# Patient Record
Sex: MALE | Race: WHITE | Employment: OTHER | URBAN - METROPOLITAN AREA
[De-identification: names, ages, dates, MRNs, and addresses within clinical notes are randomized per-mention and may not be internally consistent; named-entity substitution may affect disease eponyms.]

---

## 2017-01-20 ENCOUNTER — HOSPITAL ENCOUNTER (OUTPATIENT)
Dept: GENERAL RADIOLOGY | Facility: HOSPITAL | Age: 82
Discharge: HOME | End: 2017-01-20
Attending: NURSE PRACTITIONER
Payer: MEDICARE

## 2017-01-20 ENCOUNTER — ANESTHESIA EVENT (OUTPATIENT)
Dept: SURGERY | Facility: HOSPITAL | Age: 82
DRG: 235 | End: 2017-01-20
Payer: MEDICARE

## 2017-01-20 ENCOUNTER — HOSPITAL ENCOUNTER (OUTPATIENT)
Dept: ULTRASOUND IMAGING | Facility: HOSPITAL | Age: 82
Discharge: HOME | End: 2017-01-20
Attending: PHYSICIAN ASSISTANT
Payer: MEDICARE

## 2017-01-20 ENCOUNTER — HOSPITAL ENCOUNTER (OUTPATIENT)
Dept: CV DIAGNOSTICS | Facility: HOSPITAL | Age: 82
Discharge: HOME | End: 2017-01-20
Attending: PHYSICIAN ASSISTANT
Payer: MEDICARE

## 2017-01-20 DIAGNOSIS — R42 DIZZINESS: ICD-10-CM

## 2017-01-20 DIAGNOSIS — I25.119 ATHEROSCLEROTIC HEART DISEASE OF NATIVE CORONARY ARTERY WITH UNSPECIFIED ANGINA PECTORIS (HCC): ICD-10-CM

## 2017-01-20 LAB
ALBUMIN SERPL BCP-MCNC: 3.8 G/DL (ref 3.5–4.8)
ALBUMIN/GLOB SERPL: 1.5 {RATIO} (ref 1–2)
ALP SERPL-CCNC: 56 U/L (ref 32–100)
ALT SERPL-CCNC: 18 U/L (ref 17–63)
ANION GAP SERPL CALC-SCNC: 7 MMOL/L (ref 0–18)
ANTIBODY SCREEN: NEGATIVE
AST SERPL-CCNC: 22 U/L (ref 15–41)
BACTERIA UR QL AUTO: NEGATIVE /HPF
BASOPHILS # BLD: 0 K/UL (ref 0–0.2)
BASOPHILS NFR BLD: 1 %
BILIRUB SERPL-MCNC: 0.9 MG/DL (ref 0.3–1.2)
BILIRUB UR QL: NEGATIVE
BUN SERPL-MCNC: 20 MG/DL (ref 8–20)
BUN/CREAT SERPL: 14 (ref 10–20)
CALCIUM SERPL-MCNC: 9.7 MG/DL (ref 8.5–10.5)
CHLORIDE SERPL-SCNC: 102 MMOL/L (ref 95–110)
CLARITY UR: CLEAR
CO2 SERPL-SCNC: 31 MMOL/L (ref 22–32)
COLOR UR: YELLOW
CREAT SERPL-MCNC: 1.43 MG/DL (ref 0.5–1.5)
EOSINOPHIL # BLD: 0.2 K/UL (ref 0–0.7)
EOSINOPHIL NFR BLD: 3 %
ERYTHROCYTE [DISTWIDTH] IN BLOOD BY AUTOMATED COUNT: 13.5 % (ref 11–15)
GLOBULIN PLAS-MCNC: 2.5 G/DL (ref 2.5–3.7)
GLUCOSE SERPL-MCNC: 81 MG/DL (ref 70–99)
GLUCOSE UR-MCNC: NEGATIVE MG/DL
HBA1C MFR BLD: 5.5 % (ref 4–6)
HCT VFR BLD AUTO: 45.4 % (ref 41–52)
HGB BLD-MCNC: 15.1 G/DL (ref 13.5–17.5)
HGB UR QL STRIP.AUTO: NEGATIVE
HYALINE CASTS #/AREA URNS AUTO: 1 /LPF
INR BLD: 1 (ref 0.9–1.2)
KETONES UR-MCNC: NEGATIVE MG/DL
LEUKOCYTE ESTERASE UR QL STRIP.AUTO: NEGATIVE
LYMPHOCYTES # BLD: 1.6 K/UL (ref 1–4)
LYMPHOCYTES NFR BLD: 25 %
MAGNESIUM SERPL-MCNC: 1.9 MG/DL (ref 1.8–2.5)
MCH RBC QN AUTO: 31.1 PG (ref 27–32)
MCHC RBC AUTO-ENTMCNC: 33.2 G/DL (ref 32–37)
MCV RBC AUTO: 93.6 FL (ref 80–100)
MONOCYTES # BLD: 0.7 K/UL (ref 0–1)
MONOCYTES NFR BLD: 11 %
MRSA NASAL: NEGATIVE
NEUTROPHILS # BLD AUTO: 3.8 K/UL (ref 1.8–7.7)
NEUTROPHILS NFR BLD: 60 %
NITRITE UR QL STRIP.AUTO: NEGATIVE
OSMOLALITY UR CALC.SUM OF ELEC: 292 MOSM/KG (ref 275–295)
PH UR: 6 [PH] (ref 5–8)
PLATELET # BLD AUTO: 219 K/UL (ref 140–400)
PMV BLD AUTO: 8.5 FL (ref 7.4–10.3)
POTASSIUM SERPL-SCNC: 4.3 MMOL/L (ref 3.3–5.1)
PROT SERPL-MCNC: 6.3 G/DL (ref 5.9–8.4)
PROT UR-MCNC: NEGATIVE MG/DL
PROTHROMBIN TIME: 12.6 SECONDS (ref 11.8–14.5)
RBC # BLD AUTO: 4.85 M/UL (ref 4.5–5.9)
RBC #/AREA URNS AUTO: <1 /HPF
RH BLOOD TYPE: POSITIVE
SODIUM SERPL-SCNC: 140 MMOL/L (ref 136–144)
SP GR UR STRIP: 1.02 (ref 1–1.03)
STAPH A BY PCR: NEGATIVE
UROBILINOGEN UR STRIP-ACNC: <2
VIT C UR-MCNC: 20 MG/DL
WBC # BLD AUTO: 6.3 K/UL (ref 4–11)
WBC #/AREA URNS AUTO: <1 /HPF

## 2017-01-20 PROCEDURE — 93306 TTE W/DOPPLER COMPLETE: CPT | Performed by: INTERNAL MEDICINE

## 2017-01-20 PROCEDURE — 86901 BLOOD TYPING SEROLOGIC RH(D): CPT | Performed by: NURSE PRACTITIONER

## 2017-01-20 PROCEDURE — 71020 XR CHEST PA + LAT CHEST (CPT=71020): CPT

## 2017-01-20 PROCEDURE — 81003 URINALYSIS AUTO W/O SCOPE: CPT | Performed by: NURSE PRACTITIONER

## 2017-01-20 PROCEDURE — 85610 PROTHROMBIN TIME: CPT | Performed by: NURSE PRACTITIONER

## 2017-01-20 PROCEDURE — 86900 BLOOD TYPING SEROLOGIC ABO: CPT | Performed by: NURSE PRACTITIONER

## 2017-01-20 PROCEDURE — 83735 ASSAY OF MAGNESIUM: CPT | Performed by: NURSE PRACTITIONER

## 2017-01-20 PROCEDURE — 94667 MNPJ CHEST WALL 1ST: CPT

## 2017-01-20 PROCEDURE — 93010 ELECTROCARDIOGRAM REPORT: CPT | Performed by: NURSE PRACTITIONER

## 2017-01-20 PROCEDURE — 93306 TTE W/DOPPLER COMPLETE: CPT

## 2017-01-20 PROCEDURE — 93880 EXTRACRANIAL BILAT STUDY: CPT

## 2017-01-20 PROCEDURE — 86850 RBC ANTIBODY SCREEN: CPT | Performed by: NURSE PRACTITIONER

## 2017-01-20 PROCEDURE — 86920 COMPATIBILITY TEST SPIN: CPT

## 2017-01-20 PROCEDURE — 93005 ELECTROCARDIOGRAM TRACING: CPT

## 2017-01-20 PROCEDURE — 80053 COMPREHEN METABOLIC PANEL: CPT | Performed by: NURSE PRACTITIONER

## 2017-01-20 PROCEDURE — 83036 HEMOGLOBIN GLYCOSYLATED A1C: CPT | Performed by: NURSE PRACTITIONER

## 2017-01-20 PROCEDURE — 85025 COMPLETE CBC W/AUTO DIFF WBC: CPT | Performed by: NURSE PRACTITIONER

## 2017-01-20 NOTE — PROGRESS NOTES
Misc. Note    Met with patient and wife for pre and post-op teaching for open heart surgery 1/25/17 with Dr. Jennifer Johnston. Questions answered consents signed.  Pre-op Instructions reviewed in regards to showers; arrival time; NPO after midnight; medications to s

## 2017-01-20 NOTE — ANESTHESIA PREPROCEDURE EVALUATION
Anesthesia PreOp Note    HPI:     Fernie Howell is a 80year old male who presents for preoperative consultation requested by: Sukumar Krishnamurthy MD    Date of Surgery: 1/25/2017    Procedure(s):   HEART CORONARY ARTERY BYPASS GRAFT  ENDOSCOPIC LEG VEIN H 01/20/2017   MCHC 33.2 01/20/2017   RDW 13.5 01/20/2017    01/20/2017   MPV 8.5 01/20/2017          Lab Results  Component Value Date   INR 1.0 01/20/2017       Vital Signs:   height is 1.74 m (5' 8.5\") and weight is 77.111 kg (170 lb).     01/20/17

## 2017-01-25 ENCOUNTER — SURGERY (OUTPATIENT)
Age: 82
End: 2017-01-25

## 2017-01-25 ENCOUNTER — ANESTHESIA (OUTPATIENT)
Dept: SURGERY | Facility: HOSPITAL | Age: 82
DRG: 235 | End: 2017-01-25
Payer: MEDICARE

## 2017-01-25 ENCOUNTER — APPOINTMENT (OUTPATIENT)
Dept: GENERAL RADIOLOGY | Facility: HOSPITAL | Age: 82
DRG: 235 | End: 2017-01-25
Attending: CLINICAL NURSE SPECIALIST
Payer: MEDICARE

## 2017-01-25 ENCOUNTER — HOSPITAL ENCOUNTER (INPATIENT)
Facility: HOSPITAL | Age: 82
LOS: 6 days | Discharge: NURSING, REHAB OR LONG-TERM CARE | DRG: 235 | End: 2017-01-31
Attending: THORACIC SURGERY (CARDIOTHORACIC VASCULAR SURGERY) | Admitting: THORACIC SURGERY (CARDIOTHORACIC VASCULAR SURGERY)
Payer: MEDICARE

## 2017-01-25 DIAGNOSIS — I25.10 CORONARY ARTERY DISEASE INVOLVING NATIVE HEART, ANGINA PRESENCE UNSPECIFIED, UNSPECIFIED VESSEL OR LESION TYPE: Primary | ICD-10-CM

## 2017-01-25 LAB
ANION GAP SERPL CALC-SCNC: 15 MMOL/L (ref 0–18)
ANION GAP SERPL CALC-SCNC: 9 MMOL/L (ref 0–18)
APTT PPP: 61 SECONDS (ref 23.2–35.3)
BASE EXCESS BLD CALC-SCNC: -0.6 MMOL/L (ref ?–2)
BASE EXCESS BLD CALC-SCNC: -1.3 MMOL/L (ref ?–2)
BASE EXCESS BLD CALC-SCNC: -2.8 MMOL/L (ref ?–2)
BASE EXCESS BLD CALC-SCNC: -3.1 MMOL/L (ref ?–2)
BASE EXCESS BLD CALC-SCNC: -3.1 MMOL/L (ref ?–2)
BASE EXCESS BLD CALC-SCNC: -3.6 MMOL/L (ref ?–2)
BLOOD TYPE BARCODE: 5100
BUN SERPL-MCNC: 18 MG/DL (ref 8–20)
BUN SERPL-MCNC: 21 MG/DL (ref 8–20)
BUN/CREAT SERPL: 12.4 (ref 10–20)
BUN/CREAT SERPL: 16.2 (ref 10–20)
CA-I BLD-SCNC: 1.18 MMOL/L (ref 0.95–1.32)
CALCIUM SERPL-MCNC: 7.4 MG/DL (ref 8.5–10.5)
CALCIUM SERPL-MCNC: 7.9 MG/DL (ref 8.5–10.5)
CFT BLD TEG: 11.4 MIN (ref 5–10)
CFT BLD TEG: 4.2 MIN (ref 5–10)
CFT P HPASE BLD TEG: 11.8 MIN (ref 5–10)
CHLORIDE SERPL-SCNC: 110 MMOL/L (ref 95–110)
CHLORIDE SERPL-SCNC: 114 MMOL/L (ref 95–110)
CLOT ANGLE BLD TEG: 49.7 DEG (ref 53–72)
CLOT ANGLE BLD TEG: 74.8 DEG (ref 53–72)
CLOT ANGLE P HPASE BLD TEG: 50.3 DEG (ref 53–72)
CLOT LYSIS 30M P MA LENFR BLD TEG: 0 % (ref 0–8)
CLOT LYSIS 30M P MA LENFR BLD TEG: 0 % (ref 0–8)
CLOT LYSIS ESTIMATE PRCTL BLD TEG: 10.2 MIN
CLOT LYSIS ESTIMATE PRCTL BLD TEG: 3.9 MIN
CLOT STRENGTH BLD TEG: 1 MIN (ref 1–3)
CLOT STRENGTH BLD TEG: 3.2 MIN (ref 1–3)
CLOT STRENGTH BLD TEG: 5 KD/SC (ref 4.5–11)
CLOT STRENGTH BLD TEG: 9.6 KD/SC (ref 4.5–11)
CLOT STRENGTH P HPASE BLD TEG: 2.8 MIN (ref 1–3)
CLOT STRENGTH P HPASE BLD TEG: 6.4 KD/SC (ref 4.5–11)
CLOT STRENGTH P HPASE BLD TEG: 9.8 MIN
CO2 SERPL-SCNC: 18 MMOL/L (ref 22–32)
CO2 SERPL-SCNC: 21 MMOL/L (ref 22–32)
COHGB MFR BLD: 1.5 % (ref 0–1.5)
COHGB MFR BLD: 1.6 % (ref 0–1.5)
COHGB MFR BLD: <1.5 % (ref 0–1.5)
CREAT SERPL-MCNC: 1.11 MG/DL (ref 0.5–1.5)
CREAT SERPL-MCNC: 1.69 MG/DL (ref 0.5–1.5)
ERYTHROCYTE [DISTWIDTH] IN BLOOD BY AUTOMATED COUNT: 13.3 % (ref 11–15)
FIBRINOGEN PPP-MCNC: 164 MG/DL (ref 176–491)
GLUCOSE BLDC GLUCOMTR-MCNC: 105 MG/DL (ref 70–99)
GLUCOSE BLDC GLUCOMTR-MCNC: 116 MG/DL (ref 70–99)
GLUCOSE BLDC GLUCOMTR-MCNC: 121 MG/DL (ref 70–99)
GLUCOSE BLDC GLUCOMTR-MCNC: 127 MG/DL (ref 70–99)
GLUCOSE BLDC GLUCOMTR-MCNC: 136 MG/DL (ref 70–99)
GLUCOSE BLDC GLUCOMTR-MCNC: 141 MG/DL (ref 70–99)
GLUCOSE BLDC GLUCOMTR-MCNC: 145 MG/DL (ref 70–99)
GLUCOSE BLDC GLUCOMTR-MCNC: 148 MG/DL (ref 70–99)
GLUCOSE BLDC GLUCOMTR-MCNC: 149 MG/DL (ref 70–99)
GLUCOSE BLDC GLUCOMTR-MCNC: 152 MG/DL (ref 70–99)
GLUCOSE BLDC GLUCOMTR-MCNC: 164 MG/DL (ref 70–99)
GLUCOSE BLDC GLUCOMTR-MCNC: 171 MG/DL (ref 70–99)
GLUCOSE BLDC GLUCOMTR-MCNC: 173 MG/DL (ref 70–99)
GLUCOSE BLDC GLUCOMTR-MCNC: 175 MG/DL (ref 70–99)
GLUCOSE BLDC GLUCOMTR-MCNC: 179 MG/DL (ref 70–99)
GLUCOSE BLDC GLUCOMTR-MCNC: 183 MG/DL (ref 70–99)
GLUCOSE BLDC GLUCOMTR-MCNC: 79 MG/DL (ref 70–99)
GLUCOSE SERPL-MCNC: 133 MG/DL (ref 70–99)
GLUCOSE SERPL-MCNC: 196 MG/DL (ref 70–99)
HCO3 BLDA-SCNC: 20.1 MEQ/L (ref 21–27)
HCO3 BLDA-SCNC: 20.9 MEQ/L (ref 21–27)
HCO3 BLDA-SCNC: 21 MEQ/L (ref 21–27)
HCO3 BLDA-SCNC: 22 MEQ/L (ref 21–27)
HCO3 BLDA-SCNC: 23.1 MEQ/L (ref 21–27)
HCO3 BLDA-SCNC: 23.6 MEQ/L (ref 21–27)
HCT VFR BLD AUTO: 39.9 % (ref 41–52)
HGB BLD-MCNC: 12.4 G/DL (ref 13.5–17.5)
HGB BLD-MCNC: 12.5 G/DL (ref 13.5–17.5)
HGB BLD-MCNC: 13.1 G/DL (ref 13.5–17.5)
HGB BLD-MCNC: 7.9 G/DL (ref 13.5–17.5)
HGB BLD-MCNC: 8.5 G/DL (ref 13.5–17.5)
HGB BLD-MCNC: 8.6 G/DL (ref 13.5–17.5)
HGB BLD-MCNC: 9.5 G/DL (ref 13.5–17.5)
INR BLD: 2.3 (ref 0.9–1.2)
MAGNESIUM SERPL-MCNC: 2.2 MG/DL (ref 1.8–2.5)
MAGNESIUM SERPL-MCNC: 2.6 MG/DL (ref 1.8–2.5)
MCF BLD TEG: 40.4 MM
MCF BLD TEG: 49.8 MM (ref 50–70)
MCF BLD TEG: 55.4 MM
MCF BLD TEG: 65.8 MM (ref 50–70)
MCF P HPASE BLD TEG: 56 MM (ref 50–70)
MCH RBC QN AUTO: 30.8 PG (ref 27–32)
MCHC RBC AUTO-ENTMCNC: 32.9 G/DL (ref 32–37)
MCV RBC AUTO: 93.6 FL (ref 80–100)
METHGB MFR BLD: 0 % SAT (ref 0.4–1.5)
METHGB MFR BLD: 0 % SAT (ref 0.4–1.5)
METHGB MFR BLD: 0.8 % SAT (ref 0.4–1.5)
METHGB MFR BLD: 1 % SAT (ref 0.4–1.5)
METHGB MFR BLD: 1.5 % SAT (ref 0.4–1.5)
METHGB MFR BLD: 1.7 % SAT (ref 0.4–1.5)
O2 CT BLD-SCNC: 11.7 VOL% (ref 15–23)
O2 CT BLD-SCNC: 12.4 VOL% (ref 15–23)
O2 CT BLD-SCNC: 12.5 VOL% (ref 15–23)
O2 CT BLD-SCNC: 13.6 VOL% (ref 15–23)
O2 CT BLD-SCNC: 17.7 VOL% (ref 15–23)
O2 CT BLD-SCNC: 17.7 VOL% (ref 15–23)
O2/TOTAL GAS SETTING VFR VENT: 60 %
OSMOLALITY UR CALC.SUM OF ELEC: 302 MOSM/KG (ref 275–295)
OSMOLALITY UR CALC.SUM OF ELEC: 304 MOSM/KG (ref 275–295)
PCO2 BLDA: 34 MM HG (ref 35–45)
PCO2 BLDA: 35 MM HG (ref 35–45)
PCO2 BLDA: 36 MM HG (ref 35–45)
PCO2 BLDA: 40 MM HG (ref 35–45)
PCO2 BLDA: 40 MM HG (ref 35–45)
PCO2 BLDA: 41 MM HG (ref 35–45)
PEEP SETTING VENT: 5 CM H2O
PH BLDA: 7.35 [PH] (ref 7.35–7.45)
PH BLDA: 7.38 [PH] (ref 7.35–7.45)
PH BLDA: 7.38 [PH] (ref 7.35–7.45)
PH BLDA: 7.39 [PH] (ref 7.35–7.45)
PH BLDA: 7.39 [PH] (ref 7.35–7.45)
PH BLDA: 7.4 [PH] (ref 7.35–7.45)
PLATELET # BLD AUTO: 99 K/UL (ref 140–400)
PLATELET MAPPING % INHIBITION (AA): 48 %
PLATELET MAPPING % INHIBITION (ADP): 20 %
PMV BLD AUTO: 8.2 FL (ref 7.4–10.3)
PO2 BLDA: 183 MM HG (ref 80–100)
PO2 BLDA: 262 MM HG (ref 80–100)
PO2 BLDA: 278 MM HG (ref 80–100)
PO2 BLDA: 300 MM HG (ref 80–100)
PO2 BLDA: 306 MM HG (ref 80–100)
PO2 BLDA: 309 MM HG (ref 80–100)
PO2 SATN ADJ TO 0.5 BLD: 26 MMHG (ref 24–28)
PO2 SATN ADJ TO 0.5 BLD: 27 MMHG (ref 24–28)
PO2 SATN ADJ TO 0.5 BLD: 27 MMHG (ref 24–28)
PO2 SATN ADJ TO 0.5 BLD: 28 MMHG (ref 24–28)
POTASSIUM (BLOOD GAS): 3.2 MMOL/L (ref 3.3–5.1)
POTASSIUM (BLOOD GAS): 3.5 MMOL/L (ref 3.3–5.1)
POTASSIUM (BLOOD GAS): 3.7 MMOL/L (ref 3.3–5.1)
POTASSIUM (BLOOD GAS): 4.1 MMOL/L (ref 3.3–5.1)
POTASSIUM (BLOOD GAS): 4.1 MMOL/L (ref 3.3–5.1)
POTASSIUM (BLOOD GAS): 4.2 MMOL/L (ref 3.3–5.1)
POTASSIUM SERPL-SCNC: 3 MMOL/L (ref 3.3–5.1)
POTASSIUM SERPL-SCNC: 4 MMOL/L (ref 3.3–5.1)
PRESSURE SUPPORT SETTING VENT: 10 CM H2O
PROTHROMBIN TIME: 25.5 SECONDS (ref 11.8–14.5)
PUNCTURE CHARGE: NO
RBC # BLD AUTO: 4.26 M/UL (ref 4.5–5.9)
RESP RATE: 10 BPM
SAO2 % BLDA: >99 % (ref 94–100)
SODIUM (BLOOD GAS): 143 MMOL/L (ref 135–145)
SODIUM (BLOOD GAS): 145 MMOL/L (ref 135–145)
SODIUM (BLOOD GAS): 146 MMOL/L (ref 135–145)
SODIUM (BLOOD GAS): 150 MMOL/L (ref 135–145)
SODIUM SERPL-SCNC: 143 MMOL/L (ref 136–144)
SODIUM SERPL-SCNC: 144 MMOL/L (ref 136–144)
SPECIMEN VOL 24H UR: 700 ML
TSH SERPL-ACNC: 1.62 UIU/ML (ref 0.34–5.6)
WBC # BLD AUTO: 21 K/UL (ref 4–11)

## 2017-01-25 PROCEDURE — 02100Z9 BYPASS CORONARY ARTERY, ONE ARTERY FROM LEFT INTERNAL MAMMARY, OPEN APPROACH: ICD-10-PCS | Performed by: THORACIC SURGERY (CARDIOTHORACIC VASCULAR SURGERY)

## 2017-01-25 PROCEDURE — B24BZZ4 ULTRASONOGRAPHY OF HEART WITH AORTA, TRANSESOPHAGEAL: ICD-10-PCS | Performed by: ANESTHESIOLOGY

## 2017-01-25 PROCEDURE — 5A1221Z PERFORMANCE OF CARDIAC OUTPUT, CONTINUOUS: ICD-10-PCS | Performed by: ANESTHESIOLOGY

## 2017-01-25 PROCEDURE — 71010 XR CHEST AP PORTABLE  (CPT=71010): CPT

## 2017-01-25 PROCEDURE — 93312 ECHO TRANSESOPHAGEAL: CPT | Performed by: ANESTHESIOLOGY

## 2017-01-25 PROCEDURE — 99223 1ST HOSP IP/OBS HIGH 75: CPT | Performed by: INTERNAL MEDICINE

## 2017-01-25 PROCEDURE — 021209W BYPASS CORONARY ARTERY, THREE ARTERIES FROM AORTA WITH AUTOLOGOUS VENOUS TISSUE, OPEN APPROACH: ICD-10-PCS | Performed by: THORACIC SURGERY (CARDIOTHORACIC VASCULAR SURGERY)

## 2017-01-25 PROCEDURE — 06BQ4ZZ EXCISION OF LEFT SAPHENOUS VEIN, PERCUTANEOUS ENDOSCOPIC APPROACH: ICD-10-PCS | Performed by: THORACIC SURGERY (CARDIOTHORACIC VASCULAR SURGERY)

## 2017-01-25 RX ORDER — HEPARIN SODIUM 1000 [USP'U]/ML
INJECTION, SOLUTION INTRAVENOUS; SUBCUTANEOUS AS NEEDED
Status: DISCONTINUED | OUTPATIENT
Start: 2017-01-25 | End: 2017-01-25 | Stop reason: HOSPADM

## 2017-01-25 RX ORDER — EPHEDRINE SULFATE 50 MG/ML
INJECTION, SOLUTION INTRAVENOUS AS NEEDED
Status: DISCONTINUED | OUTPATIENT
Start: 2017-01-25 | End: 2017-01-25 | Stop reason: SURG

## 2017-01-25 RX ORDER — MIDAZOLAM HYDROCHLORIDE 1 MG/ML
INJECTION INTRAMUSCULAR; INTRAVENOUS AS NEEDED
Status: DISCONTINUED | OUTPATIENT
Start: 2017-01-25 | End: 2017-01-25 | Stop reason: SURG

## 2017-01-25 RX ORDER — DOBUTAMINE HYDROCHLORIDE 100 MG/100ML
INJECTION INTRAVENOUS CONTINUOUS PRN
Status: DISCONTINUED | OUTPATIENT
Start: 2017-01-25 | End: 2017-01-25 | Stop reason: SURG

## 2017-01-25 RX ORDER — METOCLOPRAMIDE 10 MG/1
10 TABLET ORAL ONCE
Status: COMPLETED | OUTPATIENT
Start: 2017-01-25 | End: 2017-01-25

## 2017-01-25 RX ORDER — ALBUMIN, HUMAN INJ 5% 5 %
SOLUTION INTRAVENOUS
Status: COMPLETED
Start: 2017-01-25 | End: 2017-01-25

## 2017-01-25 RX ORDER — DEXAMETHASONE SODIUM PHOSPHATE 4 MG/ML
VIAL (ML) INJECTION AS NEEDED
Status: DISCONTINUED | OUTPATIENT
Start: 2017-01-25 | End: 2017-01-25 | Stop reason: SURG

## 2017-01-25 RX ORDER — TEMAZEPAM 15 MG/1
15 CAPSULE ORAL NIGHTLY PRN
Status: DISCONTINUED | OUTPATIENT
Start: 2017-01-25 | End: 2017-01-31

## 2017-01-25 RX ORDER — VECURONIUM BROMIDE 1 MG/ML
INJECTION, POWDER, LYOPHILIZED, FOR SOLUTION INTRAVENOUS AS NEEDED
Status: DISCONTINUED | OUTPATIENT
Start: 2017-01-25 | End: 2017-01-25 | Stop reason: SURG

## 2017-01-25 RX ORDER — PAPAVERINE HYDROCHLORIDE 30 MG/ML
INJECTION INTRAMUSCULAR; INTRAVENOUS AS NEEDED
Status: DISCONTINUED | OUTPATIENT
Start: 2017-01-25 | End: 2017-01-25 | Stop reason: HOSPADM

## 2017-01-25 RX ORDER — FAMOTIDINE 20 MG/1
20 TABLET ORAL DAILY
Status: DISCONTINUED | OUTPATIENT
Start: 2017-01-25 | End: 2017-01-31

## 2017-01-25 RX ORDER — ACETAMINOPHEN 10 MG/ML
1000 INJECTION, SOLUTION INTRAVENOUS EVERY 8 HOURS
Status: COMPLETED | OUTPATIENT
Start: 2017-01-25 | End: 2017-01-26

## 2017-01-25 RX ORDER — BISACODYL 10 MG
10 SUPPOSITORY, RECTAL RECTAL
Status: DISCONTINUED | OUTPATIENT
Start: 2017-01-25 | End: 2017-01-31

## 2017-01-25 RX ORDER — MORPHINE SULFATE 2 MG/ML
2 INJECTION, SOLUTION INTRAMUSCULAR; INTRAVENOUS EVERY 2 HOUR PRN
Status: DISCONTINUED | OUTPATIENT
Start: 2017-01-25 | End: 2017-01-31

## 2017-01-25 RX ORDER — ALBUMIN, HUMAN INJ 5% 5 %
250 SOLUTION INTRAVENOUS ONCE AS NEEDED
Status: COMPLETED | OUTPATIENT
Start: 2017-01-25 | End: 2017-01-25

## 2017-01-25 RX ORDER — MORPHINE SULFATE 4 MG/ML
8 INJECTION, SOLUTION INTRAMUSCULAR; INTRAVENOUS EVERY 2 HOUR PRN
Status: DISCONTINUED | OUTPATIENT
Start: 2017-01-25 | End: 2017-01-31

## 2017-01-25 RX ORDER — METOCLOPRAMIDE HYDROCHLORIDE 5 MG/ML
10 INJECTION INTRAMUSCULAR; INTRAVENOUS EVERY 6 HOURS
Status: DISCONTINUED | OUTPATIENT
Start: 2017-01-25 | End: 2017-01-28

## 2017-01-25 RX ORDER — ASCORBIC ACID 500 MG
500 TABLET ORAL 3 TIMES DAILY
Status: DISCONTINUED | OUTPATIENT
Start: 2017-01-26 | End: 2017-01-31

## 2017-01-25 RX ORDER — ALBUMIN (HUMAN) 12.5 G/50ML
SOLUTION INTRAVENOUS
Status: COMPLETED
Start: 2017-01-25 | End: 2017-01-25

## 2017-01-25 RX ORDER — FAMOTIDINE 20 MG/1
20 TABLET ORAL ONCE
Status: COMPLETED | OUTPATIENT
Start: 2017-01-25 | End: 2017-01-25

## 2017-01-25 RX ORDER — POTASSIUM CHLORIDE 29.8 MG/ML
40 INJECTION INTRAVENOUS AS NEEDED
Status: DISCONTINUED | OUTPATIENT
Start: 2017-01-25 | End: 2017-01-31

## 2017-01-25 RX ORDER — GLYCOPYRROLATE 0.2 MG/ML
0.6 INJECTION, SOLUTION INTRAMUSCULAR; INTRAVENOUS ONCE
Status: COMPLETED | OUTPATIENT
Start: 2017-01-25 | End: 2017-01-25

## 2017-01-25 RX ORDER — NITROGLYCERIN 20 MG/100ML
INJECTION INTRAVENOUS CONTINUOUS PRN
Status: DISCONTINUED | OUTPATIENT
Start: 2017-01-25 | End: 2017-01-28

## 2017-01-25 RX ORDER — PROTAMINE SULFATE 10 MG/ML
INJECTION, SOLUTION INTRAVENOUS AS NEEDED
Status: DISCONTINUED | OUTPATIENT
Start: 2017-01-25 | End: 2017-01-25 | Stop reason: SURG

## 2017-01-25 RX ORDER — PROTAMINE SULFATE 10 MG/ML
INJECTION, SOLUTION INTRAVENOUS
Status: COMPLETED
Start: 2017-01-25 | End: 2017-01-25

## 2017-01-25 RX ORDER — LIDOCAINE HYDROCHLORIDE 10 MG/ML
INJECTION, SOLUTION EPIDURAL; INFILTRATION; INTRACAUDAL; PERINEURAL AS NEEDED
Status: DISCONTINUED | OUTPATIENT
Start: 2017-01-25 | End: 2017-01-25 | Stop reason: SURG

## 2017-01-25 RX ORDER — CLOPIDOGREL BISULFATE 75 MG/1
75 TABLET ORAL DAILY
Status: DISCONTINUED | OUTPATIENT
Start: 2017-01-26 | End: 2017-01-31

## 2017-01-25 RX ORDER — ALBUMIN, HUMAN INJ 5% 5 %
250 SOLUTION INTRAVENOUS ONCE
Status: COMPLETED | OUTPATIENT
Start: 2017-01-25 | End: 2017-01-25

## 2017-01-25 RX ORDER — DOBUTAMINE HYDROCHLORIDE 100 MG/100ML
INJECTION INTRAVENOUS CONTINUOUS PRN
Status: DISCONTINUED | OUTPATIENT
Start: 2017-01-25 | End: 2017-01-27

## 2017-01-25 RX ORDER — MAGNESIUM SULFATE HEPTAHYDRATE 40 MG/ML
2 INJECTION, SOLUTION INTRAVENOUS AS NEEDED
Status: DISCONTINUED | OUTPATIENT
Start: 2017-01-25 | End: 2017-01-31

## 2017-01-25 RX ORDER — ASPIRIN 81 MG/1
81 TABLET, CHEWABLE ORAL DAILY
Status: DISCONTINUED | OUTPATIENT
Start: 2017-01-26 | End: 2017-01-28

## 2017-01-25 RX ORDER — MORPHINE SULFATE 2 MG/ML
2 INJECTION, SOLUTION INTRAMUSCULAR; INTRAVENOUS ONCE
Status: COMPLETED | OUTPATIENT
Start: 2017-01-25 | End: 2017-01-25

## 2017-01-25 RX ORDER — FAMOTIDINE 10 MG/ML
20 INJECTION, SOLUTION INTRAVENOUS DAILY
Status: DISCONTINUED | OUTPATIENT
Start: 2017-01-25 | End: 2017-01-31

## 2017-01-25 RX ORDER — ENOXAPARIN SODIUM 100 MG/ML
40 INJECTION SUBCUTANEOUS DAILY
Status: DISCONTINUED | OUTPATIENT
Start: 2017-01-26 | End: 2017-01-26

## 2017-01-25 RX ORDER — LORAZEPAM 1 MG/1
1 TABLET ORAL ONCE
Status: COMPLETED | OUTPATIENT
Start: 2017-01-25 | End: 2017-01-25

## 2017-01-25 RX ORDER — SODIUM CHLORIDE 9 MG/ML
INJECTION, SOLUTION INTRAVENOUS CONTINUOUS PRN
Status: DISCONTINUED | OUTPATIENT
Start: 2017-01-25 | End: 2017-01-25 | Stop reason: SURG

## 2017-01-25 RX ORDER — ONDANSETRON 2 MG/ML
INJECTION INTRAMUSCULAR; INTRAVENOUS AS NEEDED
Status: DISCONTINUED | OUTPATIENT
Start: 2017-01-25 | End: 2017-01-25 | Stop reason: SURG

## 2017-01-25 RX ORDER — ACETAMINOPHEN 325 MG/1
650 TABLET ORAL EVERY 4 HOURS PRN
Status: DISCONTINUED | OUTPATIENT
Start: 2017-01-26 | End: 2017-01-31

## 2017-01-25 RX ORDER — CHLORHEXIDINE GLUCONATE 0.12 MG/ML
15 RINSE ORAL
Status: DISCONTINUED | OUTPATIENT
Start: 2017-01-25 | End: 2017-01-25

## 2017-01-25 RX ORDER — DOXEPIN HYDROCHLORIDE 50 MG/1
1 CAPSULE ORAL DAILY
Status: DISCONTINUED | OUTPATIENT
Start: 2017-01-26 | End: 2017-01-31

## 2017-01-25 RX ORDER — SODIUM CHLORIDE 9 MG/ML
83 INJECTION, SOLUTION INTRAVENOUS CONTINUOUS
Status: DISCONTINUED | OUTPATIENT
Start: 2017-01-25 | End: 2017-01-26

## 2017-01-25 RX ORDER — HEPARIN SODIUM 1000 [USP'U]/ML
INJECTION, SOLUTION INTRAVENOUS; SUBCUTANEOUS AS NEEDED
Status: DISCONTINUED | OUTPATIENT
Start: 2017-01-25 | End: 2017-01-25 | Stop reason: SURG

## 2017-01-25 RX ORDER — HYDROCODONE BITARTRATE AND ACETAMINOPHEN 5; 325 MG/1; MG/1
2 TABLET ORAL EVERY 4 HOURS PRN
Status: DISCONTINUED | OUTPATIENT
Start: 2017-01-26 | End: 2017-01-31

## 2017-01-25 RX ORDER — HYDROCODONE BITARTRATE AND ACETAMINOPHEN 5; 325 MG/1; MG/1
1 TABLET ORAL EVERY 4 HOURS PRN
Status: DISCONTINUED | OUTPATIENT
Start: 2017-01-26 | End: 2017-01-31

## 2017-01-25 RX ORDER — DEXTROSE, SODIUM CHLORIDE, SODIUM LACTATE, POTASSIUM CHLORIDE, AND CALCIUM CHLORIDE 5; .6; .31; .03; .02 G/100ML; G/100ML; G/100ML; G/100ML; G/100ML
INJECTION, SOLUTION INTRAVENOUS CONTINUOUS
Status: DISCONTINUED | OUTPATIENT
Start: 2017-01-25 | End: 2017-01-28

## 2017-01-25 RX ORDER — SODIUM CHLORIDE 0.9 % (FLUSH) 0.9 %
10 SYRINGE (ML) INJECTION AS NEEDED
Status: DISCONTINUED | OUTPATIENT
Start: 2017-01-25 | End: 2017-01-31

## 2017-01-25 RX ORDER — SODIUM CHLORIDE 9 MG/ML
INJECTION, SOLUTION INTRAVENOUS CONTINUOUS
Status: DISCONTINUED | OUTPATIENT
Start: 2017-01-25 | End: 2017-01-26

## 2017-01-25 RX ORDER — NEOSTIGMINE METHYLSULFATE 0.5 MG/ML
3 INJECTION INTRAVENOUS ONCE
Status: COMPLETED | OUTPATIENT
Start: 2017-01-25 | End: 2017-01-25

## 2017-01-25 RX ORDER — ASCORBIC ACID 500 MG
1000 TABLET ORAL ONCE
Status: COMPLETED | OUTPATIENT
Start: 2017-01-25 | End: 2017-01-25

## 2017-01-25 RX ORDER — POTASSIUM CHLORIDE 14.9 MG/ML
20 INJECTION INTRAVENOUS AS NEEDED
Status: DISCONTINUED | OUTPATIENT
Start: 2017-01-25 | End: 2017-01-31

## 2017-01-25 RX ORDER — CHLORHEXIDINE GLUCONATE 0.12 MG/ML
15 RINSE ORAL 2 TIMES DAILY
Status: DISCONTINUED | OUTPATIENT
Start: 2017-01-26 | End: 2017-01-31

## 2017-01-25 RX ORDER — MORPHINE SULFATE 4 MG/ML
4 INJECTION, SOLUTION INTRAMUSCULAR; INTRAVENOUS EVERY 2 HOUR PRN
Status: DISCONTINUED | OUTPATIENT
Start: 2017-01-25 | End: 2017-01-31

## 2017-01-25 RX ORDER — ONDANSETRON 2 MG/ML
4 INJECTION INTRAMUSCULAR; INTRAVENOUS EVERY 6 HOURS PRN
Status: DISCONTINUED | OUTPATIENT
Start: 2017-01-25 | End: 2017-01-31

## 2017-01-25 RX ADMIN — PROTAMINE SULFATE 100 MG: 10 INJECTION, SOLUTION INTRAVENOUS at 11:35:00

## 2017-01-25 RX ADMIN — DEXAMETHASONE SODIUM PHOSPHATE 4 MG: 4 MG/ML VIAL (ML) INJECTION at 07:51:00

## 2017-01-25 RX ADMIN — HEPARIN SODIUM 3000 UNITS: 1000 INJECTION, SOLUTION INTRAVENOUS; SUBCUTANEOUS at 08:15:00

## 2017-01-25 RX ADMIN — VECURONIUM BROMIDE 10 MG: 1 INJECTION, POWDER, LYOPHILIZED, FOR SOLUTION INTRAVENOUS at 07:21:00

## 2017-01-25 RX ADMIN — VECURONIUM BROMIDE 2 MG: 1 INJECTION, POWDER, LYOPHILIZED, FOR SOLUTION INTRAVENOUS at 09:20:00

## 2017-01-25 RX ADMIN — PROTAMINE SULFATE 500 MG: 10 INJECTION, SOLUTION INTRAVENOUS at 11:15:00

## 2017-01-25 RX ADMIN — MIDAZOLAM HYDROCHLORIDE 2 MG: 1 INJECTION INTRAMUSCULAR; INTRAVENOUS at 07:01:00

## 2017-01-25 RX ADMIN — LIDOCAINE HYDROCHLORIDE 50 MG: 10 INJECTION, SOLUTION EPIDURAL; INFILTRATION; INTRACAUDAL; PERINEURAL at 07:21:00

## 2017-01-25 RX ADMIN — PROTAMINE SULFATE 50 MG: 10 INJECTION, SOLUTION INTRAVENOUS at 12:05:00

## 2017-01-25 RX ADMIN — EPHEDRINE SULFATE 5 MG: 50 INJECTION, SOLUTION INTRAVENOUS at 09:25:00

## 2017-01-25 RX ADMIN — EPHEDRINE SULFATE 5 MG: 50 INJECTION, SOLUTION INTRAVENOUS at 07:26:00

## 2017-01-25 RX ADMIN — EPHEDRINE SULFATE 5 MG: 50 INJECTION, SOLUTION INTRAVENOUS at 07:35:00

## 2017-01-25 RX ADMIN — SODIUM CHLORIDE: 9 INJECTION, SOLUTION INTRAVENOUS at 07:00:00

## 2017-01-25 RX ADMIN — VECURONIUM BROMIDE 2 MG: 1 INJECTION, POWDER, LYOPHILIZED, FOR SOLUTION INTRAVENOUS at 11:10:00

## 2017-01-25 RX ADMIN — SODIUM CHLORIDE: 9 INJECTION, SOLUTION INTRAVENOUS at 09:30:00

## 2017-01-25 RX ADMIN — DOBUTAMINE HYDROCHLORIDE 4 MCG/KG/MIN: 100 INJECTION INTRAVENOUS at 10:45:00

## 2017-01-25 RX ADMIN — ONDANSETRON 4 MG: 2 INJECTION INTRAMUSCULAR; INTRAVENOUS at 07:51:00

## 2017-01-25 RX ADMIN — HEPARIN SODIUM 32000 UNITS: 1000 INJECTION, SOLUTION INTRAVENOUS; SUBCUTANEOUS at 09:20:00

## 2017-01-25 NOTE — ANESTHESIA POSTPROCEDURE EVALUATION
Patient: Zak Cuellar    Procedure Summary     Date Anesthesia Start Anesthesia Stop Room / Location    01/25/17 0700 1246 [facility] [place] 18 / [facility] [place]       Procedure Diagnosis Surgeon Responsible Provider    HEART CORONARY ARTERY BYPASS GRAFT (N/A ) (c

## 2017-01-25 NOTE — CONSULTS
[facility]    PATIENT'S NAME: Garrick Terrell   ATTENDING PHYSICIAN: Martita Lucia. Josefa Schneider MD   CONSULTING PHYSICIAN: Nathan William.  Lulú Ravi MD   PATIENT ACCOUNT#:   [id number]    LOCATION:  [address] [place] [address] #:   [id number]       DATE OF BIRTH: PAST MEDICAL HISTORY:  Coronary artery disease, please see above; hypertension; hypothyroid; hyperlipidemia. PAST SURGICAL HISTORY:  No surgical history reported.     MEDICATIONS:  His medications at the time of Dr. Gary Walker visit included aspirin, Thyroid disease, per primary care physician. 6.   Thrombocytopenia and elevated white count, per CV Surgery. Continue to follow up with CBC. PLAN:  Postop day number 0, patient's blood pressure and heart rate stable.   Wean off dobutamine as tolerated

## 2017-01-25 NOTE — DISCHARGE PLANNING
SW received order for [facility] -  currently intubated. Will follow up when extubated 1/26.     Roldan Rg, [address]

## 2017-01-25 NOTE — PLAN OF CARE
Rec'd pt from OR s/p CABG x 4, AET 1246. Pt with ETT, mechanical vent settings as charted, R IJ cordis, swan in place. A line to left radial, hemodynamics as charted. Mediastinal chest tubes in place, drainage as charted. Temp pacer wires in place.

## 2017-01-25 NOTE — ANESTHESIA PROCEDURE NOTES
Arterial Line  Performed by: Nieves Lucia  Authorized by: Nieves Lucia    Procedure Start:  1/25/2017 7:01 AM  Procedure End:  1/25/2017 7:04 AM  Site Identification: surface landmarks    Patient Location:  OR  Indication: continuous blood pressure mon patient tolerated procedure well with no complications     Wedge not attempted  Procedure Performed: CELIA      Start Time:  1/25/2017 7:40 AM       End Time:      Preanesthesia Checklist:  Procedure being performed at surgeon's request.    General Procedure Septum:  Intra-ventricular septum morphology normal.          Other Findings  Pericardium:  normal  Pleural Effusion:  none      Anesthesia Information  Performed Personally  Anesthesiologist:  Tracie Brasher      Procedure Performed: CELIA     Start Time:

## 2017-01-25 NOTE — OPERATIVE REPORT
Kelli 45  Operative Note     Aiyana Snare Location: OR   [facility] [id number] MRN [id number]   Admission Date 1/25/2017 Operation Date 1/25/2017   Attending Physician Maribeth Krause MD Operating Physician Ermias Barnes MD      P bypass and cooled to 33°. The aorta was crossclamped antegrade and retrograde cardioplegia were given initially and after each anastomosis.   The right coronary artery was dissected out it was small nondominant diffusely diseased we found a soft area above

## 2017-01-26 ENCOUNTER — APPOINTMENT (OUTPATIENT)
Dept: GENERAL RADIOLOGY | Facility: HOSPITAL | Age: 82
DRG: 235 | End: 2017-01-26
Attending: CLINICAL NURSE SPECIALIST
Payer: MEDICARE

## 2017-01-26 LAB
ANION GAP SERPL CALC-SCNC: 8 MMOL/L (ref 0–18)
BASE EXCESS BLD CALC-SCNC: -6.4 MMOL/L (ref ?–2)
BASOPHILS # BLD: 0 K/UL (ref 0–0.2)
BASOPHILS NFR BLD: 0 %
BUN SERPL-MCNC: 25 MG/DL (ref 8–20)
BUN/CREAT SERPL: 15.3 (ref 10–20)
CA-I BLD-SCNC: 1.14 MMOL/L (ref 0.95–1.32)
CALCIUM SERPL-MCNC: 7.8 MG/DL (ref 8.5–10.5)
CHLORIDE SERPL-SCNC: 110 MMOL/L (ref 95–110)
CO2 SERPL-SCNC: 26 MMOL/L (ref 22–32)
COHGB MFR BLD: <1.5 % (ref 0–1.5)
CREAT SERPL-MCNC: 1.63 MG/DL (ref 0.5–1.5)
EOSINOPHIL # BLD: 0 K/UL (ref 0–0.7)
EOSINOPHIL NFR BLD: 0 %
ERYTHROCYTE [DISTWIDTH] IN BLOOD BY AUTOMATED COUNT: 13.7 % (ref 11–15)
GLUCOSE BLDC GLUCOMTR-MCNC: 105 MG/DL (ref 70–99)
GLUCOSE BLDC GLUCOMTR-MCNC: 107 MG/DL (ref 70–99)
GLUCOSE BLDC GLUCOMTR-MCNC: 108 MG/DL (ref 70–99)
GLUCOSE BLDC GLUCOMTR-MCNC: 112 MG/DL (ref 70–99)
GLUCOSE BLDC GLUCOMTR-MCNC: 112 MG/DL (ref 70–99)
GLUCOSE BLDC GLUCOMTR-MCNC: 113 MG/DL (ref 70–99)
GLUCOSE BLDC GLUCOMTR-MCNC: 113 MG/DL (ref 70–99)
GLUCOSE BLDC GLUCOMTR-MCNC: 117 MG/DL (ref 70–99)
GLUCOSE BLDC GLUCOMTR-MCNC: 119 MG/DL (ref 70–99)
GLUCOSE BLDC GLUCOMTR-MCNC: 119 MG/DL (ref 70–99)
GLUCOSE BLDC GLUCOMTR-MCNC: 120 MG/DL (ref 70–99)
GLUCOSE BLDC GLUCOMTR-MCNC: 122 MG/DL (ref 70–99)
GLUCOSE BLDC GLUCOMTR-MCNC: 124 MG/DL (ref 70–99)
GLUCOSE BLDC GLUCOMTR-MCNC: 126 MG/DL (ref 70–99)
GLUCOSE BLDC GLUCOMTR-MCNC: 136 MG/DL (ref 70–99)
GLUCOSE BLDC GLUCOMTR-MCNC: 142 MG/DL (ref 70–99)
GLUCOSE BLDC GLUCOMTR-MCNC: 171 MG/DL (ref 70–99)
GLUCOSE BLDC GLUCOMTR-MCNC: 97 MG/DL (ref 70–99)
GLUCOSE BLDC GLUCOMTR-MCNC: 98 MG/DL (ref 70–99)
GLUCOSE SERPL-MCNC: 148 MG/DL (ref 70–99)
HCO3 BLDA-SCNC: 17.6 MEQ/L (ref 21–27)
HCT VFR BLD AUTO: 29.5 % (ref 41–52)
HEPARIN AB PPP QL PL AGG: NEGATIVE
HGB BLD-MCNC: 9.8 G/DL (ref 13.5–17.5)
HGB BLD-MCNC: 9.9 G/DL (ref 13.5–17.5)
LYMPHOCYTES # BLD: 0.5 K/UL (ref 1–4)
LYMPHOCYTES NFR BLD: 3 %
MAGNESIUM SERPL-MCNC: 2 MG/DL (ref 1.8–2.5)
MCH RBC QN AUTO: 30.8 PG (ref 27–32)
MCHC RBC AUTO-ENTMCNC: 33.1 G/DL (ref 32–37)
MCV RBC AUTO: 93.3 FL (ref 80–100)
METHGB MFR BLD: 1 % SAT (ref 0.4–1.5)
MONOCYTES # BLD: 1.1 K/UL (ref 0–1)
MONOCYTES NFR BLD: 6 %
NEUTROPHILS # BLD AUTO: 15.5 K/UL (ref 1.8–7.7)
NEUTROPHILS NFR BLD: 91 %
O2 CT BLD-SCNC: 13.8 VOL% (ref 15–23)
O2/TOTAL GAS SETTING VFR VENT: 40 %
OSMOLALITY UR CALC.SUM OF ELEC: 305 MOSM/KG (ref 275–295)
PCO2 BLDA: 32 MM HG (ref 35–45)
PEEP SETTING VENT: 5 CM H2O
PH BLDA: 7.37 [PH] (ref 7.35–7.45)
PLATELET # BLD AUTO: 77 K/UL (ref 140–400)
PMV BLD AUTO: 8.9 FL (ref 7.4–10.3)
PO2 BLDA: 136 MM HG (ref 80–100)
PO2 SATN ADJ TO 0.5 BLD: 27 MMHG (ref 24–28)
POTASSIUM (BLOOD GAS): 4 MMOL/L (ref 3.3–5.1)
POTASSIUM SERPL-SCNC: 3.8 MMOL/L (ref 3.3–5.1)
POTASSIUM SERPL-SCNC: 4 MMOL/L (ref 3.3–5.1)
PRESSURE SUPPORT SETTING VENT: 5 CM H2O
PUNCTURE CHARGE: NO
RBC # BLD AUTO: 3.17 M/UL (ref 4.5–5.9)
SAO2 % BLDA: >99 % (ref 94–100)
SODIUM (BLOOD GAS): 148 MMOL/L (ref 135–145)
SODIUM SERPL-SCNC: 144 MMOL/L (ref 136–144)
WBC # BLD AUTO: 17.2 K/UL (ref 4–11)

## 2017-01-26 PROCEDURE — 71010 XR CHEST AP PORTABLE  (CPT=71010): CPT

## 2017-01-26 PROCEDURE — 99233 SBSQ HOSP IP/OBS HIGH 50: CPT | Performed by: INTERNAL MEDICINE

## 2017-01-26 PROCEDURE — 99222 1ST HOSP IP/OBS MODERATE 55: CPT | Performed by: OTHER

## 2017-01-26 RX ORDER — LEVOTHYROXINE SODIUM 0.07 MG/1
75 TABLET ORAL
Status: DISCONTINUED | OUTPATIENT
Start: 2017-01-26 | End: 2017-01-31

## 2017-01-26 NOTE — PHYSICAL THERAPY NOTE
PHYSICAL THERAPY EVALUATION - INPATIENT     Room Number: 232/232-A  Evaluation Date: 1/26/2017  Type of Evaluation: Initial  Physician Order: PT Eval and Treat    Presenting Problem: CAD s/p CABG x4  Reason for Therapy: Mobility Dysfunction and Dischar adjusting bedclothes, sheets and blankets)?: A Little   -   Sitting down on and standing up from a chair with arms (e.g., wheelchair, bedside commode, etc.): A Lot   -   Moving from lying on back to sitting on the side of the bed?: A Little   How much help cues to facilitate hip extension during his sit to stand transfer. The pt was able to ambulate for 20' with the RW and min A but he displayed decreased stride length and step height during ambulation.  The pt was also educated on postural awareness during a

## 2017-01-26 NOTE — PROGRESS NOTES
[facility]  Cardiology Progress Note    Zak Cuellar Patient Status:  Inpatient    1931 MRN [id number]   Location [facility] 2W/SW Attending Bessie Reyes MD   Hosp Day # 1 PCP OMAR LEAVITT       Assessment and Plan: S/P CABG POD Extremities: Without  edema. Peripheral pulses intact  Neurologic: Alert and oriented, normal affect. .  Skin: Warm    Laboratory/Data:    Labs:      Lab Results  Component Value Date   TSH 1.62 01/25/2017       Recent Labs   Lab  01/25/17   1233  01/26/ temazepam (RESTORIL) cap 15 mg 15 mg Oral Nightly PRN   DOBUTamine in D5W (DOBUTREX) 250 mg/250 ml infusion 2.5-10 mcg/kg/min Intravenous Continuous PRN   nitroGLYCERIN infusion 50mg in D5W 250ml 5-300 mcg/min Intravenous Continuous PRN   norepinephrine morphINE sulfate (PF) 4 MG/ML injection 8 mg 8 mg Intravenous Q2H PRN   aspirin chewable tab 81 mg 81 mg Oral Daily   CeFAZolin Sodium (ANCEF/KEFZOL) IVPB premix 2 g 2 g Intravenous Q8H   Clopidogrel Bisulfate (PLAVIX) tab 75 mg 75 mg Oral Daily   Chlorh

## 2017-01-26 NOTE — PLAN OF CARE
CARDIOVASCULAR - ADULT    • Maintains optimal cardiac output and hemodynamic stability Progressing    • Absence of cardiac arrhythmias or at baseline Progressing        METABOLIC/FLUID AND ELECTROLYTES - ADULT    • Glucose maintained within prescribed rang

## 2017-01-26 NOTE — PROGRESS NOTES
[facility]     Progress Note        Lyle Code Patient Status:  Inpatient    1931 MRN [id number]   Location [facility] 2W/SW Attending Anshul Odonnell MD   [facility] Day # 1 PCP OMAR LEAVITT       Subjective:   Patient seen Min PRN   fentaNYL PCA bolus from bag 50 mcg Intravenous Once   And      fentaNYL citrate (SUBLIMAZE) 1,000 mcg in sodium chloride 0.9 % 100 mL infusion 25 mcg/hr Intravenous Continuous PRN   Normal Saline Flush 0.9 % injection 10 mL 10 mL Intravenous PRN MG per tab 1 tablet 1 tablet Oral Q4H PRN   Or      HYDROcodone-acetaminophen (NORCO) 5-325 MG per tab 2 tablet 2 tablet Oral Q4H PRN   morphINE sulfate (PF) 2 MG/ML injection 2 mg 2 mg Intravenous Q2H PRN   Or      morphINE sulfate (PF) 4 MG/ML injection K 3.8 01/26/2017    01/26/2017   CO2 26 01/26/2017    01/26/2017   CA 7.8 01/26/2017   PTT 61.0 01/25/2017   INR 2.3 01/25/2017   TSH 1.62 01/25/2017   MG 2.0 01/26/2017       Xr Chest Pa + Lat Chest (kmt=15844)    1/20/2017  PROCEDURE: XR C 8:28     Approved by (CST): Lay Corona MD on 1/26/2017 at 8:32          1/26/2017  CONCLUSION:   Improving left basilar and right infrahilar airspace disease. Persistent left pleural effusion. No pneumothorax.       Xr Chest Ap Portable  (cpt=71010) Hypokalemia  6.  Hyperlipidemia  7.  Hypothyroidism       Plan   -Patient presents status post scheduled CABG ×4  -Evidence of confusion earlier this morning with some delayed speech and tremors. Unclear etiology. Did receive pain medications earlier.   A

## 2017-01-26 NOTE — PROGRESS NOTES
[facility]    Progress Note    Zak Cuellar Patient Status:  Inpatient    1931 MRN [id number]   Location [facility] 2W/SW Attending Bessie Reyes MD   Hosp Day # 1 PCP OMAR LEAVITT       Subjective:   Zak Cuellar is insulin (NOVOLIN R) bolus from bag  0.1 Units/kg Intravenous Once   • sodium bicarbonate  50 mEq Intravenous Once       Continuous Infusions:   • sodium chloride 20 mL/hr at 01/26/17 0509   • sodium chloride     • norepinephrine     • dexmedetomidine (PREC RN report; discussed with Dr. Moon Bennett neurology  Discharge Planning; Lives at home with wife plan for [facility] or rehab will have PT/OT evaluate        Results:     Lab Results  Component Value Date   WBC 17.2* 01/26/2017   HGB 9.8* 01/26/2017   HCT 29.5* 01/26/

## 2017-01-26 NOTE — CONSULTS
[place] [facility]    Neurology Consultation    [address] [facility] Patient Status:  Inpatient    1931 MRN [id number]   Location [facility] 2W/SW Attending Ekaterina Aguayo MD   Hosp Day # 1 PEYMAN Pathak Duty     Date of Admission:  / Retired     Current Medications:    Current Facility-Administered Medications:  Levothyroxine Sodium (SYNTHROID, LEVOTHROID) tab 75 mcg 75 mcg Oral Before breakfast   insulin detemir (LEVEMIR) 100 UNIT/ML flextouch 10 Units 10 Units Subc [facility] STANISLAUS [facility]) injection 4 mg 4 mg Intravenous Q6H PRN   Metoclopramide HCl (REGLAN) injection 10 mg 10 mg Intravenous Q6H   famoTIDine (PEPCID) tab 20 mg 20 mg Oral Daily   Or      famoTIDine (PEPCID) injection 20 mg 20 mg Intravenous Daily   potassium chloride mouth before breakfast.   lisinopril 20 MG Oral Tab Take 20 mg by mouth daily. simvastatin 5 MG Oral Tab Take 20 mg by mouth nightly.        Allergies  No Known Allergies     ROS:   GENERAL: no weight loss  SKIN: denies any unusual skin lesions or rashes Portable  (cpt=71010)    1/26/2017  CONCLUSION:   Improving left basilar and right infrahilar airspace disease. Persistent left pleural effusion. No pneumothorax. Xr Chest Ap Portable  (cpt=71010)    1/25/2017  CONCLUSION:  1.  Postop chest with lines

## 2017-01-26 NOTE — PLAN OF CARE
Problem: CARDIOVASCULAR - ADULT  Goal: Maintains optimal cardiac output and hemodynamic stability  INTERVENTIONS:  - Monitor vital signs, rhythm, and trends  - Monitor for bleeding, hypotension and signs of decreased cardiac output  - Evaluate effectivenes Progressing  S/p CABG, ETT with mechanical vent, settings as charted. VAP precautions. wean when hemodynamically stable and awake.  CPM    Problem: GASTROINTESTINAL - ADULT  Goal: Minimal or absence of nausea and vomiting  INTERVENTIONS:  - Maintain adequa urinary retention  INTERVENTIONS:  - Assess patient’s ability to void and empty bladder  - Monitor intake/output and perform bladder scan as needed  - Follow urinary retention protocol/standard of care  - Consider collaborating with pharmacy to review rosendo protocol.      Problem: SKIN/TISSUE INTEGRITY - ADULT  Goal: Skin integrity remains intact  INTERVENTIONS  - Assess and document risk factors for pressure ulcer development  - Assess and document skin integrity  - Monitor for areas of redness and/or skin br period  INTERVENTIONS  - Monitor WBC  - Administer growth factors as ordered  - Implement neutropenic guidelines  Outcome: Progressing    Problem: SAFETY ADULT - FALL  Goal: Free from fall injury  INTERVENTIONS:  - Assess pt frequently for physical needs

## 2017-01-26 NOTE — DIETARY NOTE
Consult received for diet education per protocol. Visited pt and pt's wife and reviewed Nutrition Care and what to expect after Cardiac procedure.   Education deferred until patient transferred out of CCU or until s/p intervention and when appropriate for t

## 2017-01-26 NOTE — SLP NOTE
ADULT SWALLOWING EVALUATION    ASSESSMENT & PLAN   ASSESSMENT  Pt seen sitting upright in bed. Pt extubated with sustained phonation at audible level during spontaneous verbal output. Pt's wife sitting bedside upon SLP entrance for BSSE.     PLAN   Diet Rec Impaired  Laryngeal Elevation Coordination: Impaired  (Please note: Silent aspiration cannot be evaluated clinically.  Videofluoroscopic Swallow Study is required to rule-out silent aspiration.)    Esophageal Phase of Swallow: No complaints consistent with with VFSS as clinically indicated. SLE to follow for further speech/language investigation. Collaborated results with RN and APN.      FOLLOW UP  Treatment Plan: Dysphagia therapy;Communication evaluation  Number of Visits to Meet Established Goals: 3  Foll

## 2017-01-26 NOTE — CARDIAC REHAB
Cardiac Rehab Phase I    Activity:   Chair: Yes      Assistive Device: 6372 Maximus Delgado needed: Moderate   Patient tolerated activity: Fair. .    Education:  Rachel Guillermo provided and reviewed: Heart Surgery Binder.    Patient instructed on: LANDON / Brionna John

## 2017-01-27 LAB
ANION GAP SERPL CALC-SCNC: 7 MMOL/L (ref 0–18)
BUN SERPL-MCNC: 33 MG/DL (ref 8–20)
BUN/CREAT SERPL: 22.9 (ref 10–20)
CALCIUM SERPL-MCNC: 8 MG/DL (ref 8.5–10.5)
CHLORIDE SERPL-SCNC: 109 MMOL/L (ref 95–110)
CO2 SERPL-SCNC: 27 MMOL/L (ref 22–32)
CREAT SERPL-MCNC: 1.44 MG/DL (ref 0.5–1.5)
GLUCOSE BLDC GLUCOMTR-MCNC: 121 MG/DL (ref 70–99)
GLUCOSE BLDC GLUCOMTR-MCNC: 130 MG/DL (ref 70–99)
GLUCOSE BLDC GLUCOMTR-MCNC: 147 MG/DL (ref 70–99)
GLUCOSE BLDC GLUCOMTR-MCNC: 158 MG/DL (ref 70–99)
GLUCOSE SERPL-MCNC: 142 MG/DL (ref 70–99)
HEPARIN AB PPP QL PL AGG: NEGATIVE
OSMOLALITY UR CALC.SUM OF ELEC: 306 MOSM/KG (ref 275–295)
POTASSIUM SERPL-SCNC: 4.1 MMOL/L (ref 3.3–5.1)
SODIUM SERPL-SCNC: 143 MMOL/L (ref 136–144)

## 2017-01-27 PROCEDURE — 99233 SBSQ HOSP IP/OBS HIGH 50: CPT | Performed by: HOSPITALIST

## 2017-01-27 PROCEDURE — 99232 SBSQ HOSP IP/OBS MODERATE 35: CPT | Performed by: OTHER

## 2017-01-27 PROCEDURE — 99232 SBSQ HOSP IP/OBS MODERATE 35: CPT | Performed by: INTERNAL MEDICINE

## 2017-01-27 RX ORDER — ATORVASTATIN CALCIUM 20 MG/1
20 TABLET, FILM COATED ORAL NIGHTLY
Status: DISCONTINUED | OUTPATIENT
Start: 2017-01-27 | End: 2017-01-31

## 2017-01-27 NOTE — PHYSICAL THERAPY NOTE
PHYSICAL THERAPY TREATMENT NOTE - INPATIENT    Room Number: 812/582-T       Presenting Problem: CAD s/p CABG x4    Problem List  Active Problems:    Coronary artery disease involving native heart    Metabolic encephalopathy      ASSESSMENT   RN notified p with activity 96%  Liters of O2:  3  Shortness of breath  Heart Rate: with activity 348    AM-PAC '6-Clicks' INPATIENT SHORT FORM - BASIC MOBILITY  How much difficulty does the patient currently have. ..  -   Turning over in bed (including adjusting bedclot modified independent  Current: min A x 200' with a RW and verbal cues for postural awareness. Goal #4 The pt will be able to recite and maintain his sternal precautions during activity.   CURRENT: the pt required verbal reminders of all of his sternal pre

## 2017-01-27 NOTE — PROGRESS NOTES
[facility]    Progress Note    Claudette Ng Patient Status:  Inpatient    1931 MRN [id number]   Location [facility] 2W/SW Attending Rebecca Moon Day # 2 PCP OMAR LEAVITT     Subjective:     HENT: Alphonse Dasilva 01/20/2017   ALT 18 01/20/2017   PTT 61.0* 01/25/2017   INR 2.3* 01/25/2017   TSH 1.62 01/25/2017   MG 2.0 01/26/2017       Xr Chest Ap Portable  (cpt=71010)    1/26/2017  CONCLUSION:   Improving left basilar and right infrahilar airspace disease.  Persiste

## 2017-01-27 NOTE — PROGRESS NOTES
[facility]    Progress Note    Claudette Ng Patient Status:  Inpatient    1931 MRN [id number]   Location [facility] 2W/SW Attending Nita Coates MD   Hosp Day # 2 PCP Marilyn Henderson         Assessment and Plan:     Karen Chandra Application Nasal BID   • aspirin  81 mg Oral Daily   • Clopidogrel Bisulfate  75 mg Oral Daily   • Chlorhexidine Gluconate  15 mL Mouth/Throat BID   • insulin (NOVOLIN R) bolus from bag  0.1 Units/kg Intravenous Once   • sodium bicarbonate  50 mEq Teodoro Woodbury

## 2017-01-27 NOTE — CARDIAC REHAB
Cardiac Rehab Phase I    Activity:   Chair: Yes   Ambulation: Yes   Assistive Device: Walker   Distance: To chair   Assistance needed: Moderate   Patient tolerated activity: Fair. .    Education:  Echo Syed provided and reviewed: Heart Surgery Binder.

## 2017-01-27 NOTE — PROGRESS NOTES
[facility]    Progress Note    Millie Aguayo Patient Status:  Inpatient    1931 MRN [id number]   Location [facility] 2W/SW Attending Rebecca Moon Day # 2 PCP [address]       Subjective:   Millie Aguayo 0900)       General appearance: alert and cooperative  Neurologic: Alert and oriented X 3, denies double vision today; aphasia improved  Sternum incision dressing C/D/I; Left leg incision dressing C/D/I  Pulmonary:  clear to auscultation bilaterally;  Chest atelectasis. 4.         Ekg 12-lead    1/26/2017  ECG Report  Interpretation  -------------------------- Sinus Rhythm - occasional PAC . - Nonspecific T-abnormality.  ABNORMAL When compared with ECG of 01/25/2017 13:00:34 ST depression no longer seen Electr

## 2017-01-27 NOTE — DISCHARGE PLANNING
ROXANNE met w/ pt, pt's wife, and pt's dtr about discharge planning. SW received order stating possible d/c to SNF between Sunday-Tuesday. Pt's wife requested referral to [facility]. SW informed that [facility] is full until Tuesday.  Pt's wife requested referra

## 2017-01-27 NOTE — OCCUPATIONAL THERAPY NOTE
OCCUPATIONAL THERAPY EVALUATION - INPATIENT     Room Number: 232/232-A  Evaluation Date: 1/27/2017  Type of Evaluation: Initial  Presenting Problem:  (CABG x4)    Physician Order: IP Consult to Occupational Therapy  Reason for Therapy: ADL/IADL Dysfunction SITUATION  Type of Home: House  Home Layout: Multi-level  Lives With: Spouse     Toilet and Equipment: Standard height toilet  Shower/Tub and Equipment: Tub-shower combo    Drives: Yes  Patient Regularly Uses: None    Stairs in Home: 4 stairs to enter  Use Bianca    AM-PAC Score:  Score: 14  Approx Degree of Impairment: 59.67%  Standardized Score (AM-PAC Scale): 33.39  CMS Modifier (G-Code): CK    FUNCTIONAL TRANSFER ASSESSMENT  Supine to Sit : Not tested  Sit to Stand:  Moderate assistance    Toilet Transfer

## 2017-01-27 NOTE — SLP NOTE
SPEECH DAILY NOTE - INPATIENT    Evaluation Date: 01/26/2017    ASSESSMENT & PLAN   ASSESSMENT  Pt seen sitting upright in bed on trials of current diet per BSSE. Pt and wife in room upon SLP entrance.  Pt seen sitting upright in chair for all self feeding at [facility]. GOAL NOT MET.    Goal #3 The patient will utilize compensatory strategies as outlined by  BSSE (clinical evaluation) including Slow rate, Small bites, Small sips, Multiple swallows, Alternate liquids/solids, No straws, Supervision with meals wi

## 2017-01-27 NOTE — PROGRESS NOTES
[facility]     Progress Note        Marie Shore Patient Status:  Inpatient    1931 MRN [id number]   Location [facility] 2W/SW Attending Gerry Tan MD   [facility] Day # 2 PCP Ab Cleveland       Subjective:   Patient seen 250 mL infusion 0.1-4 mcg/kg/min Intravenous Continuous PRN   metoprolol Tartrate (LOPRESSOR) tab 25 mg 25 mg Oral 2x Daily(Beta Blocker)   Milrinone in Dextrose 20 MG/100ML premix infusion 0.375 mcg/kg/min Intravenous PRN   magnesium hydroxide (MILK OF MA chloride 0.9 % 100 mL infusion 1-32 Units/hr Intravenous Continuous   sodium bicarbonate injection 50 mEq 50 mEq Intravenous Once       Continuous Infusions:   • norepinephrine     • fentanyl (SUBLIMAZE) infusion     • DOBUTamine in D5W Stopped (01/26/17 0 Portable  (cpt=71010)    1/26/2017  PROCEDURE: XR CHEST AP PORTABLE (CPT=71010) TIME: 0629   COMPARISON: [facility], XR CHEST AP PORTABLE (CPT=71010), 1/25/2017, 13:01. INDICATIONS: Post op CABG, aortic stenosis  TECHNIQUE:   Single view. Nasogastric tube at least level of the gastric body  Dictated by (CST): Massiel Hall MD on 1/25/2017 at 14:02     Approved by (CST): Massiel Hall MD on 1/25/2017 at 14:08          1/25/2017  CONCLUSION:  1. Postop chest with lines and tubes in place.  2

## 2017-01-27 NOTE — H&P
[facility]    Neurology Progress Note    Lou Geronimo Patient Status:  Inpatient    1931 MRN [id number]   Location [facility] 2W/SW Attending Rebecca Moon Day # 2 PCP Chelsey Almanzar       Subjective:   Alec Mojica

## 2017-01-27 NOTE — PLAN OF CARE
CARDIOVASCULAR - ADULT    • Maintains optimal cardiac output and hemodynamic stability Progressing    • Absence of cardiac arrhythmias or at baseline Progressing

## 2017-01-28 LAB
ANION GAP SERPL CALC-SCNC: 7 MMOL/L (ref 0–18)
BASOPHILS # BLD: 0 K/UL (ref 0–0.2)
BASOPHILS NFR BLD: 0 %
BUN SERPL-MCNC: 31 MG/DL (ref 8–20)
BUN/CREAT SERPL: 28.7 (ref 10–20)
CALCIUM SERPL-MCNC: 8.2 MG/DL (ref 8.5–10.5)
CHLORIDE SERPL-SCNC: 108 MMOL/L (ref 95–110)
CO2 SERPL-SCNC: 27 MMOL/L (ref 22–32)
CREAT SERPL-MCNC: 1.08 MG/DL (ref 0.5–1.5)
EOSINOPHIL # BLD: 0 K/UL (ref 0–0.7)
EOSINOPHIL NFR BLD: 0 %
ERYTHROCYTE [DISTWIDTH] IN BLOOD BY AUTOMATED COUNT: 13.8 % (ref 11–15)
GLUCOSE BLDC GLUCOMTR-MCNC: 101 MG/DL (ref 70–99)
GLUCOSE BLDC GLUCOMTR-MCNC: 110 MG/DL (ref 70–99)
GLUCOSE BLDC GLUCOMTR-MCNC: 111 MG/DL (ref 70–99)
GLUCOSE BLDC GLUCOMTR-MCNC: 151 MG/DL (ref 70–99)
GLUCOSE SERPL-MCNC: 112 MG/DL (ref 70–99)
HCT VFR BLD AUTO: 25.5 % (ref 41–52)
HGB BLD-MCNC: 8.3 G/DL (ref 13.5–17.5)
LYMPHOCYTES # BLD: 1.3 K/UL (ref 1–4)
LYMPHOCYTES NFR BLD: 9 %
MAGNESIUM SERPL-MCNC: 2.1 MG/DL (ref 1.8–2.5)
MCH RBC QN AUTO: 30.6 PG (ref 27–32)
MCHC RBC AUTO-ENTMCNC: 32.8 G/DL (ref 32–37)
MCV RBC AUTO: 93.4 FL (ref 80–100)
MONOCYTES # BLD: 1.5 K/UL (ref 0–1)
MONOCYTES NFR BLD: 11 %
NEUTROPHILS # BLD AUTO: 11.3 K/UL (ref 1.8–7.7)
NEUTROPHILS NFR BLD: 80 %
OSMOLALITY UR CALC.SUM OF ELEC: 301 MOSM/KG (ref 275–295)
PLATELET # BLD AUTO: 56 K/UL (ref 140–400)
PMV BLD AUTO: 9.1 FL (ref 7.4–10.3)
POTASSIUM SERPL-SCNC: 3.6 MMOL/L (ref 3.3–5.1)
POTASSIUM SERPL-SCNC: 4.3 MMOL/L (ref 3.3–5.1)
RBC # BLD AUTO: 2.72 M/UL (ref 4.5–5.9)
SODIUM SERPL-SCNC: 142 MMOL/L (ref 136–144)
WBC # BLD AUTO: 14.1 K/UL (ref 4–11)

## 2017-01-28 PROCEDURE — 99232 SBSQ HOSP IP/OBS MODERATE 35: CPT | Performed by: INTERNAL MEDICINE

## 2017-01-28 PROCEDURE — 99233 SBSQ HOSP IP/OBS HIGH 50: CPT | Performed by: HOSPITALIST

## 2017-01-28 RX ORDER — ASPIRIN 81 MG/1
81 TABLET ORAL DAILY
Status: DISCONTINUED | OUTPATIENT
Start: 2017-01-28 | End: 2017-01-31

## 2017-01-28 RX ORDER — LISINOPRIL 2.5 MG/1
2.5 TABLET ORAL DAILY
Status: DISCONTINUED | OUTPATIENT
Start: 2017-01-28 | End: 2017-01-31

## 2017-01-28 RX ORDER — METOPROLOL SUCCINATE 25 MG/1
25 TABLET, EXTENDED RELEASE ORAL
Status: DISCONTINUED | OUTPATIENT
Start: 2017-01-28 | End: 2017-01-31

## 2017-01-28 RX ORDER — POLYETHYLENE GLYCOL 3350 17 G/17G
17 POWDER, FOR SOLUTION ORAL DAILY
Status: DISCONTINUED | OUTPATIENT
Start: 2017-01-28 | End: 2017-01-31

## 2017-01-28 RX ORDER — POTASSIUM CHLORIDE 20 MEQ/1
40 TABLET, EXTENDED RELEASE ORAL EVERY 4 HOURS
Status: COMPLETED | OUTPATIENT
Start: 2017-01-28 | End: 2017-01-28

## 2017-01-28 RX ORDER — ACETAMINOPHEN 325 MG/1
650 TABLET ORAL EVERY 6 HOURS PRN
Status: DISCONTINUED | OUTPATIENT
Start: 2017-01-28 | End: 2017-01-31

## 2017-01-28 NOTE — PHYSICAL THERAPY NOTE
PHYSICAL THERAPY TREATMENT NOTE - INPATIENT    Room Number: 611/752-B       Presenting Problem: CAD s/p CABG x4    Problem List  Active Problems:    Coronary artery disease involving native heart    Metabolic encephalopathy      Past Medical History  Past with a railing?: A Little    AM-PAC Score:  Raw Score: 18   PT Approx Degree of Impairment Score: 46.58%   Standardized Score (AM-PAC Scale): 43.63   CMS Modifier (G-Code): CK    FUNCTIONAL ABILITY STATUS  Gait Assessment   Gait Assistance:  (CGA)  Distanc supine - sit EOB @ level: independent  Radha for bed mobility using log-roll technique    Goal #2  Patient is able to demonstrate transfers Sit to/from Stand at assistance level: modified independent  Min A for sit to stand transfers with verbal cues to [place]

## 2017-01-28 NOTE — PROGRESS NOTES
[facility]    Progress Note    Grace Luther Patient Status:  Inpatient    1931 MRN [id number]   Location [facility] 2W/SW Attending Rebecca Moon Day # 3 PCP [address]       Subjective:   Grace Luther General appearance: alert and cooperative  Neurologic: Alert and oriented X 3, moves all extremities; no confusion speech clear  Sternum Incision dressing C/D/I; Left leg incision dressing C/D/I  Pulmonary:  clear to auscultation bilaterally  Cardiov

## 2017-01-28 NOTE — PROGRESS NOTES
[facility]     Progress Note        Jose G Alejandro Patient Status:  Inpatient    1931 MRN [id number]   Location [facility] 2W/SW Attending Mary Sarabia MD   Hosp Day # 3 PCP OMAR LEAVITT       Subjective:   Patient seen injection 10 mg 10 mg Intravenous Q6H   famoTIDine (PEPCID) tab 20 mg 20 mg Oral Daily   Or      famoTIDine (PEPCID) injection 20 mg 20 mg Intravenous Daily   potassium chloride IVPB premix 20 mEq 20 mEq Intravenous PRN   Or      potassium chloride IVPB pr  01/28/2017   CA 8.2 01/28/2017   MG 2.1 01/28/2017       Xr Chest Pa + Lat Chest (ife=[id number])    1/20/2017  PROCEDURE: XR CHEST PA + LAT CHEST (CPT=71020)  COMPARISON: None.   INDICATIONS: Shortness of breath, Pre op Bypass surgery 1-25-17  [facility] basilar and right infrahilar airspace disease. Persistent left pleural effusion. No pneumothorax. Xr Chest Ap Portable  (cpt=71010)    1/25/2017  PROCEDURE: XR CHEST AP PORTABLE (CPT=71010)-supine TIME: 1304 hrs. a.    COMPARISON: [facility]

## 2017-01-28 NOTE — PROGRESS NOTES
[facility]    Cardiology Progress Note    Zak Cuellar Patient Status:  Inpatient    1931 MRN [id number]   Location [facility] 2W/SW Attending Rebecca Moon Day # 3 PCP OMAR LEAVITT         Assessment and P 01/28/2017   CREATSERUM 1.08 01/28/2017   BUN 31* 01/28/2017    01/28/2017   K 3.6 01/28/2017    01/28/2017   CO2 27 01/28/2017   * 01/28/2017   CA 8.2* 01/28/2017   ALB 3.8 01/20/2017   ALKPHO 56 01/20/2017   BILT 0.9 01/20/2017   TP 6.

## 2017-01-28 NOTE — PROGRESS NOTES
[place] [facility]    Progress Note    Fabián Parkinson Patient Status:  Inpatient    1931 MRN [id number]   Location [facility] 2W/SW Attending Rebecca Moon Day # 3 PCP OMAR LEAVITT     Subjective:     HENT: Andi Mccallum 01/28/2017   CO2 27 01/28/2017   * 01/28/2017   CA 8.2* 01/28/2017   ALB 3.8 01/20/2017   ALKPHO 56 01/20/2017   BILT 0.9 01/20/2017   TP 6.3 01/20/2017   AST 22 01/20/2017   ALT 18 01/20/2017   PTT 61.0* 01/25/2017   INR 2.3* 01/25/2017   TSH 1.62

## 2017-01-29 ENCOUNTER — APPOINTMENT (OUTPATIENT)
Dept: GENERAL RADIOLOGY | Facility: HOSPITAL | Age: 82
DRG: 235 | End: 2017-01-29
Attending: THORACIC SURGERY (CARDIOTHORACIC VASCULAR SURGERY)
Payer: MEDICARE

## 2017-01-29 ENCOUNTER — APPOINTMENT (OUTPATIENT)
Dept: GENERAL RADIOLOGY | Facility: HOSPITAL | Age: 82
DRG: 235 | End: 2017-01-29
Attending: NURSE PRACTITIONER
Payer: MEDICARE

## 2017-01-29 LAB
ANION GAP SERPL CALC-SCNC: 6 MMOL/L (ref 0–18)
BASOPHILS # BLD: 0 K/UL (ref 0–0.2)
BASOPHILS NFR BLD: 0 %
BLOOD TYPE BARCODE: 5100
BUN SERPL-MCNC: 29 MG/DL (ref 8–20)
BUN/CREAT SERPL: 27.1 (ref 10–20)
CALCIUM SERPL-MCNC: 8.4 MG/DL (ref 8.5–10.5)
CHLORIDE SERPL-SCNC: 113 MMOL/L (ref 95–110)
CO2 SERPL-SCNC: 25 MMOL/L (ref 22–32)
CREAT SERPL-MCNC: 1.07 MG/DL (ref 0.5–1.5)
EOSINOPHIL # BLD: 0 K/UL (ref 0–0.7)
EOSINOPHIL NFR BLD: 0 %
ERYTHROCYTE [DISTWIDTH] IN BLOOD BY AUTOMATED COUNT: 13.5 % (ref 11–15)
GLUCOSE BLDC GLUCOMTR-MCNC: 106 MG/DL (ref 70–99)
GLUCOSE BLDC GLUCOMTR-MCNC: 122 MG/DL (ref 70–99)
GLUCOSE BLDC GLUCOMTR-MCNC: 125 MG/DL (ref 70–99)
GLUCOSE BLDC GLUCOMTR-MCNC: 232 MG/DL (ref 70–99)
GLUCOSE SERPL-MCNC: 103 MG/DL (ref 70–99)
HCT VFR BLD AUTO: 26.2 % (ref 41–52)
HGB BLD-MCNC: 8.6 G/DL (ref 13.5–17.5)
LYMPHOCYTES # BLD: 1.4 K/UL (ref 1–4)
LYMPHOCYTES NFR BLD: 13 %
MAGNESIUM SERPL-MCNC: 2.1 MG/DL (ref 1.8–2.5)
MCH RBC QN AUTO: 31 PG (ref 27–32)
MCHC RBC AUTO-ENTMCNC: 33.1 G/DL (ref 32–37)
MCV RBC AUTO: 93.7 FL (ref 80–100)
MONOCYTES # BLD: 1.1 K/UL (ref 0–1)
MONOCYTES NFR BLD: 10 %
NEUTROPHILS # BLD AUTO: 8.5 K/UL (ref 1.8–7.7)
NEUTROPHILS NFR BLD: 77 %
OSMOLALITY UR CALC.SUM OF ELEC: 304 MOSM/KG (ref 275–295)
PLATELET # BLD AUTO: 80 K/UL (ref 140–400)
PMV BLD AUTO: 8.9 FL (ref 7.4–10.3)
POTASSIUM SERPL-SCNC: 4.1 MMOL/L (ref 3.3–5.1)
RBC # BLD AUTO: 2.79 M/UL (ref 4.5–5.9)
SODIUM SERPL-SCNC: 144 MMOL/L (ref 136–144)
TSH SERPL-ACNC: 3.57 UIU/ML (ref 0.34–5.6)
TSH SERPL-ACNC: 3.57 UIU/ML (ref 0.34–5.6)
WBC # BLD AUTO: 11 K/UL (ref 4–11)

## 2017-01-29 PROCEDURE — 99233 SBSQ HOSP IP/OBS HIGH 50: CPT | Performed by: HOSPITALIST

## 2017-01-29 PROCEDURE — 02HV33Z INSERTION OF INFUSION DEVICE INTO SUPERIOR VENA CAVA, PERCUTANEOUS APPROACH: ICD-10-PCS | Performed by: THORACIC SURGERY (CARDIOTHORACIC VASCULAR SURGERY)

## 2017-01-29 PROCEDURE — 71020 XR CHEST PA + LAT CHEST (CPT=71020): CPT

## 2017-01-29 PROCEDURE — 71010 XR CHEST AP PORTABLE  (CPT=71010): CPT

## 2017-01-29 RX ORDER — AMIODARONE HYDROCHLORIDE 200 MG/1
200 TABLET ORAL
Status: DISCONTINUED | OUTPATIENT
Start: 2017-01-29 | End: 2017-01-29

## 2017-01-29 RX ORDER — MELATONIN
325
Status: DISCONTINUED | OUTPATIENT
Start: 2017-01-29 | End: 2017-01-31

## 2017-01-29 RX ORDER — DOCUSATE SODIUM 100 MG/1
100 CAPSULE, LIQUID FILLED ORAL 2 TIMES DAILY
Status: DISCONTINUED | OUTPATIENT
Start: 2017-01-29 | End: 2017-01-31

## 2017-01-29 RX ORDER — SODIUM CHLORIDE 0.9 % (FLUSH) 0.9 %
10 SYRINGE (ML) INJECTION AS NEEDED
Status: DISCONTINUED | OUTPATIENT
Start: 2017-01-29 | End: 2017-01-31

## 2017-01-29 RX ORDER — LIDOCAINE HYDROCHLORIDE 10 MG/ML
0.5 INJECTION, SOLUTION INFILTRATION; PERINEURAL ONCE AS NEEDED
Status: ACTIVE | OUTPATIENT
Start: 2017-01-29 | End: 2017-01-29

## 2017-01-29 NOTE — OCCUPATIONAL THERAPY NOTE
OCCUPATIONAL THERAPY TREATMENT NOTE - INPATIENT     Room Number: 539/582-M          Presenting Problem:  (CABG x4)    Problem List  Active Problems:    Coronary artery disease involving native heart    Metabolic encephalopathy      ASSESSMENT   Pt was seen drying)?: A Lot  -   Toileting, which includes using toilet, bedpan or urinal? : A Lot  -   Putting on and taking off regular upper body clothing?: A Lot  -   Taking care of personal grooming such as brushing teeth?: A Little  -   Eating meals?: A Little

## 2017-01-29 NOTE — PROGRESS NOTES
[facility] - [facility]    Progress Note    Aida Kaiser Patient Status:  Inpatient    1931 MRN [id number]   Location [facility] 2W/SW Attending Rebecca Moon Day # 4 PCP Monique Solis     Subjective:     HENT: Dimitri Reed K 4.1 01/29/2017   * 01/29/2017   CO2 25 01/29/2017   * 01/29/2017   CA 8.4* 01/29/2017   ALB 3.8 01/20/2017   ALKPHO 56 01/20/2017   BILT 0.9 01/20/2017   TP 6.3 01/20/2017   AST 22 01/20/2017   ALT 18 01/20/2017   PTT 61.0* 01/25/2017   IN

## 2017-01-29 NOTE — PROGRESS NOTES
[facility]    Cardiology Progress Note    Aida Kaiser Patient Status:  Inpatient    1931 MRN [id number]   Location [facility] 2W/SW Attending Rebecca Moon Day # 4 PCP OMAR LEAVITT         Assessment and P bicarbonate  50 mEq Intravenous Once         Results:     Lab Results  Component Value Date   WBC 11.0 01/29/2017   HGB 8.6* 01/29/2017   HCT 26.2* 01/29/2017   PLT 80* 01/29/2017   CREATSERUM 1.08 01/28/2017   BUN 31* 01/28/2017    01/28/2017   K 4.

## 2017-01-29 NOTE — PHYSICAL THERAPY NOTE
PHYSICAL THERAPY TREATMENT NOTE - INPATIENT    Room Number: 808/736-I       Presenting Problem: CAD s/p CABG x4    Problem List  Active Problems:    Coronary artery disease involving native heart    Metabolic encephalopathy      ASSESSMENT   ANIYAH Hernandez in Static Sitting: Normal  Dynamic Sitting: Normal           Static Standing: Fair +  Dynamic Standing: Fair +    ACTIVITY TOLERANCE  At rest  /67 mmHg  SpO2 96% room air  HR 89 bpm  RR 16  After activity  BP 94/71 mmHg   bpm (RN aware)  RR 17 level: modified independent    Current:  Min A for sit to stand transfers with verbal cues to maintain his sternal precautions.     Goal #3   Patient is able to ambulate 300 feet with assist device: walker - rolling at assistance level: modified independen

## 2017-01-29 NOTE — PROGRESS NOTES
[facility]    Progress Note    Grace Luther Patient Status:  Inpatient    1931 MRN [id number]   Location [facility] 2W/SW Attending Rebecca Moon Day # 4 PCP [address]       Subjective:   Grace Luther scds; Lovenox on hold low PLTs; Plts trended upward today  To 80;  HIPA neg X2; DARRIAN sent yesterday  Pain medication as needed; tylenol as needed per family who are afraid that the narcotic will confuse him  Increase activity up and ambulate; PT/OT  Shower t

## 2017-01-29 NOTE — PROGRESS NOTES
PICC Line Insertion Note    Procedure: Insertion of # 5 FR / Double Power Solo    Indications:  Amiodarone drip    Procedure Details   Patient and/or significant others educated regarding insertion of PICC line, rationale for procedure, and after care.  Inf

## 2017-01-30 ENCOUNTER — PRIOR ORIGINAL RECORDS (OUTPATIENT)
Dept: OTHER | Age: 82
End: 2017-01-30

## 2017-01-30 LAB
ANION GAP SERPL CALC-SCNC: 7 MMOL/L (ref 0–18)
BASOPHILS # BLD: 0 K/UL (ref 0–0.2)
BASOPHILS NFR BLD: 1 %
BUN SERPL-MCNC: 29 MG/DL (ref 8–20)
BUN/CREAT SERPL: 23.8 (ref 10–20)
CALCIUM SERPL-MCNC: 8.3 MG/DL (ref 8.5–10.5)
CHLORIDE SERPL-SCNC: 109 MMOL/L (ref 95–110)
CO2 SERPL-SCNC: 26 MMOL/L (ref 22–32)
CREAT SERPL-MCNC: 1.22 MG/DL (ref 0.5–1.5)
EOSINOPHIL # BLD: 0.2 K/UL (ref 0–0.7)
EOSINOPHIL NFR BLD: 2 %
ERYTHROCYTE [DISTWIDTH] IN BLOOD BY AUTOMATED COUNT: 13.6 % (ref 11–15)
GLUCOSE BLDC GLUCOMTR-MCNC: 106 MG/DL (ref 70–99)
GLUCOSE BLDC GLUCOMTR-MCNC: 124 MG/DL (ref 70–99)
GLUCOSE BLDC GLUCOMTR-MCNC: 94 MG/DL (ref 70–99)
GLUCOSE SERPL-MCNC: 103 MG/DL (ref 70–99)
HCT VFR BLD AUTO: 24.3 % (ref 41–52)
HGB BLD-MCNC: 8.1 G/DL (ref 13.5–17.5)
LYMPHOCYTES # BLD: 1.4 K/UL (ref 1–4)
LYMPHOCYTES NFR BLD: 14 %
MAGNESIUM SERPL-MCNC: 1.9 MG/DL (ref 1.8–2.5)
MCH RBC QN AUTO: 31.5 PG (ref 27–32)
MCHC RBC AUTO-ENTMCNC: 33.5 G/DL (ref 32–37)
MCV RBC AUTO: 94 FL (ref 80–100)
MONOCYTES # BLD: 1.2 K/UL (ref 0–1)
MONOCYTES NFR BLD: 12 %
NEUTROPHILS # BLD AUTO: 7.2 K/UL (ref 1.8–7.7)
NEUTROPHILS NFR BLD: 72 %
OSMOLALITY UR CALC.SUM OF ELEC: 300 MOSM/KG (ref 275–295)
PHOSPHATE SERPL-MCNC: 2.9 MG/DL (ref 2.4–4.7)
PLATELET # BLD AUTO: 108 K/UL (ref 140–400)
PMV BLD AUTO: 9 FL (ref 7.4–10.3)
POTASSIUM SERPL-SCNC: 4 MMOL/L (ref 3.3–5.1)
RBC # BLD AUTO: 2.59 M/UL (ref 4.5–5.9)
SODIUM SERPL-SCNC: 142 MMOL/L (ref 136–144)
WBC # BLD AUTO: 10 K/UL (ref 4–11)

## 2017-01-30 RX ORDER — ENOXAPARIN SODIUM 100 MG/ML
40 INJECTION SUBCUTANEOUS DAILY
Status: DISCONTINUED | OUTPATIENT
Start: 2017-01-30 | End: 2017-01-31

## 2017-01-30 RX ORDER — AMIODARONE HYDROCHLORIDE 200 MG/1
200 TABLET ORAL
Status: DISCONTINUED | OUTPATIENT
Start: 2017-01-30 | End: 2017-01-31

## 2017-01-30 NOTE — PHYSICAL THERAPY NOTE
PHYSICAL THERAPY TREATMENT NOTE - INPATIENT    Room Number: 992/462-N       Presenting Problem: CAD s/p CABG x4    Problem List  Active Problems:    Coronary artery disease involving native heart    Metabolic encephalopathy      ASSESSMENT   Pt is receive and standing up from a chair with arms (e.g., wheelchair, bedside commode, etc.): A Little   -   Moving from lying on back to sitting on the side of the bed?: A Little   How much help from another person does the patient currently need. ..   -   Moving to a

## 2017-01-30 NOTE — OCCUPATIONAL THERAPY NOTE
OCCUPATIONAL THERAPY TREATMENT NOTE - INPATIENT     Room Number: 739/757-I   Presenting Problem:  (CABG x4)    Problem List  Active Problems:    Coronary artery disease involving native heart    Metabolic encephalopathy      ASSESSMENT   Notified RN prior on and taking off regular upper body clothing?: A Little  -   Taking care of personal grooming such as brushing teeth?: None  -   Eating meals?: None    AM-PAC Score:  Score: 20  Approx Degree of Impairment: 38.32%  Standardized Score (AM-PAC Scale): 42.03

## 2017-01-30 NOTE — PROGRESS NOTES
[place] [facility]    Progress Note    Elder Hof Patient Status:  Inpatient    1931 MRN [id number]   Location [facility] 2W/SW Attending Rebecca Moon Day # 5 PCP OMAR LEAVITT     Subjective:  Pt.  Sitting in c prevention; will resume Lovenox now that plts increased. HIPA negative  Stop Levemir as glucose values within adeq range. No Hx: DM   Post op AFib- currently SR- on Amio- would plan to continue for one month post op   Discharge planning:  Thomas dunaway

## 2017-01-30 NOTE — PROGRESS NOTES
[facility]  Cardiology Progress Note    Aguilarannette Venita Patient Status:  Inpatient    1931 MRN [id number]   Location [facility] 2W/SW Attending Rebecca Moon Betty Day # 5 PCP OMAR LEAVITT       Assessment and Plan: S/P CABG Abdomen: Soft,Extremities: Without edema. Peripheral pulses are intact. Neurologic: Alert and oriented, normal affect.  Skin: Warm      Laboratory/Data:    Labs:         Recent Labs   Lab  01/28/17   0442  01/29/17   0517  01/30/17   0506   WBC  14.1* PRN   temazepam (RESTORIL) cap 15 mg 15 mg Oral Nightly PRN   magnesium hydroxide (MILK OF MAGNESIA) 400 MG/5ML suspension 30 mL 30 mL Oral Daily PRN   bisacodyl (DULCOLAX) rectal suppository 10 mg 10 mg Rectal Daily PRN   ondansetron HCl (ZOFRAN) injectio

## 2017-01-31 VITALS
SYSTOLIC BLOOD PRESSURE: 111 MMHG | DIASTOLIC BLOOD PRESSURE: 55 MMHG | BODY MASS INDEX: 26.42 KG/M2 | RESPIRATION RATE: 15 BRPM | WEIGHT: 174.31 LBS | TEMPERATURE: 98 F | HEART RATE: 82 BPM | HEIGHT: 68 IN | OXYGEN SATURATION: 98 %

## 2017-01-31 LAB — GLUCOSE BLDC GLUCOMTR-MCNC: 112 MG/DL (ref 70–99)

## 2017-01-31 PROCEDURE — 99239 HOSP IP/OBS DSCHRG MGMT >30: CPT | Performed by: HOSPITALIST

## 2017-01-31 RX ORDER — LISINOPRIL 2.5 MG/1
2.5 TABLET ORAL DAILY
Qty: 30 TABLET | Refills: 0 | Status: SHIPPED | OUTPATIENT
Start: 2017-01-31

## 2017-01-31 RX ORDER — MELATONIN
325
Qty: 60 TABLET | Refills: 0 | Status: SHIPPED | OUTPATIENT
Start: 2017-01-31 | End: 2021-09-03 | Stop reason: ALTCHOICE

## 2017-01-31 RX ORDER — DOXEPIN HYDROCHLORIDE 50 MG/1
1 CAPSULE ORAL DAILY
Qty: 30 TABLET | Refills: 0 | Status: SHIPPED | OUTPATIENT
Start: 2017-01-31 | End: 2017-03-02

## 2017-01-31 RX ORDER — CHLORHEXIDINE GLUCONATE 0.12 MG/ML
15 RINSE ORAL 2 TIMES DAILY
Qty: 240 ML | Refills: 0 | Status: SHIPPED | OUTPATIENT
Start: 2017-01-31 | End: 2017-01-31

## 2017-01-31 RX ORDER — ENOXAPARIN SODIUM 100 MG/ML
40 INJECTION SUBCUTANEOUS DAILY
Qty: 5.6 ML | Refills: 0 | Status: SHIPPED | OUTPATIENT
Start: 2017-01-31 | End: 2017-01-31

## 2017-01-31 RX ORDER — PSEUDOEPHEDRINE HCL 30 MG
100 TABLET ORAL 2 TIMES DAILY PRN
Qty: 60 CAPSULE | Refills: 0 | Status: SHIPPED | OUTPATIENT
Start: 2017-01-31 | End: 2021-09-03 | Stop reason: ALTCHOICE

## 2017-01-31 RX ORDER — AMIODARONE HYDROCHLORIDE 200 MG/1
200 TABLET ORAL DAILY
Status: DISCONTINUED | OUTPATIENT
Start: 2017-02-08 | End: 2017-01-31

## 2017-01-31 RX ORDER — ATORVASTATIN CALCIUM 20 MG/1
20 TABLET, FILM COATED ORAL NIGHTLY
Qty: 30 TABLET | Refills: 0 | Status: SHIPPED | OUTPATIENT
Start: 2017-01-31 | End: 2018-10-26 | Stop reason: DRUGHIGH

## 2017-01-31 RX ORDER — CLOPIDOGREL BISULFATE 75 MG/1
75 TABLET ORAL DAILY
Qty: 30 TABLET | Refills: 0 | Status: SHIPPED | OUTPATIENT
Start: 2017-01-31 | End: 2021-09-03 | Stop reason: ALTCHOICE

## 2017-01-31 RX ORDER — ASCORBIC ACID 500 MG
500 TABLET ORAL 3 TIMES DAILY
Qty: 90 TABLET | Refills: 0 | Status: SHIPPED | OUTPATIENT
Start: 2017-01-31 | End: 2021-09-03 | Stop reason: ALTCHOICE

## 2017-01-31 RX ORDER — HYDROCODONE BITARTRATE AND ACETAMINOPHEN 5; 325 MG/1; MG/1
1 TABLET ORAL EVERY 4 HOURS PRN
Qty: 30 TABLET | Refills: 0 | Status: SHIPPED | OUTPATIENT
Start: 2017-01-31 | End: 2017-01-31

## 2017-01-31 RX ORDER — AMIODARONE HYDROCHLORIDE 200 MG/1
200 TABLET ORAL 2 TIMES DAILY
Qty: 14 TABLET | Refills: 0 | Status: SHIPPED | OUTPATIENT
Start: 2017-01-31 | End: 2017-02-07

## 2017-01-31 RX ORDER — TEMAZEPAM 15 MG/1
15 CAPSULE ORAL NIGHTLY PRN
Qty: 30 CAPSULE | Refills: 0 | Status: SHIPPED | OUTPATIENT
Start: 2017-01-31 | End: 2021-09-03 | Stop reason: ALTCHOICE

## 2017-01-31 RX ORDER — ACETAMINOPHEN 325 MG/1
650 TABLET ORAL EVERY 6 HOURS PRN
Qty: 30 TABLET | Refills: 0 | Status: SHIPPED | OUTPATIENT
Start: 2017-01-31 | End: 2021-09-03 | Stop reason: ALTCHOICE

## 2017-01-31 RX ORDER — HYDROCODONE BITARTRATE AND ACETAMINOPHEN 5; 325 MG/1; MG/1
2 TABLET ORAL EVERY 4 HOURS PRN
Qty: 30 TABLET | Refills: 0 | Status: SHIPPED | OUTPATIENT
Start: 2017-01-31 | End: 2021-09-03 | Stop reason: ALTCHOICE

## 2017-01-31 RX ORDER — METOPROLOL SUCCINATE 25 MG/1
25 TABLET, EXTENDED RELEASE ORAL
Qty: 30 TABLET | Refills: 0 | Status: SHIPPED | OUTPATIENT
Start: 2017-01-31 | End: 2021-09-03 | Stop reason: ALTCHOICE

## 2017-01-31 RX ORDER — FAMOTIDINE 20 MG/1
20 TABLET ORAL DAILY
Qty: 30 TABLET | Refills: 0 | Status: SHIPPED | OUTPATIENT
Start: 2017-01-31 | End: 2021-09-03 | Stop reason: ALTCHOICE

## 2017-01-31 NOTE — PROGRESS NOTES
[place] [facility] - [facility]    Progress Note    Angelika Lemus Patient Status:  Inpatient    1931 MRN [id number]   Location [facility] 2W/SW Attending Rebecca Moon Day # 6 PCP OMAR LEAVITT     Subjective:  Pt.  Sitting in c

## 2017-01-31 NOTE — DISCHARGE PLANNING
SW received message from Dr. Pamela Mcnulty stating pt and pt's wife have questions in regards to insurance at [facility]; per MD, can d/c today. SW contacted RN who requested 3:30p d/c time.   SW contacted [facility] who stated they can accept at 3:30p.  SW contacted pt

## 2017-01-31 NOTE — CARDIAC REHAB
Cardiac Rehab Phase I    Activity:              . .    Education:  Sandra Goods provided and reviewed: Heart Surgery Binder. Patient instructed on: I.S. / Acapella, Cough and Deep Breathe, Incision Care, Infection Prevention and Pain Scale Instruction.

## 2017-01-31 NOTE — SLP NOTE
SPEECH DAILY NOTE - INPATIENT    Evaluation Date: 01/31/2017    ASSESSMENT & PLAN   ASSESSMENT  Patient seen to monitor tolerance of po diet. Patient only took thin liquid by straw.   Delayed slight throat clear x1 noted, which may or may not hav luciano relat [facility]. Futrher treatment is not warranted.   Follow Up Needed: No  SLP Follow-up Date: 01/30/17  Number of Visits to Meet Established Goals: 3  Session: 2    If you have any questions, please contact Dustin Santana MA/EDDA-SLP  Spe

## 2017-01-31 NOTE — DISCHARGE SUMMARY
More than 30 min spent on dc  Dc summary # [id number]  Called kelsey reyes 1/31/17 637p jorge Breen his nurse at [employer] to [employer] and call dr amaro's apn tomorrow to see if it should be continued as gilmar not back

## 2017-01-31 NOTE — PLAN OF CARE
CARDIOVASCULAR - ADULT    • Maintains optimal cardiac output and hemodynamic stability Adequate for Discharge    • Absence of cardiac arrhythmias or at baseline Adequate for Discharge    Vital signs are stable within parameters, cardiac monitor NSR, rate c

## 2017-01-31 NOTE — PROGRESS NOTES
[facility]    Cardiology Progress Note  [facility]    Tamara Bolden Patient Status:  Inpatient    1931 MRN [id number]   Location [facility] 2W/SW Attending Rebecca Moon Day # 6 PCP native heart  Stable  Post op af. To rehab with amio 200 bid for a week then 200/day. See our apn and then me thereafter.        Results:     Lab Results  Component Value Date   WBC 10.0 01/30/2017   HGB 8.1* 01/30/2017   HCT 24.3* 01/30/2017   *

## 2017-02-01 ENCOUNTER — TELEPHONE (OUTPATIENT)
Dept: MEDSURG UNIT | Facility: HOSPITAL | Age: 82
End: 2017-02-01

## 2017-02-01 LAB — SEROTONIN, SERUM: <10 NG/ML

## 2017-02-02 NOTE — DISCHARGE SUMMARY
[facility]    PATIENT'S NAME: Winter Machado   ATTENDING PHYSICIAN: Lily Moon MD   PATIENT ACCOUNT#:   [id number]    LOCATION:  [address] [place] [address] #:   [id number]       YOB: 1931  ADMISSION DATE:       01/25/20 is alert, oriented, friendly, cooperative, and completely appropriate. LABORATORY STUDIES:  Please see chart. ASSESSMENT AND PLAN:    1. Status post coronary artery bypass grafting, as described. The patient doing well.   We will see how he does in daily.  7.   Lisinopril 2.5 mg daily. 8.   Atenolol 25 mg daily. 9.   Ferrous sulfate 325 t.i.d. p.r.n.   10.   Temazepam 15 mg daily. 11.   Colace 100 mg p.o. b.i.d. p.r.n.   12.   Plavix 75 mg daily. 13.   Multivitamins daily.   14.   Synthroid 75 mcg

## 2017-02-03 ENCOUNTER — SNF VISIT (OUTPATIENT)
Dept: INTERNAL MEDICINE CLINIC | Facility: SKILLED NURSING FACILITY | Age: 82
End: 2017-02-03

## 2017-02-03 DIAGNOSIS — I50.9 CONGESTIVE HEART FAILURE, UNSPECIFIED CONGESTIVE HEART FAILURE CHRONICITY, UNSPECIFIED CONGESTIVE HEART FAILURE TYPE: Primary | ICD-10-CM

## 2017-02-03 DIAGNOSIS — T82.218S: ICD-10-CM

## 2017-02-03 NOTE — PROGRESS NOTES
HPI: Kavon Winter is an 80year old male admitted to [address] [place] [address] for elective CABG x 4 y Dr. Kaur Moran.  He had an uneventful postoperative course, but did have an episode of A. fib which was treated medically, this reverted to sinus rhythm,    Chief complaint: f/u Discussed with patient- willing to take diuretic if increased edema or weights. Will continue to monitor  CBC,CMP ON 2/8/17     1. CAD status post CABGx4.    Pt/ot, in house cards consult, cardiac meds, asa/plavix, lovenox per surgery  2. acute encephalopath

## 2017-02-09 ENCOUNTER — PRIOR ORIGINAL RECORDS (OUTPATIENT)
Dept: OTHER | Age: 82
End: 2017-02-09

## 2017-02-10 ENCOUNTER — SNF VISIT (OUTPATIENT)
Dept: INTERNAL MEDICINE CLINIC | Facility: SKILLED NURSING FACILITY | Age: 82
End: 2017-02-10

## 2017-02-10 DIAGNOSIS — T82.218D: Primary | ICD-10-CM

## 2017-02-10 RX ORDER — FUROSEMIDE 20 MG/1
20 TABLET ORAL DAILY
Qty: 1 TABLET | Refills: 0 | OUTPATIENT
Start: 2017-02-10 | End: 2021-09-03 | Stop reason: ALTCHOICE

## 2017-02-10 NOTE — PROGRESS NOTES
HPI: Kavon Winter is an 80year old male admitted to [facility] for elective CABG x 4 y Dr. Kaur Moran.  He had an uneventful postoperative course, but did have an episode of A. fib which was treated medically, this reverted to sinus rhythm,    Chief complaint: f/u post CABGx4. Pt/ot, in house cards consult, cardiac meds, asa/plavix, lovenox per surgery and dc on rehab discharge  f/u Dr. Radha Zhao 2/22 and Dr. Renu Rodas 2/15 and 3/6 as directed  2. acute encephalopathy: resolved. Monitor.   3. Leukocytosis: resolved monito

## 2017-02-14 ENCOUNTER — PRIOR ORIGINAL RECORDS (OUTPATIENT)
Dept: OTHER | Age: 82
End: 2017-02-14

## 2017-02-14 LAB
BUN: 29 MG/DL
CALCIUM: 8.3 MG/DL
CHLORIDE: 109 MEQ/L
CREATININE, SERUM: 1.22 MG/DL
GLUCOSE: 103 MG/DL
HEMATOCRIT: 24.3 %
HEMOGLOBIN: 8.1 G/DL
MAGNESIUM: 1.9 MG/DL
PLATELETS: 108 K/UL
POTASSIUM, SERUM: 4 MEQ/L
RED BLOOD COUNT: 2.59 X 10-6/U
SODIUM: 142 MEQ/L
WHITE BLOOD COUNT: 10 X 10-3/U

## 2017-02-20 ENCOUNTER — PRIOR ORIGINAL RECORDS (OUTPATIENT)
Dept: OTHER | Age: 82
End: 2017-02-20

## 2017-02-20 ENCOUNTER — TELEPHONE (OUTPATIENT)
Dept: INTERNAL MEDICINE CLINIC | Facility: CLINIC | Age: 82
End: 2017-02-20

## 2017-02-27 ENCOUNTER — LAB ENCOUNTER (OUTPATIENT)
Dept: LAB | Facility: HOSPITAL | Age: 82
End: 2017-02-27
Attending: INTERNAL MEDICINE
Payer: MEDICARE

## 2017-02-27 DIAGNOSIS — Z95.0 CARDIAC PACEMAKER IN SITU: Primary | ICD-10-CM

## 2017-02-27 LAB
BASOPHILS # BLD: 0.1 K/UL (ref 0–0.2)
BASOPHILS NFR BLD: 1 %
EOSINOPHIL # BLD: 0.2 K/UL (ref 0–0.7)
EOSINOPHIL NFR BLD: 3 %
ERYTHROCYTE [DISTWIDTH] IN BLOOD BY AUTOMATED COUNT: 15.6 % (ref 11–15)
HCT VFR BLD AUTO: 39.2 % (ref 41–52)
HGB BLD-MCNC: 12.8 G/DL (ref 13.5–17.5)
LYMPHOCYTES # BLD: 1.2 K/UL (ref 1–4)
LYMPHOCYTES NFR BLD: 16 %
MCH RBC QN AUTO: 31.8 PG (ref 27–32)
MCHC RBC AUTO-ENTMCNC: 32.6 G/DL (ref 32–37)
MCV RBC AUTO: 97.6 FL (ref 80–100)
MONOCYTES # BLD: 0.7 K/UL (ref 0–1)
MONOCYTES NFR BLD: 9 %
NEUTROPHILS # BLD AUTO: 5.4 K/UL (ref 1.8–7.7)
NEUTROPHILS NFR BLD: 71 %
PLATELET # BLD AUTO: 241 K/UL (ref 140–400)
PMV BLD AUTO: 7.7 FL (ref 7.4–10.3)
RBC # BLD AUTO: 4.01 M/UL (ref 4.5–5.9)
WBC # BLD AUTO: 7.6 K/UL (ref 4–11)

## 2017-02-27 PROCEDURE — 36415 COLL VENOUS BLD VENIPUNCTURE: CPT

## 2017-02-27 PROCEDURE — 85025 COMPLETE CBC W/AUTO DIFF WBC: CPT

## 2017-02-27 NOTE — PROGRESS NOTES
Srini Greene #[id number]  (80 year old M)       Celena Ludwig MD Physician Signed  H&P 1/27/2017  1:18 PM      [address]    Neurology Progress Note  [facility] Patient [facility] cover starting 1/28/2017      Alona Rodríguez MD

## 2017-03-01 ENCOUNTER — MYAURORA ACCOUNT LINK (OUTPATIENT)
Dept: OTHER | Age: 82
End: 2017-03-01

## 2017-03-01 ENCOUNTER — PRIOR ORIGINAL RECORDS (OUTPATIENT)
Dept: OTHER | Age: 82
End: 2017-03-01

## 2017-03-03 ENCOUNTER — PRIOR ORIGINAL RECORDS (OUTPATIENT)
Dept: OTHER | Age: 82
End: 2017-03-03

## 2017-03-06 ENCOUNTER — PRIOR ORIGINAL RECORDS (OUTPATIENT)
Dept: OTHER | Age: 82
End: 2017-03-06

## 2017-03-10 ENCOUNTER — CARDPULM VISIT (OUTPATIENT)
Dept: CARDIAC REHAB | Facility: HOSPITAL | Age: 82
End: 2017-03-10
Attending: INTERNAL MEDICINE
Payer: MEDICARE

## 2017-03-10 PROCEDURE — 93798 PHYS/QHP OP CAR RHAB W/ECG: CPT

## 2017-03-15 ENCOUNTER — CARDPULM VISIT (OUTPATIENT)
Dept: CARDIAC REHAB | Facility: HOSPITAL | Age: 82
End: 2017-03-15
Attending: INTERNAL MEDICINE
Payer: MEDICARE

## 2017-03-15 PROCEDURE — 93798 PHYS/QHP OP CAR RHAB W/ECG: CPT

## 2017-03-17 ENCOUNTER — CARDPULM VISIT (OUTPATIENT)
Dept: CARDIAC REHAB | Facility: HOSPITAL | Age: 82
End: 2017-03-17
Attending: INTERNAL MEDICINE
Payer: MEDICARE

## 2017-03-17 PROCEDURE — 93798 PHYS/QHP OP CAR RHAB W/ECG: CPT

## 2017-03-20 ENCOUNTER — PRIOR ORIGINAL RECORDS (OUTPATIENT)
Dept: OTHER | Age: 82
End: 2017-03-20

## 2017-03-20 ENCOUNTER — LAB ENCOUNTER (OUTPATIENT)
Dept: LAB | Facility: HOSPITAL | Age: 82
End: 2017-03-20
Attending: INTERNAL MEDICINE
Payer: MEDICARE

## 2017-03-20 ENCOUNTER — CARDPULM VISIT (OUTPATIENT)
Dept: CARDIAC REHAB | Facility: HOSPITAL | Age: 82
End: 2017-03-20
Attending: INTERNAL MEDICINE
Payer: MEDICARE

## 2017-03-20 ENCOUNTER — MYAURORA ACCOUNT LINK (OUTPATIENT)
Dept: OTHER | Age: 82
End: 2017-03-20

## 2017-03-20 DIAGNOSIS — E78.00 PURE HYPERCHOLESTEROLEMIA: Primary | ICD-10-CM

## 2017-03-20 DIAGNOSIS — D64.9 ANEMIA: ICD-10-CM

## 2017-03-20 DIAGNOSIS — N18.1 CHRONIC KIDNEY DISEASE, STAGE I: ICD-10-CM

## 2017-03-20 LAB
ALBUMIN SERPL BCP-MCNC: 3.6 G/DL (ref 3.5–4.8)
ALBUMIN/GLOB SERPL: 1.5 {RATIO} (ref 1–2)
ALP SERPL-CCNC: 61 U/L (ref 32–100)
ALT SERPL-CCNC: 18 U/L (ref 17–63)
ANION GAP SERPL CALC-SCNC: 8 MMOL/L (ref 0–18)
AST SERPL-CCNC: 23 U/L (ref 15–41)
BASOPHILS # BLD: 0.1 K/UL (ref 0–0.2)
BASOPHILS NFR BLD: 1 %
BILIRUB SERPL-MCNC: 0.6 MG/DL (ref 0.3–1.2)
BUN SERPL-MCNC: 19 MG/DL (ref 8–20)
BUN/CREAT SERPL: 13.9 (ref 10–20)
CALCIUM SERPL-MCNC: 9.2 MG/DL (ref 8.5–10.5)
CHLORIDE SERPL-SCNC: 107 MMOL/L (ref 95–110)
CHOLEST SERPL-MCNC: 166 MG/DL (ref 110–200)
CO2 SERPL-SCNC: 28 MMOL/L (ref 22–32)
CREAT SERPL-MCNC: 1.37 MG/DL (ref 0.5–1.5)
EOSINOPHIL # BLD: 0.3 K/UL (ref 0–0.7)
EOSINOPHIL NFR BLD: 5 %
ERYTHROCYTE [DISTWIDTH] IN BLOOD BY AUTOMATED COUNT: 14.6 % (ref 11–15)
GLOBULIN PLAS-MCNC: 2.4 G/DL (ref 2.5–3.7)
GLUCOSE SERPL-MCNC: 85 MG/DL (ref 70–99)
HCT VFR BLD AUTO: 40.3 % (ref 41–52)
HDLC SERPL-MCNC: 47 MG/DL
HGB BLD-MCNC: 13.3 G/DL (ref 13.5–17.5)
LDLC SERPL CALC-MCNC: 101 MG/DL (ref 0–99)
LYMPHOCYTES # BLD: 1.6 K/UL (ref 1–4)
LYMPHOCYTES NFR BLD: 28 %
MCH RBC QN AUTO: 31.7 PG (ref 27–32)
MCHC RBC AUTO-ENTMCNC: 33 G/DL (ref 32–37)
MCV RBC AUTO: 96 FL (ref 80–100)
MONOCYTES # BLD: 0.7 K/UL (ref 0–1)
MONOCYTES NFR BLD: 12 %
NEUTROPHILS # BLD AUTO: 3.1 K/UL (ref 1.8–7.7)
NEUTROPHILS NFR BLD: 54 %
NONHDLC SERPL-MCNC: 119 MG/DL
OSMOLALITY UR CALC.SUM OF ELEC: 298 MOSM/KG (ref 275–295)
PLATELET # BLD AUTO: 254 K/UL (ref 140–400)
PMV BLD AUTO: 8 FL (ref 7.4–10.3)
POTASSIUM SERPL-SCNC: 4.3 MMOL/L (ref 3.3–5.1)
PROT SERPL-MCNC: 6 G/DL (ref 5.9–8.4)
RBC # BLD AUTO: 4.2 M/UL (ref 4.5–5.9)
SODIUM SERPL-SCNC: 143 MMOL/L (ref 136–144)
TRIGL SERPL-MCNC: 90 MG/DL (ref 1–149)
WBC # BLD AUTO: 5.8 K/UL (ref 4–11)

## 2017-03-20 PROCEDURE — 36415 COLL VENOUS BLD VENIPUNCTURE: CPT

## 2017-03-20 PROCEDURE — 80053 COMPREHEN METABOLIC PANEL: CPT

## 2017-03-20 PROCEDURE — 85025 COMPLETE CBC W/AUTO DIFF WBC: CPT

## 2017-03-20 PROCEDURE — 80061 LIPID PANEL: CPT

## 2017-03-20 PROCEDURE — 93798 PHYS/QHP OP CAR RHAB W/ECG: CPT

## 2017-03-22 ENCOUNTER — PRIOR ORIGINAL RECORDS (OUTPATIENT)
Dept: OTHER | Age: 82
End: 2017-03-22

## 2017-03-22 ENCOUNTER — CARDPULM VISIT (OUTPATIENT)
Dept: CARDIAC REHAB | Facility: HOSPITAL | Age: 82
End: 2017-03-22
Attending: INTERNAL MEDICINE
Payer: MEDICARE

## 2017-03-22 LAB
ALT (SGPT): 18 U/L
AST (SGOT): 23 U/L
BUN: 19 MG/DL
CHOLESTEROL, TOTAL: 166 MG/DL
CREATININE, SERUM: 1.37 MG/DL
GLUCOSE: 85 MG/DL
GLUCOSE: 85 MG/DL
HDL CHOLESTEROL: 47 MG/DL
HEMATOCRIT: 40.3 %
HEMOGLOBIN: 13.3 G/DL
LDL CHOLESTEROL: 101 MG/DL
PLATELETS: 254 K/UL
POTASSIUM, SERUM: 4.3 MEQ/L
RED BLOOD COUNT: 4.2 X 10-6/U
SGOT (AST): 23 IU/L
SGPT (ALT): 18 IU/L
SODIUM: 143 MEQ/L
TRIGLYCERIDES: 90 MG/DL
WHITE BLOOD COUNT: 5.8 X 10-3/U

## 2017-03-22 PROCEDURE — 93798 PHYS/QHP OP CAR RHAB W/ECG: CPT

## 2017-03-24 ENCOUNTER — CARDPULM VISIT (OUTPATIENT)
Dept: CARDIAC REHAB | Facility: HOSPITAL | Age: 82
End: 2017-03-24
Attending: INTERNAL MEDICINE
Payer: MEDICARE

## 2017-03-24 PROCEDURE — 93798 PHYS/QHP OP CAR RHAB W/ECG: CPT

## 2017-03-27 ENCOUNTER — PRIOR ORIGINAL RECORDS (OUTPATIENT)
Dept: OTHER | Age: 82
End: 2017-03-27

## 2017-03-27 ENCOUNTER — CARDPULM VISIT (OUTPATIENT)
Dept: CARDIAC REHAB | Facility: HOSPITAL | Age: 82
End: 2017-03-27
Attending: INTERNAL MEDICINE
Payer: MEDICARE

## 2017-03-27 PROCEDURE — 93798 PHYS/QHP OP CAR RHAB W/ECG: CPT

## 2017-03-29 ENCOUNTER — CARDPULM VISIT (OUTPATIENT)
Dept: CARDIAC REHAB | Facility: HOSPITAL | Age: 82
End: 2017-03-29
Attending: INTERNAL MEDICINE
Payer: MEDICARE

## 2017-03-29 PROCEDURE — 93798 PHYS/QHP OP CAR RHAB W/ECG: CPT

## 2017-03-31 ENCOUNTER — CARDPULM VISIT (OUTPATIENT)
Dept: CARDIAC REHAB | Facility: HOSPITAL | Age: 82
End: 2017-03-31
Attending: INTERNAL MEDICINE
Payer: MEDICARE

## 2017-03-31 PROCEDURE — 93798 PHYS/QHP OP CAR RHAB W/ECG: CPT

## 2017-04-03 ENCOUNTER — CARDPULM VISIT (OUTPATIENT)
Dept: CARDIAC REHAB | Facility: HOSPITAL | Age: 82
End: 2017-04-03
Attending: INTERNAL MEDICINE
Payer: MEDICARE

## 2017-04-03 PROCEDURE — 93798 PHYS/QHP OP CAR RHAB W/ECG: CPT

## 2017-04-05 ENCOUNTER — CARDPULM VISIT (OUTPATIENT)
Dept: CARDIAC REHAB | Facility: HOSPITAL | Age: 82
End: 2017-04-05
Attending: INTERNAL MEDICINE
Payer: MEDICARE

## 2017-04-05 PROCEDURE — 93798 PHYS/QHP OP CAR RHAB W/ECG: CPT

## 2017-04-07 ENCOUNTER — CARDPULM VISIT (OUTPATIENT)
Dept: CARDIAC REHAB | Facility: HOSPITAL | Age: 82
End: 2017-04-07
Attending: INTERNAL MEDICINE
Payer: MEDICARE

## 2017-04-07 PROCEDURE — 93798 PHYS/QHP OP CAR RHAB W/ECG: CPT

## 2017-04-10 ENCOUNTER — CARDPULM VISIT (OUTPATIENT)
Dept: CARDIAC REHAB | Facility: HOSPITAL | Age: 82
End: 2017-04-10
Attending: INTERNAL MEDICINE
Payer: MEDICARE

## 2017-04-10 PROCEDURE — 93798 PHYS/QHP OP CAR RHAB W/ECG: CPT

## 2017-04-12 ENCOUNTER — CARDPULM VISIT (OUTPATIENT)
Dept: CARDIAC REHAB | Facility: HOSPITAL | Age: 82
End: 2017-04-12
Attending: INTERNAL MEDICINE
Payer: MEDICARE

## 2017-04-12 PROCEDURE — 93798 PHYS/QHP OP CAR RHAB W/ECG: CPT

## 2017-04-14 ENCOUNTER — CARDPULM VISIT (OUTPATIENT)
Dept: CARDIAC REHAB | Facility: HOSPITAL | Age: 82
End: 2017-04-14
Attending: INTERNAL MEDICINE
Payer: MEDICARE

## 2017-04-14 PROCEDURE — 93798 PHYS/QHP OP CAR RHAB W/ECG: CPT

## 2017-04-17 ENCOUNTER — CARDPULM VISIT (OUTPATIENT)
Dept: CARDIAC REHAB | Facility: HOSPITAL | Age: 82
End: 2017-04-17
Attending: INTERNAL MEDICINE
Payer: MEDICARE

## 2017-04-17 PROCEDURE — 93798 PHYS/QHP OP CAR RHAB W/ECG: CPT

## 2017-04-19 ENCOUNTER — CARDPULM VISIT (OUTPATIENT)
Dept: CARDIAC REHAB | Facility: HOSPITAL | Age: 82
End: 2017-04-19
Attending: INTERNAL MEDICINE
Payer: MEDICARE

## 2017-04-19 PROCEDURE — 93798 PHYS/QHP OP CAR RHAB W/ECG: CPT

## 2017-04-21 ENCOUNTER — CARDPULM VISIT (OUTPATIENT)
Dept: CARDIAC REHAB | Facility: HOSPITAL | Age: 82
End: 2017-04-21
Attending: INTERNAL MEDICINE
Payer: MEDICARE

## 2017-04-21 PROCEDURE — 93798 PHYS/QHP OP CAR RHAB W/ECG: CPT

## 2017-04-24 ENCOUNTER — CARDPULM VISIT (OUTPATIENT)
Dept: CARDIAC REHAB | Facility: HOSPITAL | Age: 82
End: 2017-04-24
Attending: INTERNAL MEDICINE
Payer: MEDICARE

## 2017-04-24 PROCEDURE — 93798 PHYS/QHP OP CAR RHAB W/ECG: CPT

## 2017-04-26 ENCOUNTER — CARDPULM VISIT (OUTPATIENT)
Dept: CARDIAC REHAB | Facility: HOSPITAL | Age: 82
End: 2017-04-26
Attending: INTERNAL MEDICINE
Payer: MEDICARE

## 2017-04-26 PROCEDURE — 93798 PHYS/QHP OP CAR RHAB W/ECG: CPT

## 2017-04-28 ENCOUNTER — CARDPULM VISIT (OUTPATIENT)
Dept: CARDIAC REHAB | Facility: HOSPITAL | Age: 82
End: 2017-04-28
Attending: INTERNAL MEDICINE
Payer: MEDICARE

## 2017-04-28 PROCEDURE — 93798 PHYS/QHP OP CAR RHAB W/ECG: CPT

## 2017-05-01 ENCOUNTER — CARDPULM VISIT (OUTPATIENT)
Dept: CARDIAC REHAB | Facility: HOSPITAL | Age: 82
End: 2017-05-01
Attending: INTERNAL MEDICINE
Payer: MEDICARE

## 2017-05-01 PROCEDURE — 93798 PHYS/QHP OP CAR RHAB W/ECG: CPT

## 2017-05-03 ENCOUNTER — CARDPULM VISIT (OUTPATIENT)
Dept: CARDIAC REHAB | Facility: HOSPITAL | Age: 82
End: 2017-05-03
Attending: INTERNAL MEDICINE
Payer: MEDICARE

## 2017-05-03 ENCOUNTER — PRIOR ORIGINAL RECORDS (OUTPATIENT)
Dept: OTHER | Age: 82
End: 2017-05-03

## 2017-05-03 PROCEDURE — 93798 PHYS/QHP OP CAR RHAB W/ECG: CPT

## 2017-05-05 ENCOUNTER — CARDPULM VISIT (OUTPATIENT)
Dept: CARDIAC REHAB | Facility: HOSPITAL | Age: 82
End: 2017-05-05
Attending: INTERNAL MEDICINE
Payer: MEDICARE

## 2017-05-05 PROCEDURE — 93798 PHYS/QHP OP CAR RHAB W/ECG: CPT

## 2017-05-08 ENCOUNTER — CARDPULM VISIT (OUTPATIENT)
Dept: CARDIAC REHAB | Facility: HOSPITAL | Age: 82
End: 2017-05-08
Attending: INTERNAL MEDICINE
Payer: MEDICARE

## 2017-05-08 PROCEDURE — 93798 PHYS/QHP OP CAR RHAB W/ECG: CPT

## 2017-05-09 ENCOUNTER — APPOINTMENT (OUTPATIENT)
Dept: OTHER | Facility: HOSPITAL | Age: 82
End: 2017-05-09
Attending: ORTHOPAEDIC SURGERY

## 2017-05-12 ENCOUNTER — APPOINTMENT (OUTPATIENT)
Dept: CARDIAC REHAB | Facility: HOSPITAL | Age: 82
End: 2017-05-12
Attending: INTERNAL MEDICINE
Payer: MEDICARE

## 2017-05-17 ENCOUNTER — CARDPULM VISIT (OUTPATIENT)
Dept: CARDIAC REHAB | Facility: HOSPITAL | Age: 82
End: 2017-05-17
Attending: INTERNAL MEDICINE
Payer: MEDICARE

## 2017-05-17 PROCEDURE — 93798 PHYS/QHP OP CAR RHAB W/ECG: CPT

## 2017-05-19 ENCOUNTER — CARDPULM VISIT (OUTPATIENT)
Dept: CARDIAC REHAB | Facility: HOSPITAL | Age: 82
End: 2017-05-19
Attending: INTERNAL MEDICINE
Payer: MEDICARE

## 2017-05-19 PROCEDURE — 93798 PHYS/QHP OP CAR RHAB W/ECG: CPT

## 2017-05-22 ENCOUNTER — CARDPULM VISIT (OUTPATIENT)
Dept: CARDIAC REHAB | Facility: HOSPITAL | Age: 82
End: 2017-05-22
Attending: INTERNAL MEDICINE
Payer: MEDICARE

## 2017-05-22 PROCEDURE — 93798 PHYS/QHP OP CAR RHAB W/ECG: CPT

## 2017-05-24 ENCOUNTER — CARDPULM VISIT (OUTPATIENT)
Dept: CARDIAC REHAB | Facility: HOSPITAL | Age: 82
End: 2017-05-24
Attending: INTERNAL MEDICINE
Payer: MEDICARE

## 2017-05-24 PROCEDURE — 93798 PHYS/QHP OP CAR RHAB W/ECG: CPT

## 2017-05-26 ENCOUNTER — CARDPULM VISIT (OUTPATIENT)
Dept: CARDIAC REHAB | Facility: HOSPITAL | Age: 82
End: 2017-05-26
Attending: INTERNAL MEDICINE
Payer: MEDICARE

## 2017-05-26 PROCEDURE — 93798 PHYS/QHP OP CAR RHAB W/ECG: CPT

## 2017-05-31 ENCOUNTER — CARDPULM VISIT (OUTPATIENT)
Dept: CARDIAC REHAB | Facility: HOSPITAL | Age: 82
End: 2017-05-31
Attending: INTERNAL MEDICINE
Payer: MEDICARE

## 2017-05-31 PROCEDURE — 93798 PHYS/QHP OP CAR RHAB W/ECG: CPT

## 2017-06-02 ENCOUNTER — CARDPULM VISIT (OUTPATIENT)
Dept: CARDIAC REHAB | Facility: HOSPITAL | Age: 82
End: 2017-06-02
Attending: INTERNAL MEDICINE
Payer: MEDICARE

## 2017-06-02 PROCEDURE — 93798 PHYS/QHP OP CAR RHAB W/ECG: CPT

## 2017-06-05 ENCOUNTER — APPOINTMENT (OUTPATIENT)
Dept: CARDIAC REHAB | Facility: HOSPITAL | Age: 82
End: 2017-06-05
Attending: INTERNAL MEDICINE
Payer: MEDICARE

## 2017-06-07 ENCOUNTER — APPOINTMENT (OUTPATIENT)
Dept: CARDIAC REHAB | Facility: HOSPITAL | Age: 82
End: 2017-06-07
Attending: INTERNAL MEDICINE
Payer: MEDICARE

## 2017-06-09 ENCOUNTER — APPOINTMENT (OUTPATIENT)
Dept: CARDIAC REHAB | Facility: HOSPITAL | Age: 82
End: 2017-06-09
Attending: INTERNAL MEDICINE
Payer: MEDICARE

## 2017-06-12 ENCOUNTER — CARDPULM VISIT (OUTPATIENT)
Dept: CARDIAC REHAB | Facility: HOSPITAL | Age: 82
End: 2017-06-12
Attending: INTERNAL MEDICINE
Payer: MEDICARE

## 2017-06-12 PROCEDURE — 93798 PHYS/QHP OP CAR RHAB W/ECG: CPT

## 2017-06-14 ENCOUNTER — CARDPULM VISIT (OUTPATIENT)
Dept: CARDIAC REHAB | Facility: HOSPITAL | Age: 82
End: 2017-06-14
Attending: INTERNAL MEDICINE
Payer: MEDICARE

## 2017-06-14 PROCEDURE — 93798 PHYS/QHP OP CAR RHAB W/ECG: CPT

## 2017-06-16 ENCOUNTER — APPOINTMENT (OUTPATIENT)
Dept: CARDIAC REHAB | Facility: HOSPITAL | Age: 82
End: 2017-06-16
Attending: INTERNAL MEDICINE
Payer: MEDICARE

## 2017-06-19 ENCOUNTER — OFFICE VISIT (OUTPATIENT)
Dept: OPTOMETRY | Facility: CLINIC | Age: 82
End: 2017-06-19

## 2017-06-19 ENCOUNTER — APPOINTMENT (OUTPATIENT)
Dept: CARDIAC REHAB | Facility: HOSPITAL | Age: 82
End: 2017-06-19
Attending: INTERNAL MEDICINE
Payer: MEDICARE

## 2017-06-19 ENCOUNTER — MYAURORA ACCOUNT LINK (OUTPATIENT)
Dept: OTHER | Age: 82
End: 2017-06-19

## 2017-06-19 ENCOUNTER — PRIOR ORIGINAL RECORDS (OUTPATIENT)
Dept: OTHER | Age: 82
End: 2017-06-19

## 2017-06-19 DIAGNOSIS — H25.13 AGE-RELATED NUCLEAR CATARACT OF BOTH EYES: Primary | ICD-10-CM

## 2017-06-19 DIAGNOSIS — H25.013 CORTICAL SENILE CATARACT, BILATERAL: ICD-10-CM

## 2017-06-19 DIAGNOSIS — H52.13 MYOPIA, BILATERAL: ICD-10-CM

## 2017-06-19 PROCEDURE — 92014 COMPRE OPH EXAM EST PT 1/>: CPT | Performed by: OPTOMETRIST

## 2017-06-19 PROCEDURE — 92015 DETERMINE REFRACTIVE STATE: CPT | Performed by: OPTOMETRIST

## 2017-06-19 RX ORDER — ATENOLOL AND CHLORTHALIDONE 50; 25 MG/1; MG/1
TABLET ORAL
COMMUNITY
End: 2021-09-03 | Stop reason: ALTCHOICE

## 2017-06-19 NOTE — PATIENT INSTRUCTIONS
Senile cataract  No treatment is required. Will continue to observe. Myopia  New glasses RX given to update as needed. I gave patient new RX for glasses for reading music and playing the trombone. Cortical senile cataract  No treatment is required.

## 2017-06-19 NOTE — ASSESSMENT & PLAN NOTE
New glasses RX given to update as needed. I gave patient new RX for glasses for reading music and playing the trombone.

## 2017-06-19 NOTE — PROGRESS NOTES
Zak Cuellar is a 80year old male. HPI:     HPI     Patient is in for an annual eye exam. He last had an EE here 3 years ago and felt it was time to come back. Has hx of cataracts with myopic shift. Has several pair of glasses he wants checked.  Sandi furosemide (LASIX) 20 MG Oral Tab Take 1 tablet (20 mg total) by mouth daily. Disp: 1 tablet Rfl: 0   ferrous sulfate 325 (65 FE) MG Oral Tab EC Take 1 tablet (325 mg total) by mouth 3 (three) times daily with meals.  Disp: 60 tablet Rfl: 0   temazepam 15 pinguecula    Cornea Clear Clear    Anterior Chamber Deep and quiet Deep and quiet    Iris Normal Normal    Lens 2-3+ NS, 1+ Cortical cataract, Trace Posterior subcapsular cataract 2-3+ NS, 1+ Cortical cataract, Trace Posterior subcapsular cataract    Vitr

## 2017-06-21 ENCOUNTER — CARDPULM VISIT (OUTPATIENT)
Dept: CARDIAC REHAB | Facility: HOSPITAL | Age: 82
End: 2017-06-21
Attending: INTERNAL MEDICINE
Payer: MEDICARE

## 2017-06-21 PROCEDURE — 93798 PHYS/QHP OP CAR RHAB W/ECG: CPT

## 2017-06-23 ENCOUNTER — CARDPULM VISIT (OUTPATIENT)
Dept: CARDIAC REHAB | Facility: HOSPITAL | Age: 82
End: 2017-06-23
Attending: INTERNAL MEDICINE
Payer: MEDICARE

## 2017-06-23 PROCEDURE — 93798 PHYS/QHP OP CAR RHAB W/ECG: CPT

## 2017-06-30 ENCOUNTER — CARDPULM VISIT (OUTPATIENT)
Dept: CARDIAC REHAB | Facility: HOSPITAL | Age: 82
End: 2017-06-30

## 2017-08-23 ENCOUNTER — LAB ENCOUNTER (OUTPATIENT)
Dept: LAB | Facility: HOSPITAL | Age: 82
End: 2017-08-23
Attending: INTERNAL MEDICINE
Payer: MEDICARE

## 2017-08-23 ENCOUNTER — PRIOR ORIGINAL RECORDS (OUTPATIENT)
Dept: OTHER | Age: 82
End: 2017-08-23

## 2017-08-23 DIAGNOSIS — E78.00 PURE HYPERCHOLESTEROLEMIA: Primary | ICD-10-CM

## 2017-08-23 LAB
ALT SERPL-CCNC: 16 U/L (ref 17–63)
AST SERPL-CCNC: 23 U/L (ref 15–41)
CHOLEST SERPL-MCNC: 116 MG/DL (ref 110–200)
HDLC SERPL-MCNC: 41 MG/DL
LDLC SERPL CALC-MCNC: 58 MG/DL (ref 0–99)
NONHDLC SERPL-MCNC: 75 MG/DL
TRIGL SERPL-MCNC: 83 MG/DL (ref 1–149)

## 2017-08-23 PROCEDURE — 84460 ALANINE AMINO (ALT) (SGPT): CPT

## 2017-08-23 PROCEDURE — 36415 COLL VENOUS BLD VENIPUNCTURE: CPT

## 2017-08-23 PROCEDURE — 80061 LIPID PANEL: CPT

## 2017-08-23 PROCEDURE — 84450 TRANSFERASE (AST) (SGOT): CPT

## 2017-09-15 ENCOUNTER — APPOINTMENT (OUTPATIENT)
Dept: CARDIAC REHAB | Facility: HOSPITAL | Age: 82
End: 2017-09-15

## 2017-12-06 ENCOUNTER — CARDPULM VISIT (OUTPATIENT)
Dept: CARDIAC REHAB | Facility: HOSPITAL | Age: 82
End: 2017-12-06

## 2017-12-20 ENCOUNTER — PRIOR ORIGINAL RECORDS (OUTPATIENT)
Dept: OTHER | Age: 82
End: 2017-12-20

## 2018-02-19 ENCOUNTER — APPOINTMENT (OUTPATIENT)
Dept: LAB | Facility: HOSPITAL | Age: 83
End: 2018-02-19
Attending: INTERNAL MEDICINE
Payer: MEDICARE

## 2018-02-19 ENCOUNTER — PRIOR ORIGINAL RECORDS (OUTPATIENT)
Dept: OTHER | Age: 83
End: 2018-02-19

## 2018-02-19 DIAGNOSIS — E78.00 PURE HYPERCHOLESTEROLEMIA: ICD-10-CM

## 2018-02-19 DIAGNOSIS — I48.91 ATRIAL FIBRILLATION, UNSPECIFIED TYPE (HCC): ICD-10-CM

## 2018-02-19 LAB
ALT SERPL-CCNC: 23 U/L (ref 17–63)
ANION GAP SERPL CALC-SCNC: 7 MMOL/L (ref 0–18)
AST SERPL-CCNC: 25 U/L (ref 15–41)
BUN SERPL-MCNC: 20 MG/DL (ref 8–20)
BUN/CREAT SERPL: 16.9 (ref 10–20)
CALCIUM SERPL-MCNC: 8.6 MG/DL (ref 8.5–10.5)
CHLORIDE SERPL-SCNC: 106 MMOL/L (ref 95–110)
CHOLEST SERPL-MCNC: 119 MG/DL (ref 110–200)
CO2 SERPL-SCNC: 26 MMOL/L (ref 22–32)
CREAT SERPL-MCNC: 1.18 MG/DL (ref 0.5–1.5)
GLUCOSE SERPL-MCNC: 92 MG/DL (ref 70–99)
HDLC SERPL-MCNC: 43 MG/DL
LDLC SERPL CALC-MCNC: 62 MG/DL (ref 0–99)
NONHDLC SERPL-MCNC: 76 MG/DL
OSMOLALITY UR CALC.SUM OF ELEC: 290 MOSM/KG (ref 275–295)
POTASSIUM SERPL-SCNC: 3.6 MMOL/L (ref 3.3–5.1)
SODIUM SERPL-SCNC: 139 MMOL/L (ref 136–144)
TRIGL SERPL-MCNC: 71 MG/DL (ref 1–149)

## 2018-02-19 PROCEDURE — 84450 TRANSFERASE (AST) (SGOT): CPT

## 2018-02-19 PROCEDURE — 36415 COLL VENOUS BLD VENIPUNCTURE: CPT

## 2018-02-19 PROCEDURE — 80048 BASIC METABOLIC PNL TOTAL CA: CPT

## 2018-02-19 PROCEDURE — 84460 ALANINE AMINO (ALT) (SGPT): CPT

## 2018-02-19 PROCEDURE — 80061 LIPID PANEL: CPT

## 2018-02-20 LAB
ALT (SGPT): 23 U/L
AST (SGOT): 25 U/L
BUN: 20 MG/DL
CALCIUM: 8.6 MG/DL
CHLORIDE: 106 MEQ/L
CHOLESTEROL, TOTAL: 119 MG/DL
CREATININE, SERUM: 1.18 MG/DL
GLUCOSE: 92 MG/DL
GLUCOSE: 92 MG/DL
HDL CHOLESTEROL: 43 MG/DL
LDL CHOLESTEROL: 62 MG/DL
NON-HDL CHOLESTEROL: 76 MG/DL
POTASSIUM, SERUM: 3.6 MEQ/L
SODIUM: 139 MEQ/L
TRIGLYCERIDES: 71 MG/DL

## 2018-02-21 ENCOUNTER — PRIOR ORIGINAL RECORDS (OUTPATIENT)
Dept: OTHER | Age: 83
End: 2018-02-21

## 2018-02-22 LAB
ALBUMIN: 3.2 G/DL
ALKALINE PHOSPHATATE(ALK PHOS): 92 IU/L
ALT (SGPT): 32 U/L
AST (SGOT): 25 U/L
BILIRUBIN TOTAL: 0.4 MG/DL
BUN: 22 MG/DL
CALCIUM: 8.7 MG/DL
CHLORIDE: 107 MEQ/L
CHOLESTEROL, TOTAL: 122 MG/DL
CREATININE, SERUM: 1.12 MG/DL
GLUCOSE: 86 MG/DL
GLUCOSE: 86 MG/DL
HDL CHOLESTEROL: 51 MG/DL
LDL CHOLESTEROL: 55 MG/DL
POTASSIUM, SERUM: 4.3 MEQ/L
SGOT (AST): 25 IU/L
SGPT (ALT): 32 IU/L
SODIUM: 141 MEQ/L
THYROID STIMULATING HORMONE: 7.95 MLU/L
TRIGLYCERIDES: 82 MG/DL

## 2018-03-30 ENCOUNTER — APPOINTMENT (OUTPATIENT)
Dept: OTHER | Facility: HOSPITAL | Age: 83
End: 2018-03-30
Attending: FAMILY MEDICINE

## 2018-04-16 ENCOUNTER — PRIOR ORIGINAL RECORDS (OUTPATIENT)
Dept: OTHER | Age: 83
End: 2018-04-16

## 2018-04-17 ENCOUNTER — PRIOR ORIGINAL RECORDS (OUTPATIENT)
Dept: OTHER | Age: 83
End: 2018-04-17

## 2018-04-17 ENCOUNTER — LAB ENCOUNTER (OUTPATIENT)
Dept: LAB | Facility: HOSPITAL | Age: 83
End: 2018-04-17
Attending: INTERNAL MEDICINE
Payer: MEDICARE

## 2018-04-17 DIAGNOSIS — E78.00 PURE HYPERCHOLESTEROLEMIA: Primary | ICD-10-CM

## 2018-04-17 PROCEDURE — 80061 LIPID PANEL: CPT

## 2018-04-17 PROCEDURE — 36415 COLL VENOUS BLD VENIPUNCTURE: CPT

## 2018-04-18 ENCOUNTER — PRIOR ORIGINAL RECORDS (OUTPATIENT)
Dept: OTHER | Age: 83
End: 2018-04-18

## 2018-04-18 LAB
CHOLESTEROL, TOTAL: 144 MG/DL
HDL CHOLESTEROL: 48 MG/DL
LDL CHOLESTEROL: 77 MG/DL
NON-HDL CHOLESTEROL: 96 MG/DL
TRIGLYCERIDES: 94 MG/DL

## 2018-06-01 ENCOUNTER — CARDPULM VISIT (OUTPATIENT)
Dept: CARDIAC REHAB | Facility: HOSPITAL | Age: 83
End: 2018-06-01

## 2018-07-17 ENCOUNTER — LAB ENCOUNTER (OUTPATIENT)
Dept: LAB | Facility: HOSPITAL | Age: 83
End: 2018-07-17
Attending: INTERNAL MEDICINE
Payer: MEDICARE

## 2018-07-17 ENCOUNTER — PRIOR ORIGINAL RECORDS (OUTPATIENT)
Dept: OTHER | Age: 83
End: 2018-07-17

## 2018-07-17 DIAGNOSIS — E78.00 PURE HYPERCHOLESTEROLEMIA: Primary | ICD-10-CM

## 2018-07-17 LAB
ALT SERPL-CCNC: 19 U/L (ref 17–63)
AST SERPL-CCNC: 22 U/L (ref 15–41)
CHOLEST SERPL-MCNC: 125 MG/DL (ref 110–200)
HDLC SERPL-MCNC: 42 MG/DL
LDLC SERPL CALC-MCNC: 68 MG/DL (ref 0–99)
NONHDLC SERPL-MCNC: 83 MG/DL
TRIGL SERPL-MCNC: 73 MG/DL (ref 1–149)

## 2018-07-17 PROCEDURE — 84460 ALANINE AMINO (ALT) (SGPT): CPT

## 2018-07-17 PROCEDURE — 80061 LIPID PANEL: CPT

## 2018-07-17 PROCEDURE — 84450 TRANSFERASE (AST) (SGOT): CPT

## 2018-07-17 PROCEDURE — 36415 COLL VENOUS BLD VENIPUNCTURE: CPT

## 2018-07-18 ENCOUNTER — PRIOR ORIGINAL RECORDS (OUTPATIENT)
Dept: OTHER | Age: 83
End: 2018-07-18

## 2018-07-18 LAB
ALT (SGPT): 19 U/L
AST (SGOT): 22 U/L
CHOLESTEROL, TOTAL: 125 MG/DL
HDL CHOLESTEROL: 42 MG/DL
LDL CHOLESTEROL: 68 MG/DL
NON-HDL CHOLESTEROL: 83 MG/DL
TRIGLYCERIDES: 73 MG/DL

## 2018-08-17 ENCOUNTER — CARDPULM VISIT (OUTPATIENT)
Dept: CARDIAC REHAB | Facility: HOSPITAL | Age: 83
End: 2018-08-17

## 2018-08-27 ENCOUNTER — PRIOR ORIGINAL RECORDS (OUTPATIENT)
Dept: OTHER | Age: 83
End: 2018-08-27

## 2018-08-27 ENCOUNTER — MYAURORA ACCOUNT LINK (OUTPATIENT)
Dept: OTHER | Age: 83
End: 2018-08-27

## 2018-10-26 ENCOUNTER — OFFICE VISIT (OUTPATIENT)
Dept: OPTOMETRY | Facility: CLINIC | Age: 83
End: 2018-10-26
Payer: MEDICARE

## 2018-10-26 DIAGNOSIS — H25.13 AGE-RELATED NUCLEAR CATARACT OF BOTH EYES: Primary | ICD-10-CM

## 2018-10-26 DIAGNOSIS — H52.13 MYOPIA, BILATERAL: ICD-10-CM

## 2018-10-26 PROCEDURE — 92014 COMPRE OPH EXAM EST PT 1/>: CPT | Performed by: OPTOMETRIST

## 2018-10-26 PROCEDURE — 92015 DETERMINE REFRACTIVE STATE: CPT | Performed by: OPTOMETRIST

## 2018-10-26 RX ORDER — ATORVASTATIN CALCIUM 40 MG/1
40 TABLET, FILM COATED ORAL
Refills: 2 | COMMUNITY
Start: 2018-07-18

## 2018-10-26 NOTE — ASSESSMENT & PLAN NOTE
I advised patient that vision is reduced enough that he should consider cataract surgery OS >>OD. His choice. I advised that  [facility] no longer does cataract surgery and we recommend [address] [place] [address] Ophthalmology. He will discuss with his wife and call me.  I will then

## 2018-10-26 NOTE — PATIENT INSTRUCTIONS
Myopia, bilateral  New glasses RX not recommended as decreased vision is due to cataracts and improvement with glasses is not significant enough.     Age-related nuclear cataract of both eyes  I advised patient that vision is reduced enough that he should c

## 2018-10-26 NOTE — PROGRESS NOTES
Jethro Santiago is a 80year old male. HPI:     HPI     Patient is in for an annual eye exam. He has hx of cataracts that we have  been following. Patient was not anxious for catarct surgery.  He had new glasses made last year but is having more difficult Hr Take 1 tablet (25 mg total) by mouth Daily Beta Blocker. Disp: 30 tablet Rfl: 0   temazepam 15 MG Oral Cap Take 1 capsule (15 mg total) by mouth nightly as needed for Sleep.  Disp: 30 capsule Rfl: 0   docusate sodium 100 MG Oral Cap Take 100 mg by mouth Conjunctiva/Sclera Nasal/temp pinguecula Nasal/temp pinguecula    Cornea Clear Clear    Anterior Chamber Deep and quiet Deep and quiet    Iris Normal Normal    Lens 2-3+ NS, 1+ Cortical cataract, Trace Posterior subcapsular cataract 2-3+ NS, 1+ Cortical ca Evaluation.     10/26/2018  Scribed by: Kaur Fang

## 2018-10-26 NOTE — ASSESSMENT & PLAN NOTE
New glasses RX not recommended as decreased vision is due to cataracts and improvement with glasses is not significant enough.

## 2018-11-08 ENCOUNTER — TELEPHONE (OUTPATIENT)
Dept: OPTOMETRY | Facility: CLINIC | Age: 83
End: 2018-11-08

## 2018-11-08 NOTE — TELEPHONE ENCOUNTER
Patient had a few questions that he wanted answered on cataract surgery. He wants to proceed with cataract surgery so I will send his chart notes and demographics to [address] [place] [address] Ophthalmology.

## 2018-11-14 ENCOUNTER — CARDPULM VISIT (OUTPATIENT)
Dept: CARDIAC REHAB | Facility: HOSPITAL | Age: 83
End: 2018-11-14

## 2019-02-28 VITALS
HEIGHT: 68 IN | WEIGHT: 172 LBS | DIASTOLIC BLOOD PRESSURE: 70 MMHG | SYSTOLIC BLOOD PRESSURE: 130 MMHG | BODY MASS INDEX: 26.07 KG/M2 | RESPIRATION RATE: 18 BRPM | HEART RATE: 61 BPM

## 2019-02-28 VITALS
BODY MASS INDEX: 26.52 KG/M2 | DIASTOLIC BLOOD PRESSURE: 70 MMHG | RESPIRATION RATE: 18 BRPM | HEIGHT: 68 IN | HEART RATE: 58 BPM | WEIGHT: 175 LBS | SYSTOLIC BLOOD PRESSURE: 119 MMHG

## 2019-02-28 VITALS
BODY MASS INDEX: 25.61 KG/M2 | RESPIRATION RATE: 18 BRPM | SYSTOLIC BLOOD PRESSURE: 130 MMHG | HEART RATE: 66 BPM | WEIGHT: 169 LBS | HEIGHT: 68 IN | DIASTOLIC BLOOD PRESSURE: 72 MMHG

## 2019-03-01 VITALS
HEIGHT: 68 IN | BODY MASS INDEX: 26.07 KG/M2 | SYSTOLIC BLOOD PRESSURE: 120 MMHG | DIASTOLIC BLOOD PRESSURE: 70 MMHG | RESPIRATION RATE: 18 BRPM | WEIGHT: 172 LBS | HEART RATE: 59 BPM

## 2019-03-01 VITALS
SYSTOLIC BLOOD PRESSURE: 102 MMHG | HEART RATE: 110 BPM | BODY MASS INDEX: 26.07 KG/M2 | DIASTOLIC BLOOD PRESSURE: 60 MMHG | OXYGEN SATURATION: 93 % | WEIGHT: 172 LBS | HEIGHT: 68 IN

## 2019-03-01 VITALS
HEART RATE: 54 BPM | DIASTOLIC BLOOD PRESSURE: 74 MMHG | BODY MASS INDEX: 26.37 KG/M2 | HEIGHT: 68 IN | OXYGEN SATURATION: 100 % | WEIGHT: 174 LBS | SYSTOLIC BLOOD PRESSURE: 130 MMHG

## 2019-03-01 VITALS
WEIGHT: 173 LBS | BODY MASS INDEX: 26.22 KG/M2 | HEIGHT: 68 IN | DIASTOLIC BLOOD PRESSURE: 66 MMHG | SYSTOLIC BLOOD PRESSURE: 126 MMHG | HEART RATE: 59 BPM | RESPIRATION RATE: 18 BRPM

## 2019-03-12 RX ORDER — LISINOPRIL 2.5 MG/1
1 TABLET ORAL DAILY
COMMUNITY
Start: 2018-09-10

## 2019-03-12 RX ORDER — ATORVASTATIN CALCIUM 40 MG/1
1 TABLET, FILM COATED ORAL AT BEDTIME
COMMUNITY
Start: 2019-01-15

## 2019-03-12 RX ORDER — ASPIRIN 325 MG
325 TABLET ORAL DAILY
COMMUNITY

## 2019-03-12 RX ORDER — LEVOTHYROXINE SODIUM 0.1 MG/1
100 TABLET ORAL DAILY
COMMUNITY

## 2019-03-21 PROBLEM — E87.6 HYPOKALEMIA: Status: ACTIVE | Noted: 2019-03-21

## 2019-03-21 PROBLEM — E03.9 HYPOTHYROIDISM: Status: ACTIVE | Noted: 2019-03-21

## 2019-03-21 PROBLEM — N18.9 CHRONIC KIDNEY DISEASE: Status: ACTIVE | Noted: 2019-03-21

## 2019-03-21 PROBLEM — E78.5 DYSLIPIDEMIA: Status: ACTIVE | Noted: 2019-03-21

## 2019-03-21 PROBLEM — I10 ESSENTIAL HYPERTENSION: Status: ACTIVE | Noted: 2019-03-21

## 2019-03-21 PROBLEM — I25.10 CAD (CORONARY ARTERY DISEASE): Status: ACTIVE | Noted: 2019-03-21

## 2019-03-22 ENCOUNTER — CARDPULM VISIT (OUTPATIENT)
Dept: CARDIAC REHAB | Facility: HOSPITAL | Age: 84
End: 2019-03-22

## 2019-03-25 ENCOUNTER — OFFICE VISIT (OUTPATIENT)
Dept: CARDIOLOGY | Age: 84
End: 2019-03-25

## 2019-03-25 ENCOUNTER — DOCUMENTATION ONLY (OUTPATIENT)
Dept: OPTOMETRY | Facility: CLINIC | Age: 84
End: 2019-03-25

## 2019-03-25 VITALS
WEIGHT: 174 LBS | BODY MASS INDEX: 26.37 KG/M2 | DIASTOLIC BLOOD PRESSURE: 72 MMHG | HEART RATE: 64 BPM | RESPIRATION RATE: 18 BRPM | HEIGHT: 68 IN | OXYGEN SATURATION: 96 % | SYSTOLIC BLOOD PRESSURE: 144 MMHG

## 2019-03-25 DIAGNOSIS — E78.5 DYSLIPIDEMIA: ICD-10-CM

## 2019-03-25 DIAGNOSIS — I25.10 CORONARY ARTERY DISEASE INVOLVING NATIVE CORONARY ARTERY OF NATIVE HEART WITHOUT ANGINA PECTORIS: Primary | ICD-10-CM

## 2019-03-25 PROCEDURE — 99214 OFFICE O/P EST MOD 30 MIN: CPT | Performed by: INTERNAL MEDICINE

## 2019-06-14 ENCOUNTER — CARDPULM VISIT (OUTPATIENT)
Dept: CARDIAC REHAB | Facility: HOSPITAL | Age: 84
End: 2019-06-14

## 2019-07-22 RX ORDER — ATORVASTATIN CALCIUM 40 MG/1
TABLET, FILM COATED ORAL
Qty: 90 TABLET | Refills: 1 | OUTPATIENT
Start: 2019-07-22

## 2019-07-22 NOTE — TELEPHONE ENCOUNTER
ATORVASTATIN 40 MG  Patient not seen at [facility] cardi[facility]. Seen at [facility]. Refill denied and message sent to pharmacy.

## 2019-08-30 ENCOUNTER — CARDPULM VISIT (OUTPATIENT)
Dept: CARDIAC REHAB | Facility: HOSPITAL | Age: 84
End: 2019-08-30

## 2019-09-25 ENCOUNTER — OFFICE VISIT (OUTPATIENT)
Dept: CARDIOLOGY | Age: 84
End: 2019-09-25

## 2019-09-25 VITALS
HEIGHT: 68 IN | WEIGHT: 173 LBS | BODY MASS INDEX: 26.22 KG/M2 | SYSTOLIC BLOOD PRESSURE: 122 MMHG | OXYGEN SATURATION: 97 % | DIASTOLIC BLOOD PRESSURE: 76 MMHG | HEART RATE: 67 BPM

## 2019-09-25 DIAGNOSIS — I25.10 CORONARY ARTERY DISEASE INVOLVING NATIVE CORONARY ARTERY OF NATIVE HEART WITHOUT ANGINA PECTORIS: Primary | ICD-10-CM

## 2019-09-25 DIAGNOSIS — E78.5 DYSLIPIDEMIA: ICD-10-CM

## 2019-09-25 PROCEDURE — 99214 OFFICE O/P EST MOD 30 MIN: CPT | Performed by: INTERNAL MEDICINE

## 2019-09-25 ASSESSMENT — PATIENT HEALTH QUESTIONNAIRE - PHQ9
SUM OF ALL RESPONSES TO PHQ9 QUESTIONS 1 AND 2: 0
1. LITTLE INTEREST OR PLEASURE IN DOING THINGS: NOT AT ALL
2. FEELING DOWN, DEPRESSED OR HOPELESS: NOT AT ALL
SUM OF ALL RESPONSES TO PHQ9 QUESTIONS 1 AND 2: 0

## 2019-11-19 LAB
ANION GAP SERPL CALC-SCNC: NORMAL MMOL/L
BUN SERPL-MCNC: 19 MG/DL
BUN/CREAT SERPL: NORMAL
CALCIUM SERPL-MCNC: NORMAL MG/DL
CHLORIDE SERPL-SCNC: NORMAL MMOL/L
CHOLEST SERPL-MCNC: 143 MG/DL
CHOLEST/HDLC SERPL: NORMAL {RATIO}
CO2 SERPL-SCNC: NORMAL MMOL/L
CREAT SERPL-MCNC: 1.15 MG/DL
FREE T4: 1.34
GLUCOSE SERPL-MCNC: 90 MG/DL
HDLC SERPL-MCNC: 54 MG/DL
LDLC SERPL CALC-MCNC: 73 MG/DL
LENGTH OF FAST TIME PATIENT: NORMAL H
LENGTH OF FAST TIME PATIENT: NORMAL H
Lab: 396
MAGNESIUM SERPL-MCNC: 2.1 MG/DL
NONHDLC SERPL-MCNC: NORMAL MG/DL
POTASSIUM SERPL-SCNC: 3.8 MMOL/L
SODIUM SERPL-SCNC: 141 MMOL/L
TRIGL SERPL-MCNC: NORMAL MG/DL
TSH SERPL-ACNC: 0.99 M[IU]/L
VIT D,1.25-DIHYDROXY: 30
VLDLC SERPL CALC-MCNC: NORMAL MG/DL

## 2019-11-22 ENCOUNTER — CARDPULM VISIT (OUTPATIENT)
Dept: CARDIAC REHAB | Facility: HOSPITAL | Age: 84
End: 2019-11-22

## 2020-03-06 ENCOUNTER — CARDPULM VISIT (OUTPATIENT)
Dept: CARDIAC REHAB | Facility: HOSPITAL | Age: 85
End: 2020-03-06

## 2020-03-25 ENCOUNTER — CLINICAL ABSTRACT (OUTPATIENT)
Dept: CARDIOLOGY | Age: 85
End: 2020-03-25

## 2020-03-25 ENCOUNTER — OFFICE VISIT (OUTPATIENT)
Dept: CARDIOLOGY | Age: 85
End: 2020-03-25

## 2020-03-25 DIAGNOSIS — E78.5 DYSLIPIDEMIA: ICD-10-CM

## 2020-03-25 DIAGNOSIS — I25.10 CORONARY ARTERY DISEASE INVOLVING NATIVE CORONARY ARTERY OF NATIVE HEART WITHOUT ANGINA PECTORIS: Primary | ICD-10-CM

## 2020-03-25 PROCEDURE — 99441 TELEPHONE E&M BY PHYSICIAN EST PT NOT ORIG PREV 7 DAYS 5-10 MIN: CPT | Performed by: INTERNAL MEDICINE

## 2020-03-25 ASSESSMENT — PATIENT HEALTH QUESTIONNAIRE - PHQ9
2. FEELING DOWN, DEPRESSED OR HOPELESS: NOT AT ALL
SUM OF ALL RESPONSES TO PHQ9 QUESTIONS 1 AND 2: 0
1. LITTLE INTEREST OR PLEASURE IN DOING THINGS: NOT AT ALL
SUM OF ALL RESPONSES TO PHQ9 QUESTIONS 1 AND 2: 0

## 2020-05-05 ENCOUNTER — APPOINTMENT (OUTPATIENT)
Dept: OTHER | Facility: HOSPITAL | Age: 85
End: 2020-05-05
Attending: FAMILY MEDICINE

## 2020-09-28 ENCOUNTER — OFFICE VISIT (OUTPATIENT)
Dept: CARDIOLOGY | Age: 85
End: 2020-09-28

## 2020-09-28 VITALS
HEART RATE: 61 BPM | DIASTOLIC BLOOD PRESSURE: 80 MMHG | HEIGHT: 68 IN | BODY MASS INDEX: 26.22 KG/M2 | WEIGHT: 173 LBS | SYSTOLIC BLOOD PRESSURE: 122 MMHG

## 2020-09-28 DIAGNOSIS — E78.5 DYSLIPIDEMIA: ICD-10-CM

## 2020-09-28 DIAGNOSIS — I25.10 CORONARY ARTERY DISEASE INVOLVING NATIVE CORONARY ARTERY OF NATIVE HEART WITHOUT ANGINA PECTORIS: Primary | ICD-10-CM

## 2020-09-28 PROCEDURE — 99214 OFFICE O/P EST MOD 30 MIN: CPT | Performed by: INTERNAL MEDICINE

## 2020-09-28 ASSESSMENT — PATIENT HEALTH QUESTIONNAIRE - PHQ9
CLINICAL INTERPRETATION OF PHQ9 SCORE: NO FURTHER SCREENING NEEDED
SUM OF ALL RESPONSES TO PHQ9 QUESTIONS 1 AND 2: 0
SUM OF ALL RESPONSES TO PHQ9 QUESTIONS 1 AND 2: 0
2. FEELING DOWN, DEPRESSED OR HOPELESS: NOT AT ALL
CLINICAL INTERPRETATION OF PHQ2 SCORE: NO FURTHER SCREENING NEEDED
1. LITTLE INTEREST OR PLEASURE IN DOING THINGS: NOT AT ALL

## 2020-10-07 LAB
ALT SERPL-CCNC: 26 UNITS/L
AST SERPL-CCNC: 22 UNITS/L
BUN SERPL-MCNC: 20 MG/DL
CHLORIDE SERPL-SCNC: 111 MMOL/L
CHOLEST SERPL-MCNC: 127 MG/DL
CREAT SERPL-MCNC: 1.2 MG/DL
FOLATE RBC: 36.1
GLUCOSE SERPL-MCNC: 86 MG/DL
HCT VFR BLD CALC: 49 %
HDLC SERPL-MCNC: 55 MG/DL
HGB BLD-MCNC: 15.4 G/DL
LDLC SERPL CALC-MCNC: 54 MG/DL
Lab: 381
POTASSIUM SERPL-SCNC: 4.2 MMOL/L
SODIUM SERPL-SCNC: 141 MMOL/L
TRIGL SERPL-MCNC: 89 MG/DL
TSH SERPL-ACNC: 3.74 MCUNITS/ML
WBC # BLD: 6.75 K/MCL

## 2020-10-22 ENCOUNTER — CLINICAL ABSTRACT (OUTPATIENT)
Dept: CARDIOLOGY | Age: 85
End: 2020-10-22

## 2021-03-24 ENCOUNTER — OFFICE VISIT (OUTPATIENT)
Dept: CARDIOLOGY | Age: 86
End: 2021-03-24

## 2021-03-24 VITALS
RESPIRATION RATE: 18 BRPM | HEART RATE: 68 BPM | DIASTOLIC BLOOD PRESSURE: 68 MMHG | WEIGHT: 166 LBS | HEIGHT: 68 IN | BODY MASS INDEX: 25.16 KG/M2 | SYSTOLIC BLOOD PRESSURE: 134 MMHG | OXYGEN SATURATION: 95 %

## 2021-03-24 DIAGNOSIS — I25.10 CORONARY ARTERY DISEASE INVOLVING NATIVE CORONARY ARTERY OF NATIVE HEART WITHOUT ANGINA PECTORIS: Primary | ICD-10-CM

## 2021-03-24 DIAGNOSIS — I10 ESSENTIAL HYPERTENSION: ICD-10-CM

## 2021-03-24 DIAGNOSIS — E78.5 DYSLIPIDEMIA: ICD-10-CM

## 2021-03-24 PROCEDURE — 99214 OFFICE O/P EST MOD 30 MIN: CPT | Performed by: INTERNAL MEDICINE

## 2021-03-24 ASSESSMENT — PATIENT HEALTH QUESTIONNAIRE - PHQ9
CLINICAL INTERPRETATION OF PHQ2 SCORE: NO FURTHER SCREENING NEEDED
CLINICAL INTERPRETATION OF PHQ9 SCORE: NO FURTHER SCREENING NEEDED
SUM OF ALL RESPONSES TO PHQ9 QUESTIONS 1 AND 2: 0
2. FEELING DOWN, DEPRESSED OR HOPELESS: NOT AT ALL
SUM OF ALL RESPONSES TO PHQ9 QUESTIONS 1 AND 2: 0
1. LITTLE INTEREST OR PLEASURE IN DOING THINGS: NOT AT ALL

## 2021-09-03 ENCOUNTER — OFFICE VISIT (OUTPATIENT)
Dept: OPTOMETRY | Facility: CLINIC | Age: 86
End: 2021-09-03
Payer: MEDICARE

## 2021-09-03 DIAGNOSIS — H52.203 MYOPIA WITH ASTIGMATISM AND PRESBYOPIA, BILATERAL: ICD-10-CM

## 2021-09-03 DIAGNOSIS — H52.13 MYOPIA WITH ASTIGMATISM AND PRESBYOPIA, BILATERAL: ICD-10-CM

## 2021-09-03 DIAGNOSIS — H52.4 MYOPIA WITH ASTIGMATISM AND PRESBYOPIA, BILATERAL: ICD-10-CM

## 2021-09-03 DIAGNOSIS — H26.493 AFTER CATARACT NOT OBSCURING VISION, BILATERAL: Primary | ICD-10-CM

## 2021-09-03 PROCEDURE — 92015 DETERMINE REFRACTIVE STATE: CPT | Performed by: OPTOMETRIST

## 2021-09-03 PROCEDURE — 92014 COMPRE OPH EXAM EST PT 1/>: CPT | Performed by: OPTOMETRIST

## 2021-09-03 RX ORDER — LEVOTHYROXINE SODIUM 0.1 MG/1
100 TABLET ORAL DAILY
COMMUNITY

## 2021-09-03 NOTE — PROGRESS NOTES
Jessa Guan is a 80year old male. HPI:     HPI     Patient is in for an annual eye exam. He had phaco in 2019 at Dr. Severa Mount and Sosa's office. He is very happy with the outcome of the surgery and has no complaints with his vision.     Last edited by [facility])       Right Left    Dist cc 20/20 - 20/25 -    Near cc 4pt 4pt+    Correction: Glasses          Tonometry ([facility], 8:52 AM)       Right Left    Pressure 14 15          Pupils       Pupils    Right PERRL    Left PERRL          Visual Fields       Lef Visit:  Requested Prescriptions      No prescriptions requested or ordered in this encounter        Follow up instructions:  Return in about 1 year (around 9/3/2022) for Eye Exam.    9/3/2021  Scribed by: Tala Martini

## 2021-09-03 NOTE — PATIENT INSTRUCTIONS
Myopia with astigmatism and presbyopia, bilateral  New glasses RX given to update as needed. After cataract not obscuring vision, bilateral  No treatment is required. Will continue to observe.

## 2024-07-16 NOTE — CONSULTS
07/15/24 2337   NIV Type   NIV Started/Stopped On   Equipment Type v60   Mode Bilevel   Mask Type Full face mask   Mask Size Medium   Assessment   Respirations 18   Settings/Measurements   IPAP 12 cmH20   CPAP/EPAP 6 cmH2O   Vt (Measured) 510 mL   FiO2  30 %   Mask Leak (lpm) 10 lpm   Patient's Home Machine No   Alarm Settings   Alarms On Y   Low Pressure (cmH2O) 6 cmH2O   High Pressure (cmH2O) 30 cmH2O        [facility]    Report of Consultation    Jessa Guan Patient Status:  Inpatient    1931 MRN [id number]   Location [facility] 2W/SW Attending Al Singh MD   Hosp Day # 0 PCP Kim Shook     Date of Admission:  / citrate (SUBLIMAZE) 0.05 MG/ML injection 50 mcg 50 mcg Intravenous Q30 Min PRN   fentaNYL PCA bolus from bag 50 mcg Intravenous Once   And      fentaNYL citrate (SUBLIMAZE) 1,000 mcg in sodium chloride 0.9 % 100 mL infusion 25 mcg/hr Intravenous Continuous infusion  Intravenous Continuous   [START ON 1/26/2017] Enoxaparin Sodium (LOVENOX) 40 MG/0.4ML injection 40 mg 40 mg Subcutaneous Daily   acetaminophen (OFIRMEV) infusion 1,000 mg 1,000 mg Intravenous Q8H   [START ON 1/26/2017] acetaminophen (TYLENOL) tab temperature source Oral, resp. rate 11, height 5' 8\" (1.727 m), weight 177 lb 5 oz (80.428 kg), SpO2 100 %.     Constitutional: no acute distress  HEENT: PERRL  Cardio: RRR, S1 S2  Respiratory: clear to auscultation bilaterally  GI: abdomen soft, non tende arteries post coronary bypass. Post open heart surgery. TECHNIQUE:   Single view. FINDINGS:  CARDIAC/VASC: Post coronary artery bypass. Heart size and pulmonary vascularity normal. Atherosclerotic calcification aorta.  MEDIAST/GRIFFIN:   No visible mass or

## 2024-09-22 ENCOUNTER — APPOINTMENT (OUTPATIENT)
Dept: CV DIAGNOSTICS | Facility: HOSPITAL | Age: 89
End: 2024-09-22
Attending: STUDENT IN AN ORGANIZED HEALTH CARE EDUCATION/TRAINING PROGRAM
Payer: MEDICARE

## 2024-09-22 ENCOUNTER — APPOINTMENT (OUTPATIENT)
Dept: GENERAL RADIOLOGY | Facility: HOSPITAL | Age: 89
End: 2024-09-22
Attending: EMERGENCY MEDICINE
Payer: MEDICARE

## 2024-09-22 ENCOUNTER — HOSPITAL ENCOUNTER (OUTPATIENT)
Facility: HOSPITAL | Age: 89
Setting detail: OBSERVATION
Discharge: HOME | End: 2024-09-23
Attending: EMERGENCY MEDICINE | Admitting: INTERNAL MEDICINE
Payer: MEDICARE

## 2024-09-22 DIAGNOSIS — R07.9 ACUTE CHEST PAIN: Primary | ICD-10-CM

## 2024-09-22 LAB
ANION GAP SERPL CALC-SCNC: 9 MMOL/L (ref 0–18)
ATRIAL RATE: 74 BPM
BASOPHILS # BLD AUTO: 0.04 X10(3) UL (ref 0–0.2)
BASOPHILS NFR BLD AUTO: 0.2 %
BUN BLD-MCNC: 13 MG/DL (ref 9–23)
BUN/CREAT SERPL: 12.9 (ref 10–20)
CALCIUM BLD-MCNC: 9.7 MG/DL (ref 8.7–10.4)
CHLORIDE SERPL-SCNC: 107 MMOL/L (ref 98–112)
CO2 SERPL-SCNC: 26 MMOL/L (ref 21–32)
CREAT BLD-MCNC: 1.01 MG/DL
DEPRECATED RDW RBC AUTO: 54.9 FL (ref 35.1–46.3)
EGFRCR SERPLBLD CKD-EPI 2021: 69 ML/MIN/1.73M2 (ref 60–?)
EOSINOPHIL # BLD AUTO: 0.01 X10(3) UL (ref 0–0.7)
EOSINOPHIL NFR BLD AUTO: 0.1 %
ERYTHROCYTE [DISTWIDTH] IN BLOOD BY AUTOMATED COUNT: 15.3 % (ref 11–15)
GLUCOSE BLD-MCNC: 107 MG/DL (ref 70–99)
HCT VFR BLD AUTO: 46.5 %
HGB BLD-MCNC: 15.1 G/DL
IMM GRANULOCYTES # BLD AUTO: 0.08 X10(3) UL (ref 0–1)
IMM GRANULOCYTES NFR BLD: 0.5 %
LYMPHOCYTES # BLD AUTO: 0.44 X10(3) UL (ref 1–4)
LYMPHOCYTES NFR BLD AUTO: 2.6 %
MCH RBC QN AUTO: 31.6 PG (ref 26–34)
MCHC RBC AUTO-ENTMCNC: 32.5 G/DL (ref 31–37)
MCV RBC AUTO: 97.3 FL
MONOCYTES # BLD AUTO: 0.38 X10(3) UL (ref 0.1–1)
MONOCYTES NFR BLD AUTO: 2.3 %
NEUTROPHILS # BLD AUTO: 15.88 X10 (3) UL (ref 1.5–7.7)
NEUTROPHILS # BLD AUTO: 15.88 X10(3) UL (ref 1.5–7.7)
NEUTROPHILS NFR BLD AUTO: 94.3 %
NT-PROBNP SERPL-MCNC: 1168 PG/ML (ref ?–450)
OSMOLALITY SERPL CALC.SUM OF ELEC: 295 MOSM/KG (ref 275–295)
P AXIS: 48 DEGREES
P-R INTERVAL: 134 MS
PLATELET # BLD AUTO: 253 10(3)UL (ref 150–450)
POTASSIUM SERPL-SCNC: 3.9 MMOL/L (ref 3.5–5.1)
Q-T INTERVAL: 352 MS
QRS DURATION: 74 MS
QTC CALCULATION (BEZET): 390 MS
R AXIS: 81 DEGREES
RBC # BLD AUTO: 4.78 X10(6)UL
SODIUM SERPL-SCNC: 142 MMOL/L (ref 136–145)
T AXIS: 68 DEGREES
TROPONIN I SERPL HS-MCNC: 25 NG/L
VENTRICULAR RATE: 74 BPM
WBC # BLD AUTO: 16.8 X10(3) UL (ref 4–11)

## 2024-09-22 PROCEDURE — 85025 COMPLETE CBC W/AUTO DIFF WBC: CPT | Performed by: EMERGENCY MEDICINE

## 2024-09-22 PROCEDURE — 80048 BASIC METABOLIC PNL TOTAL CA: CPT | Performed by: EMERGENCY MEDICINE

## 2024-09-22 PROCEDURE — 93306 TTE W/DOPPLER COMPLETE: CPT | Performed by: STUDENT IN AN ORGANIZED HEALTH CARE EDUCATION/TRAINING PROGRAM

## 2024-09-22 PROCEDURE — 99285 EMERGENCY DEPT VISIT HI MDM: CPT

## 2024-09-22 PROCEDURE — 83880 ASSAY OF NATRIURETIC PEPTIDE: CPT | Performed by: EMERGENCY MEDICINE

## 2024-09-22 PROCEDURE — 96372 THER/PROPH/DIAG INJ SC/IM: CPT

## 2024-09-22 PROCEDURE — 93010 ELECTROCARDIOGRAM REPORT: CPT

## 2024-09-22 PROCEDURE — 93005 ELECTROCARDIOGRAM TRACING: CPT

## 2024-09-22 PROCEDURE — 84484 ASSAY OF TROPONIN QUANT: CPT | Performed by: EMERGENCY MEDICINE

## 2024-09-22 PROCEDURE — 96374 THER/PROPH/DIAG INJ IV PUSH: CPT

## 2024-09-22 PROCEDURE — 71045 X-RAY EXAM CHEST 1 VIEW: CPT | Performed by: EMERGENCY MEDICINE

## 2024-09-22 RX ORDER — ASPIRIN 81 MG/1
324 TABLET, CHEWABLE ORAL ONCE
Status: COMPLETED | OUTPATIENT
Start: 2024-09-22 | End: 2024-09-22

## 2024-09-22 RX ORDER — LISINOPRIL 2.5 MG/1
2.5 TABLET ORAL DAILY
COMMUNITY

## 2024-09-22 RX ORDER — LEVOTHYROXINE SODIUM 100 UG/1
100 TABLET ORAL
Status: DISCONTINUED | OUTPATIENT
Start: 2024-09-23 | End: 2024-09-23

## 2024-09-22 RX ORDER — MORPHINE SULFATE 2 MG/ML
2 INJECTION, SOLUTION INTRAMUSCULAR; INTRAVENOUS ONCE
Status: COMPLETED | OUTPATIENT
Start: 2024-09-22 | End: 2024-09-22

## 2024-09-22 RX ORDER — ENOXAPARIN SODIUM 100 MG/ML
40 INJECTION SUBCUTANEOUS DAILY
Status: DISCONTINUED | OUTPATIENT
Start: 2024-09-22 | End: 2024-09-23

## 2024-09-22 RX ORDER — ONDANSETRON 2 MG/ML
4 INJECTION INTRAMUSCULAR; INTRAVENOUS EVERY 6 HOURS PRN
Status: DISCONTINUED | OUTPATIENT
Start: 2024-09-22 | End: 2024-09-23

## 2024-09-22 RX ORDER — ATORVASTATIN CALCIUM 40 MG/1
40 TABLET, FILM COATED ORAL NIGHTLY
COMMUNITY

## 2024-09-22 RX ORDER — ASPIRIN 325 MG
325 TABLET ORAL DAILY
COMMUNITY

## 2024-09-22 NOTE — CONSULTS
[facility]  [facility] of [facility]    Cardiology Consultation    Imer Mcguire Patient Status:  Emergency    1931 MRN [id number]   Location [facility] EMERGENCY DEPARTMENT Attending Areli García MD   Hosp Day # 0 PCP OMAR LEAVITT     Date of Admission:  2024  Date of Consult:  2024  Reason for Consultation:   Chest pain    History of Present Illness:   Patient is a 89-year-old male with a history of CAD s/p CABG 2017, HTN, HLD, CKD, who presents with chest discomfort.  Earlier this morning shortly after breakfast was getting ready to go to the [employer] game when he developed central chest discomfort that lasted several hours.  Reported very mild shortness of breath and diaphoresis, once he arrived in the hospital was nauseated and had an episode of emesis.  Now he is symptom-free, however over the last month or so has had 4-5 similar episodes although not as severe.  In between these episodes he reports feeling well without any shortness of breath and walks up to a mile when he plays golf.  He remains physically and socially active.    proBNP 1168  Troponin 25  WBC 16.8    ECG-sinus rhythm, nonspecific ST-T wave abnormality, 74 bpm    Cardiac history:  CAD s/p CABG (LIMA-LAD, SVG-RCA, SVG-OM1, SVG-OM2) on 2017  Venous insufficiency  HTN  HLD  CKD    Past Medical History  Past Medical History:    Atherosclerosis of coronary artery    Cortical senile cataract    Disorder of thyroid    Dyslipidemia    Essential hypertension    High blood pressure    Hypothyroidism    Senile cataract     Past Surgical History  Past Surgical History:   Procedure Laterality Date    Cataract extraction w/  intraocular lens implant Right 2019    Dr. Schwarz @ [facility]    Cataract extraction w/  intraocular lens implant Left 2019    Dr. Schwarz @ [facility]     Family History  Family History   Problem Relation Age of Onset    Other (Other) Father     Cancer Mother     Cancer Brother     Diabetes  Neg     Glaucoma Neg      Social History  Lives at home alone.  No tobacco use.  Occasional alcohol socially.  Now retired but previously worked as a .  Pediatric History   Patient Parents/Guardians    Carli Hameed (Daughter/Guardian)     Other Topics Concern    Caffeine Concern Not Asked    Exercise Not Asked    Seat Belt Not Asked    Special Diet Not Asked    Stress Concern Not Asked    Weight Concern Not Asked   Social History Narrative    Not on file         Current Medications:  Current Facility-Administered Medications   Medication Dose Route Frequency    aspirin chewable tab 324 mg  324 mg Oral Once     (Not in a hospital admission)    Allergies  No Known Allergies    Review of Systems:     14 point review of systems completed and negative except as noted.    Physical Exam:   Patient Vitals for the past 24 hrs:   BP Temp Temp src Pulse Resp SpO2 Height Weight   09/22/24 1545 (!) 162/63 -- -- 75 (!) 28 94 % -- --   09/22/24 1353 145/55 97.4 °F (36.3 °C) Temporal 64 18 94 % 5' 8\" (1.727 m) 150 lb (68 kg)     Scheduled Meds:    aspirin  324 mg Oral Once     General: Alert and oriented x 3. No apparent distress.   HEENT: Normocephalic, anicteric sclera, neck supple, no thyromegaly or adenopathy.  Neck: No JVD, carotids 2+, no bruits.  Cardiac: Regular rate and rhythm. S1, S2 normal. 3/6 systolic murmur.  Lungs: Clear without wheezes, rales, rhonchi or dullness.  Normal excursions and effort.  Abdomen: Soft, non-tender. No organosplenomegally, mass or rebound, BS-present.  Extremities: Without clubbing or cyanosis. No edema.    Neurologic: Alert and oriented, normal affect. No focal defects  Skin: Warm and dry.     Results:   Laboratory Data:  Lab Results   Component Value Date    WBC 16.8 (H) 09/22/2024    HGB 15.1 09/22/2024    HCT 46.5 09/22/2024    .0 09/22/2024    CREATSERUM 1.01 09/22/2024    BUN 13 09/22/2024     09/22/2024    K 3.9 09/22/2024     09/22/2024    CO2 26.0  09/22/2024     (H) 09/22/2024    CA 9.7 09/22/2024    ALB 3.6 03/20/2017    ALKPHO 61 03/20/2017    TP 6.0 03/20/2017    AST 22 07/17/2018    ALT 19 07/17/2018    PTT 61.0 (H) 01/25/2017    INR 2.3 (H) 01/25/2017    PTP 25.5 (H) 01/25/2017    TSH 3.57 01/29/2017    TSH 3.57 01/29/2017    MG 1.9 01/30/2017    PHOS 2.9 01/30/2017         Recent Labs   Lab 09/22/24  1521   *   BUN 13   CREATSERUM 1.01   CA 9.7      K 3.9      CO2 26.0     Recent Labs   Lab 09/22/24  1521   RBC 4.78   HGB 15.1   HCT 46.5   MCV 97.3   MCH 31.6   MCHC 32.5   RDW 15.3*   NEPRELIM 15.88*   WBC 16.8*   .0       No results for input(s): \"BNPML\" in the last 168 hours.    No results for input(s): \"TROP\", \"CK\" in the last 168 hours.    Imaging:  No results found.    Impression:   Assessment:  Chest pain, troponin negative  CAD s/p CABG (LIMA-LAD, SVG-RCA, SVG-OM1, SVG-OM2) on 1/25/2017  Venous insufficiency  HTN  HLD  CKD    Plan:  - Presents with intermittent episodes of chest discomfort associated with diaphoresis/nausea, not associated with exertion  - Twelve-lead ECG unremarkable, troponin negative  - Given age and multiple risk factors plan to rule out ischemia, no recent cardiac testing  -Transthoracic echocardiogram, soft systolic murmur on exam  - Lexiscan SPECT stress tomorrow to rule obstructive disease  - If above testing is unremarkable can be discharged home afterwards, patient is working a shift at the [employer] on Tuesday morning    Thank you for allowing me to participate in the care of your patient.    Rishi Hyatt MD  [facility]  9/22/2024

## 2024-09-22 NOTE — ED PROVIDER NOTES
Patient Seen in: [facility] Emergency Department      History     Chief Complaint   Patient presents with    Chest Pain     Stated Complaint: chest pain/ chills    Subjective:   HPI    89-year-old male with multiple medical problems presents for evaluation of chest pain.  Patient reports sudden onset left-sided chest pain while at home 2 hours prior to arrival.  Associated with diaphoresis.  No shortness of breath.  He has had a cough.      Objective:   Past Medical History:    Atherosclerosis of coronary artery    Cortical senile cataract    Disorder of thyroid    Dyslipidemia    Essential hypertension    High blood pressure    Hypothyroidism    Senile cataract              Past Surgical History:   Procedure Laterality Date    Cataract extraction w/  intraocular lens implant Right 01/08/2019    Dr. Schwarz @ [facility]    Cataract extraction w/  intraocular lens implant Left 03/2019    Dr. Schwarz @ [facility]                Social History     Socioeconomic History    Marital status:    Tobacco Use    Smoking status: Never   Substance and Sexual Activity    Alcohol use: Yes     Alcohol/week: 2.0 standard drinks of alcohol     Types: 2 Glasses of wine per week    Drug use: No     Social Determinants of Health     Food Insecurity: No Food Insecurity (9/22/2024)    Food Insecurity     Food Insecurity: Never true   Transportation Needs: No Transportation Needs (9/22/2024)    Transportation Needs     Lack of Transportation: No   Housing Stability: Low Risk  (9/22/2024)    Housing Stability     Housing Instability: No              Review of Systems    Positive for stated Chief Complaint: Chest Pain    Other systems are as noted in HPI.  Constitutional and vital signs reviewed.      All other systems reviewed and negative except as noted above.    Physical Exam     ED Triage Vitals [09/22/24 1353]   /55   Pulse 64   Resp 18   Temp 97.4 °F (36.3 °C)   Temp src Temporal   SpO2 94 %   O2 Device None (Room air)        Current Vitals:   Vital Signs  BP: 125/65  Pulse: 83  Resp: 18  Temp: (!) 100.5 °F (38.1 °C)  Temp src: Oral  MAP (mmHg): 81    Oxygen Therapy  SpO2: 94 %  O2 Device: None (Room air)            Physical Exam  Vitals and nursing note reviewed.   Constitutional:       General: He is not in acute distress.     Appearance: He is well-developed.   HENT:      Head: Normocephalic and atraumatic.   Eyes:      Conjunctiva/sclera: Conjunctivae normal.   Cardiovascular:      Rate and Rhythm: Normal rate and regular rhythm.      Heart sounds: Normal heart sounds.   Pulmonary:      Effort: Pulmonary effort is normal. No respiratory distress.      Breath sounds: Normal breath sounds.   Abdominal:      General: Bowel sounds are normal.      Palpations: Abdomen is soft.      Tenderness: There is no abdominal tenderness.   Musculoskeletal:         General: Normal range of motion.      Cervical back: Normal range of motion and neck supple.   Skin:     General: Skin is warm and dry.      Findings: No rash.   Neurological:      General: No focal deficit present.      Mental Status: He is alert and oriented to person, place, and time.               ED Course     Labs Reviewed   CBC WITH DIFFERENTIAL WITH PLATELET - Abnormal; Notable for the following components:       Result Value    WBC 16.8 (*)     RDW-SD 54.9 (*)     RDW 15.3 (*)     Neutrophil Absolute Prelim 15.88 (*)     Neutrophil Absolute 15.88 (*)     Lymphocyte Absolute 0.44 (*)     All other components within normal limits   BASIC METABOLIC PANEL (8) - Abnormal; Notable for the following components:    Glucose 107 (*)     All other components within normal limits   PRO BETA NATRIURETIC PEPTIDE - Abnormal; Notable for the following components:    Pro-Beta Natriuretic Peptide 1,168 (*)     All other components within normal limits   TROPONIN I HIGH SENSITIVITY - Normal   RAINBOW DRAW BLUE     EKG    Rate, intervals and axes as noted on EKG Report.  Rate: 74  Rhythm: Sinus  Rhythm  Reading: Sinus rhythm, no               Imaging Results Available and Reviewed while in ED:   XR CHEST AP PORTABLE  (CPT=71045)   Final Result   PROCEDURE: XR CHEST AP PORTABLE  (CPT=71045)   TIME: 14:57.         COMPARISON: [facility], XR CHEST AP PORTABLE (CPT=71010),    1/29/2017, 1:21 PM.  [facility], XR CHEST PA + LAT CHEST    (CPT=71020), 1/29/2017, 9:08 AM.  [facility], XR CHEST AP    PORTABLE (CPT=71010), 1/26/2017,    6:26 AM.       INDICATIONS: Pain mid epigastric region and back pain mid thoracic today.    No known injury.       TECHNIQUE:   Single view.         FINDINGS:    CARDIAC/VASC: The cardiomediastinal silhouette is unchanged in size.     Postoperative changes from prior CABG.  There is atherosclerotic    calcification of the thoracic aorta.   MEDIAST/GRIFFIN:   The mediastinum hilum are unchanged.   LUNGS/PLEURA: There is progressive prominence of the interstitial    markings.  There are linear bibasilar opacities.  No significant pleural    effusion.  No pneumothorax.   BONES: Multilevel degenerative changes of the thoracic spine.  Bilateral    shoulder degenerative change.   OTHER: Negative.                     =====   CONCLUSION:        Progressive prominence of the interstitial markings, which is concerning    for pulmonary edema.       Linear bibasilar opacities, which are favored to reflect atelectasis.       Redemonstrated postoperative changes from prior CABG.               Dictated by (CST): Josse Guerra MD on 9/22/2024 at 4:42 PM        Finalized by (CST): Josse Guerra MD on 9/22/2024 at 4:43 PM                   ED Medications Administered:   Medications   levothyroxine (Synthroid) tab 100 mcg (has no administration in time range)   enoxaparin (Lovenox) 40 MG/0.4ML SUBQ injection 40 mg (has no administration in time range)   ondansetron (Zofran) 4 MG/2ML injection 4 mg (has no administration in time range)   morphINE PF 2 MG/ML  injection 2 mg (2 mg Intravenous Given 9/22/24 1547)   aspirin chewable tab 324 mg (324 mg Oral Given 9/22/24 1621)           Vitals:    09/22/24 1545 09/22/24 1600 09/22/24 1635 09/22/24 1757   BP: (!) 162/63 132/52 125/65    BP Location:   Right arm    Pulse: 75 75 83    Resp: (!) 28 20 18    Temp:   (!) 100.5 °F (38.1 °C)    TempSrc:   Oral    SpO2: 94% 92% 94%    Weight:    64 kg   Height:         *I personally reviewed and interpreted all ED vitals.             MDM        Admission disposition: 9/22/2024  3:55 PM                                        Medical Decision Making  Differential diagnosis includes but is not limited to ACS, aortic aneurysm or dissection, pleural effusion, GERD    Well-appearing patient, initial troponin negative.  Given history plan for admission for further evaluation and monitoring.  Discussed with and seen in the emergency department by Dr. Hyatt.    Problems Addressed:  Acute chest pain: acute illness or injury    Amount and/or Complexity of Data Reviewed  External Data Reviewed: labs.     Details: New leukocytosis, otherwise CBC and BMP stable compared to 3/20/2017  Labs: ordered.  Radiology: ordered and independent interpretation performed.     Details: Chest x-ray image reviewed, no obvious pneumothorax, other incidental findings deferred to radiology  ECG/medicine tests: ordered and independent interpretation performed. Decision-making details documented in ED Course.        Disposition and Plan     Clinical Impression:  1. Acute chest pain         Disposition:  Admit  9/22/2024  3:55 pm    Follow-up:  Manny Pickett  [address] [address] [phone]          We recommend that you schedule follow up care with a primary care provider within the next three months to obtain basic health screening including reassessment of your blood pressure.      Medications Prescribed:  Current Discharge Medication List                            Hospital Problems        Present on Admission  Date Reviewed: 9/3/2021            ICD-10-CM Noted POA    * (Principal) Acute chest pain R07.9 9/22/2024 Unknown

## 2024-09-22 NOTE — H&P
[facility] Emergency Department Hospitalist H&P       CC: Chest pain     PCP: OMAR LEAVITT    History of Present Illness:   89-year-old male with a history of CAD status post CABG in 2017, hypertension, hyperlipidemia, CKD, who presents to the hospital for evaluation of chest discomfort.  He states this happened earlier this morning shortly after eating breakfast.  He was about to go watch the [employer] game.  However he started to develop central chest discomfort, it lasted a few hours.  He does not have any symptoms right now in the ER.  He is normally pretty active [place] he actually works there as well once a week.  He denies any diaphoresis.  He denies any shortness of breath.  Currently denies any nausea or vomiting.    Review of Systems  Comprehensive ROS reviewed and negative except for what's stated above.     PMH  Past Medical History:    Atherosclerosis of coronary artery    Cortical senile cataract    Disorder of thyroid    Dyslipidemia    Essential hypertension    High blood pressure    Hypothyroidism    Senile cataract        PSH  Past Surgical History:   Procedure Laterality Date    Cataract extraction w/  intraocular lens implant Right 01/08/2019    Dr. Schwarz @ [facility]    Cataract extraction w/  intraocular lens implant Left 03/2019    Dr. Schwarz @ [facility]      ALL:  No Known Allergies     Home Medications:  Synthroid 100mcg daily    Soc Hx  Social History     Tobacco Use    Smoking status: Never    Smokeless tobacco: Not on file   Substance Use Topics    Alcohol use: Yes     Alcohol/week: 2.0 standard drinks of alcohol     Types: 2 Glasses of wine per week        Fam Hx  Family History   Problem Relation Age of Onset    Other (Other) Father     Cancer Mother     Cancer Brother     Diabetes Neg     Glaucoma Neg        OBJECTIVE:  BP (!) 162/63   Pulse 75   Temp 97.4 °F (36.3 °C) (Temporal)   Resp (!) 28   Ht 5' 8\" (1.727 m)   Wt 150 lb (68 kg)   SpO2 94%   BMI 22.81 kg/m²   General: Alert, no  acute distress  Lungs: clear to ausculation bilaterally, no wheezing  Heart: Regular rate and rhythm  Abdomen: soft, non tender, non distended   Extremities: No edema    Diagnostic Data:    CBC/Chem  Recent Labs   Lab 09/22/24  1521   WBC 16.8*   HGB 15.1   MCV 97.3   .0       Recent Labs   Lab 09/22/24  1521      K 3.9      CO2 26.0   BUN 13   CREATSERUM 1.01   *   CA 9.7     ASSESSMENT / PLAN:   89-year-old male with a history of CAD status post CABG in 2017, hypertension, hyperlipidemia, CKD, who presents to the hospital for evaluation of chest discomfort.      Chest pain  History of coronary artery disease, CABG 1/25/2017  -Cardiology consulted  -Troponin negative  -EKG unremarkable  -Plan for nuclear stress test tomorrow  -Will also obtain TTE    Chronic medical problems  Hypothyroidism-Coreg  Hyperlipidemia-currently off, reasonable given his age  CKD, stable, creatinine of 1 on admission  Hypertension, not on treatment again reasonable given his age, will monitor his blood pressures while he is here    Diet: stress test diet   DVT prophylaxis: lovenox  Code status: full     Asrar skye CHACON  [facility] Hospitalist

## 2024-09-23 ENCOUNTER — APPOINTMENT (OUTPATIENT)
Dept: CV DIAGNOSTICS | Facility: HOSPITAL | Age: 89
End: 2024-09-23
Attending: INTERNAL MEDICINE
Payer: MEDICARE

## 2024-09-23 ENCOUNTER — APPOINTMENT (OUTPATIENT)
Dept: NUCLEAR MEDICINE | Facility: HOSPITAL | Age: 89
End: 2024-09-23
Attending: INTERNAL MEDICINE
Payer: MEDICARE

## 2024-09-23 VITALS
TEMPERATURE: 99 F | HEART RATE: 61 BPM | OXYGEN SATURATION: 97 % | WEIGHT: 147.38 LBS | RESPIRATION RATE: 18 BRPM | BODY MASS INDEX: 22.34 KG/M2 | SYSTOLIC BLOOD PRESSURE: 147 MMHG | HEIGHT: 68 IN | DIASTOLIC BLOOD PRESSURE: 70 MMHG

## 2024-09-23 PROCEDURE — 93017 CV STRESS TEST TRACING ONLY: CPT | Performed by: INTERNAL MEDICINE

## 2024-09-23 PROCEDURE — 93018 CV STRESS TEST I&R ONLY: CPT | Performed by: STUDENT IN AN ORGANIZED HEALTH CARE EDUCATION/TRAINING PROGRAM

## 2024-09-23 PROCEDURE — 78452 HT MUSCLE IMAGE SPECT MULT: CPT | Performed by: INTERNAL MEDICINE

## 2024-09-23 PROCEDURE — 93016 CV STRESS TEST SUPVJ ONLY: CPT | Performed by: STUDENT IN AN ORGANIZED HEALTH CARE EDUCATION/TRAINING PROGRAM

## 2024-09-23 RX ORDER — ASPIRIN 325 MG
325 TABLET, DELAYED RELEASE (ENTERIC COATED) ORAL DAILY
Status: DISCONTINUED | OUTPATIENT
Start: 2024-09-23 | End: 2024-09-23

## 2024-09-23 RX ORDER — REGADENOSON 0.08 MG/ML
INJECTION, SOLUTION INTRAVENOUS
Status: COMPLETED
Start: 2024-09-23 | End: 2024-09-23

## 2024-09-23 RX ORDER — ATORVASTATIN CALCIUM 40 MG/1
40 TABLET, FILM COATED ORAL NIGHTLY
Status: DISCONTINUED | OUTPATIENT
Start: 2024-09-23 | End: 2024-09-23

## 2024-09-23 RX ORDER — LISINOPRIL 2.5 MG/1
2.5 TABLET ORAL DAILY
Status: DISCONTINUED | OUTPATIENT
Start: 2024-09-23 | End: 2024-09-23

## 2024-09-23 NOTE — PLAN OF CARE
Patient is alert and oriented. Patient had lexiscan stress test today. Patient is medically cleared for discharge back to home. Patient will drive himself home, patient drove himself here. Discharge instructions were reviewed with the patient and all questions were answered. Safety measures are in place.     Problem: Patient Centered Care  Goal: Patient preferences are identified and integrated in the patient's plan of care  Description: Interventions:  - What would you like us to know as we care for you? I'm from home alone  - Provide timely, complete, and accurate information to patient/family  - Incorporate patient and family knowledge, values, beliefs, and cultural backgrounds into the planning and delivery of care  - Encourage patient/family to participate in care and decision-making at the level they choose  - Honor patient and family perspectives and choices  Outcome: Adequate for Discharge     Problem: CARDIOVASCULAR - ADULT  Goal: Maintains optimal cardiac output and hemodynamic stability  Description: INTERVENTIONS:  - Monitor vital signs, rhythm, and trends  - Monitor for bleeding, hypotension and signs of decreased cardiac output  - Evaluate effectiveness of vasoactive medications to optimize hemodynamic stability  - Monitor arterial and/or venous puncture sites for bleeding and/or hematoma  - Assess quality of pulses, skin color and temperature  - Assess for signs of decreased coronary artery perfusion - ex. Angina  - Evaluate fluid balance, assess for edema, trend weights  Outcome: Adequate for Discharge  Goal: Absence of cardiac arrhythmias or at baseline  Description: INTERVENTIONS:  - Continuous cardiac monitoring, monitor vital signs, obtain 12 lead EKG if indicated  - Evaluate effectiveness of antiarrhythmic and heart rate control medications as ordered  - Initiate emergency measures for life threatening arrhythmias  - Monitor electrolytes and administer replacement therapy as ordered  Outcome:  Adequate for Discharge

## 2024-09-23 NOTE — DISCHARGE SUMMARY
General Medicine Discharge Summary     Patient ID:  Imer Mcguire  89 year old  7/11/1931    Admit date: 9/22/2024    Discharge date and time: 9/23/24    Attending Physician: Bhavana Chapa DO     Primary Care Physician: MANNY PICKETT     Discharge Diagnoses:   Acute chest pain [R07.9]    Discharge Condition: stable    Disposition: home     Important Follow up:  Manny Pickett  [address] [facility] [address]  [facility] [address] [phone]    Hospital Course:    89-year-old male with a history of CAD status post CABG in 2017, hypertension, hyperlipidemia, CKD, who presents to the hospital for evaluation of chest discomfort.       Chest pain  History of coronary artery disease, CABG 1/25/2017  -Cardiology consulted  -Troponin negative  -EKG unremarkable  -Plan for nuclear stress test---negative, normal perfusion study here this admission   -echocardiogram completed   -resume aspirin and statin   -stable to discharge home.   -continue regular home meds        Consults: IP CONSULT TO CARDIOLOGY    Operative Procedures:        Patient instructions:      Discharge medications reconciled with current medication list     Current Discharge Medication List        CONTINUE these medications which have NOT CHANGED    Details   lisinopril 2.5 MG Oral Tab Take 1 tablet (2.5 mg total) by mouth daily.      atorvastatin 40 MG Oral Tab Take 1 tablet (40 mg total) by mouth nightly.      aspirin 325 MG Oral Tab Take 1 tablet (325 mg total) by mouth daily.      levothyroxine 75 MCG Oral Tab Take 1 tablet (75 mcg total) by mouth daily.             Exam on day of discharge:     Vitals:    09/23/24 0813   BP: 132/71   Pulse: 58   Resp: 18   Temp: 98.4 °F (36.9 °C)       General: no acute distress  Heart: RRR  Lungs: clear bilaterally  Abdomen: nontender, nondistended, intact BS  Extremities: no pedal edema     Total time coordinating care for discharge: Greater than 30 minutes    Bhavana Chapa  DO

## 2024-09-23 NOTE — PROGRESS NOTES
[facility] Hospitalist Progress Note     CC: Hospital Follow up    PCP: OMAR LEAVITT       Assessment/Plan:   89-year-old male with a history of CAD status post CABG in 2017, hypertension, hyperlipidemia, CKD, who presents to the hospital for evaluation of chest discomfort.       Chest pain  History of coronary artery disease, CABG 1/25/2017  -Cardiology consulted  -Troponin negative  -EKG unremarkable  -Plan for nuclear stress test today   -echocardiogram completed   -resume aspirin and statin      Chronic medical problems  Hypothyroidism-Coreg  Hyperlipidemia-on statin   CKD, stable, creatinine of 1 on admission  Hypertension: apparently is on lisinopril, ordered per cardiology      Diet: stress test diet   DVT prophylaxis: lovenox  Code status: full   Dispo: maybe home today pending stress test results     Bhavana Chapa DO  [facility] Hospitalist         Subjective:     Feels fine. No acute symptoms or complaints.     OBJECTIVE:    Blood pressure 132/71, pulse 58, temperature 98.4 °F (36.9 °C), temperature source Oral, resp. rate 18, height 5' 8\" (1.727 m), weight 147 lb 6.4 oz (66.9 kg), SpO2 97%.    Temp:  [97.4 °F (36.3 °C)-100.5 °F (38.1 °C)] 98.4 °F (36.9 °C)  Pulse:  [58-83] 58  Resp:  [18-28] 18  BP: (106-162)/(52-71) 132/71  SpO2:  [92 %-97 %] 97 %      Intake/Output:    Intake/Output Summary (Last 24 hours) at 9/23/2024 1226  Last data filed at 9/23/2024 0900  Gross per 24 hour   Intake 170 ml   Output 550 ml   Net -380 ml       Last 3 Weights   09/23/24 0441 147 lb 6.4 oz (66.9 kg)   09/22/24 1757 141 lb 1.6 oz (64 kg)   09/22/24 1353 150 lb (68 kg)   01/31/17 0700 174 lb 4.8 oz (79.1 kg)   01/30/17 0700 174 lb 1.6 oz (79 kg)   01/29/17 0600 173 lb 12.8 oz (78.8 kg)   01/28/17 0600 175 lb 6.4 oz (79.6 kg)   01/27/17 0500 177 lb 3.2 oz (80.4 kg)   01/26/17 0600 177 lb 3.2 oz (80.4 kg)   01/25/17 0547 177 lb 5 oz (80.4 kg)   01/20/17 0916 170 lb (77.1 kg)   01/18/17 1414 172 lb (78 kg)        /71 (BP Location: Right arm)   Pulse 58   Temp 98.4 °F (36.9 °C) (Oral)   Resp 18   Ht 5' 8\" (1.727 m)   Wt 147 lb 6.4 oz (66.9 kg)   SpO2 97%   BMI 22.41 kg/m²   General: Alert, no acute distress  Lungs: clear to ausculation bilaterally  Heart: Regular rate and rhythm  Abdomen: soft, non tender  Extremities: No edema    Data Review:       Labs:     Recent Labs   Lab 09/22/24  1521   RBC 4.78   HGB 15.1   HCT 46.5   MCV 97.3   MCH 31.6   MCHC 32.5   RDW 15.3*   NEPRELIM 15.88*   WBC 16.8*   .0         Recent Labs   Lab 09/22/24  1521   *   BUN 13   CREATSERUM 1.01   EGFRCR 69   CA 9.7      K 3.9      CO2 26.0       No results for input(s): \"ALT\", \"AST\", \"ALB\", \"AMYLASE\", \"LIPASE\", \"LDH\" in the last 168 hours.    Invalid input(s): \"ALPHOS\", \"TBIL\", \"DBIL\", \"TPROT\"      Imaging:  XR CHEST AP PORTABLE  (CPT=71045)    Result Date: 9/22/2024  CONCLUSION:   Progressive prominence of the interstitial markings, which is concerning for pulmonary edema.  Linear bibasilar opacities, which are favored to reflect atelectasis.  Redemonstrated postoperative changes from prior CABG.    Dictated by (CST): Josse Guerra MD on 9/22/2024 at 4:42 PM     Finalized by (CST): Josse Guerra MD on 9/22/2024 at 4:43 PM             Meds:      regadenoson        aspirin  325 mg Oral Daily    lisinopril  2.5 mg Oral Daily    atorvastatin  40 mg Oral Nightly    levothyroxine  100 mcg Oral Daily @ 0700    enoxaparin  40 mg Subcutaneous Daily         regadenoson    ondansetron

## 2024-09-23 NOTE — PROGRESS NOTES
[facility] Cardiology Progress Note    Imer Mcguire Patient Status:  Observation    1931 MRN [id number]   Location [facility] 3W/SW Attending Bhavana Chapa,    Hosp Day # 0 PCP OMAR LEAVITT     Subjective:  No CV concerns. Asymptomatic        Objective:  /71 (BP Location: Right arm)   Pulse 58   Temp 98.4 °F (36.9 °C) (Oral)   Resp 18   Ht 172.7 cm (5' 8\")   Wt 147 lb 6.4 oz (66.9 kg)   SpO2 97%   BMI 22.41 kg/m²     Telemetry: SB      Intake/Output:    Intake/Output Summary (Last 24 hours) at 2024 1001  Last data filed at 2024 0549  Gross per 24 hour   Intake 170 ml   Output 550 ml   Net -380 ml       Last 3 Weights   24 0441 147 lb 6.4 oz (66.9 kg)   24 1757 141 lb 1.6 oz (64 kg)   24 1353 150 lb (68 kg)   17 0700 174 lb 4.8 oz (79.1 kg)   17 0700 174 lb 1.6 oz (79 kg)   17 0600 173 lb 12.8 oz (78.8 kg)   17 0600 175 lb 6.4 oz (79.6 kg)   17 0500 177 lb 3.2 oz (80.4 kg)   17 0600 177 lb 3.2 oz (80.4 kg)   17 0547 177 lb 5 oz (80.4 kg)   17 0916 170 lb (77.1 kg)   17 1414 172 lb (78 kg)       Labs:  Recent Labs   Lab 24  1521   *   BUN 13   CREATSERUM 1.01   EGFRCR 69   CA 9.7      K 3.9      CO2 26.0     Recent Labs   Lab 24  1521   RBC 4.78   HGB 15.1   HCT 46.5   MCV 97.3   MCH 31.6   MCHC 32.5   RDW 15.3*   NEPRELIM 15.88*   WBC 16.8*   .0         Recent Labs   Lab 24  1521   TROPHS 25       Diagnostics:   24 Echo  1. Left ventricle: The cavity size was normal. Wall thickness was normal.      Systolic function was normal. The estimated ejection fraction was 55-65%,      by biplane method of disks. Wall motion is normal; there are no regional      wall motion abnormalities. Left ventricular diastolic function parameters      were normal.   2. Left atrium: The atrium was mildly dilated.   3. Tricuspid valve: There was moderate regurgitation.   4.  Pulmonary arteries: Systolic pressure was estimated to be 44mm Hg.       Review of Systems   Respiratory: Negative.     Cardiovascular: Negative.      Physical Exam:    General: Alert and oriented x 3. No apparent distress.   HEENT: Normocephalic, anicteric sclera, neck supple, no thyromegaly or adenopathy.  Neck: No JVD, carotids 2+, no bruits.  Cardiac: Regular rate & rhythm. S1, S2 normal. No murmur, pericardial rub, S3, or extra cardiac sounds.  Lungs: Clear without wheezes, rales, rhonchi or dullness.  Normal excursions and effort.  Abdomen: Soft, non-tender. No organosplenomegally, mass or rebound, BS-present.  Extremities: Without clubbing or cyanosis.  No left lower extremity edema, no right lower extremity edema.  Neurologic: Alert and oriented, normal affect. No focal defects  Skin: Warm and dry.       Medications:   levothyroxine  100 mcg Oral Daily @ 0700    enoxaparin  40 mg Subcutaneous Daily         Assessment:    Chest pain   - presented w/ intermittent chest pain, mild sob, diaphoresis, nausea & vomiting   - troponin negative   - no acute ischemic EKG changes  - echo w/ EF 55-65%, no wma, mild LA dilation, mod TR   - pending lexiscan today     CAD,  s/p CABG (LIMA-LAD, SVG-RCA, SVG-OM1, SVG-OM2) on 1/25/2017  - chest pain w/u as above   - on asa , lisinopril & atorvastatin     Venous insufficiency    HTN  - bp stable. On lisinopril     HLD  - LDL 68, on statin therapy     CKD  - stable     Plan:    Pt asymptomatic. Cardiac w/u thus far has been unremarkable   Pending lexiscan test today   BNP notably elevated w/ no s/s of acute HF on exam   Continue home medications - asa, lisinopril & atorvastatin     DUSTY Harrison  9/23/2024  10:01 AM  Ph [phone] ([place])  Ph [phone] ([place])      Cardiology attending    Chart and note reviewed, patient examined independently.    Agree with assessment and plan as above.  Discharge home if nuclear stress test is favorable.

## 2024-09-23 NOTE — PLAN OF CARE
Normal myocardial perfusion scan. D/w Dr. Yanes. No further cardiac w/u at this time. Ok to discharge from Cardiology standpoint. RN notified.     DUSTY Harrison  9/23/2024  3:33 PM  Ph [phone] ([place])  Ph [phone] ([place])

## 2024-09-23 NOTE — PLAN OF CARE
Patient alert and oriented on room air with no complaints of CP overnight. Call light in reach and no needs noted at this time. Plan for stress test this morning  Problem: Patient Centered Care  Goal: Patient preferences are identified and integrated in the patient's plan of care  Description: Interventions:  - What would you like us to know as we care for you?   - Provide timely, complete, and accurate information to patient/family  - Incorporate patient and family knowledge, values, beliefs, and cultural backgrounds into the planning and delivery of care  - Encourage patient/family to participate in care and decision-making at the level they choose  - Honor patient and family perspectives and choices  Outcome: Progressing     Problem: CARDIOVASCULAR - ADULT  Goal: Maintains optimal cardiac output and hemodynamic stability  Description: INTERVENTIONS:  - Monitor vital signs, rhythm, and trends  - Monitor for bleeding, hypotension and signs of decreased cardiac output  - Evaluate effectiveness of vasoactive medications to optimize hemodynamic stability  - Monitor arterial and/or venous puncture sites for bleeding and/or hematoma  - Assess quality of pulses, skin color and temperature  - Assess for signs of decreased coronary artery perfusion - ex. Angina  - Evaluate fluid balance, assess for edema, trend weights  Outcome: Progressing  Goal: Absence of cardiac arrhythmias or at baseline  Description: INTERVENTIONS:  - Continuous cardiac monitoring, monitor vital signs, obtain 12 lead EKG if indicated  - Evaluate effectiveness of antiarrhythmic and heart rate control medications as ordered  - Initiate emergency measures for life threatening arrhythmias  - Monitor electrolytes and administer replacement therapy as ordered  Outcome: Progressing

## 2024-09-24 LAB
% OF MAX PREDICTED HR: 100 %
MAX DIASTOLIC BP: 72 MMHG
MAX HEART RATE: 73 BPM
MAX PREDICTED HEART RATE: 127 BPM
MAX SYSTOLIC BP: 164 MMHG
MAX WORK LOAD: 10

## 2024-09-27 ENCOUNTER — LAB ENCOUNTER (OUTPATIENT)
Dept: LAB | Facility: HOSPITAL | Age: 89
End: 2024-09-27
Attending: FAMILY MEDICINE
Payer: MEDICARE

## 2024-09-27 DIAGNOSIS — D72.829 LEUKOCYTOSIS (LEUCOCYTOSIS): Primary | ICD-10-CM

## 2024-09-27 LAB
BASOPHILS # BLD AUTO: 0.05 X10(3) UL (ref 0–0.2)
BASOPHILS NFR BLD AUTO: 0.5 %
DEPRECATED RDW RBC AUTO: 54.1 FL (ref 35.1–46.3)
EOSINOPHIL # BLD AUTO: 0.09 X10(3) UL (ref 0–0.7)
EOSINOPHIL NFR BLD AUTO: 0.8 %
ERYTHROCYTE [DISTWIDTH] IN BLOOD BY AUTOMATED COUNT: 15.3 % (ref 11–15)
HCT VFR BLD AUTO: 41.2 %
HGB BLD-MCNC: 13.6 G/DL
IMM GRANULOCYTES # BLD AUTO: 0.11 X10(3) UL (ref 0–1)
IMM GRANULOCYTES NFR BLD: 1 %
LYMPHOCYTES # BLD AUTO: 1.32 X10(3) UL (ref 1–4)
LYMPHOCYTES NFR BLD AUTO: 12 %
MCH RBC QN AUTO: 31.6 PG (ref 26–34)
MCHC RBC AUTO-ENTMCNC: 33 G/DL (ref 31–37)
MCV RBC AUTO: 95.6 FL
MONOCYTES # BLD AUTO: 1 X10(3) UL (ref 0.1–1)
MONOCYTES NFR BLD AUTO: 9.1 %
NEUTROPHILS # BLD AUTO: 8.45 X10 (3) UL (ref 1.5–7.7)
NEUTROPHILS # BLD AUTO: 8.45 X10(3) UL (ref 1.5–7.7)
NEUTROPHILS NFR BLD AUTO: 76.6 %
PLATELET # BLD AUTO: 276 10(3)UL (ref 150–450)
RBC # BLD AUTO: 4.31 X10(6)UL
WBC # BLD AUTO: 11 X10(3) UL (ref 4–11)

## 2024-09-27 PROCEDURE — 85025 COMPLETE CBC W/AUTO DIFF WBC: CPT

## 2024-09-27 PROCEDURE — 36415 COLL VENOUS BLD VENIPUNCTURE: CPT

## 2025-06-14 ENCOUNTER — HOSPITAL ENCOUNTER (INPATIENT)
Facility: HOSPITAL | Age: OVER 89
LOS: 12 days | Discharge: NURSING, REHAB OR LONG-TERM CARE | End: 2025-06-26
Attending: EMERGENCY MEDICINE | Admitting: INTERNAL MEDICINE
Payer: MEDICARE

## 2025-06-14 ENCOUNTER — APPOINTMENT (OUTPATIENT)
Dept: GENERAL RADIOLOGY | Facility: HOSPITAL | Age: OVER 89
End: 2025-06-14
Attending: EMERGENCY MEDICINE
Payer: MEDICARE

## 2025-06-14 DIAGNOSIS — J18.9 SEPSIS DUE TO PNEUMONIA (HCC): Primary | ICD-10-CM

## 2025-06-14 DIAGNOSIS — A41.9 SEPSIS DUE TO PNEUMONIA (HCC): Primary | ICD-10-CM

## 2025-06-14 LAB
ALBUMIN SERPL-MCNC: 4.2 G/DL (ref 3.2–4.8)
ALBUMIN/GLOB SERPL: 1.6 {RATIO} (ref 1–2)
ALP LIVER SERPL-CCNC: 116 U/L (ref 45–117)
ALT SERPL-CCNC: 32 U/L (ref 10–49)
ANION GAP SERPL CALC-SCNC: 11 MMOL/L (ref 0–18)
APTT PPP: 28.5 SECONDS (ref 23–36)
AST SERPL-CCNC: 43 U/L (ref ?–34)
BASOPHILS # BLD: 0 X10(3) UL (ref 0–0.2)
BASOPHILS NFR BLD: 0 %
BILIRUB SERPL-MCNC: 1.8 MG/DL (ref 0.2–0.9)
BILIRUB UR QL: NEGATIVE
BUN BLD-MCNC: 32 MG/DL (ref 9–23)
BUN/CREAT SERPL: 20.3 (ref 10–20)
CALCIUM BLD-MCNC: 9.6 MG/DL (ref 8.7–10.4)
CHLORIDE SERPL-SCNC: 101 MMOL/L (ref 98–112)
CO2 SERPL-SCNC: 23 MMOL/L (ref 21–32)
COLOR UR: YELLOW
CREAT BLD-MCNC: 1.58 MG/DL (ref 0.7–1.3)
DEPRECATED RDW RBC AUTO: 47.1 FL (ref 35.1–46.3)
EGFRCR SERPLBLD CKD-EPI 2021: 41 ML/MIN/1.73M2 (ref 60–?)
EOSINOPHIL # BLD: 0 X10(3) UL (ref 0–0.7)
EOSINOPHIL NFR BLD: 0 %
ERYTHROCYTE [DISTWIDTH] IN BLOOD BY AUTOMATED COUNT: 13.3 % (ref 11–15)
GLOBULIN PLAS-MCNC: 2.6 G/DL (ref 2–3.5)
GLUCOSE BLD-MCNC: 99 MG/DL (ref 70–99)
GLUCOSE BLDC GLUCOMTR-MCNC: 91 MG/DL (ref 70–99)
GLUCOSE UR-MCNC: NORMAL MG/DL
HCT VFR BLD AUTO: 47.9 % (ref 39–53)
HGB BLD-MCNC: 15.8 G/DL (ref 13–17.5)
INR BLD: 1.14 (ref 0.8–1.2)
KETONES UR-MCNC: NEGATIVE MG/DL
LACTATE SERPL-SCNC: 2.2 MMOL/L (ref 0.5–2)
LACTATE SERPL-SCNC: 2.5 MMOL/L (ref 0.5–2)
LACTATE SERPL-SCNC: 3.5 MMOL/L (ref 0.5–2)
LEUKOCYTE ESTERASE UR QL STRIP.AUTO: NEGATIVE
LYMPHOCYTES NFR BLD: 0.23 X10(3) UL (ref 1–4)
LYMPHOCYTES NFR BLD: 1 %
MCH RBC QN AUTO: 31.1 PG (ref 26–34)
MCHC RBC AUTO-ENTMCNC: 33 G/DL (ref 31–37)
MCV RBC AUTO: 94.3 FL (ref 80–100)
MONOCYTES # BLD: 0.23 X10(3) UL (ref 0.1–1)
MONOCYTES NFR BLD: 1 %
MORPHOLOGY: NORMAL
NEUTROPHILS # BLD AUTO: 21.83 X10 (3) UL (ref 1.5–7.7)
NEUTROPHILS NFR BLD: 57 %
NEUTS BAND NFR BLD: 41 %
NEUTS HYPERSEG # BLD: 22.74 X10(3) UL (ref 1.5–7.7)
NEUTS VAC BLD QL SMEAR: PRESENT
NITRITE UR QL STRIP.AUTO: NEGATIVE
OSMOLALITY SERPL CALC.SUM OF ELEC: 287 MOSM/KG (ref 275–295)
PH UR: 6 [PH] (ref 5–8)
PLATELET # BLD AUTO: 185 10(3)UL (ref 150–450)
PLATELET MORPHOLOGY: NORMAL
POTASSIUM SERPL-SCNC: 4 MMOL/L (ref 3.5–5.1)
PROT SERPL-MCNC: 6.8 G/DL (ref 5.7–8.2)
PROT UR-MCNC: 200 MG/DL
PROTHROMBIN TIME: 15.3 SECONDS (ref 11.6–14.8)
RBC # BLD AUTO: 5.08 X10(6)UL (ref 3.8–5.8)
SODIUM SERPL-SCNC: 135 MMOL/L (ref 136–145)
SP GR UR STRIP: 1.03 (ref 1–1.03)
TOTAL CELLS COUNTED BLD: 100
UROBILINOGEN UR STRIP-ACNC: 3
WBC # BLD AUTO: 23.2 X10(3) UL (ref 4–11)

## 2025-06-14 PROCEDURE — 71045 X-RAY EXAM CHEST 1 VIEW: CPT | Performed by: EMERGENCY MEDICINE

## 2025-06-14 RX ORDER — AZITHROMYCIN 250 MG/1
500 TABLET, FILM COATED ORAL EVERY 24 HOURS
Status: DISCONTINUED | OUTPATIENT
Start: 2025-06-14 | End: 2025-06-16

## 2025-06-14 RX ORDER — ACETAMINOPHEN 325 MG/1
650 TABLET ORAL EVERY 6 HOURS PRN
Status: DISCONTINUED | OUTPATIENT
Start: 2025-06-14 | End: 2025-06-26

## 2025-06-14 RX ORDER — LEVOTHYROXINE SODIUM 100 UG/1
100 TABLET ORAL DAILY
Status: DISCONTINUED | OUTPATIENT
Start: 2025-06-14 | End: 2025-06-23

## 2025-06-14 RX ORDER — ATORVASTATIN CALCIUM 40 MG/1
40 TABLET, FILM COATED ORAL NIGHTLY
Status: DISCONTINUED | OUTPATIENT
Start: 2025-06-15 | End: 2025-06-26

## 2025-06-14 RX ORDER — ASPIRIN 325 MG
325 TABLET ORAL DAILY
Status: DISCONTINUED | OUTPATIENT
Start: 2025-06-14 | End: 2025-06-19

## 2025-06-14 RX ORDER — VANCOMYCIN 1.75 GRAM/500 ML IN 0.9 % SODIUM CHLORIDE INTRAVENOUS
25 ONCE
Status: COMPLETED | OUTPATIENT
Start: 2025-06-14 | End: 2025-06-14

## 2025-06-14 RX ORDER — CALCIUM CARBONATE 500 MG/1
500 TABLET, CHEWABLE ORAL 2 TIMES DAILY PRN
Status: DISCONTINUED | OUTPATIENT
Start: 2025-06-14 | End: 2025-06-26

## 2025-06-14 RX ORDER — SODIUM CHLORIDE 9 MG/ML
INJECTION, SOLUTION INTRAVENOUS CONTINUOUS
Status: DISCONTINUED | OUTPATIENT
Start: 2025-06-14 | End: 2025-06-15

## 2025-06-14 RX ORDER — HEPARIN SODIUM 5000 [USP'U]/ML
5000 INJECTION, SOLUTION INTRAVENOUS; SUBCUTANEOUS EVERY 12 HOURS SCHEDULED
Status: DISCONTINUED | OUTPATIENT
Start: 2025-06-14 | End: 2025-06-16

## 2025-06-14 RX ORDER — ACETAMINOPHEN 325 MG/1
650 TABLET ORAL ONCE
Status: COMPLETED | OUTPATIENT
Start: 2025-06-14 | End: 2025-06-14

## 2025-06-14 NOTE — ED INITIAL ASSESSMENT (HPI)
Pt arrives via ems from home for weakness x 1 week, denies shortness of breath, vomiting, and chest pain. Pt reports weakness in bilateral leg weakness x 1 week. Denies recent falls. Pt hx of incontinence.

## 2025-06-14 NOTE — PROGRESS NOTES
[facility] Pharmacy Dosing Service      Initial Pharmacokinetic Consult for Vancomycin Dosing     Imer Mcguire is a 89 year old male who is being initiated on vancomycin therapy for pneumonia.  Pharmacy has been asked to dose vancomycin by Deepthi.  The initial treatment and monitoring approach will be non-AUC strategy.        Weight and Temperature:    Wt Readings from Last 1 Encounters:   25 70.3 kg (155 lb)        Temp Readings from Last 1 Encounters:   25 100.2 °F (37.9 °C) (Oral)      Labs:   Recent Labs   Lab 25  1409   CREATSERUM 1.58*      Estimated Creatinine Clearance: 28.3 mL/min (A) (based on SCr of 1.58 mg/dL (H)).     Recent Labs   Lab 25  1409   WBC 23.2*          The Pharmacokinetic Target is:    Trough/random 10-15 mg/L    Renal Dosing Considerations:    SIERRA/ARF     Assessment/Plan:   Initial/Loading dose: Has received 1750 mg IV (25 mg/kg, capped at 2250 mg) x 1 loading dose.      Maintenance dose: Pharmacy will dose vancomycin per levels    Monitorin) Plan for vancomycin random level to be obtained in approximately 48 hours    2) Pharmacy will order SCr as clinically indicated to assess renal function.    3) Pharmacy will monitor for toxicity and efficacy, adjust vancomycin dose and/or frequency, and order vancomycin levels as appropriate per the Pharmacy and Therapeutics Committee approved protocol until discontinuation of the medication.       We appreciate the opportunity to assist in the care of this patient.     Denis Hill, PharmD  2025  6:58 PM  [place]  Pharmacy Extension: [phone]

## 2025-06-14 NOTE — H&P
[facility]  part of [facility]    History & Physical    Imer Mcguire Patient Status:  Inpatient    1931 MRN [id number]   Location [facility] 5SW/SE Attending Severo Lacey MD   Hosp Day # 0 PCP OMAR LEAVITT     Date:  2025  Date of Admission:  2025    History provided by:patient  Chief Complaint:     Chief Complaint   Patient presents with    Fever     Increased weakness for 1 week cough and fever for 2 to 3 days  HPI:   Imer Mcguire is a(n) 89 year old male.  With coronary artery disease, status post CABG, hypothyroidism, hypertension, dyslipidemia, who was in his usual state of health.  The patient started and weakness.  Which has been getting worse over the last 1 week.  Patient also developed a fever and cough.  Patient came to the emergency room.  Patient also complaining of having shortness of breath earlier.  He is feeling better now.      History   Past Medical History[1]  Past Surgical History[2]  Family History[3]  Social History:  Short Social Hx on File[4]  Allergies/Medications:   Allergies: Allergies[5]  Prescriptions Prior to Admission[6]    Review of Systems:   No headache.  No chest pain   No abdominal pain no nausea no vomiting.  No urinary symptoms.  Other systems are negative.      Physical Exam:   Vital Signs:  Blood pressure 115/55, pulse 92, temperature (!) 100.7 °F (38.2 °C), temperature source Oral, resp. rate 22, height 5' 8\" (1.727 m), weight 155 lb (70.3 kg), SpO2 92%.     HEENT: No pallor no icterus.  Extraocular movements are intact.  Neck no JVD no carotid bruit.  Heart S1 and S2 regular in rate and rhythm.  Lungs bilateral coarse breath sounds are noted.  Abdomen is soft.  Extremities no pedal edema  CNS examination patient is conscious and alert        Results:     Lab Results   Component Value Date    WBC 23.2 (H) 2025    HGB 15.8 2025    HCT 47.9 2025    .0 2025    CREATSERUM 1.58 (H) 2025     BUN 32 (H) 06/14/2025     (L) 06/14/2025    K 4.0 06/14/2025     06/14/2025    CO2 23.0 06/14/2025    GLU 99 06/14/2025    CA 9.6 06/14/2025    ALB 4.2 06/14/2025    ALKPHO 116 06/14/2025    BILT 1.8 (H) 06/14/2025    TP 6.8 06/14/2025    AST 43 (H) 06/14/2025    ALT 32 06/14/2025    PTT 28.5 06/14/2025    INR 1.14 06/14/2025    TSH 3.57 01/29/2017    TSH 3.57 01/29/2017    MG 1.9 01/30/2017    PHOS 2.9 01/30/2017       No results found.  EKG 12 Lead  Result Date: 6/14/2025  Sinus tachycardia with Premature supraventricular complexes Otherwise normal ECG When compared with ECG of 22-SEP-2024 13:48, Premature supraventricular complexes are now Present Vent. rate has increased BY  41 BPM Nonspecific T wave abnormality no longer evident in Anterior-lateral leads      Assessment/Plan:     Sepsis due to pneumonia (HCC)  Pneumonia.  Community-acquired bacterial bacterial not on.  Will continue patient on Zosyn and vancomycin.  Also on Zithromax to cover for any atypical organisms.    Acute hypoxic respiratory failure patient is requiring 5 L of oxygen.    Continue oxygen supplementation.    Acute kidney injury.  Will start the patient on IV fluids.      Coronary disease stable.    Hypothyroidism continue the patient on levothyroxine.    Patient stable.  Discussed with the ER physician.  Discussed with nursing staff.  Further management will depend on the clinical course of the patient      Active Orders   LAB    Lactic Acid Post Positive     Frequency: Once     Number of Occurrences: 1 Occurrences   OT    OT eval and treat     Frequency: Once     Number of Occurrences: 1 Occurrences   PT    PT eval and treat     Frequency: Once     Number of Occurrences: 1 Occurrences       Severo Lacey MD  6/14/2025          [1]   Past Medical History:   Atherosclerosis of coronary artery    Cortical senile cataract    Disorder of thyroid    Dyslipidemia    Essential hypertension    High blood pressure    Hypothyroidism     Senile cataract   [2]   Past Surgical History:  Procedure Laterality Date    Cataract extraction w/  intraocular lens implant Right 01/08/2019    Dr. Schwarz @ [facility]    Cataract extraction w/  intraocular lens implant Left 03/2019    Dr. Schwarz @ [facility]   [3]   Family History  Problem Relation Age of Onset    Other (Other) Father     Cancer Mother     Cancer Brother     Diabetes Neg     Glaucoma Neg    [4]   Social History  Socioeconomic History    Marital status:    Tobacco Use    Smoking status: Never   Vaping Use    Vaping status: Never Used   Substance and Sexual Activity    Alcohol use: Yes     Alcohol/week: 2.0 standard drinks of alcohol     Types: 2 Glasses of wine per week    Drug use: No     Social Drivers of Health     Food Insecurity: No Food Insecurity (6/14/2025)    NCSS - Food Insecurity     Worried About Running Out of Food in the Last Year: No     Ran Out of Food in the Last Year: No   Transportation Needs: No Transportation Needs (6/14/2025)    NCSS - Transportation     Lack of Transportation: No   Housing Stability: Not At Risk (6/14/2025)    NCSS - Housing/Utilities     Has Housing: Yes     Worried About Losing Housing: No     Unable to Get Utilities: No   [5] No Known Allergies  [6]   Medications Prior to Admission   Medication Sig    lisinopril 2.5 MG Oral Tab Take 1 tablet (2.5 mg total) by mouth in the morning.    atorvastatin 40 MG Oral Tab Take 1 tablet (40 mg total) by mouth nightly.    aspirin 325 MG Oral Tab Take 1 tablet (325 mg total) by mouth in the morning.    levothyroxine 75 MCG Oral Tab Take 100 mcg by mouth in the morning.

## 2025-06-14 NOTE — PLAN OF CARE
Problem: PAIN - ADULT  Goal: Verbalizes/displays adequate comfort level or patient's stated pain goal  Description: INTERVENTIONS:  - Encourage pt to monitor pain and request assistance  - Assess pain using appropriate pain scale  - Administer analgesics based on type and severity of pain and evaluate response  - Implement non-pharmacological measures as appropriate and evaluate response  - Consider cultural and social influences on pain and pain management  - Manage/alleviate anxiety  - Utilize distraction and/or relaxation techniques  - Monitor for opioid side effects  - Notify MD/LIP if interventions unsuccessful or patient reports new pain  - Anticipate increased pain with activity and pre-medicate as appropriate  Outcome: Progressing     Problem: SAFETY ADULT - FALL  Goal: Free from fall injury  Description: INTERVENTIONS:  - Assess pt frequently for physical needs  - Identify cognitive and physical deficits and behaviors that affect risk of falls.  - [place] fall precautions as indicated by assessment.  - Educate pt/family on patient safety including physical limitations  - Instruct pt to call for assistance with activity based on assessment  - Modify environment to reduce risk of injury  - Provide assistive devices as appropriate  - Consider OT/PT consult to assist with strengthening/mobility  - Encourage toileting schedule  Outcome: Progressing     Problem: DISCHARGE PLANNING  Goal: Discharge to home or other facility with appropriate resources  Description: INTERVENTIONS:  - Identify barriers to discharge w/pt and caregiver  - Include patient/family/discharge partner in discharge planning  - Arrange for needed discharge resources and transportation as appropriate  - Identify discharge learning needs (meds, wound care, etc)  - Arrange for interpreters to assist at discharge as needed  - Consider post-discharge preferences of patient/family/discharge partner  - Complete POLST form as appropriate  - Assess  patient's ability to be responsible for managing their own health  - Refer to Case Management Department for coordinating discharge planning if the patient needs post-hospital services based on physician/LIP order or complex needs related to functional status, cognitive ability or social support system  Outcome: Progressing     Problem: RESPIRATORY - ADULT  Goal: Achieves optimal ventilation and oxygenation  Description: INTERVENTIONS:  - Assess for changes in respiratory status  - Assess for changes in mentation and behavior  - Position to facilitate oxygenation and minimize respiratory effort  - Oxygen supplementation based on oxygen saturation or ABGs  - Provide Smoking Cessation handout, if applicable  - Encourage broncho-pulmonary hygiene including cough, deep breathe, Incentive Spirometry  - Assess the need for suctioning and perform as needed  - Assess and instruct to report SOB or any respiratory difficulty  - Respiratory Therapy support as indicated  - Manage/alleviate anxiety  - Monitor for signs/symptoms of CO2 retention  Outcome: Progressing     Problem: Impaired Functional Mobility  Goal: Achieve highest/safest level of mobility/gait  Description: Interventions:  - Assess patient's functional ability and stability  - Promote increasing activity/tolerance for mobility and gait  - Educate and engage patient/family in tolerated activity level and precautions  Outcome: Progressing

## 2025-06-14 NOTE — ED QUICK NOTES
Orders for admission, patient is aware of plan and ready to go upstairs. Any questions, please call ED RN quan at extension [phone].     Patient Covid vaccination status: Fully vaccinated     COVID Test Ordered in ED: None    COVID Suspicion at Admission: N/A    Running Infusions: Medication Infusions[1]     Mental Status/LOC at time of transport: 4    Other pertinent information:   CIWA score: N/A   NIH score:  N/A             [1]

## 2025-06-14 NOTE — ED PROVIDER NOTES
Patient Seen in: [facility] Emergency Department        History  Chief Complaint   Patient presents with    Fever     Stated Complaint: weakness    Subjective:   HPI                  Objective:     Past Medical History:    Atherosclerosis of coronary artery    Cortical senile cataract    Disorder of thyroid    Dyslipidemia    Essential hypertension    High blood pressure    Hypothyroidism    Senile cataract              Past Surgical History:   Procedure Laterality Date    Cataract extraction w/  intraocular lens implant Right 01/08/2019    Dr. Schwarz @ [facility]    Cataract extraction w/  intraocular lens implant Left 03/2019    Dr. Schwarz @ [facility]                Social History     Socioeconomic History    Marital status:    Tobacco Use    Smoking status: Never   Vaping Use    Vaping status: Never Used   Substance and Sexual Activity    Alcohol use: Yes     Alcohol/week: 2.0 standard drinks of alcohol     Types: 2 Glasses of wine per week    Drug use: No     Social Drivers of Health     Food Insecurity: No Food Insecurity (9/22/2024)    Food Insecurity     Food Insecurity: Never true   Transportation Needs: No Transportation Needs (9/22/2024)    Transportation Needs     Lack of Transportation: No   Housing Stability: Low Risk  (9/22/2024)    Housing Stability     Housing Instability: No                                Physical Exam    ED Triage Vitals   BP 06/14/25 1426 139/63   Pulse 06/14/25 1357 112   Resp 06/14/25 1425 20   Temp 06/14/25 1357 (!) 103.1 °F (39.5 °C)   Temp src 06/14/25 1554 Oral   SpO2 06/14/25 1357 (!) 88 %   O2 Device 06/14/25 1357 None (Room air)       Current Vitals:   Vital Signs  BP: 97/56  Pulse: 82  Resp: 24  Temp: 99.2 °F (37.3 °C)  Temp src: Oral  MAP (mmHg): 69    Oxygen Therapy  SpO2: 91 %  O2 Device: Nasal cannula  O2 Flow Rate (L/min): 3 L/min            Physical Exam            ED Course  Labs Reviewed   URINALYSIS WITH CULTURE REFLEX - Abnormal; Notable for the following  components:       Result Value    Clarity Urine Turbid (*)     Blood Urine 2+ (*)     Protein Urine 200 (*)     Urobilinogen Urine 3 (*)     RBC Urine 6-10 (*)     Squamous Epi. Cells Few (*)     All other components within normal limits   CBC WITH DIFFERENTIAL WITH PLATELET - Abnormal; Notable for the following components:    WBC 23.2 (*)     RDW-SD 47.1 (*)     Neutrophil Absolute Prelim 21.83 (*)     All other components within normal limits   COMP METABOLIC PANEL (14) - Abnormal; Notable for the following components:    Sodium 135 (*)     BUN 32 (*)     Creatinine 1.58 (*)     BUN/CREA Ratio 20.3 (*)     eGFR-Cr 41 (*)     AST 43 (*)     Bilirubin, Total 1.8 (*)     All other components within normal limits   PROTHROMBIN TIME (PT) - Abnormal; Notable for the following components:    PT 15.3 (*)     All other components within normal limits   LACTIC ACID, PLASMA - Abnormal; Notable for the following components:    Lactic Acid 3.5 (*)     All other components within normal limits   MANUAL DIFFERENTIAL - Abnormal; Notable for the following components:    Neutrophil Absolute Manual 22.74 (*)     Lymphocyte Absolute Manual 0.23 (*)     Vacuolated Neutrophils Present (*)     All other components within normal limits   PTT, ACTIVATED - Normal   POCT GLUCOSE - Normal   LACTIC ACID REFLEX POST POSTIVE   RAINBOW DRAW LAVENDER   RAINBOW DRAW LIGHT GREEN   RAINBOW DRAW BLUE   RAINBOW DRAW GOLD   BLOOD CULTURE   BLOOD CULTURE     EKG    Rate, intervals and axes as noted on EKG Report.  Rate: 115  Rhythm: Sinus Rhythm  Reading: Sinus tachycardia with occasional PVCs without signs of acute ischemia or other abnormal intervals.                                MDM     89-year-old male history of CABG, hypothyroidism, hypertension, hyperlipidemia presents today with weakness.  Patient states that he has had increasing weakness particularly in the lower extremities for the last 1 week.  He is also had some cough and recently some  new urinary incontinence.  He was also found to have a fever today.  Patient denies headache, trouble breathing, neck stiffness, back pain, belly pain, diarrhea, leg swelling, rash, or other symptoms.    On exam, febrile at 39.5, tachycardic in the 110s, hypoxic in the high 80s without respiratory distress.  Normal mental status.  Mild right lung crackles.  Benign abdomen.  No extremity swelling, strength and sensation intact in the lower extremities.    Differential: Sepsis with consideration for pulmonary versus urinary source.    Patient ordered for sepsis evaluation including labs, cultures, and empiric IV fluids and broad-spectrum antibiotics.    Pt stable on 2LNC    Labs concerning for SIERRA, elevated lactic acid, leukocytosis,    I reviewed and interpreted the patient's checks x-ray which shows a consolidating right middle lobe pneumonia    While in the ED, patient had some downtrending blood pressures which were responding to IV fluids and remained generally well-appearing.    Will admit for further management.    I spent a total of 35 minutes of critical care time in obtaining history, performing a physical exam, bedside monitoring of interventions, collecting and interpreting tests and discussion with consultants but not including time spent performing procedures.      Admission disposition: 6/14/2025  4:05 PM           Medical Decision Making      Disposition and Plan     Clinical Impression:  1. Sepsis due to pneumonia (HCC)         Disposition:  Admit  6/14/2025  4:05 pm    Follow-up:  No follow-up provider specified.  We recommend that you schedule follow up care with a primary care provider within the next three months to obtain basic health screening including reassessment of your blood pressure.      Medications Prescribed:  Current Discharge Medication List                Supplementary Documentation:     [facility]  part of [facility]      Sepsis Reassessment Note    /70    Pulse 102   Temp (!) 103.1 °F (39.5 °C)   Resp 19   Ht 172.7 cm (5' 8\")   Wt 70.3 kg   SpO2 90%   BMI 23.57 kg/m²      I completed the sepsis reassessment at 1645      Kriag Byrne MD  6/14/2025  2:59 PM             Hospital Problems       Present on Admission  Date Reviewed: 9/3/2021          ICD-10-CM Noted POA    Sepsis due to pneumonia (HCC) J18.9, A41.9 6/14/2025 Unknown                             Sepsis dx documented at 6/14/2025  4:05 PM

## 2025-06-15 LAB
ALBUMIN SERPL-MCNC: 3.6 G/DL (ref 3.2–4.8)
ALBUMIN/GLOB SERPL: 1.6 {RATIO} (ref 1–2)
ALP LIVER SERPL-CCNC: 103 U/L (ref 45–117)
ALT SERPL-CCNC: 34 U/L (ref 10–49)
ANION GAP SERPL CALC-SCNC: 15 MMOL/L (ref 0–18)
AST SERPL-CCNC: 53 U/L (ref ?–34)
ATRIAL RATE: 115 BPM
BASOPHILS # BLD: 0 X10(3) UL (ref 0–0.2)
BASOPHILS NFR BLD: 0 %
BILIRUB SERPL-MCNC: 1.3 MG/DL (ref 0.2–0.9)
BUN BLD-MCNC: 34 MG/DL (ref 9–23)
BUN/CREAT SERPL: 23.1 (ref 10–20)
CALCIUM BLD-MCNC: 8.7 MG/DL (ref 8.7–10.4)
CHLORIDE SERPL-SCNC: 104 MMOL/L (ref 98–112)
CO2 SERPL-SCNC: 20 MMOL/L (ref 21–32)
CREAT BLD-MCNC: 1.47 MG/DL (ref 0.7–1.3)
DEPRECATED RDW RBC AUTO: 48.2 FL (ref 35.1–46.3)
EGFRCR SERPLBLD CKD-EPI 2021: 44 ML/MIN/1.73M2 (ref 60–?)
EOSINOPHIL # BLD: 0 X10(3) UL (ref 0–0.7)
EOSINOPHIL NFR BLD: 0 %
ERYTHROCYTE [DISTWIDTH] IN BLOOD BY AUTOMATED COUNT: 13.5 % (ref 11–15)
GLOBULIN PLAS-MCNC: 2.2 G/DL (ref 2–3.5)
GLUCOSE BLD-MCNC: 70 MG/DL (ref 70–99)
HCT VFR BLD AUTO: 48.4 % (ref 39–53)
HGB BLD-MCNC: 15.8 G/DL (ref 13–17.5)
L PNEUMO AG UR QL: POSITIVE
LYMPHOCYTES NFR BLD: 0 %
LYMPHOCYTES NFR BLD: 0 X10(3) UL (ref 1–4)
MCH RBC QN AUTO: 31.2 PG (ref 26–34)
MCHC RBC AUTO-ENTMCNC: 32.6 G/DL (ref 31–37)
MCV RBC AUTO: 95.5 FL (ref 80–100)
METAMYELOCYTES # BLD: 1.06 X10(3) UL (ref ?–0.01)
METAMYELOCYTES NFR BLD: 4 %
MONOCYTES # BLD: 0 X10(3) UL (ref 0.1–1)
MONOCYTES NFR BLD: 0 %
MORPHOLOGY: NORMAL
MRSA DNA SPEC QL NAA+PROBE: NEGATIVE
NEUTROPHILS # BLD AUTO: 25.87 X10 (3) UL (ref 1.5–7.7)
NEUTROPHILS NFR BLD: 71 %
NEUTS BAND NFR BLD: 25 %
NEUTS HYPERSEG # BLD: 25.54 X10(3) UL (ref 1.5–7.7)
NEUTS VAC BLD QL SMEAR: PRESENT
OSMOLALITY SERPL CALC.SUM OF ELEC: 294 MOSM/KG (ref 275–295)
P AXIS: 58 DEGREES
P-R INTERVAL: 134 MS
PLATELET # BLD AUTO: 216 10(3)UL (ref 150–450)
PLATELET MORPHOLOGY: NORMAL
POTASSIUM SERPL-SCNC: 3.8 MMOL/L (ref 3.5–5.1)
PROT SERPL-MCNC: 5.8 G/DL (ref 5.7–8.2)
Q-T INTERVAL: 296 MS
QRS DURATION: 80 MS
QTC CALCULATION (BEZET): 409 MS
R AXIS: 63 DEGREES
RBC # BLD AUTO: 5.07 X10(6)UL (ref 3.8–5.8)
SODIUM SERPL-SCNC: 139 MMOL/L (ref 136–145)
STREP PNEUMO ANTIGEN, URINE: NEGATIVE
T AXIS: 44 DEGREES
TOTAL CELLS COUNTED BLD: 100
VENTRICULAR RATE: 115 BPM
WBC # BLD AUTO: 26.6 X10(3) UL (ref 4–11)

## 2025-06-15 PROCEDURE — 99223 1ST HOSP IP/OBS HIGH 75: CPT | Performed by: INTERNAL MEDICINE

## 2025-06-15 RX ORDER — ALBUTEROL SULFATE 0.83 MG/ML
2.5 SOLUTION RESPIRATORY (INHALATION) EVERY 4 HOURS PRN
Status: DISCONTINUED | OUTPATIENT
Start: 2025-06-15 | End: 2025-06-26

## 2025-06-15 RX ORDER — POTASSIUM CHLORIDE 1500 MG/1
40 TABLET, EXTENDED RELEASE ORAL ONCE
Status: COMPLETED | OUTPATIENT
Start: 2025-06-15 | End: 2025-06-15

## 2025-06-15 NOTE — CONSULTS
[facility]  part of [facility]    Report of Consultation    Imer Mcguire Patient Status:  Inpatient    1931 MRN [id number]   Location [facility] 5SW/SE Attending Severo Lacey MD   Hosp Day # 1 PCP OMAR LEAVITT     Date of Admission:  2025    Reason for Consultation:   Dyspnea    History of Present Illness:   Patient is a 89-year-old male with past medical history significant for previous CABG, hypertension, hypothyroidism who presents with chief complaint of generalized fatigue and malaise.  Admits to symptoms for 1 week duration.  Persistent occasional cough and mild dyspnea.  Oxygen saturation mid 80s on presentation currently on 5 L.  Denies significant history of lung disease.  Tmax 102.5 degrees.  Hemodynamically stable    Past Medical History  Past Medical History[1]    Past Surgical History  Past Surgical History[2]    Family History  Family History[3]    Social History  Social Hx on file[4]        Current Medications:  Current Hospital Medications[5]  Prescriptions Prior to Admission[6]    Allergies  Allergies[7]    Review of Systems:   Constitutional: Fatigue, malaise  HEENT: denies headache, sore throat, vision loss  Cardio: denies chest pain, chest pressure, palpitations  Respiratory: dyspnea, cough, denies wheezing, hemoptysis   GI: denies nausea, vomiting, abdominal pain  : denies dysuria, hematuria  Musculoskeletal: denies arthralgia, myalgia  Integumentary: denies rash, itching  Neurological: denies syncope, weakness, dizziness,   Psychiatric: denies depression, anxiety  Hematologic: denies bruising        Physical Exam:   Blood pressure (!) 162/79, pulse 95, temperature 98.2 °F (36.8 °C), temperature source Oral, resp. rate 20, height 5' 8\" (1.727 m), weight 155 lb (70.3 kg), SpO2 95%.    Constitutional: no acute distress  Eyes: PERRL  ENT: nares patent  Neck: neck supple, no JVD  Cardio: RRR, S1 S2  Respiratory: Scattered right-sided crackles  GI:  abdomen soft, non tender, active bowel souds, no organomegaly  Extremities: no clubbing, cyanosis, edema  Neurologic: no gross motor deficits  Skin: warm, dry    Results:   Laboratory Data  Lab Results   Component Value Date    WBC 26.6 (H) 06/15/2025    HGB 15.8 06/15/2025    HCT 48.4 06/15/2025    .0 06/15/2025    CREATSERUM 1.47 (H) 06/15/2025    BUN 34 (H) 06/15/2025     06/15/2025    K 3.8 06/15/2025     06/15/2025    CO2 20.0 (L) 06/15/2025    GLU 70 06/15/2025    CA 8.7 06/15/2025    ALB 3.6 06/15/2025    ALKPHO 103 06/15/2025    TP 5.8 06/15/2025    AST 53 (H) 06/15/2025    ALT 34 06/15/2025    PTT 28.5 06/14/2025    INR 1.14 06/14/2025    PTP 15.3 (H) 06/14/2025    TSH 3.57 01/29/2017    TSH 3.57 01/29/2017    MG 1.9 01/30/2017    PHOS 2.9 01/30/2017         Imaging  XR CHEST AP PORTABLE  (CPT=71045)  Result Date: 6/14/2025  CONCLUSION:  1. Newly developed airspace opacification in the mid and upper lung likely representing pneumonia.  Follow-up to complete radiographic resolution recommended.    Dictated by (CST): Nacho Lucio MD on 6/14/2025 at 6:31 PM     Finalized by (CST): Nacho Lucio MD on 6/14/2025 at 6:31 PM            Assessment   1.  Community-acquired pneumonia  2.  Acute hypoxemic respiratory failure  3.  Fevers  4.  Leukocytosis  5.  Coronary artery disease  6.  Acute kidney injury  7.  Hypertension    Plan   -Patient presents with evidence of fatigue malaise some associated cough and mild dyspnea.  Hypoxic on presentation to emergency department.  - Chest x-ray with right sided opacity seen concerning for underlying pneumonia.  - Empiric antibiotic therapy at this time.  Blood and sputum cultures pending  - Legionella and strep pneumonia antigen pending  - Wean oxygen as tolerated  - Reviewed vitals, labs imaging    Bernabe Davis DO  Pulmonary Critical Care Medicine  [facility]  6/15/2025  1:49 PM        [1]   Past Medical History:   Atherosclerosis of coronary  artery    Cortical senile cataract    Disorder of thyroid    Dyslipidemia    Essential hypertension    High blood pressure    Hypothyroidism    Senile cataract   [2]   Past Surgical History:  Procedure Laterality Date    Cataract extraction w/  intraocular lens implant Right 01/08/2019    Dr. Schwarz @ [facility]    Cataract extraction w/  intraocular lens implant Left 03/2019    Dr. Schwarz @ [facility]   [3]   Family History  Problem Relation Age of Onset    Other (Other) Father     Cancer Mother     Cancer Brother     Diabetes Neg     Glaucoma Neg    [4]   Social History  Socioeconomic History    Marital status:    Tobacco Use    Smoking status: Never   Vaping Use    Vaping status: Never Used   Substance and Sexual Activity    Alcohol use: Yes     Alcohol/week: 2.0 standard drinks of alcohol     Types: 2 Glasses of wine per week    Drug use: No   [5]   Current Facility-Administered Medications   Medication Dose Route Frequency    albuterol (Ventolin) (2.5 MG/3ML) 0.083% nebulizer solution 2.5 mg  2.5 mg Nebulization Q4H PRN    aspirin tab 325 mg  325 mg Oral Daily    atorvastatin (Lipitor) tab 40 mg  40 mg Oral Nightly    levothyroxine (Synthroid) tab 100 mcg  100 mcg Oral Daily    acetaminophen (Tylenol) tab 650 mg  650 mg Oral Q6H PRN    piperacillin-tazobactam (Zosyn) 3.375 g in dextrose 5% 100 mL IVPB-ADDV  3.375 g Intravenous Q8H    azithromycin (Zithromax) tab 500 mg  500 mg Oral Q24H    heparin (Porcine) 5000 UNIT/ML injection 5,000 Units  5,000 Units Subcutaneous 2 times per day    Vancomycin: PHARMACY DOSING  1 each Intravenous See Admin Instructions (RX holding)    calcium carbonate (Tums) chewable tab 500 mg  500 mg Oral BID PRN   [6]   Medications Prior to Admission   Medication Sig    lisinopril 2.5 MG Oral Tab Take 1 tablet (2.5 mg total) by mouth in the morning.    atorvastatin 40 MG Oral Tab Take 1 tablet (40 mg total) by mouth nightly.    aspirin 325 MG Oral Tab Take 1 tablet (325 mg total) by mouth in the  morning.    levothyroxine 75 MCG Oral Tab Take 100 mcg by mouth in the morning.   [7] No Known Allergies

## 2025-06-15 NOTE — PLAN OF CARE
Problem: Patient Centered Care  Goal: Patient preferences are identified and integrated in the patient's plan of care  Description: Interventions:  - What would you like us to know as we care for you? From home alone  - Provide timely, complete, and accurate information to patient/family  - Incorporate patient and family knowledge, values, beliefs, and cultural backgrounds into the planning and delivery of care  - Encourage patient/family to participate in care and decision-making at the level they choose  - Honor patient and family perspectives and choices  Outcome: Progressing     Problem: Patient/Family Goals  Goal: Patient/Family Long Term Goal  Description: Patient's Long Term Goal:     Interventions:  -   - See additional Care Plan goals for specific interventions  Outcome: Progressing  Goal: Patient/Family Short Term Goal  Description: Patient's Short Term Goal:     Interventions:   -   - See additional Care Plan goals for specific interventions  Outcome: Progressing     Problem: PAIN - ADULT  Goal: Verbalizes/displays adequate comfort level or patient's stated pain goal  Description: INTERVENTIONS:  - Encourage pt to monitor pain and request assistance  - Assess pain using appropriate pain scale  - Administer analgesics based on type and severity of pain and evaluate response  - Implement non-pharmacological measures as appropriate and evaluate response  - Consider cultural and social influences on pain and pain management  - Manage/alleviate anxiety  - Utilize distraction and/or relaxation techniques  - Monitor for opioid side effects  - Notify MD/LIP if interventions unsuccessful or patient reports new pain  - Anticipate increased pain with activity and pre-medicate as appropriate  Outcome: Progressing     Problem: SAFETY ADULT - FALL  Goal: Free from fall injury  Description: INTERVENTIONS:  - Assess pt frequently for physical needs  - Identify cognitive and physical deficits and behaviors that affect  risk of falls.  - [place] fall precautions as indicated by assessment.  - Educate pt/family on patient safety including physical limitations  - Instruct pt to call for assistance with activity based on assessment  - Modify environment to reduce risk of injury  - Provide assistive devices as appropriate  - Consider OT/PT consult to assist with strengthening/mobility  - Encourage toileting schedule  Outcome: Progressing     Problem: DISCHARGE PLANNING  Goal: Discharge to home or other facility with appropriate resources  Description: INTERVENTIONS:  - Identify barriers to discharge w/pt and caregiver  - Include patient/family/discharge partner in discharge planning  - Arrange for needed discharge resources and transportation as appropriate  - Identify discharge learning needs (meds, wound care, etc)  - Arrange for interpreters to assist at discharge as needed  - Consider post-discharge preferences of patient/family/discharge partner  - Complete POLST form as appropriate  - Assess patient's ability to be responsible for managing their own health  - Refer to Case Management Department for coordinating discharge planning if the patient needs post-hospital services based on physician/LIP order or complex needs related to functional status, cognitive ability or social support system  Outcome: Progressing     Problem: RESPIRATORY - ADULT  Goal: Achieves optimal ventilation and oxygenation  Description: INTERVENTIONS:  - Assess for changes in respiratory status  - Assess for changes in mentation and behavior  - Position to facilitate oxygenation and minimize respiratory effort  - Oxygen supplementation based on oxygen saturation or ABGs  - Provide Smoking Cessation handout, if applicable  - Encourage broncho-pulmonary hygiene including cough, deep breathe, Incentive Spirometry  - Assess the need for suctioning and perform as needed  - Assess and instruct to report SOB or any respiratory difficulty  - Respiratory Therapy  support as indicated  - Manage/alleviate anxiety  - Monitor for signs/symptoms of CO2 retention  Outcome: Progressing     Problem: Impaired Functional Mobility  Goal: Achieve highest/safest level of mobility/gait  Description: Interventions:  - Assess patient's functional ability and stability  - Promote increasing activity/tolerance for mobility and gait  - Educate and engage patient/family in tolerated activity level and precaution    Outcome: Progressing

## 2025-06-15 NOTE — PROGRESS NOTES
Seen and examined  Feeling better  Breathing  is better than yesterday  D/w son in law at bedside  D/w nursing staff

## 2025-06-15 NOTE — PLAN OF CARE
Problem: Patient Centered Care  Goal: Patient preferences are identified and integrated in the patient's plan of care  Description: Interventions:  - What would you like us to know as we care for you? From home alone  - Provide timely, complete, and accurate information to patient/family  - Incorporate patient and family knowledge, values, beliefs, and cultural backgrounds into the planning and delivery of care  - Encourage patient/family to participate in care and decision-making at the level they choose  - Honor patient and family perspectives and choices  Outcome: Progressing       Problem: PAIN - ADULT  Goal: Verbalizes/displays adequate comfort level or patient's stated pain goal  Description: INTERVENTIONS:  - Encourage pt to monitor pain and request assistance  - Assess pain using appropriate pain scale  - Administer analgesics based on type and severity of pain and evaluate response  - Implement non-pharmacological measures as appropriate and evaluate response  - Consider cultural and social influences on pain and pain management  - Manage/alleviate anxiety  - Utilize distraction and/or relaxation techniques  - Monitor for opioid side effects  - Notify MD/LIP if interventions unsuccessful or patient reports new pain  - Anticipate increased pain with activity and pre-medicate as appropriate  Outcome: Progressing     Problem: SAFETY ADULT - FALL  Goal: Free from fall injury  Description: INTERVENTIONS:  - Assess pt frequently for physical needs  - Identify cognitive and physical deficits and behaviors that affect risk of falls.  - [place] fall precautions as indicated by assessment.  - Educate pt/family on patient safety including physical limitations  - Instruct pt to call for assistance with activity based on assessment  - Modify environment to reduce risk of injury  - Provide assistive devices as appropriate  - Consider OT/PT consult to assist with strengthening/mobility  - Encourage toileting  schedule  Outcome: Progressing     Problem: DISCHARGE PLANNING  Goal: Discharge to home or other facility with appropriate resources  Description: INTERVENTIONS:  - Identify barriers to discharge w/pt and caregiver  - Include patient/family/discharge partner in discharge planning  - Arrange for needed discharge resources and transportation as appropriate  - Identify discharge learning needs (meds, wound care, etc)  - Arrange for interpreters to assist at discharge as needed  - Consider post-discharge preferences of patient/family/discharge partner  - Complete POLST form as appropriate  - Assess patient's ability to be responsible for managing their own health  - Refer to Case Management Department for coordinating discharge planning if the patient needs post-hospital services based on physician/LIP order or complex needs related to functional status, cognitive ability or social support system  Outcome: Progressing     Problem: RESPIRATORY - ADULT  Goal: Achieves optimal ventilation and oxygenation  Description: INTERVENTIONS:  - Assess for changes in respiratory status  - Assess for changes in mentation and behavior  - Position to facilitate oxygenation and minimize respiratory effort  - Oxygen supplementation based on oxygen saturation or ABGs  - Provide Smoking Cessation handout, if applicable  - Encourage broncho-pulmonary hygiene including cough, deep breathe, Incentive Spirometry  - Assess the need for suctioning and perform as needed  - Assess and instruct to report SOB or any respiratory difficulty  - Respiratory Therapy support as indicated  - Manage/alleviate anxiety  - Monitor for signs/symptoms of CO2 retention  Outcome: Progressing     Problem: Impaired Functional Mobility  Goal: Achieve highest/safest level of mobility/gait  Description: Interventions:  - Assess patient's functional ability and stability  - Promote increasing activity/tolerance for mobility and gait  - Educate and engage patient/family  in tolerated activity level and precautions    Outcome: Progressing

## 2025-06-15 NOTE — PROGRESS NOTES
[facility]  part of [facility]    Progress Note    Imer Mcguire Patient Status:  Inpatient    1931 MRN [id number]   Location [facility] 5SW/SE Attending Severo Lacey MD   Hosp Day # 1 PCP OMAR LEAVITT       SUBJECTIVE:  Feeling better.  Breathing is better.  Nursing staff reported patient was febrile earlier.  Patient's son-in-law is at the bedside.    OBJECTIVE:  Vital signs in last 24 hours:  /85 (BP Location: Right arm)   Pulse 103   Temp 99 °F (37.2 °C) (Oral)   Resp 20   Ht 5' 8\" (1.727 m)   Wt 155 lb (70.3 kg)   SpO2 91%   BMI 23.57 kg/m²     Intake/Output:    Intake/Output Summary (Last 24 hours) at 6/15/2025 0816  Last data filed at 6/15/2025 0417  Gross per 24 hour   Intake 2209 ml   Output 250 ml   Net 1959 ml       Wt Readings from Last 3 Encounters:   25 155 lb (70.3 kg)   24 147 lb 6.4 oz (66.9 kg)   17 174 lb 4.8 oz (79.1 kg)       Exam  Gen: No acute distress, alert and oriented x3,  HEENT: No pallor no icterus  Pulm: Lungs crackles are noted on the right side.  CV: Heart with regular rate and rhythm, no peripheral edema  Abd: Abdomen soft, nontender, nondistended, no organomegaly, bowel sounds present  Skin: no rashes or lesions  CNS: Alert    Data Review:     Labs:   Lab Results   Component Value Date    WBC 26.6 06/15/2025    HGB 15.8 06/15/2025    HCT 48.4 06/15/2025    .0 06/15/2025    CREATSERUM 1.47 06/15/2025    BUN 34 06/15/2025     06/15/2025    K 3.8 06/15/2025     06/15/2025    CO2 20.0 06/15/2025    GLU 70 06/15/2025    CA 8.7 06/15/2025    ALB 3.6 06/15/2025    ALKPHO 103 06/15/2025    BILT 1.3 06/15/2025    TP 5.8 06/15/2025    AST 53 06/15/2025    ALT 34 06/15/2025    PTT 28.5 2025    INR 1.14 2025       LABS  Recent Labs   Lab 25  1409 06/15/25  0617   RBC 5.08 5.07   HGB 15.8 15.8   HCT 47.9 48.4   MCV 94.3 95.5   MCH 31.1 31.2   MCHC 33.0 32.6   RDW 13.3 13.5   NEPRELIM  21.83* 25.87*   WBC 23.2* 26.6*   .0 216.0   AST 43* 53*   ALT 32 34   PTT 28.5  --    INR 1.14  --    GLU 99 70       Imaging:      Meds:   Current Hospital Medications[1]    Assessment  Problem List[2]  Sepsis due to pneumonia (HCC)  Pneumonia.  Community-acquired bacterial bacterial not on.  Will continue patient on Zosyn and vancomycin.  Also on Zithromax to cover for any atypical organisms.     Acute hypoxic respiratory failure patient is requiring 5 L of oxygen.     Continue oxygen supplementation.     Acute kidney injury.  on IV fluids.        Coronary disease stable.     Hypothyroidism continue the patient on levothyroxine.     Patient stable.    Discussed with nursing staff.  Further management will depend on the clinical course of the patient     Plan:   Continue IV antibiotics.  Continue continue other treatments.  Discussed with the son-in-law at the bedside.  I discussed with the patient and the son-in-law regarding goals of care and the advance directives.  Son-in-law reports to me that patient's daughter is coming back from [place].  That she has all the paperwork.    Active Orders   LAB    Vancomycin Trough, Serum     Frequency: Timed draw     Number of Occurrences: 1 Occurrences   OT    OT eval and treat     Frequency: Once     Number of Occurrences: 1 Occurrences   PT    PT eval and treat     Frequency: Once     Number of Occurrences: 1 Occurrences   Diet    Cardiac diet Cardiac; Is Patient on Accuchecks? No     Frequency: Effective Now     Number of Occurrences: Until Specified   Consult    IP Consult to Pharmacy - Pharmacy to dose Vancomycin     Frequency: Once     Number of Occurrences: 1 Occurrences   Medications    acetaminophen (Tylenol) tab 650 mg     Frequency: Q6H PRN     Dose: 650 mg     Route: Oral    aspirin tab 325 mg     Frequency: Daily     Dose: 325 mg     Route: Oral    atorvastatin (Lipitor) tab 40 mg     Frequency: Nightly     Dose: 40 mg     Route: Oral    azithromycin  (Zithromax) tab 500 mg     Frequency: Q24H     Dose: 500 mg     Route: Oral    calcium carbonate (Tums) chewable tab 500 mg     Frequency: BID PRN     Dose: 500 mg     Route: Oral    heparin (Porcine) 5000 UNIT/ML injection 5,000 Units     Frequency: Q12H TIFFANIE     Dose: 5,000 Units     Route: Subcutaneous    levothyroxine (Synthroid) tab 100 mcg     Frequency: Daily     Dose: 100 mcg     Route: Oral    piperacillin-tazobactam (Zosyn) 3.375 g in dextrose 5% 100 mL IVPB-ADDV     Frequency: Q8H     Dose: 3.375 g     Route: Intravenous    sodium chloride 0.9% infusion     Frequency: Continuous     Route: Intravenous    Vancomycin: PHARMACY DOSING     Frequency: See Admin Instructions (RX holding)     Dose: 1 each     Route: Intravenous       Severo Lacey MD  6/15/2025         [1]   Current Facility-Administered Medications   Medication Dose Route Frequency    aspirin tab 325 mg  325 mg Oral Daily    atorvastatin (Lipitor) tab 40 mg  40 mg Oral Nightly    levothyroxine (Synthroid) tab 100 mcg  100 mcg Oral Daily    acetaminophen (Tylenol) tab 650 mg  650 mg Oral Q6H PRN    piperacillin-tazobactam (Zosyn) 3.375 g in dextrose 5% 100 mL IVPB-ADDV  3.375 g Intravenous Q8H    sodium chloride 0.9% infusion   Intravenous Continuous    azithromycin (Zithromax) tab 500 mg  500 mg Oral Q24H    heparin (Porcine) 5000 UNIT/ML injection 5,000 Units  5,000 Units Subcutaneous 2 times per day    Vancomycin: PHARMACY DOSING  1 each Intravenous See Admin Instructions (RX holding)    calcium carbonate (Tums) chewable tab 500 mg  500 mg Oral BID PRN   [2]   Patient Active Problem List  Diagnosis    Coronary artery disease involving native heart    Metabolic encephalopathy    Myopia with astigmatism and presbyopia, bilateral    Age-related nuclear cataract of both eyes    After cataract not obscuring vision, bilateral    Acute chest pain    Sepsis due to pneumonia (HCC)

## 2025-06-16 ENCOUNTER — APPOINTMENT (OUTPATIENT)
Dept: GENERAL RADIOLOGY | Facility: HOSPITAL | Age: OVER 89
End: 2025-06-16
Attending: PHYSICIAN ASSISTANT
Payer: MEDICARE

## 2025-06-16 ENCOUNTER — APPOINTMENT (OUTPATIENT)
Dept: PICC SERVICES | Facility: HOSPITAL | Age: OVER 89
End: 2025-06-16
Attending: INTERNAL MEDICINE
Payer: MEDICARE

## 2025-06-16 LAB
ANION GAP SERPL CALC-SCNC: 10 MMOL/L (ref 0–18)
ANION GAP SERPL CALC-SCNC: 10 MMOL/L (ref 0–18)
APTT PPP: 31.4 SECONDS (ref 23–36)
APTT PPP: 53.4 SECONDS (ref 23–36)
BASOPHILS # BLD: 0 X10(3) UL (ref 0–0.2)
BASOPHILS NFR BLD: 0 %
BUN BLD-MCNC: 54 MG/DL (ref 9–23)
BUN BLD-MCNC: 67 MG/DL (ref 9–23)
BUN/CREAT SERPL: 26.5 (ref 10–20)
BUN/CREAT SERPL: 28 (ref 10–20)
CALCIUM BLD-MCNC: 8.3 MG/DL (ref 8.7–10.4)
CALCIUM BLD-MCNC: 8.6 MG/DL (ref 8.7–10.4)
CHLORIDE SERPL-SCNC: 106 MMOL/L (ref 98–112)
CHLORIDE SERPL-SCNC: 106 MMOL/L (ref 98–112)
CO2 SERPL-SCNC: 20 MMOL/L (ref 21–32)
CO2 SERPL-SCNC: 21 MMOL/L (ref 21–32)
CREAT BLD-MCNC: 2.04 MG/DL (ref 0.7–1.3)
CREAT BLD-MCNC: 2.39 MG/DL (ref 0.7–1.3)
DEPRECATED RDW RBC AUTO: 47.3 FL (ref 35.1–46.3)
DEPRECATED RDW RBC AUTO: 48.3 FL (ref 35.1–46.3)
DOHLE BOD BLD QL SMEAR: PRESENT
EGFRCR SERPLBLD CKD-EPI 2021: 25 ML/MIN/1.73M2 (ref 60–?)
EGFRCR SERPLBLD CKD-EPI 2021: 30 ML/MIN/1.73M2 (ref 60–?)
EOSINOPHIL # BLD: 0 X10(3) UL (ref 0–0.7)
EOSINOPHIL NFR BLD: 0 %
ERYTHROCYTE [DISTWIDTH] IN BLOOD BY AUTOMATED COUNT: 13.7 % (ref 11–15)
ERYTHROCYTE [DISTWIDTH] IN BLOOD BY AUTOMATED COUNT: 13.7 % (ref 11–15)
EST. AVERAGE GLUCOSE BLD GHB EST-MCNC: 114 MG/DL (ref 68–126)
GLUCOSE BLD-MCNC: 105 MG/DL (ref 70–99)
GLUCOSE BLD-MCNC: 91 MG/DL (ref 70–99)
HBA1C MFR BLD: 5.6 % (ref ?–5.7)
HCT VFR BLD AUTO: 42 % (ref 39–53)
HCT VFR BLD AUTO: 43.9 % (ref 39–53)
HGB BLD-MCNC: 14.1 G/DL (ref 13–17.5)
HGB BLD-MCNC: 14.7 G/DL (ref 13–17.5)
LYMPHOCYTES NFR BLD: 0 %
LYMPHOCYTES NFR BLD: 0 X10(3) UL (ref 1–4)
MAGNESIUM SERPL-MCNC: 2.3 MG/DL (ref 1.6–2.6)
MCH RBC QN AUTO: 31.3 PG (ref 26–34)
MCH RBC QN AUTO: 31.8 PG (ref 26–34)
MCHC RBC AUTO-ENTMCNC: 33.5 G/DL (ref 31–37)
MCHC RBC AUTO-ENTMCNC: 33.6 G/DL (ref 31–37)
MCV RBC AUTO: 93.4 FL (ref 80–100)
MCV RBC AUTO: 94.8 FL (ref 80–100)
MONOCYTES # BLD: 0.36 X10(3) UL (ref 0.1–1)
MONOCYTES NFR BLD: 1 %
MORPHOLOGY: NORMAL
NEUTROPHILS # BLD AUTO: 34.39 X10 (3) UL (ref 1.5–7.7)
NEUTROPHILS NFR BLD: 99 %
NEUTS HYPERSEG # BLD: 35.44 X10(3) UL (ref 1.5–7.7)
NEUTS VAC BLD QL SMEAR: PRESENT
OSMOLALITY SERPL CALC.SUM OF ELEC: 296 MOSM/KG (ref 275–295)
OSMOLALITY SERPL CALC.SUM OF ELEC: 304 MOSM/KG (ref 275–295)
PLATELET # BLD AUTO: 186 10(3)UL (ref 150–450)
PLATELET # BLD AUTO: 195 10(3)UL (ref 150–450)
PLATELET MORPHOLOGY: NORMAL
POTASSIUM SERPL-SCNC: 3.9 MMOL/L (ref 3.5–5.1)
POTASSIUM SERPL-SCNC: 3.9 MMOL/L (ref 3.5–5.1)
POTASSIUM SERPL-SCNC: 4.6 MMOL/L (ref 3.5–5.1)
Q-T INTERVAL: 268 MS
Q-T INTERVAL: 300 MS
QRS DURATION: 78 MS
QRS DURATION: 82 MS
QTC CALCULATION (BEZET): 431 MS
QTC CALCULATION (BEZET): 458 MS
R AXIS: 29 DEGREES
R AXIS: 55 DEGREES
RBC # BLD AUTO: 4.43 X10(6)UL (ref 3.8–5.8)
RBC # BLD AUTO: 4.7 X10(6)UL (ref 3.8–5.8)
SODIUM SERPL-SCNC: 136 MMOL/L (ref 136–145)
SODIUM SERPL-SCNC: 137 MMOL/L (ref 136–145)
T AXIS: 24 DEGREES
T AXIS: 36 DEGREES
TOTAL CELLS COUNTED BLD: 100
TSI SER-ACNC: 4.86 UIU/ML (ref 0.55–4.78)
VANCOMYCIN TROUGH SERPL-MCNC: 7.8 UG/ML (ref 10–20)
VENTRICULAR RATE: 124 BPM
VENTRICULAR RATE: 176 BPM
WBC # BLD AUTO: 33.8 X10(3) UL (ref 4–11)
WBC # BLD AUTO: 35.8 X10(3) UL (ref 4–11)

## 2025-06-16 PROCEDURE — 71045 X-RAY EXAM CHEST 1 VIEW: CPT | Performed by: PHYSICIAN ASSISTANT

## 2025-06-16 PROCEDURE — 99232 SBSQ HOSP IP/OBS MODERATE 35: CPT | Performed by: INTERNAL MEDICINE

## 2025-06-16 RX ORDER — HEPARIN SODIUM AND DEXTROSE 10000; 5 [USP'U]/100ML; G/100ML
12 INJECTION INTRAVENOUS ONCE
Status: COMPLETED | OUTPATIENT
Start: 2025-06-16 | End: 2025-06-16

## 2025-06-16 RX ORDER — HEPARIN SODIUM 1000 [USP'U]/ML
60 INJECTION, SOLUTION INTRAVENOUS; SUBCUTANEOUS ONCE
Status: COMPLETED | OUTPATIENT
Start: 2025-06-16 | End: 2025-06-16

## 2025-06-16 RX ORDER — DILTIAZEM HYDROCHLORIDE 5 MG/ML
10 INJECTION INTRAVENOUS ONCE
Status: COMPLETED | OUTPATIENT
Start: 2025-06-16 | End: 2025-06-16

## 2025-06-16 RX ORDER — DILTIAZEM HYDROCHLORIDE 30 MG/1
30 TABLET, FILM COATED ORAL EVERY 6 HOURS SCHEDULED
Status: DISCONTINUED | OUTPATIENT
Start: 2025-06-16 | End: 2025-06-17

## 2025-06-16 RX ORDER — LEVOFLOXACIN 750 MG/1
750 TABLET, FILM COATED ORAL
Status: DISCONTINUED | OUTPATIENT
Start: 2025-06-16 | End: 2025-06-17

## 2025-06-16 RX ORDER — DILTIAZEM HYDROCHLORIDE 5 MG/ML
INJECTION INTRAVENOUS
Status: COMPLETED
Start: 2025-06-16 | End: 2025-06-16

## 2025-06-16 RX ORDER — HEPARIN SODIUM AND DEXTROSE 10000; 5 [USP'U]/100ML; G/100ML
INJECTION INTRAVENOUS CONTINUOUS
Status: DISCONTINUED | OUTPATIENT
Start: 2025-06-16 | End: 2025-06-19

## 2025-06-16 NOTE — HISTORICAL OFFICE NOTE
Facility Logo [facility]  [address], [address] [place] [address]  [phone]      Imer Mcguire  Progress Note  Demographics:  Name: Imer Mcguire YOB: 1931  Age: 89, Male Medical Record No: [id number]  Visited Date/Time: 10/02/2024 09:00 AM    Chief Complaints  hospital f/u  History of Present Illness  Patient is a 89-year-old male with a history of CAD s/p CABG 2017, HTN, HLD, CKD who presents establish care.  Previously was followed with Dr. Alicia for many years.  He says he was raking leaves when he developed chest pain in 2017, [facility] noted multivessel [facility].  There is some discussion about PCI versus CABG but eventually came over here for second opinion and had a four-vessel bypass on 1/25/2017.  Since then has been doing well, denies any chest pain, palpitations, short of breath, edema, dizziness, syncope.  He is very physically active and goes to the gym 3 to 4 days a week.  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  Additionally sings in the choir.    10/2/2024  Patient came to the hospital 2 weeks ago with episode of chest pain, had an echocardiogram and stress test also was unremarkable.  TTE noted moderate tricuspid regurgitation.  No recurrent symptoms, currently feels great.    Cardiac history:  CAD s/p CABG (LIMA-LAD, SVG-RCA, SVG-OM1, SVG-OM 2) on 1/25/2017  Venous insufficiency  Moderate TR  HTN  HLD  CKD  Cardiac risk factors Never smoked  Past Medical History  1.Essential hypertension  2.Dyslipidemia  3.CAD (coronary artery disease)  4.Chronic kidney disease  5.Hypokalemia  6.Hypothyroidism  Family History  Family history unknown.  Social History  Smoking status Never smoked  Tobacco usage - No (Non-smoker for personal reasons (finding))  Patient lives at home alone.  He denies any tobacco use, does have occasional alcohol socially when they do their weekly gatherings at his daughter  [facility].  He is very physically active as well as sings in the [email or web address] choir and plays breast [employer] and weekly concerts.  He is now retired but previously was a .  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  Review of systems  Cardiovascular No history of Chest pain, Palpitations and Edema  Respiratory No history of SOB  Neurological No history of Numbness and Limb weakness  Physical Examination  Constitutional 96%o2  Vitals Left Arm Sitting  / 82 mmHg, Pulse rate 68 bpm, Height in 5' 8\", BMI: 23.3, Weight in 153 lbs (or) 69 kgs and BSA : 1.83 cc/m²  General Appearance No Acute Distress and Normal Body habitus  Cardiovascular   EKG/Other abnormalities  GENERAL: in no apparent distress  HEENT: Normal  NECK: no JVD, normal carotid pulses without bruits. No lymphadenopathy  LUNGS: normal excursion, clear to auscultation bilaterally  HEART: RRR, nl S1/S2, no gallop, no murmur, no rub  ABD: soft, nontender, nondistended, normal bowel sounds  EXTREMITIES: no cyanosis, clubbing or edema  SKIN: warm, dry, normal turgor  NEURO: alert, oriented x3, symmetrical face, no dysarthria, no focal deficits  PSYCH: cooperative, calm  Allergies  No medication allergies noted.  Medications (Info obtained by: Verbal)  1.aspirin 325 MG tablet, Take 325 mg by mouth daily. 1 daily  2.atorvastatin (LIPITOR) 40 MG tablet, Take 1 tablet by mouth at bedtime.  3.levothyroxine (SYNTHROID) 100 MCG tablet, Take 100 mcg by mouth daily. 1 daily  4.lisinopriL 5 mg tablet, Take 1 tablet orally once a day.  5.multivitamin , daily  6.omeprazole 20 mg capsule,delayed release, Take 1 capsule orally once a day.  Impression  1.Essential hypertension  2.Dyslipidemia  3.CAD (coronary artery disease)  Assessment & Plan  CAD s/p CABG (LIMA-LAD, SVG-RCA, SVG-OM1, SVG-OM 2) on 1/25/2017  - No chest pain or shortness of breath  - Recent nuclear stress test normal  - Continue aspirin 80 mg daily, atorvastatin 40  mg daily    Moderate TR  -Repeat transthoracic echocardiogram in 1 to 2 years for routine surveillance    HTN  - Normotensive today  - Continue lisinopril 5 mg daily    HLD  - LDL 70, triglycerides 74 on 10/25/2023  - Continue atorvastatin 40 mg daily   - Patient has blood work scheduled next week    Plan  Follow-up with me in 1 year or sooner as needed  Future appointments  1.Referral Visit - Manny Pickett ([email or web address]) : (Today)  Miscellaneous  1.Weight monitoring (regime/therapy)  Nurses documentation  refills: None   Assistive devices: none  Upcoming sx or procedures: none   Patient instructions  Plan  Follow-up with me in 1 year or sooner as needed  Please bring in you medication bottles or updated medicine list to your next appointment.   Call [facility] if you have any problems or concerns at [phone]   Care Providers: Rishi Hyatt MD, Danna EMMANUEL and Gabbi Schaefer  Electronically Authenticated by  Rishi Hyatt MD  10/02/2024 10:48:17 AM  Disclaimer: Components of this note were documented using voice recognition system and are subject to errors not corrected at proofreading. Contact the author of this note for any clarifications.

## 2025-06-16 NOTE — SIGNIFICANT EVENT
RRT    *See RRT Documentation Record*    Reason the RRT was called: HR elevated during Vital Signs, sustaining on higher on the 165's   Assessment of patient leading up to RRT: Vital Signs  Interventions/Testing: EKG, IV Cardizem, CBC, BMP  Patient Outcome/Disposition: Transfer to the 3rd floor  Family Notified: yes  Name of family notified: Carli Westbrookt. (Daughter)

## 2025-06-16 NOTE — PROGRESS NOTES
Pt transfer to Third floor, room 309. Belongings gathered at bedside with the patient. Daughter notified. Padmini Randolph ( covering Dr. Lacey) notified and aware.

## 2025-06-16 NOTE — OCCUPATIONAL THERAPY NOTE
OT orders received and chart reviewed. Pt's HR elevated and pt O2 drops with talking. Pt not appropriate for active participation in OT evaluation today. Will re-schedule when medically appropriate. RN, Marie blue. Thank you.    Farrah Stephens, Occupational Therapist, MS, OTR/L

## 2025-06-16 NOTE — PROGRESS NOTES
[facility]  part of [facility]    Progress Note    Imer Mcguire Patient Status:  Inpatient    1931 MRN [id number]   Location [facility] 5SW/SE Attending Severo Lacey MD   Hosp Day # 2 PCP OMAR LEAVITT       SUBJECTIVE:  Feeling better.  Breathing is better.  Daughter and granddaughter are at the bedside    OBJECTIVE:  Vital signs in last 24 hours:  BP 98/57   Pulse (!) 156   Temp 99.6 °F (37.6 °C) (Axillary)   Resp 24   Ht 5' 8\" (1.727 m)   Wt 155 lb (70.3 kg)   SpO2 93%   BMI 23.57 kg/m²     Intake/Output:    Intake/Output Summary (Last 24 hours) at 2025 0544  Last data filed at 6/15/2025 1806  Gross per 24 hour   Intake 190 ml   Output 200 ml   Net -10 ml       Wt Readings from Last 3 Encounters:   25 155 lb (70.3 kg)   24 147 lb 6.4 oz (66.9 kg)   17 174 lb 4.8 oz (79.1 kg)       Exam  Gen: No acute distress, alert and oriented x3,  HEENT: No pallor no icterus  Pulm: Lungs crackles are noted on the right side.  CV: Heart with irregular rate and rhythm, no peripheral edema  Abd: Abdomen soft, nontender, nondistended, no organomegaly, bowel sounds present  Skin: no rashes or lesions  CNS: Alert    Data Review:     Labs:   Lab Results   Component Value Date    WBC 35.8 2025    HGB 14.7 2025    HCT 43.9 2025    .0 2025    CREATSERUM 2.04 2025    BUN 54 2025     2025    K 3.9 2025    K 3.9 2025     2025    CO2 20.0 2025    GLU 91 2025    CA 8.6 2025    ALB 3.6 06/15/2025    ALKPHO 103 06/15/2025    BILT 1.3 06/15/2025    TP 5.8 06/15/2025    AST 53 06/15/2025    ALT 34 06/15/2025       LABS  Recent Labs   Lab 25  1409 06/15/25  0617 25  0455   RBC 5.08 5.07 4.70   HGB 15.8 15.8 14.7   HCT 47.9 48.4 43.9   MCV 94.3 95.5 93.4   MCH 31.1 31.2 31.3   MCHC 33.0 32.6 33.5   RDW 13.3 13.5 13.7   NEPRELIM 21.83* 25.87* 34.39*   WBC 23.2* 26.6*  35.8*   .0 216.0 195.0   AST 43* 53*  --    ALT 32 34  --    PTT 28.5  --   --    INR 1.14  --   --    GLU 99 70 91       Imaging:      Meds:   Current Hospital Medications[1]    Assessment  Problem List[2]  Sepsis due to pneumonia (HCC)  Pneumonia.,   Community-acquired bacterial bacterial   Secondary to Legionella  Patient started on Levaquin       New onset atrial fibrillation with rapid ventricular response  Cardiology consulted.  Patient started on amiodarone      Acute hypoxic respiratory failure patient is requiring 5 L of oxygen.     Continue oxygen supplementation.     Acute kidney injury.  on IV fluids.,        Coronary disease stable.     Hypothyroidism continue the patient on levothyroxine.     Patient stable.    Discussed with nursing staff.  Further management will depend on the clinical course of the patient     Plan:   Continue IV antibiotics.  Continue continue other treatments.  Discussed with daughter  at the bedside.    Active Orders   OT    OT eval and treat     Frequency: Once     Number of Occurrences: 1 Occurrences   PT    PT eval and treat     Frequency: Once     Number of Occurrences: 1 Occurrences   ECG    EKG 12 Lead     Frequency: Once     Number of Occurrences: 1 Occurrences   Diet    Cardiac diet Cardiac; Is Patient on Accuchecks? No     Frequency: Effective Now     Number of Occurrences: Until Specified   Nursing    Cardiac monitoring     Frequency: Until Discontinued     Number of Occurrences: Until Specified    Cardiac monitoring     Frequency: Continuous     Number of Occurrences: Until Specified    Initiate electrolyte protocol     Frequency: Until Discontinued     Number of Occurrences: Until Specified    Notify physician (cardiology or ordering physician):     Frequency: Until Discontinued     Number of Occurrences: Until Specified     Order Comments: If you are discontinuing a drip without any oral diltiazem medication orders      Notify physician for BP (cardiology or  ordering physician):     Frequency: Until Discontinued     Number of Occurrences: Until Specified     Order Comments: For SBP < 80 mmHg or symptomatic hypotension, discontinue drip and notify physician      Notify physician for HR (cardiology or ordering physician):     Frequency: Until Discontinued     Number of Occurrences: Until Specified     Order Comments: 1. If you are on max intravenous dose and the HR is >120  2. If HR is >140 and it has been one hour since you transitioned to oral from IV diltiazem  3. If rate < 75 for > 5 min or symptomatic bradycardia, discontinue the drip and notify physician  4. For pause greater than 3.0 seconds, hold drip and notify physician     Consult    ED Consult to Cardiology     Frequency: Once     Number of Occurrences: 1 Occurrences   Medications    acetaminophen (Tylenol) tab 650 mg     Frequency: Q6H PRN     Dose: 650 mg     Route: Oral    albuterol (Ventolin) (2.5 MG/3ML) 0.083% nebulizer solution 2.5 mg     Frequency: Q4H PRN     Dose: 2.5 mg     Route: Nebulization    aspirin tab 325 mg     Frequency: Daily     Dose: 325 mg     Route: Oral    atorvastatin (Lipitor) tab 40 mg     Frequency: Nightly     Dose: 40 mg     Route: Oral    azithromycin (Zithromax) tab 500 mg     Frequency: Q24H     Dose: 500 mg     Route: Oral    calcium carbonate (Tums) chewable tab 500 mg     Frequency: BID PRN     Dose: 500 mg     Route: Oral    dilTIAZem (cardIZEM) 100 mg in sodium chloride 0.9% 100 mL IVPB-ADDV     Frequency: Continuous     Dose: 2.5-20 mg/hr     Route: Intravenous    dilTIAZem (cardIZEM) tab 30 mg     Frequency: Q6H TIFFANIE     Dose: 30 mg     Route: Oral    dilTIAZem 10 mg BOLUS FROM BAG infusion     Frequency: Q1H PRN     Dose: 10 mg     Route: Intravenous    heparin (Porcine) 5000 UNIT/ML injection 5,000 Units     Frequency: Q12H TIFFANIE     Dose: 5,000 Units     Route: Subcutaneous    levothyroxine (Synthroid) tab 100 mcg     Frequency: Daily     Dose: 100 mcg     Route: Oral     piperacillin-tazobactam (Zosyn) 3.375 g in dextrose 5% 100 mL IVPB-ADDV     Frequency: Q8H     Dose: 3.375 g     Route: Intravenous    Vancomycin: PHARMACY DOSING     Frequency: See Admin Instructions (RX holding)     Dose: 1 each     Route: Intravenous       Severo Lacey MD           [1]   Current Facility-Administered Medications   Medication Dose Route Frequency    dilTIAZem 10 mg BOLUS FROM BAG infusion  10 mg Intravenous Q1H PRN    dilTIAZem (cardIZEM) 100 mg in sodium chloride 0.9% 100 mL IVPB-ADDV  2.5-20 mg/hr Intravenous Continuous    dilTIAZem (cardIZEM) tab 30 mg  30 mg Oral 4 times per day    albuterol (Ventolin) (2.5 MG/3ML) 0.083% nebulizer solution 2.5 mg  2.5 mg Nebulization Q4H PRN    aspirin tab 325 mg  325 mg Oral Daily    atorvastatin (Lipitor) tab 40 mg  40 mg Oral Nightly    levothyroxine (Synthroid) tab 100 mcg  100 mcg Oral Daily    acetaminophen (Tylenol) tab 650 mg  650 mg Oral Q6H PRN    piperacillin-tazobactam (Zosyn) 3.375 g in dextrose 5% 100 mL IVPB-ADDV  3.375 g Intravenous Q8H    azithromycin (Zithromax) tab 500 mg  500 mg Oral Q24H    heparin (Porcine) 5000 UNIT/ML injection 5,000 Units  5,000 Units Subcutaneous 2 times per day    Vancomycin: PHARMACY DOSING  1 each Intravenous See Admin Instructions (RX holding)    calcium carbonate (Tums) chewable tab 500 mg  500 mg Oral BID PRN   [2]   Patient Active Problem List  Diagnosis    Coronary artery disease involving native heart    Metabolic encephalopathy    Myopia with astigmatism and presbyopia, bilateral    Age-related nuclear cataract of both eyes    After cataract not obscuring vision, bilateral    Acute chest pain    Sepsis due to pneumonia (HCC)

## 2025-06-16 NOTE — PLAN OF CARE
Case/plan dw Dr. Barnett. Pt noted to be tachycardic & hypotensive. In Afib w/ HR 130s, SBP 80s.  Dilt gtt & po dilt held due to hypotension. Asymptomatic. Will start amiodarone & heparin gtts. RN notified.     Ashley Thomas, MSN, FPA-APRN, FNP-BC, CCK   6/16/2025  1:31 PM  Ph [phone] ([place])  Ph [phone] ([place])      Addendum @ 1530: D/w Dr. Barnett, RN, pt (confused), & pt's son in law, plan to tx to the ICU for low dose levophed.

## 2025-06-16 NOTE — CM/SW NOTE
06/16/25 1200   CM/SW Referral Data   Referral Source Social Work (self-referral)   Reason for Referral Discharge planning   Informant Patient   Medical Hx   Does patient have an established PCP? Yes   Patient Info   Patient's Current Mental Status at Time of Assessment Alert;Oriented   Patient's Home Environment House   Number of Levels in Home 2   Number of Stair in Home   (1 step to get inside and 14 between the levels.)   Patient lives with Alone   Patient Status Prior to Admission   Independent with ADLs and Mobility Yes   Discharge Needs   Anticipated D/C needs To be determined     SW initiated self-referral for discharge planning. SW introduced self and role to pt who was being visited by his  and niece.     Pt confirmed information above. Pt lives alone in a 2 level house. Pt reports there is one step to get in the house and 1 between the levels. Pt also reports his daughter lives close by and is available to help if needed. Pt has no caregivers.     Pt reports no current HH services in place.     Pt has a chair lift at home but does not use any devices to ambulate.   No home O2, pt is currently on 10L.    PLAN: TBD *p. Med clearance/PT/OT eval/O2 needs.    SW/CM to remain available for support and/or discharge planning.    Mignon, MSW, LSW [id number]

## 2025-06-16 NOTE — PHYSICAL THERAPY NOTE
PT orders received, chart reviewed. Noted pt had RRT this morning with HR in 160s. Spoke with ANIYAH Salazar. Pt's HR still elevated and pt's O2 drops with talking. Pt not appropriate for PT eval today, will plan to reattempt tomorrow as able.

## 2025-06-16 NOTE — SPIRITUAL CARE NOTE
Spiritual Care Visit Note    Patient Name: Imer Mcguire Date of Spiritual Care Visit: 25   : 1931 Primary Dx: Sepsis due to pneumonia (HCC)       Referred By: Referral From: Care Coordination    Spiritual Care Taxonomy:    Intended Effects: Convey a calming presence    Methods: Offer support    Interventions: Acknowledge current situation, Active listening, Ask guided questions, Silent prayer, Respond as  to a defined crisis event    Visit Type/Summary:     - Spiritual Care: Responded to a request via the on call phone Consulted with RN prior to visit. Provided support for patient with silent prayer and spiritual presence spiritual/Scientology requests.  remains available for follow up.  remains available as needed for follow up.    Spiritual Care support can be requested via an [facility] consult. For urgent/immediate needs, please contact the On Call  at: [place]: ext [phone]    Rev Giovanna Rivas MDiv

## 2025-06-16 NOTE — CONSULTS
[facility]  part of [facility]    Cardiology Consultation    Imer Mcguire Patient Status:  Inpatient    1931 MRN [id number]   Location [facility] 3W/SW Attending Severo Lacey MD   Hosp Day # 2 PCP OMAR LEAVITT     Date of Admission:  2025  Date of Consult:  2025  Reason for Consultation: AF    History of Present Illness:   Patient is a 89 year old male with sig PMH/o CAD s/p CABG, CKD who has been admitted for Legionella pneumonia.  Hospitalization course has been complicated by new onset A-fib with RVR.  Denies any chest pain.  Remains dyspneic with intermittent hypoxia despite high flow nasal cannula.  CXR this morning with patchy bilateral lower lobe and right upper lobe opacities that have increased in size and density compared to admission, consistent with worsening multifocal pneumonia.  Currently patient with heart rates between 100 and 110, was started on IV diltiazem current currently on 10 mg/h.    Assessment and Plan:   Multifocal pneumonia secondary to Legionella, worsening  Acute hypoxic respiratory failure, due to above   SIERRA on CKD    AF with RVR, new-onset  -Likely secondary to hypoxia, systemic inflammation (Legionella), and critical illness  -No HF or ischemic symptoms  -Check TTE  -Currently on IV diltiazem at 10 mg/h with partial rate control (-110)    -Continue IV diltiazem and adjust to target HR <100 bpm if tolerated    -Transition to oral rate control (e.g., diltiazem or metoprolol) when stable and able to tolerate PO    -Monitor closely for hypotension or bradycardia    -Consider amiodarone if further rate control is precluded (hypotension)    -Would avoid beta blockers if hemodynamics or respiratory status worsen  -Continue telemetry monitoring    Anticoagulation Consideration  -High SOLEDAD?DS?-VASc (age, CAD) ? increased thromboembolic risk  -Start AC with heparin gtt for now given SIERRA on CKD    CAD s/p CABG  -Stable from an  ischemic standpoint, no current CP or ECG changes  -Continue cardiac risk reduction, on statin + ASA  -Monitor for demand ischemia    Past Medical History  Past Medical History[1]    Past Surgical History  Past Surgical History[2]    Family History  Family History[3]    Social History  Pediatric History   Patient Parents/Guardians    Carli Hameed (Daughter/Guardian)     Other Topics Concern    Caffeine Concern Not Asked    Exercise Not Asked    Seat Belt Not Asked    Special Diet Not Asked    Stress Concern Not Asked    Weight Concern Not Asked   Social History Narrative    Not on file           Current Medications:  Current Hospital Medications[4]  Prescriptions Prior to Admission[5]    Allergies  Allergies[6]    Review of Systems:   ROS  10 point review of systems completed and negative except as noted.    Physical Exam:   Patient Vitals for the past 24 hrs:   BP Temp Temp src Pulse Resp SpO2   06/16/25 1051 -- -- -- 107 -- 92 %   06/16/25 0916 -- -- -- -- -- 90 %   06/16/25 0915 -- -- -- -- -- (!) 88 %   06/16/25 0910 100/53 97.8 °F (36.6 °C) Oral (!) 123 25 90 %   06/16/25 0758 -- -- -- -- -- 92 %   06/16/25 0630 (!) 133/110 -- -- (!) 145 24 90 %   06/16/25 0527 99/57 98.8 °F (37.1 °C) Oral (!) 156 24 90 %   06/16/25 0500 -- -- -- -- -- 93 %   06/16/25 0456 98/57 -- -- (!) 186 24 94 %   06/16/25 0450 125/77 99.6 °F (37.6 °C) Axillary (!) 188 26 (!) 88 %   06/16/25 0401 104/49 98.1 °F (36.7 °C) Oral 82 22 90 %   06/15/25 2129 114/74 100 °F (37.8 °C) Oral 104 22 91 %   06/15/25 2016 116/68 98.7 °F (37.1 °C) Oral 104 22 91 %   06/15/25 2005 -- -- -- -- -- 94 %   06/15/25 1501 106/56 98.5 °F (36.9 °C) Oral 89 20 96 %   06/15/25 1339 -- 98.2 °F (36.8 °C) Oral 95 20 95 %   06/15/25 1227 -- -- -- -- -- 96 %       Intake/Output:   Last 3 shifts:   Intake/Output                   06/14/25 0700 - 06/15/25 0659 06/15/25 0700 - 06/16/25 0659 06/16/25 0700 - 06/17/25 0659       Intake    P.O.  --  180  20    P.O. -- 180 20     I.V.  2109  10  --    I.V. -- 10 --    Volume (mL) (sodium chloride 0.9 % IV bolus 2,109 mL) 2109 -- --    IV PIGGYBACK  100  --  100    Volume (mL) (piperacillin-tazobactam (Zosyn) 3.375 g in dextrose 5% 100 mL IVPB-ADDV) -- -- 100    Volume (mL) (piperacillin-tazobactam (Zosyn) 4.5 g in dextrose 5% 100 mL IVPB-ADDV) 100 -- --    Total Intake 2209 190 120       Output    Urine  250  200  --    Urine 250 -- --    Output (mL) (External Urinary Catheter) -- 200 --    Total Output 250 200 --       Net I/O     1959 -10 120          Vent Settings:    Hemodynamic parameters (last 24 hours):    Scheduled Meds: Scheduled Medications[7]  Continuous Infusions: Medication Infusions[8]    Physical Exam:  General: No apparent distress.   HEENT: Normocephalic, anicteric sclera, neck supple, no thyromegaly or adenopathy.  Neck: No JVD, carotids 2+, no bruits.  Cardiac: Irreg irreg   Lungs: Diminished BS  Abdomen: Soft, non-tender. No organosplenomegally, mass or rebound, BS-present.  Extremities: No edema.    Neurologic: No focal defects  Skin: Warm and dry.     Results:   Laboratory Data:  Lab Results   Component Value Date    WBC 35.8 (H) 06/16/2025    HGB 14.7 06/16/2025    HCT 43.9 06/16/2025    .0 06/16/2025    CREATSERUM 2.04 (H) 06/16/2025    BUN 54 (H) 06/16/2025     06/16/2025    K 3.9 06/16/2025    K 3.9 06/16/2025     06/16/2025    CO2 20.0 (L) 06/16/2025    GLU 91 06/16/2025    CA 8.6 (L) 06/16/2025    ALB 3.6 06/15/2025    ALKPHO 103 06/15/2025    TP 5.8 06/15/2025    AST 53 (H) 06/15/2025    ALT 34 06/15/2025    PTT 28.5 06/14/2025    INR 1.14 06/14/2025    PTP 15.3 (H) 06/14/2025    TSH 3.57 01/29/2017    TSH 3.57 01/29/2017    MG 1.9 01/30/2017    PHOS 2.9 01/30/2017         Recent Labs   Lab 06/14/25  1409 06/15/25  0617 06/16/25  0455   GLU 99 70 91   BUN 32* 34* 54*   CREATSERUM 1.58* 1.47* 2.04*   CA 9.6 8.7 8.6*   * 139 136   K 4.0 3.8 3.9  3.9    104 106   CO2 23.0 20.0* 20.0*      Recent Labs   Lab 06/14/25  1409 06/15/25  0617 06/16/25  0455   RBC 5.08 5.07 4.70   HGB 15.8 15.8 14.7   HCT 47.9 48.4 43.9   MCV 94.3 95.5 93.4   MCH 31.1 31.2 31.3   MCHC 33.0 32.6 33.5   RDW 13.3 13.5 13.7   NEPRELIM 21.83* 25.87* 34.39*   WBC 23.2* 26.6* 35.8*   .0 216.0 195.0       No results for input(s): \"BNPML\" in the last 168 hours.    No results for input(s): \"TROP\", \"CK\" in the last 168 hours.    Imaging:  XR CHEST AP PORTABLE  (CPT=71045)  Result Date: 6/16/2025  CONCLUSION:   Patchy bilateral lower lobe and right upper lobe airspace opacities have increased in size and density since 6/14/2025 suspicious for worsening multifocal pneumonia    Dictated by (CST): Paresh Miranda MD on 6/16/2025 at 11:33 AM     Finalized by (CST): Paresh Miranda MD on 6/16/2025 at 11:34 AM            Thank you for allowing me to participate in the care of your patient.    Nilson Barnett, [facility] [place]         [1]   Past Medical History:   Atherosclerosis of coronary artery    Cortical senile cataract    Disorder of thyroid    Dyslipidemia    Essential hypertension    High blood pressure    Hypothyroidism    Senile cataract   [2]   Past Surgical History:  Procedure Laterality Date    Cataract extraction w/  intraocular lens implant Right 01/08/2019    Dr. Schwarz @ [facility]    Cataract extraction w/  intraocular lens implant Left 03/2019    Dr. Schwarz @ [facility]   [3]   Family History  Problem Relation Age of Onset    Other (Other) Father     Cancer Mother     Cancer Brother     Diabetes Neg     Glaucoma Neg    [4]   Current Facility-Administered Medications   Medication Dose Route Frequency    dilTIAZem 10 mg BOLUS FROM BAG infusion  10 mg Intravenous Q1H PRN    dilTIAZem (cardIZEM) 100 mg in sodium chloride 0.9% 100 mL IVPB-ADDV  2.5-20 mg/hr Intravenous Continuous    dilTIAZem (cardIZEM) tab 30 mg  30 mg Oral 4 times per day    azithromycin (Zithromax) 500 mg in sodium chloride 0.9% 250mL IVPB premix  500  mg Intravenous Q24H    levoFLOXacin (Levaquin) tab 750 mg  750 mg Oral Q48HR @ 0700    albuterol (Ventolin) (2.5 MG/3ML) 0.083% nebulizer solution 2.5 mg  2.5 mg Nebulization Q4H PRN    aspirin tab 325 mg  325 mg Oral Daily    atorvastatin (Lipitor) tab 40 mg  40 mg Oral Nightly    levothyroxine (Synthroid) tab 100 mcg  100 mcg Oral Daily    acetaminophen (Tylenol) tab 650 mg  650 mg Oral Q6H PRN    heparin (Porcine) 5000 UNIT/ML injection 5,000 Units  5,000 Units Subcutaneous 2 times per day    calcium carbonate (Tums) chewable tab 500 mg  500 mg Oral BID PRN   [5]   Medications Prior to Admission   Medication Sig    lisinopril 2.5 MG Oral Tab Take 1 tablet (2.5 mg total) by mouth in the morning.    atorvastatin 40 MG Oral Tab Take 1 tablet (40 mg total) by mouth nightly.    aspirin 325 MG Oral Tab Take 1 tablet (325 mg total) by mouth in the morning.    levothyroxine 75 MCG Oral Tab Take 100 mcg by mouth in the morning.   [6] No Known Allergies  [7]    dilTIAZem  30 mg Oral 4 times per day    azithromycin  500 mg Intravenous Q24H    levoFLOXacin  750 mg Oral Q48HR @ 0700    aspirin  325 mg Oral Daily    atorvastatin  40 mg Oral Nightly    levothyroxine  100 mcg Oral Daily    heparin  5,000 Units Subcutaneous 2 times per day   [8]    dilTIAZem 10 mg/hr (06/16/25 0641)

## 2025-06-16 NOTE — PLAN OF CARE
15 L high flow Nasal cannula, pt states breathing is improving. Afebrile. IV abx. IV Cardizem stopped due to hypotension, MD aware. Amio gtt ordered, per cards transfer to ICU for a levo gtt. Report given to ANIYAH Bryant. Questions answered. All belongings brought down with patient.     Problem: Patient Centered Care  Goal: Patient preferences are identified and integrated in the patient's plan of care  Description: Interventions:  - What would you like us to know as we care for you? From home alone  - Provide timely, complete, and accurate information to patient/family  - Incorporate patient and family knowledge, values, beliefs, and cultural backgrounds into the planning and delivery of care  - Encourage patient/family to participate in care and decision-making at the level they choose  - Honor patient and family perspectives and choices  Outcome: Progressing     Problem: PAIN - ADULT  Goal: Verbalizes/displays adequate comfort level or patient's stated pain goal  Description: INTERVENTIONS:  - Encourage pt to monitor pain and request assistance  - Assess pain using appropriate pain scale  - Administer analgesics based on type and severity of pain and evaluate response  - Implement non-pharmacological measures as appropriate and evaluate response  - Consider cultural and social influences on pain and pain management  - Manage/alleviate anxiety  - Utilize distraction and/or relaxation techniques  - Monitor for opioid side effects  - Notify MD/LIP if interventions unsuccessful or patient reports new pain  - Anticipate increased pain with activity and pre-medicate as appropriate  Outcome: Progressing     Problem: SAFETY ADULT - FALL  Goal: Free from fall injury  Description: INTERVENTIONS:  - Assess pt frequently for physical needs  - Identify cognitive and physical deficits and behaviors that affect risk of falls.  - Graham fall precautions as indicated by assessment.  - Educate pt/family on patient safety including  physical limitations  - Instruct pt to call for assistance with activity based on assessment  - Modify environment to reduce risk of injury  - Provide assistive devices as appropriate  - Consider OT/PT consult to assist with strengthening/mobility  - Encourage toileting schedule  Outcome: Progressing     Problem: DISCHARGE PLANNING  Goal: Discharge to home or other facility with appropriate resources  Description: INTERVENTIONS:  - Identify barriers to discharge w/pt and caregiver  - Include patient/family/discharge partner in discharge planning  - Arrange for needed discharge resources and transportation as appropriate  - Identify discharge learning needs (meds, wound care, etc)  - Arrange for interpreters to assist at discharge as needed  - Consider post-discharge preferences of patient/family/discharge partner  - Complete POLST form as appropriate  - Assess patient's ability to be responsible for managing their own health  - Refer to Case Management Department for coordinating discharge planning if the patient needs post-hospital services based on physician/LIP order or complex needs related to functional status, cognitive ability or social support system  Outcome: Progressing     Problem: RESPIRATORY - ADULT  Goal: Achieves optimal ventilation and oxygenation  Description: INTERVENTIONS:  - Assess for changes in respiratory status  - Assess for changes in mentation and behavior  - Position to facilitate oxygenation and minimize respiratory effort  - Oxygen supplementation based on oxygen saturation or ABGs  - Provide Smoking Cessation handout, if applicable  - Encourage broncho-pulmonary hygiene including cough, deep breathe, Incentive Spirometry  - Assess the need for suctioning and perform as needed  - Assess and instruct to report SOB or any respiratory difficulty  - Respiratory Therapy support as indicated  - Manage/alleviate anxiety  - Monitor for signs/symptoms of CO2 retention  Outcome: Progressing      Problem: Impaired Functional Mobility  Goal: Achieve highest/safest level of mobility/gait  Description: Interventions:  - Assess patient's functional ability and stability  - Promote increasing activity/tolerance for mobility and gait  - Educate and engage patient/family in tolerated activity level and precautions  Outcome: Progressing

## 2025-06-16 NOTE — PROGRESS NOTES
[facility]  part of [facility]    Progress Note    Imer Mcguire Patient Status:  Inpatient    1931 MRN [id number]   Location [facility] 3W/SW Attending Severo Lacey MD   Hosp Day # 2 PCP OMAR LEAVITT     Subjective:   Seen and examined while resting in bed. Daughter at bedside. RRT overnight for Afib with RVR. No cough today. No dyspnea. Poor appetite. No chills. Tmax in last 24 hours 101.6. On 10 L HF O2. Legionella urinary antigen positive. Of note, patient works at [employer] and plays golf frequently. He had been playing golf when he developed symptoms 1 weeks ago.    Objective:   Blood pressure 100/53, pulse 107, temperature 97.8 °F (36.6 °C), temperature source Oral, resp. rate 25, height 5' 8\" (1.727 m), weight 155 lb (70.3 kg), SpO2 92%.  Physical Exam  Vitals and nursing note reviewed.   Constitutional:       General: He is awake. He is not in acute distress.     Appearance: He is ill-appearing.      Interventions: Nasal cannula in place.   HENT:      Head: Normocephalic and atraumatic.   Cardiovascular:      Rate and Rhythm: Tachycardia present. Rhythm irregular.      Heart sounds: No murmur heard.  Pulmonary:      Breath sounds: Rales (Extensive R sided crackles, L basilar crackles) present.   Musculoskeletal:      Cervical back: Normal range of motion and neck supple.      Right lower leg: No edema.      Left lower leg: No edema.   Skin:     General: Skin is warm and dry.   Neurological:      General: No focal deficit present.      Mental Status: He is alert and oriented to person, place, and time.   Psychiatric:         Mood and Affect: Mood normal.         Behavior: Behavior is cooperative.         Results:   Lab Results   Component Value Date    WBC 35.8 (H) 2025    HGB 14.7 2025    HCT 43.9 2025    .0 2025    CREATSERUM 2.04 (H) 2025    BUN 54 (H) 2025     2025    K 3.9 2025    K 3.9 2025      06/16/2025    CO2 20.0 (L) 06/16/2025    GLU 91 06/16/2025    CA 8.6 (L) 06/16/2025    ALB 3.6 06/15/2025    ALKPHO 103 06/15/2025    BILT 1.3 (H) 06/15/2025    TP 5.8 06/15/2025    AST 53 (H) 06/15/2025    ALT 34 06/15/2025    PTT 28.5 06/14/2025    INR 1.14 06/14/2025    TSH 3.57 01/29/2017    TSH 3.57 01/29/2017    MG 1.9 01/30/2017    PHOS 2.9 01/30/2017    TROPHS 25 09/22/2024       XR CHEST AP PORTABLE  (CPT=71045)  Result Date: 6/14/2025  CONCLUSION:  1. Newly developed airspace opacification in the mid and upper lung likely representing pneumonia.  Follow-up to complete radiographic resolution recommended.    Dictated by (CST): Nacho Lucio MD on 6/14/2025 at 6:31 PM     Finalized by (CST): Nacho Lucio MD on 6/14/2025 at 6:31 PM          EKG 12 Lead  Result Date: 6/16/2025  Atrial fibrillation with rapid ventricular response Abnormal ECG When compared with ECG of 14-JUN-2025 14:00, Atrial fibrillation has replaced Sinus rhythm Vent. rate has increased BY  61 BPM Confirmed by ERNESTO ZAVALA JORDAN ([id number]) on 6/16/2025 7:44:39 AM    EKG 12 Lead  Result Date: 6/15/2025  Sinus tachycardia with Premature supraventricular complexes Otherwise normal ECG When compared with ECG of 22-SEP-2024 13:48, Premature supraventricular complexes are now Present Vent. 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  Severe right sided pneumonia  CXR 6/14 with right mid and upper lung opacification  CXR 6/16 with worsening right sided opacities  Significant leukocytosis with worsening  Blood cultures no growth x2 days  Started on PO azithromycin and IV Zosyn  Plan:  -F/u blood cultures  -Stop IV Zosyn  -IV azithromycin  -PO Levaquin    Acute hypoxemic respiratory  failure  Due to severe right sided pneumonia  Worsening oxygenation - requiring 15 L HF O2  Plan:  -O2 and wean as tolerated    Afib with RVR  New onset  Plan:  -Diltiazem gtt  -Cardiology consult    SIERRA  Creatinine worsening  Urine output low; unsure if accurate  Plan:  -Bladder scan  -I/Os  -Follow creatinine    DVT prophylaxis: Subcutaneous heparin    Code status: Full code. I d/w patient and his daughter today and confirmed wishes for full code at this time.    D/w pulmonary Dr. Munoz and RN.     Pulmonary attending attestation to follow.    Gunnar Hahn PA-C  Pulmonary Medicine  6/16/2025

## 2025-06-16 NOTE — SLP NOTE
ADULT SWALLOWING EVALUATION    ASSESSMENT    ASSESSMENT/OVERALL IMPRESSION:  PPE REQUIRED. THIS THERAPIST WORE GLOVES, DROPLET MASK, AND GOGGLES FOR DURATION OF EVALUATION. HANDS WASHED UPON ENTRANCE/EXIT.    SLP BSSE orders received and acknowledged. A swallow evaluation warranted as pt was coughing on 6/15/25 with an increased risk of aspiration. Pt afebrile with clear vocal quality, on 15L/Min, with oxygen saturation at 94. Pt with prior hx of dysphagia at [facility] in which last recommended diet was bite sized/thin in 1/26/17. Pt positioned upright in bed with daughter at bedside. Pt with no complaints of pain, RN aware. Pt with adequate oral acceptance and bilabial seal across all trials. Pt with intact bite, mildly prolonged mastication of solids, and increased LELE. Pt's swallow response appears timely with reduced hyolaryngeal elevation/excursion. No clinical signs of aspiration (e.g., immediate/delayed throat clear, immediate/delayed cough, wet vocal quality, increased O2 effort) observed across all trials. Oxygen status remained >90 t/o the entire evaluation.     At this time, pt presents with mild oral dysphagia and probable pharyngeal dysfunction. Recommend a regular diet (choose soft) and thin liquids with strict adherence to safe swallowing compensatory strategies. Results and recommendations reviewed with RN, pt, and family. Pt/pt's family v/u to all results/recommendations. Recommendations remain written on whiteboard. SLP collaborated with RN for MD diet orders.     PLAN: SLP to f/u x2-3 meal assessments, monitor CXR, and VFSS if clinically warranted.     RECOMMENDATIONS   Diet Recommendations - Solids: Regular  Diet Recommendations - Liquids: Thin Liquids      Compensatory Strategies Recommended: Alternate consistencies  Aspiration Precautions: Upright position, Slow rate, Small bites, Small sips, No straw  Medication Administration Recommendations: Whole in puree  Treatment Plan/Recommendations:  Aspiration precautions    HISTORY   MEDICAL HISTORY  Reason for Referral: R/O aspiration    Problem List  Principal Problem:    Sepsis due to pneumonia (HCC)      Past Medical History  Past Medical History[1]    Prior Living Situation: Home alone  Diet Prior to Admission: Regular, Thin liquids  Precautions: Aspiration    Patient/Family Goals: Was coughing on 6/15    SWALLOWING HISTORY  Current Diet Consistency: Regular, Thin liquids  Dysphagia History: BSE 1/27/17: chopped/thin    Imaging Results:     CXR 6/16/25:  CONCLUSION:      Patchy bilateral lower lobe and right upper lobe airspace opacities have increased in size and density since 6/14/2025 suspicious for worsening multifocal pneumonia            Dictated by (CST): Paresh Miranda MD on 6/16/2025 at 11:33 AM       Finalized by (CST): Paresh Miranda MD on 6/16/2025 at 11:34 AM     OBJECTIVE   ORAL MOTOR EXAMINATION  Dentition: Functional  Symmetry: Within Functional Limits  Strength: Overall reduced     Range of Motion: Within Functional Limits  Rate of Motion: Within Functional Limits    Voice Quality: Clear  Respiratory Status: Supplemental O2, Nasal cannula  Consistencies Trialed: Thin liquids, Puree, Soft solid, Hard solid  Method of Presentation: Self presentation, Spoon, Cup, Single sips  Patient Positioned: Upright, Midline    Oral Phase of Swallow: Impaired  Bolus Retrieval: Intact  Bilabial Seal: Intact  Bolus Formation: Intact  Bolus Propulsion: Impaired  Mastication: Impaired       Pharyngeal Phase of Swallow: Appears Impaired  Laryngeal Elevation Timing: Appears intact  Laryngeal Elevation Strength: Appears impaired     (Please note: Silent aspiration cannot be evaluated clinically. Videofluoroscopic Swallow Study is required to rule-out silent aspiration.)    Esophageal Phase of Swallow: No complaints consistent with possible esophageal involvement      GOALS  Goal #1 The patient will tolerate regular consistency and thin liquids without overt signs  or symptoms of aspiration with 100 % accuracy over 2 session(s).  In Progress   Goal #2 The patient/family/caregiver will demonstrate understanding and implementation of aspiration precautions and swallow strategies independently over 2 session(s).    In Progress   Goal #3 The patient will utilize compensatory strategies as outlined by  BSSE (clinical evaluation) including Slow rate, Small bites, Small sips, Alternate liquids/solids, No straws, Upright 90 degrees, Eliminate distractions with minimal assistance 100 % of the time across 2 sessions.  In Progress       FOLLOW UP  Treatment Plan/Recommendations: Aspiration precautions  Duration: 1 week  Follow Up Needed (Documentation Required): Yes  SLP Follow-up Date: 06/17/25    Thank you for your referral.   If you have any questions, please contact RYAN Swain M.S. CCC-SLP  Speech Language Pathologist  Phone Number Ext. [phone]         [1]   Past Medical History:   Atherosclerosis of coronary artery    Cortical senile cataract    Disorder of thyroid    Dyslipidemia    Essential hypertension    High blood pressure    Hypothyroidism    Senile cataract

## 2025-06-16 NOTE — H&P
On call Nocturnist 06/16/25  Rapid called for new onset afib with rvr.  Awake alert mild resp distress.  Here with pneumonia.  Warm to touch.    -fever control  -iv cardizem  -afib cardizem protocol  -tat labs cbc and bmp  -cards consulted  -update primary service

## 2025-06-17 ENCOUNTER — APPOINTMENT (OUTPATIENT)
Dept: CT IMAGING | Facility: HOSPITAL | Age: OVER 89
End: 2025-06-17
Attending: INTERNAL MEDICINE
Payer: MEDICARE

## 2025-06-17 ENCOUNTER — APPOINTMENT (OUTPATIENT)
Dept: CV DIAGNOSTICS | Facility: HOSPITAL | Age: OVER 89
End: 2025-06-17
Attending: STUDENT IN AN ORGANIZED HEALTH CARE EDUCATION/TRAINING PROGRAM
Payer: MEDICARE

## 2025-06-17 ENCOUNTER — APPOINTMENT (OUTPATIENT)
Dept: GENERAL RADIOLOGY | Facility: HOSPITAL | Age: OVER 89
End: 2025-06-17
Attending: INTERNAL MEDICINE
Payer: MEDICARE

## 2025-06-17 LAB
ANION GAP SERPL CALC-SCNC: 10 MMOL/L (ref 0–18)
APTT PPP: 38.6 SECONDS (ref 23–36)
APTT PPP: 39.8 SECONDS (ref 23–36)
APTT PPP: 47.4 SECONDS (ref 23–36)
BASOPHILS # BLD: 0 X10(3) UL (ref 0–0.2)
BASOPHILS NFR BLD: 0 %
BUN BLD-MCNC: 73 MG/DL (ref 9–23)
BUN/CREAT SERPL: 26.6 (ref 10–20)
CALCIUM BLD-MCNC: 8.5 MG/DL (ref 8.7–10.4)
CHLORIDE SERPL-SCNC: 104 MMOL/L (ref 98–112)
CO2 SERPL-SCNC: 20 MMOL/L (ref 21–32)
CREAT BLD-MCNC: 2.74 MG/DL (ref 0.7–1.3)
DEPRECATED RDW RBC AUTO: 49.3 FL (ref 35.1–46.3)
DOHLE BOD BLD QL SMEAR: PRESENT
EGFRCR SERPLBLD CKD-EPI 2021: 21 ML/MIN/1.73M2 (ref 60–?)
EOSINOPHIL # BLD: 0 X10(3) UL (ref 0–0.7)
EOSINOPHIL NFR BLD: 0 %
ERYTHROCYTE [DISTWIDTH] IN BLOOD BY AUTOMATED COUNT: 13.7 % (ref 11–15)
GLUCOSE BLD-MCNC: 98 MG/DL (ref 70–99)
HCT VFR BLD AUTO: 44.3 % (ref 39–53)
HGB BLD-MCNC: 14.4 G/DL (ref 13–17.5)
LYMPHOCYTES NFR BLD: 0.34 X10(3) UL (ref 1–4)
LYMPHOCYTES NFR BLD: 1 %
MCH RBC QN AUTO: 31.6 PG (ref 26–34)
MCHC RBC AUTO-ENTMCNC: 32.5 G/DL (ref 31–37)
MCV RBC AUTO: 97.4 FL (ref 80–100)
MONOCYTES # BLD: 0.34 X10(3) UL (ref 0.1–1)
MONOCYTES NFR BLD: 1 %
MORPHOLOGY: NORMAL
NEUTROPHILS # BLD AUTO: 37.47 X10 (3) UL (ref 1.5–7.7)
NEUTROPHILS NFR BLD: 81 %
NEUTS BAND NFR BLD: 17 %
NEUTS HYPERSEG # BLD: 33.12 X10(3) UL (ref 1.5–7.7)
OSMOLALITY SERPL CALC.SUM OF ELEC: 300 MOSM/KG (ref 275–295)
PLATELET # BLD AUTO: 145 10(3)UL (ref 150–450)
PLATELET # BLD AUTO: 145 10(3)UL (ref 150–450)
PLATELET MORPHOLOGY: NORMAL
POTASSIUM SERPL-SCNC: 5.1 MMOL/L (ref 3.5–5.1)
RBC # BLD AUTO: 4.55 X10(6)UL (ref 3.8–5.8)
SODIUM SERPL-SCNC: 134 MMOL/L (ref 136–145)
TOTAL CELLS COUNTED BLD: 100
TOXIC GRANULES BLD QL SMEAR: PRESENT
TSI SER-ACNC: 3.14 UIU/ML (ref 0.55–4.78)
WBC # BLD AUTO: 39.8 X10(3) UL (ref 4–11)

## 2025-06-17 PROCEDURE — 71250 CT THORAX DX C-: CPT | Performed by: INTERNAL MEDICINE

## 2025-06-17 PROCEDURE — 74176 CT ABD & PELVIS W/O CONTRAST: CPT | Performed by: INTERNAL MEDICINE

## 2025-06-17 PROCEDURE — 71045 X-RAY EXAM CHEST 1 VIEW: CPT | Performed by: INTERNAL MEDICINE

## 2025-06-17 PROCEDURE — 99233 SBSQ HOSP IP/OBS HIGH 50: CPT | Performed by: INTERNAL MEDICINE

## 2025-06-17 PROCEDURE — 93306 TTE W/DOPPLER COMPLETE: CPT | Performed by: STUDENT IN AN ORGANIZED HEALTH CARE EDUCATION/TRAINING PROGRAM

## 2025-06-17 RX ORDER — AMIODARONE HYDROCHLORIDE 200 MG/1
400 TABLET ORAL 2 TIMES DAILY WITH MEALS
Status: DISCONTINUED | OUTPATIENT
Start: 2025-06-17 | End: 2025-06-21

## 2025-06-17 RX ORDER — LEVOFLOXACIN 500 MG/1
500 TABLET, FILM COATED ORAL
Status: DISCONTINUED | OUTPATIENT
Start: 2025-06-18 | End: 2025-06-17

## 2025-06-17 RX ORDER — METOPROLOL SUCCINATE 25 MG/1
25 TABLET, EXTENDED RELEASE ORAL
Status: DISCONTINUED | OUTPATIENT
Start: 2025-06-17 | End: 2025-06-21

## 2025-06-17 NOTE — PROGRESS NOTES
[facility]  Cardiology Progress Note    Imer Mcguire Patient Status:  Inpatient    1931 MRN [id number]   Location [facility] 2W/SW Attending Severo Lacey MD   Hosp Day # 3 PCP OMAR LEAVITT     Subjective     Feeling much better today. Remains on oxygen however cough and breathing are both improved.  No chest pain or palpitations.  Remains in atrial fibrillation with controlled rates.    Objective:   Patient Vitals for the past 24 hrs:   BP Temp Temp src Pulse Resp SpO2   25 1000 115/73 -- -- 79 (!) 31 91 %   25 0800 128/76 -- -- 86 24 93 %   25 0600 139/66 -- -- 79 25 100 %   25 0500 -- -- -- 88 (!) 27 92 %   25 0400 123/65 98 °F (36.7 °C) Temporal 92 24 93 %   25 0300 -- -- -- 85 (!) 27 93 %   25 0200 127/62 -- -- 91 (!) 27 94 %   25 0100 116/63 -- -- 91 (!) 30 92 %   25 0000 111/67 98 °F (36.7 °C) Temporal 86 26 93 %   25 2337 -- -- -- 87 (!) 33 90 %   25 2336 -- -- -- 95 (!) 28 (!) 84 %   25 2300 138/72 -- -- 95 (!) 31 92 %   25 2200 102/64 -- -- 95 (!) 36 90 %   25 2100 124/68 -- -- 95 (!) 33 94 %   25 2000 121/66 97.8 °F (36.6 °C) Temporal 84 (!) 32 (!) 86 %   25 -- -- -- 92 (!) 33 91 %   25 194 141/80 -- -- 87 (!) 32 (!) 84 %   25 1900 118/66 -- -- 95 (!) 34 91 %   25 1800 130/70 98 °F (36.7 °C) Temporal 87 (!) 38 91 %   25 1700 111/64 -- -- 83 (!) 33 94 %   25 1600 -- -- -- 114 -- --   25 1520 90/60 97.8 °F (36.6 °C) Axillary (!) 123 25 94 %   25 1500 -- -- -- (!) 146 -- --   25 1457 (!) 86/66 -- -- 120 -- 91 %       Intake/Output:   Last 3 shifts:   Intake/Output                   06/15/25 0700 - 25 0659 25 0700 - 25 0659 25 0700 - 25 0659       Intake    P.O.  180  20  120    P.O. 180 20 120    I.V.  10  646.2  --    I.V. 10 -- --    Volume (mL) Heparin -- 121.9 --    Volume (mL) Amiodarone  -- 524.3 --    IV PIGGYBACK  --  350  --    Volume (mL) (piperacillin-tazobactam (Zosyn) 3.375 g in dextrose 5% 100 mL IVPB-ADDV) -- 100 --    Volume (mL) (azithromycin (Zithromax) 500 mg in sodium chloride 0.9% 250mL IVPB premix) -- 250 --    Total Intake 190 1016.2 120       Output    Urine  200  200  --    Output (mL) (External Urinary Catheter) 200 200 --    Emesis/NG output  --  --  0    Emesis -- -- 0    Total Output 200 200 0       Net I/O     -10 816.2 120             Scheduled Meds: Scheduled Medications[1]    Continuous Infusions: Medication Infusions[2]    Results:     Lab Results   Component Value Date    WBC 39.8 (H) 06/17/2025    HGB 14.4 06/17/2025    HCT 44.3 06/17/2025    .0 (L) 06/17/2025    .0 (L) 06/17/2025    CREATSERUM 2.74 (H) 06/17/2025    BUN 73 (H) 06/17/2025     (L) 06/17/2025    K 5.1 06/17/2025     06/17/2025    CO2 20.0 (L) 06/17/2025    GLU 98 06/17/2025    CA 8.5 (L) 06/17/2025    ALB 3.6 06/15/2025    ALKPHO 103 06/15/2025    BILT 1.3 (H) 06/15/2025    TP 5.8 06/15/2025    AST 53 (H) 06/15/2025    ALT 34 06/15/2025    PTT 47.4 (H) 06/17/2025    INR 1.14 06/14/2025    TSH 3.139 06/17/2025    MG 2.3 06/16/2025    PHOS 2.9 01/30/2017       Recent Labs   Lab 06/16/25  0455 06/16/25  1405 06/17/25  0437   GLU 91 105* 98   BUN 54* 67* 73*   CREATSERUM 2.04* 2.39* 2.74*   CA 8.6* 8.3* 8.5*    137 134*   K 3.9  3.9 4.6 5.1    106 104   CO2 20.0* 21.0 20.0*     Recent Labs   Lab 06/15/25  0617 06/16/25  0455 06/16/25  1405 06/17/25  0437   RBC 5.07 4.70 4.43 4.55   HGB 15.8 14.7 14.1 14.4   HCT 48.4 43.9 42.0 44.3   MCV 95.5 93.4 94.8 97.4   MCH 31.2 31.3 31.8 31.6   MCHC 32.6 33.5 33.6 32.5   RDW 13.5 13.7 13.7 13.7   NEPRELIM 25.87* 34.39*  --  37.47*   WBC 26.6* 35.8* 33.8* 39.8*   .0 195.0 186.0 145.0*  145.0*       No results for input(s): \"BNPML\" in the last 168 hours.    No results for input(s): \"TROP\", \"CK\" in the last 168 hours.    XR  CHEST AP PORTABLE  (CPT=71045)  Result Date: 6/17/2025  CONCLUSION:   Slightly improved aeration of the right lower lobe.  Persistent extensive opacities in the right upper lobe.  No new abnormality.      Dictated by (CST): Ronnell Cano MD on 6/17/2025 at 7:25 AM     Finalized by (CST): Ronnell Cano MD on 6/17/2025 at 7:27 AM          XR CHEST AP PORTABLE  (CPT=71045)  Result Date: 6/16/2025  CONCLUSION:   Patchy bilateral lower lobe and right upper lobe airspace opacities have increased in size and density since 6/14/2025 suspicious for worsening multifocal pneumonia    Dictated by (CST): Paresh Miranda MD on 6/16/2025 at 11:33 AM     Finalized by (CST): Paresh Miranda MD on 6/16/2025 at 11:34 AM          XR CHEST AP PORTABLE  (CPT=71045)  Result Date: 6/14/2025  CONCLUSION:  1. Newly developed airspace opacification in the mid and upper lung likely representing pneumonia.  Follow-up to complete radiographic resolution recommended.    Dictated by (CST): Nacho Lucio MD on 6/14/2025 at 6:31 PM     Finalized by (CST): Nacho Lucio MD on 6/14/2025 at 6:31 PM          EKG 12 Lead  Result Date: 6/16/2025  Atrial fibrillation with rapid ventricular response Abnormal ECG When compared with ECG of 16-JUN-2025 04:43, No significant change was found Confirmed by vincent teran ([id number]) on 6/16/2025 3:52:50 PM    EKG 12 Lead  Result Date: 6/16/2025  Atrial fibrillation with rapid ventricular response Abnormal ECG When compared with ECG of 14-JUN-2025 14:00, Atrial fibrillation has replaced Sinus rhythm Vent. rate has increased BY  61 BPM Confirmed by ERNESTO ZAVALA, PATTY ([id number]) on 6/16/2025 7:44:39 AM           Exam:     General: Alert and oriented x 3. No apparent distress.   HEENT: Normocephalic, anicteric sclera, neck supple, no thyromegaly or adenopathy.  Neck: No JVD, carotids 2+, no bruits.  Cardiac: Irregularly irregular rhythm, normal rate.  S1, S2 normal. No murmur, pericardial rub, S3, or extra cardiac  sounds.  Lungs: Clear without wheezes, rales, rhonchi or dullness.  Normal excursions and effort.  Abdomen: Soft, non-tender. No organosplenomegally, mass or rebound, BS-present.  Extremities: Without clubbing or cyanosis. No edema.    Neurologic: Alert and oriented, normal affect. No focal defects  Skin: Warm and dry.     Assessment and Plan:   Multifocal pneumonia secondary to Legionella, worsening  Acute hypoxic respiratory failure, due to above   SIERRA on CKD     AF with RVR, new-onset  -Likely secondary to hypoxia, systemic inflammation (Legionella), and critical illness  -No HF or ischemic symptoms  -Check TTE  -Currently on IV amiodarone due to persistent tachycardia/hypotension and A-fib, intolerant of diltiazem                  -Continue IV amiodarone 24-hour protocol, start oral amiodarone 400 mg twice daily                  - Now with better blood pressure start metoprolol succinate 25 mg twice daily                  -Monitor closely for hypotension or bradycardia  -Continue telemetry monitoring    Anticoagulation Consideration  -High SOLEDAD?DS?-VASc (age, CAD) ? increased thromboembolic risk  - Continue AC with heparin gtt for now given SIERRA on CKD     CAD s/p CABG  -Stable from an ischemic standpoint, no current CP or ECG changes  -Continue cardiac risk reduction, on statin + ASA  -Monitor for demand ischemia    Rishi Hyatt MD  [facility]  6/17/2025         [1]    lidocaine (cardiac)        atropine        EPINEPHrine        [Held by provider] dilTIAZem  30 mg Oral 4 times per day    azithromycin  500 mg Intravenous Q24H    aspirin  325 mg Oral Daily    atorvastatin  40 mg Oral Nightly    levothyroxine  100 mcg Oral Daily   [2]    amiodarone 0.5 mg/min (06/17/25 7853)    continuous dose heparin 1,000 Units/hr (06/17/25 1227)

## 2025-06-17 NOTE — CONSULTS
[facility]  part of [facility] ID CONSULT NOTE    Imer Mcguire Patient Status:  Inpatient    1931 MRN [id number]   Location [facility] 2W/SW Attending Severo Lacey MD   Hosp Day # 3 PCP OMAR LEAVITT       Reason for Consultation:  Legionella pneumonia    ASSESSMENT:    Antibiotics: IV azithromycin, levaquin; (IV zosyn)    # Legionella pneumonia with hypoxia with +Legionella urine Ag  # AFib on amiodarone drip  # SIERRA on CKD  # Leukocytosis with bandemia  # CAD s/p CABG, HTN, HLD      PLAN:    -  discontinue levaquin  -  continue IV azithromycin  -  monitor daily ECG for QT prolongation  -  check CT C/A/P  -  BCx  -  Follow fever curve, wbc  -  Reviewed labs, micro, imaging reports, available old records  -  d/w patient, family, RN, Dr. Davis    History of Present Illness:  Imer Mcguire is a a(n) 89 year old male. Patient is a 89-year-old male with a history of CAD status post CABG in , HTN, HLD, CKD who now presents to hospital sick/14 with fatigue, weakness, malaise for 1 week, cough, dyspnea.  Found to be febrile to 103.1 on admission with hypoxia, tachycardia.  Initial WBC of 26 now up to 40.  Given IV Zosyn, vancomycin, azithromycin.  Initially with worsening O2 requirements up to 15 L now improved to 6 L.  Also with A-fib on amiodarone.  Initial chest x-ray with upper lung airspace opacification consistent with pneumonia.  Blood cultures no growth.  Urine Legionella positive.  MRSA nares negative.  Also SIERRA with worsening kidney function.    Has been having issues with his air conditioning. Also works on [employer] and goes to restaurant 2x/week.    History:  Past Medical History[1]  Past Surgical History[2]  Family History[3]   reports that he has never smoked. He does not have any smokeless tobacco history on file. He reports current alcohol use of about 2.0 standard drinks of alcohol per week. He reports that he does not use  drugs.    Allergies:  Allergies[4]    Medications:  Current Hospital Medications[5]    Review of Systems:  Per HPI    Physical Exam:  Vital signs: Blood pressure 115/73, pulse 79, temperature 98 °F (36.7 °C), temperature source Temporal, resp. rate (!) 31, height 172.7 cm (5' 8\"), weight 155 lb (70.3 kg), SpO2 91%.    General: in bed, fatigued, on NC  HEENT: Moist mucous membranes. EOMI  Neck: No lymphadenopathy.  Supple.  Cardiovascular: No chest wall tenderness  Respiratory: Symmetric expansion  Abdomen: Soft, nontender, nondistended.   Musculoskeletal: No edema noted  Integument: No lesions. No erythema.    Laboratory Data: Reviewed in EMR    Microbiology: Reviewed in EMR    Radiology: Reviewed    Thank you for allowing us to participate in the care of this patient. Please do not hesitate to call if you have any questions.     We will continue to follow with you and will make further recommendations based on his progress.    Pranav Torrez MD   [facility]  [phone]  6/17/2025         [1]   Past Medical History:   Atherosclerosis of coronary artery    Cortical senile cataract    Disorder of thyroid    Dyslipidemia    Essential hypertension    High blood pressure    Hypothyroidism    Senile cataract   [2]   Past Surgical History:  Procedure Laterality Date    Cataract extraction w/  intraocular lens implant Right 01/08/2019    Dr. Schwarz @ [facility]    Cataract extraction w/  intraocular lens implant Left 03/2019    Dr. Schwarz @ [facility]   [3]   Family History  Problem Relation Age of Onset    Other (Other) Father     Cancer Mother     Cancer Brother     Diabetes Neg     Glaucoma Neg    [4] No Known Allergies  [5]   Current Facility-Administered Medications:     [START ON 6/18/2025] levoFLOXacin (Levaquin) tab 500 mg, 500 mg, Oral, Q48HR @ 0700    [Held by provider] dilTIAZem (cardIZEM) tab 30 mg, 30 mg, Oral, 4 times per day    azithromycin (Zithromax) 500 mg in sodium chloride 0.9% 250mL IVPB  premix, 500 mg, Intravenous, Q24H    [COMPLETED] amiodarone (Cordarone) 150 mg in dextrose 5% 100 mL IV bolus, 150 mg, Intravenous, Once **FOLLOWED BY** [] amiodarone in dextrose 4.14% (Cordarone) 360 mg/200mL infusion premix, 1 mg/min, Intravenous, Continuous **FOLLOWED BY** amiodarone in dextrose 4.14% (Cordarone) 360 mg/200mL infusion premix, 0.5 mg/min, Intravenous, Continuous    heparin (Porcine) 73973 units/250mL infusion ACS/AFIB CONTINUOUS, 200-3,000 Units/hr, Intravenous, Continuous    albuterol (Ventolin) (2.5 MG/3ML) 0.083% nebulizer solution 2.5 mg, 2.5 mg, Nebulization, Q4H PRN    aspirin tab 325 mg, 325 mg, Oral, Daily    atorvastatin (Lipitor) tab 40 mg, 40 mg, Oral, Nightly    levothyroxine (Synthroid) tab 100 mcg, 100 mcg, Oral, Daily    acetaminophen (Tylenol) tab 650 mg, 650 mg, Oral, Q6H PRN    calcium carbonate (Tums) chewable tab 500 mg, 500 mg, Oral, BID PRN

## 2025-06-17 NOTE — OCCUPATIONAL THERAPY NOTE
OCCUPATIONAL THERAPY EVALUATION - INPATIENT     Room Number: 219/219-A  Evaluation Date: 6/17/2025  Type of Evaluation: Initial  Presenting Problem: Sepsis due to PNA    Physician Order: IP Consult to Occupational Therapy  Reason for Therapy: ADL/IADL Dysfunction and Discharge Planning    OCCUPATIONAL THERAPY ASSESSMENT   RN cleared pt for participation in OT session, which was completed in collaboration with PT. Upon arrival, pt was supine in bed and agreeable to activity. Family present during session. Pt was left in bed (d/t transport being on their way to take pt down to CT) and alarm was activated. Call light and all needs left in reach. Handoff given to RN.    Patient is a 89 year old male admitted 6/14/2025 for cough, fever, sepsis due to pneumonia.  Prior to admission, pt was I with ADLs and IADLs without the use of mobility devices working part time and driving.  Patient is currently requiring up to mod A for ADLs overall along with for supine to sit, for sitting at EOB, for STS, and for functional transfer at RW level and sit to supine. Pt tolerated about 3 minutes of standing.  Pt has the following impairments: decreased endurance, impaired standing balance, decreased muscular endurance, and medical status.    ACTIVITY TOLERANCE  Pulse: 101  Heart Rate Source: Monitor  BP: 112/63  BP Location: Right arm  BP Method: Automatic  Patient Position: Sitting  Oxygen Therapy  SPO2% on Oxygen at Rest: 96  At rest oxygen flow (liters per minute): 6  SPO2% Ambulation on Oxygen: 89  Ambulation oxygen flow (liters per minute): 6    Education provided  Educated pt about role of OT and hospital therapy process as well as proper safety techniques including proper hand placement, body mechanics, safety techniques, importance of repositioning, RW management and safety, energy conservation strategies (using shower chair for bathing). Pt/family verbalized/demonstrated good carryover.    Patient will benefit from continued  skilled OT Services for duration of hospitalization, undetermined recommendation post hospitalization pending progress.    PLAN  OT Treatment Plan: Balance activities;Energy conservation/work simplification techniques;Continued evaluation;Compensatory technique education;Fine motor coordination activities;Neuromuscluar reeducation;Equipment eval/education;Patient/Family training;Patient/Family education;Cognitive reorientation;Endurance training;UE strengthening/ROM;Functional transfer training;Visual perceptual training;IADL training;ADL training      OCCUPATIONAL THERAPY MEDICAL/SOCIAL HISTORY     Problem List  Principal Problem:    Sepsis due to pneumonia (HCC)    Past Medical History  Past Medical History[1]    Past Surgical History  Past Surgical History[2]    HOME SITUATION  Type of Home: House  Home Layout: Two level  Lives With: Alone  Toilet and Equipment: Standard height toilet  Shower/Tub and Equipment: Walk-in shower (has shower chair if needed)  Occupation/Status: part time at [employer]  Drives: Yes  Patient Regularly Uses: Glasses, Hearing aides (has a chair lift and RW)    SUBJECTIVE  \"I should be at the [employer] today\"    OCCUPATIONAL THERAPY EXAMINATION      OBJECTIVE  Precautions: Bed/chair alarm, Hard of hearing  Fall Risk: Standard fall risk    PAIN ASSESSMENT  Ratin    COGNITION  Alert and oriented X4    VISION  Current Vision: wears glasses all the time    RANGE OF MOTION   Upper extremity ROM is within functional limits     STRENGTH ASSESSMENT  Upper extremity strength is within functional limits     COORDINATION  Gross Motor: WFL   Fine Motor: WFL     ACTIVITIES OF DAILY LIVING ASSESSMENT  AM-PAC ‘6-Clicks’ Inpatient Daily Activity Short Form  How much help from another person does the patient currently need…  -   Putting on and taking off regular lower body clothing?: A Lot  -   Bathing (including washing, rinsing, drying)?: A Lot  -   Toileting, which includes using toilet, bedpan  or urinal? : A Little  -   Putting on and taking off regular upper body clothing?: A Little  -   Taking care of personal grooming such as brushing teeth?: A Little  -   Eating meals?: A Little    AM-PAC Score:  Score: 16  Approx Degree of Impairment: 53.32%  Standardized Score (AM-PAC Scale): 35.96  CMS Modifier (G-Code): CK    FUNCTIONAL TRANSFER ASSESSMENT  Supine to Sit : Moderate Assist  Sit to supine: Mod A  STS: Mod A at RW level. Pt with posterior lean, benefits from cues to weight shift and use RW for support.    FUNCTIONAL ADL ASSESSMENT  Feeding: Set up A    Grooming: Set up A     Bathing: Mod A for thighs, BLE and feet    UB dressing: min A for retrieval and adjustment    LB dressing: mod A retrieval, socks and shoes    Toileting: min A for clothing management     The patient's Approx Degree of Impairment: 53.32% has been calculated based on documentation in the [facility] '6 clicks' Inpatient Daily Activity Short Form.  Research supports that patients with this level of impairment may benefit from rehab. Final disposition will be made by interdisciplinary medical team.    OT Goals  Patients self stated goal is: none stated     Patient will complete functional transfer with SBA  Comment:     Patient will complete toileting with SBA  Comment:     Patient will tolerate standing for 5 minutes in prep for adls with SBA   Comment:    Patient will complete item retrieval with SBA  Comment:          Goals  on: 25  Frequency: 3-5x/week    Patient Evaluation Complexity Level:   Occupational Profile/Medical History MODERATE - Expanded review of history including review of medical or therapy record   Specific performance deficits impacting engagement in ADL/IADL MODERATE  3 - 5 performance deficits   Client Assessment/Performance Deficits MODERATE - Comorbidities and min to mod modifications of tasks    Clinical Decision Making MODERATE - Analysis of occupational profile, detailed assessments, several  treatment options    Overall Complexity MODERATE     OT Session Time: 20 minutes  Therapeutic Activity: 10 minutes    SKYE Gleason OT  [facility]  Inpatient Rehabilitation  Occupational Therapy  [phone]         [1]   Past Medical History:   Atherosclerosis of coronary artery    Cortical senile cataract    Disorder of thyroid    Dyslipidemia    Essential hypertension    High blood pressure    Hypothyroidism    Senile cataract   [2]   Past Surgical History:  Procedure Laterality Date    Cataract extraction w/  intraocular lens implant Right 01/08/2019    Dr. Schwarz @ [facility]    Cataract extraction w/  intraocular lens implant Left 03/2019    Dr. Schwarz @ [facility]

## 2025-06-17 NOTE — PHYSICAL THERAPY NOTE
PHYSICAL THERAPY EVALUATION - INPATIENT     Room Number: 219/219-A  Evaluation Date: 6/17/2025  Type of Evaluation: Initial   Physician Order: PT Eval and Treat    Presenting Problem: sepsis due to pneumonia, legionella with new onset a-fib with RVR  Co-Morbidities : CAD s/p CABG, CKD,hypothyroidism, HTN  Reason for Therapy: Mobility Dysfunction and Discharge Planning    PHYSICAL THERAPY ASSESSMENT   Patient is a 89 year old male admitted 6/14/2025 for weakness in BLE, tachycardia.  Prior to admission, patient's baseline is independent and living alone in a two story home. He drives, manages home and still works as a starter 2 days a week. He has supportive family in the area.  Patient is currently functioning below baseline with bed mobility, transfers, gait, stair negotiation, maintaining seated position, standing prolonged periods, and performing household tasks.  Patient is requiring moderate assist and maximum assist as a result of the following impairments: decreased functional strength, decreased endurance/aerobic capacity, impaired standing balance, decreased muscular endurance, medical status, and increased O2 needs from baseline.  Physical Therapy will continue to follow for duration of hospitalization.    Patient will benefit from continued skilled PT Services but undetermined at this time. He may require inpatient rehab.    PLAN DURING HOSPITALIZATION  Nursing Mobility Recommendation : 1 Assist (2 assist)  PT Device Recommendation: Rolling walker  PT Treatment Plan: Bed mobility, Endurance, Energy conservation, Patient education, Gait training, Strengthening, Stoop training, Stair training, Transfer training  Rehab Potential : Good  Frequency (Obs): 5x/week     PHYSICAL THERAPY MEDICAL/SOCIAL HISTORY   History related to current admission: 89-year-old male history of CABG, hypothyroidism, hypertension, hyperlipidemia presents today with weakness. Patient states that he has had increasing weakness  particularly in the lower extremities for the last 1 week. He is also had some cough and recently some new urinary incontinence. He was also found to have a fever today. Patient denies headache, trouble breathing, neck stiffness, back pain, belly pain, diarrhea, leg swelling, rash, or other symptoms.      Problem List  Principal Problem:    Sepsis due to pneumonia (HCC)      HOME SITUATION  Type of Home: House  Home Layout: Two level, Bed/bath upstairs  Stairs to Enter : 1   Railing: No    Stairs to Bedroom: 14 (does have a stair lift if needed)    Railing: Yes    Lives With: Alone    Drives: Yes (still working PT at [employer])   Patient Regularly Uses: Glasses, Hearing aides     Prior Level of [place]: Patient lives at home alone. He is normally active and still drives and manages his home    SUBJECTIVE  \"I feel so weak\"    PHYSICAL THERAPY EXAMINATION   OBJECTIVE  Precautions: Bed/chair alarm, Hard of hearing, Other (Comment) (supplemental O2)  Fall Risk: Standard fall risk    PAIN ASSESSMENT  Ratin  Location: no complaints  Management Techniques: Activity promotion, Repositioning    COGNITION  Overall Cognitive Status:  A&Ox2, delayed responses    RANGE OF MOTION AND STRENGTH ASSESSMENT  Upper extremity ROM and strength are within functional limits   Lower extremity ROM is within functional limits   Lower extremity strength is within functional limits but fatigues easily    BALANCE  Static Sitting: Good  Dynamic Sitting: Fair +  Static Standing: Fair -  Dynamic Standing: Poor +    ACTIVITY TOLERANCE  Pulse: 101  Heart Rate Source: Monitor  Resp: (!) 28  BP: 112/63  BP Location: Right arm  BP Method: Automatic  Patient Position: Sitting    O2 WALK  Oxygen Therapy  SPO2% on Oxygen at Rest: 96  At rest oxygen flow (liters per minute): 6  SPO2% Ambulation on Oxygen: 89  Ambulation oxygen flow (liters per minute): 6    AM-PAC '6-Clicks' INPATIENT SHORT FORM - BASIC MOBILITY  How much difficulty does the  patient currently have...  Patient Difficulty: Turning over in bed (including adjusting bedclothes, sheets and blankets)?: A Little   Patient Difficulty: Sitting down on and standing up from a chair with arms (e.g., wheelchair, bedside commode, etc.): A Lot   Patient Difficulty: Moving from lying on back to sitting on the side of the bed?: A Lot   How much help from another person does the patient currently need...   Help from Another: Moving to and from a bed to a chair (including a wheelchair)?: A Lot   Help from Another: Need to walk in hospital room?: A Lot   Help from Another: Climbing 3-5 steps with a railing?: A Lot     AM-PAC Score:  Raw Score: 13   Approx Degree of Impairment: 64.91%   Standardized Score (AM-PAC Scale): 36.74   CMS Modifier (G-Code): CL    FUNCTIONAL ABILITY STATUS  Functional Mobility/Gait Assessment  Gait Assistance: Not tested (pt unable, feels too dizzy)  Rolling: moderate assist  Supine to Sit: moderate assist  Sit to Supine: maximum assist  Sit to Stand: moderate assist    Exercise/Education Provided:  Bed mobility  Energy conservation  Functional activity tolerated  Posture  ROM  Strengthening  Transfer training    Skilled Therapy Provided: RN approves participation in therapy session, seen in coordination with OT. Patient presents in bed with family at bedside, agreeable to participate. He is on 6L O2 and feels SOB during session, also reports his secretions are thick and that is making it difficult. He is need mod A for bed mobility and standing, unable to walk to chair due to feeling too SOB and overall fatigued. He also has to go down for a test and felt he would be too tired to get back to bed after sitting. He is returned to bed and needs max A to reposition to comfort, suction in hand. He will benefit from ongoing therapy, unclear if he will need a rehab stay. All questions answered and needs in reach. RN aware    The patient's Approx Degree of Impairment: 64.91% has been  calculated based on documentation in the [facility] '6 clicks' Inpatient Basic Mobility Short Form.  Research supports that patients with this level of impairment may benefit from inpatient rehab. Will continue to assess progress but may need acute inpatient rehab. Final disposition will be made by interdisciplinary medical team.    Patient End of Session: In bed, With  staff, Needs met, Call light within reach, RN aware of session/findings, All patient questions and concerns addressed, Hospital anti-slip socks, Family present    CURRENT GOALS  Goals to be met by: 6/30/25  Patient Goal Patient's self-stated goal is: to go home   Goal #1 Patient is able to demonstrate supine - sit EOB @ level: modified independent     Goal #1   Current Status    Goal #2 Patient is able to demonstrate transfers Sit to/from Stand at assistance level: supervision with walker - rolling     Goal #2  Current Status    Goal #3 Patient is able to ambulate 100 feet with assist device: walker - rolling at assistance level: minimum assistance   Goal #3   Current Status    Goal #4 Patient will negotiate 4 stairs/one curb w/ assistive device and minimal assistance   Goal #4   Current Status    Goal #5 Patient to demonstrate independence with home activity/exercise instructions provided to patient in preparation for discharge.   Goal #5   Current Status    Goal #6    Goal #6  Current Status      Patient Evaluation Complexity Level:  History High - 3 or more personal factors and/or co-morbidities   Examination of body systems Moderate - addressing a total of 3 or more elements   Clinical Presentation  Moderate - Evolving   Clinical Decision Making  Moderate Complexity     Therapeutic Activity:  20 minutes

## 2025-06-17 NOTE — SLP NOTE
SPEECH DAILY NOTE - INPATIENT    ASSESSMENT & PLAN   ASSESSMENT  PPE REQUIRED. THIS THERAPIST WORE GLOVES FOR DURATION OF SESSION. HANDS WASHED UPON ENTRANCE/EXIT.    SLP f/u for ongoing meal assessment per recommendations of regular/thin liquids per BSE. RN reports pt tolerates diet and medication well with no overt clinical s/s aspiration. Pt denies any swallowing challenges.     Pt positioned upright in bed, alert/cooperative/granddaughter present. Pt afebrile, tolerating 6L/HF O2NC with oxygen status 93% prior to the start of oral trials. SLP reviewed aspiration precautions and safe swallowing compensatory strategies with the patient. Safe swallow guidelines remain written on the white board in purple. Patient and family v/u. Provided 1:1 assistance, pt tolerates hard solids and thin liquids via cup with no overt clinical signs/symptoms of aspiration. Oxygen status remained stable t/o the entire session. Recommend remain on current diet with strict adherence to aspiration precautions and swallow strategies.    SLP to f/u with meal assessment x1-2, monitor  CXR, and VFSS if any overt CSA and/or decline in CXR. RN alerted with results and recommendations.     MOST RECENT CXR 6/17  CONCLUSION:   Slightly improved aeration of the right lower lobe.  Persistent extensive opacities in the right upper lobe.  No new abnormality.          Diet Recommendations - Solids: Regular  Diet Recommendations - Liquids: Thin Liquids    Compensatory Strategies Recommended: Alternate consistencies  Aspiration Precautions: Upright position, Slow rate, Small bites, Small sips, No straw  Medication Administration Recommendations: Whole in puree    Patient Experiencing Pain: No              Treatment Plan  Treatment Plan/Recommendations: Aspiration precautions    Interdisciplinary Communication: Plan posted at bedside          GOALS  Goal #1 The patient will tolerate regular consistency and thin liquids without overt signs or symptoms of  aspiration with 100 % accuracy over 2 session(s).  In Progress   Goal #2 The patient/family/caregiver will demonstrate understanding and implementation of aspiration precautions and swallow strategies independently over 2 session(s).    In Progress   Goal #3 The patient will utilize compensatory strategies as outlined by  BSSE (clinical evaluation) including Slow rate, Small bites, Small sips, Alternate liquids/solids, No straws, Upright 90 degrees, Eliminate distractions with minimal assistance 100 % of the time across 2 sessions.  In Progress     FOLLOW UP  Follow Up Needed (Documentation Required): Yes  SLP Follow-up Date: 06/18/25  Duration: 1 week    Session: 1    If you have any questions, please contact RYAN Dykes M.S. CCC-SLP  Speech Language Pathologist  Phone Number Ext. [phone]

## 2025-06-17 NOTE — PROGRESS NOTES
[facility]  part of [facility]     Progress Note    Imer Mcguire Patient Status:  Inpatient    1931 MRN [id number]   Location [facility] 2W/SW Attending Severo Lacey MD   Hosp Day # 3 PCP OMAR LEAVITT       Subjective:   Patient seen and examined.  Currently on 6 L oxygen.  Some occasional cough.  Some mild dyspnea present.    Objective:   Blood pressure 115/73, pulse 79, temperature 98 °F (36.7 °C), temperature source Temporal, resp. rate (!) 31, height 5' 8\" (1.727 m), weight 155 lb (70.3 kg), SpO2 91%.  Intake/Output:   Last 3 shifts: I/O last 3 completed shifts:  In: 1016.2 [P.O.:20; I.V.:646.2; IV PIGGYBACK:350]  Out: 200 [Urine:200]   This shift: I/O this shift:  In: 120 [P.O.:120]  Out: 0      Vent Settings:      Hemodynamic parameters (last 24 hours):      Scheduled Meds: Current Hospital Medications[1]    Continuous Infusions: Medication Infusions[2]    Physical Exam  Constitutional: no acute distress  Eyes: PERRL  ENT: nares pateint  Neck: supple, no JVD  Cardio: RRR, S1 S2  Respiratory: Right-sided crackles  GI: abdomen soft, non tender, active bowel sounds, no organomegaly  Extremities: no clubbing, cyanosis, edema  Neurologic: no gross motor deficits  Skin: warm, dry      Results:     Lab Results   Component Value Date    WBC 39.8 2025    HGB 14.4 2025    HCT 44.3 2025    .0 2025    .0 2025    CREATSERUM 2.74 2025    BUN 73 2025     2025    K 5.1 2025     2025    CO2 20.0 2025    GLU 98 2025    CA 8.5 2025    PTT 47.4 2025    TSH 3.139 2025    MG 2.3 2025       XR CHEST AP PORTABLE  (CPT=71045)  Result Date: 2025  CONCLUSION:   Slightly improved aeration of the right lower lobe.  Persistent extensive opacities in the right upper lobe.  No new abnormality.      Dictated by (CST): Ronnell Cano MD on 2025 at 7:25 AM     Finalized by  (CST): Ronnell Cano MD on 6/17/2025 at 7:27 AM          XR CHEST AP PORTABLE  (CPT=71045)  Result Date: 6/16/2025  CONCLUSION:   Patchy bilateral lower lobe and right upper lobe airspace opacities have increased in size and density since 6/14/2025 suspicious for worsening multifocal pneumonia    Dictated by (CST): Paresh Miranda MD on 6/16/2025 at 11:33 AM     Finalized by (CST): Paresh Miranda MD on 6/16/2025 at 11:34 AM            EKG 12 Lead  Result Date: 6/16/2025  Atrial fibrillation with rapid ventricular response Abnormal ECG When compared with ECG of 16-JUN-2025 04:43, No significant change was found Confirmed by vincent teran ([id number]) on 6/16/2025 3:52:50 PM    EKG 12 Lead  Result Date: 6/16/2025  Atrial fibrillation with rapid ventricular response Abnormal ECG When compared with ECG of 14-JUN-2025 14:00, Atrial fibrillation has replaced Sinus rhythm Vent. rate has increased BY  61 BPM Confirmed by ERNESTO ZAVALA JORDAN ([id number]) on 6/16/2025 7:44:39 AM      Assessment   1.  Severe Legionella pneumonia  2.  Acute hypoxemic respiratory failure  3.  Fevers  4.  Leukocytosis  5.  Coronary artery disease  6.  Acute kidney injury  7.  Hypertension  8.  Atrial fibrillation with rapid ventricular response     Plan   -Patient presents with evidence of fatigue malaise some associated cough and mild dyspnea.  Hypoxic on presentation to emergency department.  - Legionella antigen positive.  Chest x-ray with dense right-sided infiltrate seen.  - Significant leukocytosis present  - Antibiotic therapy per ID recommendations.  Levaquin discontinued currently on azithromycin.  - Wean oxygen as tolerated currently on 6 L  - Atrial fibrillation management per cardiology has been started on amiodarone gtt.  -Heparin GTT  - Discussed with family at bedside    Bernabe Davis DO  Pulmonary Critical Care Medicine  [facility]        [1]   Current Facility-Administered Medications   Medication Dose Route Frequency    lidocaine  (cardiac) (Xylocaine) 20 mg/mL injection        atropine 0.1 MG/ML injection        EPINEPHrine (Adrenalin) 1 MG/10ML (0.1 MG/ML) injection (Cardiac Arrest)        [Held by provider] dilTIAZem (cardIZEM) tab 30 mg  30 mg Oral 4 times per day    azithromycin (Zithromax) 500 mg in sodium chloride 0.9% 250mL IVPB premix  500 mg Intravenous Q24H    amiodarone in dextrose 4.14% (Cordarone) 360 mg/200mL infusion premix  0.5 mg/min Intravenous Continuous    heparin (Porcine) 31114 units/250mL infusion ACS/AFIB CONTINUOUS  200-3,000 Units/hr Intravenous Continuous    albuterol (Ventolin) (2.5 MG/3ML) 0.083% nebulizer solution 2.5 mg  2.5 mg Nebulization Q4H PRN    aspirin tab 325 mg  325 mg Oral Daily    atorvastatin (Lipitor) tab 40 mg  40 mg Oral Nightly    levothyroxine (Synthroid) tab 100 mcg  100 mcg Oral Daily    acetaminophen (Tylenol) tab 650 mg  650 mg Oral Q6H PRN    calcium carbonate (Tums) chewable tab 500 mg  500 mg Oral BID PRN   [2]    amiodarone 0.5 mg/min (06/17/25 4299)    continuous dose heparin 1,000 Units/hr (06/17/25 1227)

## 2025-06-17 NOTE — PROGRESS NOTES
[facility]  part of [facility]    Progress Note    Imer Mcguire Patient Status:  Inpatient    1931 MRN [id number]   Location [facility] 5SW/SE Attending Severo Lacey MD   Hosp Day # 3 PCP OMAR LEAVITT       SUBJECTIVE:  Feeling better.  Breathing is better.    OBJECTIVE:  Vital signs in last 24 hours:  /65 (BP Location: Right arm)   Pulse 92   Temp 98 °F (36.7 °C) (Temporal)   Resp 24   Ht 5' 8\" (1.727 m)   Wt 155 lb (70.3 kg)   SpO2 93%   BMI 23.57 kg/m²     Intake/Output:    Intake/Output Summary (Last 24 hours) at 2025 0615  Last data filed at 2025 2200  Gross per 24 hour   Intake 818.26 ml   Output --   Net 818.26 ml       Wt Readings from Last 3 Encounters:   25 155 lb (70.3 kg)   24 147 lb 6.4 oz (66.9 kg)   17 174 lb 4.8 oz (79.1 kg)       Exam  Gen: No acute distress, alert   HEENT: No pallor no icterus  Pulm: Lungs crackles are noted on the right side.  CV: Heart with irregular rate and rhythm, no peripheral edema  Abd: Abdomen soft, nontender, nondistended, no organomegaly, bowel sounds present  Skin: no rashes or lesions  CNS: Alert    Data Review:     Labs:   Lab Results   Component Value Date    WBC 33.8 2025    HGB 14.1 2025    HCT 42.0 2025    .0 2025    CREATSERUM 2.74 2025    BUN 73 2025     2025    K 5.1 2025     2025    CO2 20.0 2025    GLU 98 2025    CA 8.5 2025    PTT 38.6 2025    TSH 3.139 2025    MG 2.3 2025       LABS  Recent Labs   Lab 25  1409 06/15/25  0617 25  0455 25  1405 25  1920 25  0437   RBC 5.08 5.07 4.70 4.43  --   --    HGB 15.8 15.8 14.7 14.1  --   --    HCT 47.9 48.4 43.9 42.0  --   --    MCV 94.3 95.5 93.4 94.8  --   --    MCH 31.1 31.2 31.3 31.8  --   --    MCHC 33.0 32.6 33.5 33.6  --   --    RDW 13.3 13.5 13.7 13.7  --   --    NEPRELIM 21.83* 25.87*  34.39*  --   --   --    WBC 23.2* 26.6* 35.8* 33.8*  --   --    .0 216.0 195.0 186.0  --   --    AST 43* 53*  --   --   --   --    ALT 32 34  --   --   --   --    PTT 28.5  --   --  31.4 53.4* 38.6*   INR 1.14  --   --   --   --   --    GLU 99 70 91 105*  --  98       Imaging:      Meds:   Current Hospital Medications[1]    Assessment  Problem List[2]  Sepsis due to pneumonia (HCC)  Pneumonia.,   Community-acquired bacterial bacterial   Secondary to Legionella  Patient on Zithromax       New onset atrial fibrillation with rapid ventricular response  Cardiology consulted.  Patient started on amiodarone      Acute hypoxic respiratory failure patient is requiring 5 L of oxygen.     Continue oxygen supplementation.     Acute kidney injury.  on IV fluids.,        Coronary disease stable.     Hypothyroidism continue the patient on levothyroxine.     Patient stable.    Discussed with nursing staff.  Further management will depend on the clinical course of the patient     Plan:   Continue IV antibiotics.  Continue continue other treatments.      Active Orders   LAB    Basic Metabolic Panel (8)     Frequency: Q3 Days     Number of Occurrences: Until Specified    CBC, Platelet; No Differential     Frequency: Q3 Days     Number of Occurrences: Until Specified    Platelet Count     Frequency: Daily Lab     Number of Occurrences: 3 Days    PTT, Activated     Frequency: Timed draw     Number of Occurrences: 1 Occurrences     Order Comments: RN to modify draw (start) time if needed.       OT    OT eval and treat     Frequency: Once     Number of Occurrences: 1 Occurrences   PT    PT eval and treat     Frequency: Once     Number of Occurrences: 1 Occurrences   Diet    Cardiac diet Cardiac; Is Patient on Accuchecks? No     Frequency: Effective Now     Number of Occurrences: Until Specified   Nursing    Cardiac monitoring     Frequency: Until Discontinued     Number of Occurrences: Until Specified    Cardiac monitoring      Frequency: Continuous     Number of Occurrences: Until Specified    Confirm that patient does not have a history of allergy or sensitivity to Heparin.     Frequency: Until Discontinued     Number of Occurrences: Until Specified    Discontinue routine platelet count, CBC, and BMP when Heparin discontinued.     Frequency: Once     Number of Occurrences: 1 Occurrences    Increase infusion dose 100 units/hr     Frequency: Once     Number of Occurrences: 1 Occurrences    Initiate electrolyte protocol     Frequency: Until Discontinued     Number of Occurrences: Until Specified    Initiate heparin IV for ACS/A-fib protocol     Frequency: Until Discontinued     Number of Occurrences: Until Specified    Intake and Output     Frequency: Q Shift     Number of Occurrences: Until Specified    No Intramuscular injections while patient on Heparin.     Frequency: Until Discontinued     Number of Occurrences: Until Specified    Notify physician     Frequency: Once     Number of Occurrences: 1 Occurrences     Order Comments: If patient experiences abnormal bleeding or bruising, black tarry stools, or significant change in mental status or level of consciousness.      Notify physician (cardiology or ordering physician):     Frequency: Until Discontinued     Number of Occurrences: Until Specified     Order Comments: If you are discontinuing a drip without any oral diltiazem medication orders      Notify physician for BP (cardiology or ordering physician):     Frequency: Until Discontinued     Number of Occurrences: Until Specified     Order Comments: For SBP < 80 mmHg or symptomatic hypotension, discontinue drip and notify physician      Notify physician for HR (cardiology or ordering physician):     Frequency: Until Discontinued     Number of Occurrences: Until Specified     Order Comments: 1. If you are on max intravenous dose and the HR is >120  2. If HR is >140 and it has been one hour since you transitioned to oral from IV  diltiazem  3. If rate < 75 for > 5 min or symptomatic bradycardia, discontinue the drip and notify physician  4. For pause greater than 3.0 seconds, hold drip and notify physician      Notify physician if 3 consecutive PTT results require intervention     Frequency: Until Discontinued     Number of Occurrences: Until Specified    Stop amiodarone and call the attending cardiologist     Frequency: PRN     Number of Occurrences: Until Specified     Order Comments: If systolic blood pressure falls to less than 90 mmHg, heart rate drops to less than 60 beats per minute, if  EKG demonstrates greater than [place] 1 Heart Block     Code Status    Full code Continuous     Frequency: Continuous     Number of Occurrences: Until Specified   Medications    acetaminophen (Tylenol) tab 650 mg     Frequency: Q6H PRN     Dose: 650 mg     Route: Oral    albuterol (Ventolin) (2.5 MG/3ML) 0.083% nebulizer solution 2.5 mg     Frequency: Q4H PRN     Dose: 2.5 mg     Route: Nebulization    amiodarone in dextrose 4.14% (Cordarone) 360 mg/200mL infusion premix     Frequency: Continuous     Dose: 0.5 mg/min     Route: Intravenous    aspirin tab 325 mg     Frequency: Daily     Dose: 325 mg     Route: Oral    atorvastatin (Lipitor) tab 40 mg     Frequency: Nightly     Dose: 40 mg     Route: Oral    azithromycin (Zithromax) 500 mg in sodium chloride 0.9% 250mL IVPB premix     Frequency: Q24H     Dose: 500 mg     Route: Intravenous    calcium carbonate (Tums) chewable tab 500 mg     Frequency: BID PRN     Dose: 500 mg     Route: Oral    dilTIAZem (cardIZEM) tab 30 mg     Frequency: Q6H TIFFANIE     Dose: 30 mg     Route: Oral    dilTIAZem 10 mg BOLUS FROM BAG infusion     Frequency: Q1H PRN     Dose: 10 mg     Route: Intravenous    heparin (Porcine) 55967 units/250mL infusion ACS/AFIB CONTINUOUS     Frequency: Continuous     Dose: 200-3,000 Units/hr     Route: Intravenous    levoFLOXacin (Levaquin) tab 750 mg     Frequency: Q48HR @ 0700     Dose: 750  mg     Route: Oral    levothyroxine (Synthroid) tab 100 mcg     Frequency: Daily     Dose: 100 mcg     Route: Oral    norepinephrine (Levophed) 4 mg/250mL infusion premix     Frequency: Continuous     Dose: 0.5-50 mcg/min     Route: Intravenous       Severo Lacey MD           [1]   Current Facility-Administered Medications   Medication Dose Route Frequency    dilTIAZem 10 mg BOLUS FROM BAG infusion  10 mg Intravenous Q1H PRN    [Held by provider] dilTIAZem (cardIZEM) tab 30 mg  30 mg Oral 4 times per day    azithromycin (Zithromax) 500 mg in sodium chloride 0.9% 250mL IVPB premix  500 mg Intravenous Q24H    levoFLOXacin (Levaquin) tab 750 mg  750 mg Oral Q48HR @ 0700    amiodarone in dextrose 4.14% (Cordarone) 360 mg/200mL infusion premix  0.5 mg/min Intravenous Continuous    heparin (Porcine) 46259 units/250mL infusion ACS/AFIB CONTINUOUS  200-3,000 Units/hr Intravenous Continuous    norepinephrine (Levophed) 4 mg/250mL infusion premix  0.5-50 mcg/min Intravenous Continuous    albuterol (Ventolin) (2.5 MG/3ML) 0.083% nebulizer solution 2.5 mg  2.5 mg Nebulization Q4H PRN    aspirin tab 325 mg  325 mg Oral Daily    atorvastatin (Lipitor) tab 40 mg  40 mg Oral Nightly    levothyroxine (Synthroid) tab 100 mcg  100 mcg Oral Daily    acetaminophen (Tylenol) tab 650 mg  650 mg Oral Q6H PRN    calcium carbonate (Tums) chewable tab 500 mg  500 mg Oral BID PRN   [2]   Patient Active Problem List  Diagnosis    Coronary artery disease involving native heart    Metabolic encephalopathy    Myopia with astigmatism and presbyopia, bilateral    Age-related nuclear cataract of both eyes    After cataract not obscuring vision, bilateral    Acute chest pain    Sepsis due to pneumonia (HCC)

## 2025-06-17 NOTE — PHYSICAL THERAPY NOTE
Chart reviewed for PT eval, pt is currently sleeping per RN, did not sleep much last night. Will check back later.

## 2025-06-17 NOTE — PLAN OF CARE
Problem: PAIN - ADULT  Goal: Verbalizes/displays adequate comfort level or patient's stated pain goal  Description: INTERVENTIONS:  - Encourage pt to monitor pain and request assistance  - Assess pain using appropriate pain scale  - Administer analgesics based on type and severity of pain and evaluate response  - Implement non-pharmacological measures as appropriate and evaluate response  - Consider cultural and social influences on pain and pain management  - Manage/alleviate anxiety  - Utilize distraction and/or relaxation techniques  - Monitor for opioid side effects  - Notify MD/LIP if interventions unsuccessful or patient reports new pain  - Anticipate increased pain with activity and pre-medicate as appropriate  Outcome: Progressing     Problem: SAFETY ADULT - FALL  Goal: Free from fall injury  Description: INTERVENTIONS:  - Assess pt frequently for physical needs  - Identify cognitive and physical deficits and behaviors that affect risk of falls.  - [place] fall precautions as indicated by assessment.  - Educate pt/family on patient safety including physical limitations  - Instruct pt to call for assistance with activity based on assessment  - Modify environment to reduce risk of injury  - Provide assistive devices as appropriate  - Consider OT/PT consult to assist with strengthening/mobility  - Encourage toileting schedule  Outcome: Progressing     Problem: RESPIRATORY - ADULT  Goal: Achieves optimal ventilation and oxygenation  Description: INTERVENTIONS:  - Assess for changes in respiratory status  - Assess for changes in mentation and behavior  - Position to facilitate oxygenation and minimize respiratory effort  - Oxygen supplementation based on oxygen saturation or ABGs  - Provide Smoking Cessation handout, if applicable  - Encourage broncho-pulmonary hygiene including cough, deep breathe, Incentive Spirometry  - Assess the need for suctioning and perform as needed  - Assess and instruct to report  SOB or any respiratory difficulty  - Respiratory Therapy support as indicated  - Manage/alleviate anxiety  - Monitor for signs/symptoms of CO2 retention  Outcome: Progressing     Problem: CARDIOVASCULAR - ADULT  Goal: Maintains optimal cardiac output and hemodynamic stability  Description: INTERVENTIONS:  - Monitor vital signs, rhythm, and trends  - Monitor for bleeding, hypotension and signs of decreased cardiac output  - Evaluate effectiveness of vasoactive medications to optimize hemodynamic stability  - Monitor arterial and/or venous puncture sites for bleeding and/or hematoma  - Assess quality of pulses, skin color and temperature  - Assess for signs of decreased coronary artery perfusion - ex. Angina  - Evaluate fluid balance, assess for edema, trend weights  6/16/2025 2354 by Carolyn Landis, RN  Outcome: Progressing  6/16/2025 2354 by Carolyn Landis, RN  Outcome: Progressing     Problem: NEUROLOGICAL - ADULT  Goal: Achieves stable or improved neurological status  Description: INTERVENTIONS  - Assess for and report changes in neurological status  - Initiate measures to prevent increased intracranial pressure  - Maintain blood pressure and fluid volume within ordered parameters to optimize cerebral perfusion and minimize risk of hemorrhage  - Monitor temperature, glucose, and sodium. Initiate appropriate interventions as ordered  Outcome: Progressing

## 2025-06-18 LAB
ANION GAP SERPL CALC-SCNC: 13 MMOL/L (ref 0–18)
APTT PPP: 41.3 SECONDS (ref 23–36)
APTT PPP: 48 SECONDS (ref 23–36)
APTT PPP: 52.9 SECONDS (ref 23–36)
BASOPHILS # BLD: 0 X10(3) UL (ref 0–0.2)
BASOPHILS NFR BLD: 0 %
BUN BLD-MCNC: 72 MG/DL (ref 9–23)
BUN/CREAT SERPL: 25.9 (ref 10–20)
CALCIUM BLD-MCNC: 8.5 MG/DL (ref 8.7–10.4)
CHLORIDE SERPL-SCNC: 105 MMOL/L (ref 98–112)
CK SERPL-CCNC: 27 U/L (ref 46–171)
CO2 SERPL-SCNC: 16 MMOL/L (ref 21–32)
CREAT BLD-MCNC: 2.78 MG/DL (ref 0.7–1.3)
DEPRECATED RDW RBC AUTO: 46.9 FL (ref 35.1–46.3)
EGFRCR SERPLBLD CKD-EPI 2021: 21 ML/MIN/1.73M2 (ref 60–?)
EOSINOPHIL # BLD: 0 X10(3) UL (ref 0–0.7)
EOSINOPHIL NFR BLD: 0 %
ERYTHROCYTE [DISTWIDTH] IN BLOOD BY AUTOMATED COUNT: 13.7 % (ref 11–15)
GLUCOSE BLD-MCNC: 95 MG/DL (ref 70–99)
HCT VFR BLD AUTO: 39.9 % (ref 39–53)
HGB BLD-MCNC: 13.8 G/DL (ref 13–17.5)
LYMPHOCYTES NFR BLD: 0.8 X10(3) UL (ref 1–4)
LYMPHOCYTES NFR BLD: 2 %
MCH RBC QN AUTO: 31.7 PG (ref 26–34)
MCHC RBC AUTO-ENTMCNC: 34.6 G/DL (ref 31–37)
MCV RBC AUTO: 91.7 FL (ref 80–100)
METAMYELOCYTES # BLD: 0.4 X10(3) UL (ref ?–0.01)
METAMYELOCYTES NFR BLD: 1 %
MONOCYTES # BLD: 1.59 X10(3) UL (ref 0.1–1)
MONOCYTES NFR BLD: 4 %
MORPHOLOGY: NORMAL
NEUTROPHILS # BLD AUTO: 30.49 X10 (3) UL (ref 1.5–7.7)
NEUTROPHILS NFR BLD: 82 %
NEUTS BAND NFR BLD: 11 %
NEUTS HYPERSEG # BLD: 37.01 X10(3) UL (ref 1.5–7.7)
NRBC BLD MANUAL-RTO: 1 % (ref ?–1)
OSMOLALITY SERPL CALC.SUM OF ELEC: 299 MOSM/KG (ref 275–295)
PLATELET # BLD AUTO: 124 10(3)UL (ref 150–450)
PLATELET # BLD AUTO: 124 10(3)UL (ref 150–450)
PLATELET MORPHOLOGY: NORMAL
PLATELETS.RETICULATED NFR BLD AUTO: 4.4 % (ref 0–7)
POTASSIUM SERPL-SCNC: 4.3 MMOL/L (ref 3.5–5.1)
Q-T INTERVAL: 440 MS
QRS DURATION: 90 MS
QTC CALCULATION (BEZET): 450 MS
R AXIS: 23 DEGREES
RBC # BLD AUTO: 4.35 X10(6)UL (ref 3.8–5.8)
SODIUM SERPL-SCNC: 134 MMOL/L (ref 136–145)
T AXIS: 42 DEGREES
TOTAL CELLS COUNTED BLD: 100
VENTRICULAR RATE: 63 BPM
WBC # BLD AUTO: 33 X10(3) UL (ref 4–11)

## 2025-06-18 PROCEDURE — 99233 SBSQ HOSP IP/OBS HIGH 50: CPT | Performed by: INTERNAL MEDICINE

## 2025-06-18 NOTE — PROGRESS NOTES
[facility]  part of [facility]     Progress Note    Imer Mcguire Patient Status:  Inpatient    1931 MRN [id number]   Location [facility] 2W/SW Attending Sevreo Lacey MD   Hosp Day # 4 PCP OMAR LEAVITT       Subjective:   Patient seen and examined.  Currently on 6 L oxygen.  Some occasional cough.  Dyspnea gradually improving per patient    Objective:   Blood pressure 113/61, pulse 57, temperature 97 °F (36.1 °C), temperature source Temporal, resp. rate 19, height 5' 8\" (1.727 m), weight 154 lb 8.7 oz (70.1 kg), SpO2 94%.  Intake/Output:   Last 3 shifts: I/O last 3 completed shifts:  In: 2282 [P.O.:470; I.V.:1562; IV PIGGYBACK:250]  Out: 650 [Urine:650]   This shift: I/O this shift:  In: 100 [P.O.:100]  Out: 0      Vent Settings:      Hemodynamic parameters (last 24 hours):      Scheduled Meds: Current Hospital Medications[1]    Continuous Infusions: Medication Infusions[2]    Physical Exam  Constitutional: no acute distress  Eyes: PERRL  ENT: nares pateint  Neck: supple, no JVD  Cardio: RRR, S1 S2  Respiratory: Right-sided crackles  GI: abdomen soft, non tender, active bowel sounds, no organomegaly  Extremities: no clubbing, cyanosis, edema  Neurologic: no gross motor deficits  Skin: warm, dry      Results:     Lab Results   Component Value Date    WBC 33.0 2025    HGB 13.8 2025    HCT 39.9 2025    .0 2025    .0 2025    CREATSERUM 2.78 2025    BUN 72 2025     2025    K 4.3 2025     2025    CO2 16.0 2025    GLU 95 2025    CA 8.5 2025    PTT 48.0 2025       CT CHEST+ABDOMEN+PELVIS(CPT=71250/90532)  Result Date: 2025  CONCLUSION:  1. Extensive right lung airspace disease with intrinsic air bronchograms (most pronounced in the right upper lobe), which is compatible with suspected contents of lesion along pneumonia.  Follow-up to resolution is advised. 2. Small right  and trace left pleural effusions.  Probable associated compressive atelectasis at the lung bases.  Mild-moderate anasarca with trace intra-abdominal ascites.  These findings suggest fluid overload and/or mild congestive failure. 3. Coronary and peripheral atherosclerosis with coronary artery bypass. 4. Prominent in number and borderline enlarged mediastinal lymph nodes, which are probably reactive to pneumonia. 5. Punctate nonobstructing left lower pole renal calculus.  Indeterminate attenuation 1.2 cm right interpolar renal cortical lesion is incompletely characterized, but statistically relates to a benign proteinaceous/hemorrhagic cyst.  There is also left lower pole renal cortical scarring. 6. Prostatomegaly; enlarged prostate gland indents urinary bladder base. Circumferential urinary bladder wall thickening, which may relate to incomplete distention, chronic partial outlet obstruction prostate gland enlargement or cystitis.  If there are referable symptoms, urinalysis correlation is requested. 7. Colonic diverticulosis. 8. Small retrocardiac hiatal hernia. 9. Cholelithiasis. 10. Chronic-appearing right greater than left sacroiliitis. 11. Lesser incidental findings as above.     elm-remote  Dictated by (CST): Toan Ohara MD on 6/17/2025 at 3:48 PM     Finalized by (CST): Toan Ohara MD on 6/17/2025 at 4:01 PM          XR CHEST AP PORTABLE  (CPT=71045)  Result Date: 6/17/2025  CONCLUSION:   Slightly improved aeration of the right lower lobe.  Persistent extensive opacities in the right upper lobe.  No new abnormality.      Dictated by (CST): Ronnell Cano MD on 6/17/2025 at 7:25 AM     Finalized by (CST): Ronnell Cano MD on 6/17/2025 at 7:27 AM            EKG 12 Lead  Result Date: 6/18/2025  Atrial fibrillation Abnormal ECG When compared with ECG of 16-JUN-2025 14:59, Vent. rate has decreased BY  61 BPM    EKG 12 Lead  Result Date: 6/16/2025  Atrial fibrillation with rapid ventricular response Abnormal  ECG When compared with ECG of 16-JUN-2025 04:43, No significant change was found Confirmed by vincent teran ([id number]) on 6/16/2025 3:52:50 PM      Assessment   1.  Severe Legionella pneumonia  2.  Acute hypoxemic respiratory failure  3.  Fevers  4.  Leukocytosis  5.  Coronary artery disease  6.  Acute kidney injury  7.  Hypertension  8.  Atrial fibrillation with rapid ventricular response     Plan   -Patient presents with evidence of fatigue malaise some associated cough and mild dyspnea.  Hypoxic on presentation to emergency department.  - Legionella antigen positive.    - CT chest on 6/17/2025 with extensive right-sided opacities seen consistent with pneumonia.  Trace pleural effusion present.  - Significant leukocytosis present  - Antibiotic therapy per ID recommendations.  Levaquin discontinued currently on azithromycin.  - Wean oxygen as tolerated currently on 6 L  - Atrial fibrillation management per cardiology has been started on amiodarone gtt.  -Heparin GTT  -PT/OT  - Discussed with family at bedside.  Okay to transfer to floor    Bernabe Davis DO  Pulmonary Critical Care Medicine  [facility]          [1]   Current Facility-Administered Medications   Medication Dose Route Frequency    metoprolol succinate ER (Toprol XL) 24 hr tab 25 mg  25 mg Oral 2x Daily(Beta Blocker)    amiodarone (Pacerone) tab 400 mg  400 mg Oral BID with meals    azithromycin (Zithromax) 500 mg in sodium chloride 0.9% 250mL IVPB premix  500 mg Intravenous Q24H    heparin (Porcine) 58868 units/250mL infusion ACS/AFIB CONTINUOUS  200-3,000 Units/hr Intravenous Continuous    albuterol (Ventolin) (2.5 MG/3ML) 0.083% nebulizer solution 2.5 mg  2.5 mg Nebulization Q4H PRN    aspirin tab 325 mg  325 mg Oral Daily    atorvastatin (Lipitor) tab 40 mg  40 mg Oral Nightly    levothyroxine (Synthroid) tab 100 mcg  100 mcg Oral Daily    acetaminophen (Tylenol) tab 650 mg  650 mg Oral Q6H PRN    calcium carbonate (Tums) chewable tab 500 mg   500 mg Oral BID PRN   [2]    continuous dose heparin 1,300 Units/hr (06/18/25 6351)

## 2025-06-18 NOTE — PROGRESS NOTES
[facility]  part of [facility]    Cardiology Progress Note    Imer Mcguire Patient Status:  Inpatient    1931 MRN [id number]   Location [facility] 2W/SW Attending Severo Lacey MD   Hosp Day # 4 PCP OMAR LEAVITT     Interval History   Converted to SR  Denies any CP  Improved breathing    TTE with preserved LV function    Serum Cr is uptrending  Assessment and Plan:   Multifocal pneumonia secondary to Legionella  Acute hypoxic respiratory failure, due to above   SIERRA on CKD, concerning for legionella? - will check CK, appreciate nephrology input     AF with RVR, new-onset - now paroxysmal  -Has spontaneously converted to SR  -Likely secondary to hypoxia, systemic inflammation (Legionella), and critical illness  -No HF or ischemic symptoms  -TTE with preserved LV function  -Will maintain SR with amiodarone, currently on PO loading given his highly symptomatic AF with RVR with difficult rate control and hypotension, intolerant to CCB  -Started on BB as well   -Continue loading with amiodarone, monitor QTc daily  -Monitor hemodynamics/volume  -Continue telemetry monitoring    Anticoagulation Consideration  -High SOLEDAD?DS?-VASc (age, CAD) ? increased thromboembolic risk  -Continue AC with heparin gtt for now given SIERRA on CKD     CAD s/p CABG  -Stable from an ischemic standpoint, no current CP or ECG changes  -Continue cardiac risk reduction, on statin + ASA  -Monitor for demand ischemia    Objective:   Patient Vitals for the past 24 hrs:   BP Temp Temp src Pulse Resp SpO2 Weight   25 1200 113/61 97.2 °F (36.2 °C) Temporal 57 19 94 % --   25 1000 110/65 -- -- 63 21 95 % --   25 0800 119/61 97 °F (36.1 °C) Temporal 69 20 93 % --   25 0600 102/62 -- -- 71 26 94 % --   25 0500 102/56 -- -- 72 18 93 % 154 lb 8.7 oz (70.1 kg)   25 0400 116/58 97 °F (36.1 °C) Temporal 73 21 93 % --   25 0300 -- -- -- 72 25 92 % --   25 0200 115/58 -- -- 84  22 91 % --   06/18/25 0100 -- -- -- 87 24 94 % --   06/18/25 0000 106/66 97.8 °F (36.6 °C) Temporal 86 25 91 % --   06/17/25 2300 -- -- -- 78 25 93 % --   06/17/25 2200 104/67 -- -- 81 25 92 % --   06/17/25 2100 -- -- -- 77 24 93 % --   06/17/25 2000 137/66 97.4 °F (36.3 °C) Temporal 84 25 94 % --   06/17/25 1900 -- -- -- 89 25 95 % --   06/17/25 1800 130/83 -- -- 80 22 95 % --   06/17/25 1716 133/68 -- -- -- -- -- --   06/17/25 1600 133/68 97 °F (36.1 °C) Temporal 83 25 95 % --       Intake/Output:   Last 3 shifts:   Intake/Output                   06/16/25 0700 - 06/17/25 0659 06/17/25 0700 - 06/18/25 0659 06/18/25 0700 - 06/19/25 0659       Intake    P.O.  20  470  100    P.O. 20 470 100    I.V.  646.2  915.8  --    I.V. -- 289 --    Volume (mL) Heparin 121.9 230.6 --    Volume (mL) Amiodarone 524.3 396.2 --    IV PIGGYBACK  350  --  --    Volume (mL) (piperacillin-tazobactam (Zosyn) 3.375 g in dextrose 5% 100 mL IVPB-ADDV) 100 -- --    Volume (mL) (azithromycin (Zithromax) 500 mg in sodium chloride 0.9% 250mL IVPB premix) 250 -- --    Total Intake 1016.2 1385.8 100       Output    Urine  200  450  --    Output (mL) (External Urinary Catheter) 200 450 --    Emesis/NG output  --  0  0    Emesis -- 0 0    Total Output 200 450 0       Net I/O     816.2 935.8 100             Vent Settings:      Hemodynamic parameters (last 24 hours):      Scheduled Meds: Scheduled Medications[1]    Continuous Infusions: Medication Infusions[2]    Results:     Lab Results   Component Value Date    WBC 33.0 (H) 06/18/2025    HGB 13.8 06/18/2025    HCT 39.9 06/18/2025    .0 (L) 06/18/2025    .0 (L) 06/18/2025    CREATSERUM 2.78 (H) 06/18/2025    BUN 72 (H) 06/18/2025     (L) 06/18/2025    K 4.3 06/18/2025     06/18/2025    CO2 16.0 (L) 06/18/2025    GLU 95 06/18/2025    CA 8.5 (L) 06/18/2025    ALB 3.6 06/15/2025    ALKPHO 103 06/15/2025    BILT 1.3 (H) 06/15/2025    TP 5.8 06/15/2025    AST 53 (H) 06/15/2025     ALT 34 06/15/2025    PTT 48.0 (H) 06/18/2025    INR 1.14 06/14/2025    TSH 3.139 06/17/2025    MG 2.3 06/16/2025    PHOS 2.9 01/30/2017       Recent Labs   Lab 06/16/25  1405 06/17/25  0437 06/18/25  0158   * 98 95   BUN 67* 73* 72*   CREATSERUM 2.39* 2.74* 2.78*   CA 8.3* 8.5* 8.5*    134* 134*   K 4.6 5.1 4.3    104 105   CO2 21.0 20.0* 16.0*     Recent Labs   Lab 06/16/25  0455 06/16/25  1405 06/17/25  0437 06/18/25  0158   RBC 4.70 4.43 4.55 4.35   HGB 14.7 14.1 14.4 13.8   HCT 43.9 42.0 44.3 39.9   MCV 93.4 94.8 97.4 91.7   MCH 31.3 31.8 31.6 31.7   MCHC 33.5 33.6 32.5 34.6   RDW 13.7 13.7 13.7 13.7   NEPRELIM 34.39*  --  37.47* 30.49*   WBC 35.8* 33.8* 39.8* 33.0*   .0 186.0 145.0*  145.0* 124.0*  124.0*       No results for input(s): \"BNPML\" in the last 168 hours.    No results for input(s): \"TROP\", \"CK\" in the last 168 hours.    CT CHEST+ABDOMEN+PELVIS(CPT=71250/90969)  Result Date: 6/17/2025  CONCLUSION:  1. Extensive right lung airspace disease with intrinsic air bronchograms (most pronounced in the right upper lobe), which is compatible with suspected contents of lesion along pneumonia.  Follow-up to resolution is advised. 2. Small right and trace left pleural effusions.  Probable associated compressive atelectasis at the lung bases.  Mild-moderate anasarca with trace intra-abdominal ascites.  These findings suggest fluid overload and/or mild congestive failure. 3. Coronary and peripheral atherosclerosis with coronary artery bypass. 4. Prominent in number and borderline enlarged mediastinal lymph nodes, which are probably reactive to pneumonia. 5. Punctate nonobstructing left lower pole renal calculus.  Indeterminate attenuation 1.2 cm right interpolar renal cortical lesion is incompletely characterized, but statistically relates to a benign proteinaceous/hemorrhagic cyst.  There is also left lower pole renal cortical scarring. 6. Prostatomegaly; enlarged prostate gland  indents urinary bladder base. Circumferential urinary bladder wall thickening, which may relate to incomplete distention, chronic partial outlet obstruction prostate gland enlargement or cystitis.  If there are referable symptoms, urinalysis correlation is requested. 7. Colonic diverticulosis. 8. Small retrocardiac hiatal hernia. 9. Cholelithiasis. 10. Chronic-appearing right greater than left sacroiliitis. 11. Lesser incidental findings as above.     elm-remote  Dictated by (CST): Toan Ohara MD on 2025 at 3:48 PM     Finalized by (CST): Toan Ohara MD on 2025 at 4:01 PM          XR CHEST AP PORTABLE  (CPT=71045)  Result Date: 2025  CONCLUSION:   Slightly improved aeration of the right lower lobe.  Persistent extensive opacities in the right upper lobe.  No new abnormality.      Dictated by (CST): Ronnell Cano MD on 2025 at 7:25 AM     Finalized by (CST): Ronnell Cano MD on 2025 at 7:27 AM          XR CHEST AP PORTABLE  (CPT=71045)  Result Date: 2025  CONCLUSION:   Patchy bilateral lower lobe and right upper lobe airspace opacities have increased in size and density since 2025 suspicious for worsening multifocal pneumonia    Dictated by (CST): Paresh Miranda MD on 2025 at 11:33 AM     Finalized by (CST): Paresh Miranda MD on 2025 at 11:34 AM          EKG 12 Lead  Result Date: 2025  Atrial fibrillation Abnormal ECG When compared with ECG of 2025 14:59, Vent. rate has decreased BY  61 BPM    EKG 12 Lead  Result Date: 2025  Atrial fibrillation with rapid ventricular response Abnormal ECG When compared with ECG of 2025 04:43, No significant change was found Confirmed by vincent teran ([id number]) on 2025 3:52:50 PM    CARD ECHO 2D DOPPLER CONTRAST (CPT=93306)  Result Date: 2025  Transthoracic Echocardiogram Name:Imer Mcguire Date: 2025 :  1931 Ht:  (68in)  BP: 127 / 63 MRN:  [id number]    Age:  89years    Wt:  (155lb) HR:  93bpm Loc:  [facility]       Gndr: M          BSA: 1.83m^2 Sonographer: Vira CARBAJAL Ordering:    Rishi Hyatt Consulting:  Pranav Torrez ---------------------------------------------------------------------------- History/Indications:   Afib. ---------------------------------------------------------------------------- Procedure information:  A transthoracic complete 2D study was performed. Additional evaluation included M-mode, complete spectral Doppler, and color Doppler.  Patient status:  Inpatient.  Location:  Bedside.    Comparison was made to the study of 09/22/2024.    This was a routine study. Transthoracic echocardiography for diagnosis. Image quality was adequate. The study was technically limited due to poor acoustic window availability, body habitus, and patient supine. Intravenous contrast (Definity) was administered to evaluate left ventricular function and enhance regional wall motion assessment. ECG rhythm:   Atrial fibrillation ---------------------------------------------------------------------------- Conclusions: 1. Left ventricle: The cavity size was normal. Wall thickness was normal.    Systolic function was at the lower limits of normal. The estimated    ejection fraction was 50-55%, by biplane method of disks. Unable to    assess LV diastolic function due to heart rhythm. 2. Right ventricle: Systolic function was reduced. 3. Aortic valve: The findings were consistent with moderate stenosis. The    peak systolic velocity was 2.62m/sec. The mean systolic gradient was 17mm    Hg. The valve area (VTI) was 1.07cm^2. 4. Tricuspid valve: There was mild-moderate regurgitation. Impressions:  This study is compared with previous dated 09/22/2024: No significant change since prior study. * ---------------------------------------------------------------------------- * Findings: Left ventricle:  The cavity size was normal. Wall thickness was normal. Systolic function was at the lower limits of normal. The  estimated ejection fraction was 50-55%, by biplane method of disks. No diagnostic evidence for diffuse regional wall motion abnormalities. Unable to assess LV diastolic function due to heart rhythm. Left atrium:  Well visualized. The atrium was normal in size. Right ventricle:  The cavity size was normal. Systolic function was reduced. Right atrium:  Well visualized. The atrium was normal in size. Mitral valve:  Well visualized. The valve was structurally normal. The leaflets were normal thickness. Leaflet separation was normal. No evidence for prolapse.  Doppler:  Transvalvular velocity was within the normal range. There was no evidence for stenosis. There was no significant regurgitation. Aortic valve:  Well visualized. The valve was structurally normal. The valve was trileaflet. The leaflets were normal thickness. Cusp separation was normal.  Doppler:   The findings were consistent with moderate stenosis. There was trivial regurgitation.    The valve area (VTI) was 1.07cm^2. The valve area (VTI) index was 0.58cm^2/m^2.    The mean systolic gradient was 17mm Hg. The peak systolic gradient was 27mm Hg. Tricuspid valve:  Well visualized. The annulus is normal-sized. The valve is structurally normal. The leaflets are normal thickness. Leaflet separation was normal. No echocardiographic evidence for tricuspid prolapse.  Doppler: Transvalvular velocity was within the normal range. There was no evidence for stenosis. There was mild-moderate regurgitation. Pulmonic valve:   Well visualized. The annulus is normal-sized. The valve is structurally normal. The leaflets are normal thickness. Cusp separation was normal. No evidence for prolapse.  Doppler:  Transvalvular velocity was within the normal range. There was no evidence for stenosis. There was no significant regurgitation. Pericardium:   There was no pericardial effusion. Pleura:  No evidence of pleural fluid accumulation. Aorta: Aortic root: The aortic root was  normal-sized. The aortic root was normal. Ascending aorta: The ascending aorta was normal. The ascending aorta was normal. Pulmonary arteries: The main pulmonary artery was normal-sized. Mild pulmonary hypertension was present. Systemic veins:  Central venous respirophasic diameter changes are in the normal range (>50%). Inferior vena cava: The IVC was normally collapsible and normal-sized. The IVC was normal-sized. The IVC was normal-sized. ---------------------------------------------------------------------------- Measurements  Left ventricle       Value          Ref       09/22/2024  IVS thickness,   (H) 1.1   cm       0.6 - 1.0 0.8  ED, PLAX  LV ID, ED, PLAX  (L) 3.8   cm       4.2 - 5.8 3.9  LV ID, ES, PLAX  (L) 2.3   cm       2.5 - 4.0 2.4  LV PW thickness,     1.0   cm       0.6 - 1.0 0.9  ED, PLAX  IVS/LV PW ratio,     1.10           --------- 0.89  ED, PLAX  LV PW/LV ID          0.26           --------- 0.23  ratio, ED, PLAX  LV ejection          71    %        52 - 72   69  fraction  Stroke               29    ml/m^2   --------- ----------  volume/bsa, 2D  LV end-diastolic     127   ml       69 - 185  71  volume, 1-p A4C  LV ejection          49    %        46 - 74   68  fraction, 1-p  A4C  Stroke volume,       62    ml       --------- 48  1-p A4C  LV end-diastolic     69    ml/m^2   37 - 93   39  volume/bsa, 1-p  A4C  Stroke               34    ml/m^2   --------- 27  volume/bsa, 1-p  A4C  LV end-diastolic     116   ml       62 - 150  55  volume, 2-p  LV end-systolic      56    ml       21 - 61   22  volume, 2-p  LV ejection          52    %        52 - 72   60  fraction, 2-p  Stroke volume,       60    ml       --------- 33  2-p  LV end-diastolic     63    ml/m^2   34 - 74   31  volume/bsa, 2-p  LV end-systolic      30    ml/m^2   11 - 31   12  volume/bsa, 2-p  Stroke               32.8  ml/m^2   --------- 18.5  volume/bsa, 2-p  LV e', lateral   (L) 9.6   cm/sec   >=10.0    14.4  LV E/e', lateral     7               <=13      5  LV e', medial        7.1   cm/sec   >=7.0     9.4  LV E/e', medial      10             --------- 8  LV e', average       8.3   cm/sec   --------- 11.9  LV E/e', average     8              <=14      6  LVOT                 Value          Ref       09/22/2024  LVOT ID              2.1   cm       --------- ----------  LVOT peak            0.81  m/sec    --------- ----------  velocity, S  LVOT VTI, S          15.7  cm       --------- ----------  LVOT peak            3     mm Hg    --------- ----------  gradient, S  LVOT mean            1     mm Hg    --------- ----------  gradient, S  Stroke volume        54    ml       --------- ----------  (SV), LVOT DP  Stroke index         30    ml/m^2   --------- ----------  (SV/bsa), LVOT  DP  Aortic valve         Value          Ref       09/22/2024  Aortic valve         2.62  m/sec    --------- ----------  peak velocity, S  Aortic valve         50.7  cm       --------- ----------  VTI, S  Aortic mean          17    mm Hg    --------- ----------  gradient, S  Aortic peak          27    mm Hg    --------- ----------  gradient, S  Aortic pressure      467   ms       --------- ----------  half-time  Aortic valve         1.07  cm^2     --------- ----------  area, VTI  Aortic valve         0.58  cm^2/m^2 --------- ----------  area/bsa, VTI  Velocity ratio,      0.31           --------- ----------  peak, LVOT/AV  Aortic regurg        2.84  m/sec    --------- ----------  velocity, ED  Aortic regurg        178   cm/s^2   --------- ----------  deceleration  Aortic regurg        467   ms       --------- ----------  pressure  half-time  Aortic regurg        32    mm Hg    --------- ----------  gradient, ED  Aortic root          Value          Ref       09/22/2024  Aortic root ID,      2.5   cm       2.3 - 3.5 ----------  STJ, ED  Ascending aorta      Value          Ref       09/22/2024  Ascending aorta      2.6   cm       2.2 - 3.8 3.0  ID  Left atrium          Value           Ref       09/22/2024  LA volume, S         58    ml       18 - 58   50  LA volume/bsa, S     32    ml/m^2   16 - 34   28  LA volume, ES,   (H) 61    ml       18 - 58   64  1-p A4C  LA volume, ES,       54    ml       18 - 58   35  1-p A2C  LA volume, ES,       61    ml       --------- 53  A/L  LA volume/bsa,       33    ml/m^2   16 - 34   29  ES, A/L  Mitral valve         Value          Ref       09/22/2024  Mitral E-wave        0.7   m/sec    --------- 0.73  peak velocity  Mitral A-wave        0.57  m/sec    --------- 0.91  peak velocity  Mitral               274   ms       --------- 280  deceleration  time  Mitral E/A           1.2            --------- 0.8  ratio, peak  Pulmonary artery     Value          Ref       09/22/2024  PA pressure, S,      39    mm Hg    --------- ----------  DP  Tricuspid valve      Value          Ref       09/22/2024  Tricuspid regurg (H) 2.98  m/sec    <=2.8     3.2  peak velocity  Tricuspid peak       36    mm Hg    --------- 41  RV-RA gradient  Systemic veins       Value          Ref       09/22/2024  Estimated CVP        3     mm Hg    --------- 3  Inferior vena        Value          Ref       09/22/2024  cava  ID                   1.9   cm       <=2.1     1.1  Right ventricle      Value          Ref       09/22/2024  TAPSE, 2D        (L) 1.50  cm       >=1.70    1.91  TAPSE, MM        (L) 1.50  cm       >=1.70    1.91  RV pressure, S,      39    mm Hg    --------- 44  DP  RV s', lateral   (L) 7.5   cm/sec   >=9.5     11.7 Legend: (L)  and  (H)  rosy values outside specified reference range. ---------------------------------------------------------------------------- Prepared and electronically signed by Shiraz Javier 06/18/2025 08:45        Exam:     Physical Exam:  General:No apparent distress.   HEENT: Normocephalic, anicteric sclera, neck supple, no thyromegaly or adenopathy.  Neck: No JVD, carotids 2+, no bruits.  Cardiac: Regular rate and rhythm  Lungs: Diminished  BS  Abdomen: Soft, non-tender. No organosplenomegally, mass or rebound, BS-present.  Extremities: No edema.    Neurologic: Alert and oriented, normal affect. No focal defects  Skin: Warm and dry.     Nilson Barnett, [facility] [place]       [1]    metoprolol succinate ER  25 mg Oral 2x Daily(Beta Blocker)    amiodarone  400 mg Oral BID with meals    azithromycin  500 mg Intravenous Q24H    aspirin  325 mg Oral Daily    atorvastatin  40 mg Oral Nightly    levothyroxine  100 mcg Oral Daily   [2]    continuous dose heparin 1,300 Units/hr (06/18/25 1243)

## 2025-06-18 NOTE — PLAN OF CARE
Problem: PAIN - ADULT  Goal: Verbalizes/displays adequate comfort level or patient's stated pain goal  Description: INTERVENTIONS:  - Encourage pt to monitor pain and request assistance  - Assess pain using appropriate pain scale  - Administer analgesics based on type and severity of pain and evaluate response  - Implement non-pharmacological measures as appropriate and evaluate response  - Consider cultural and social influences on pain and pain management  - Manage/alleviate anxiety  - Utilize distraction and/or relaxation techniques  - Monitor for opioid side effects  - Notify MD/LIP if interventions unsuccessful or patient reports new pain  - Anticipate increased pain with activity and pre-medicate as appropriate  Outcome: Progressing     Problem: SAFETY ADULT - FALL  Goal: Free from fall injury  Description: INTERVENTIONS:  - Assess pt frequently for physical needs  - Identify cognitive and physical deficits and behaviors that affect risk of falls.  - [place] fall precautions as indicated by assessment.  - Educate pt/family on patient safety including physical limitations  - Instruct pt to call for assistance with activity based on assessment  - Modify environment to reduce risk of injury  - Provide assistive devices as appropriate  - Consider OT/PT consult to assist with strengthening/mobility  - Encourage toileting schedule  Outcome: Progressing     Problem: DISCHARGE PLANNING  Goal: Discharge to home or other facility with appropriate resources  Description: INTERVENTIONS:  - Identify barriers to discharge w/pt and caregiver  - Include patient/family/discharge partner in discharge planning  - Arrange for needed discharge resources and transportation as appropriate  - Identify discharge learning needs (meds, wound care, etc)  - Arrange for interpreters to assist at discharge as needed  - Consider post-discharge preferences of patient/family/discharge partner  - Complete POLST form as appropriate  - Assess  patient's ability to be responsible for managing their own health  - Refer to Case Management Department for coordinating discharge planning if the patient needs post-hospital services based on physician/LIP order or complex needs related to functional status, cognitive ability or social support system  Outcome: Progressing     Problem: RESPIRATORY - ADULT  Goal: Achieves optimal ventilation and oxygenation  Description: INTERVENTIONS:  - Assess for changes in respiratory status  - Assess for changes in mentation and behavior  - Position to facilitate oxygenation and minimize respiratory effort  - Oxygen supplementation based on oxygen saturation or ABGs  - Provide Smoking Cessation handout, if applicable  - Encourage broncho-pulmonary hygiene including cough, deep breathe, Incentive Spirometry  - Assess the need for suctioning and perform as needed  - Assess and instruct to report SOB or any respiratory difficulty  - Respiratory Therapy support as indicated  - Manage/alleviate anxiety  - Monitor for signs/symptoms of CO2 retention  Outcome: Progressing     Problem: Impaired Functional Mobility  Goal: Achieve highest/safest level of mobility/gait  Description: Interventions:  - Assess patient's functional ability and stability  - Promote increasing activity/tolerance for mobility and gait  - Educate and engage patient/family in tolerated activity level and precautions  Outcome: Progressing     Problem: CARDIOVASCULAR - ADULT  Goal: Maintains optimal cardiac output and hemodynamic stability  Description: INTERVENTIONS:  - Monitor vital signs, rhythm, and trends  - Monitor for bleeding, hypotension and signs of decreased cardiac output  - Evaluate effectiveness of vasoactive medications to optimize hemodynamic stability  - Monitor arterial and/or venous puncture sites for bleeding and/or hematoma  - Assess quality of pulses, skin color and temperature  - Assess for signs of decreased coronary artery perfusion - ex.  Angina  - Evaluate fluid balance, assess for edema, trend weights  Outcome: Progressing  Goal: Absence of cardiac arrhythmias or at baseline  Description: INTERVENTIONS:  - Continuous cardiac monitoring, monitor vital signs, obtain 12 lead EKG if indicated  - Evaluate effectiveness of antiarrhythmic and heart rate control medications as ordered  - Initiate emergency measures for life threatening arrhythmias  - Monitor electrolytes and administer replacement therapy as ordered  Outcome: Progressing     Problem: NEUROLOGICAL - ADULT  Goal: Achieves stable or improved neurological status  Description: INTERVENTIONS  - Assess for and report changes in neurological status  - Initiate measures to prevent increased intracranial pressure  - Maintain blood pressure and fluid volume within ordered parameters to optimize cerebral perfusion and minimize risk of hemorrhage  - Monitor temperature, glucose, and sodium. Initiate appropriate interventions as ordered  Outcome: Progressing  Goal: Achieves maximal functionality and self care  Description: INTERVENTIONS  - Monitor swallowing and airway patency with patient fatigue and changes in neurological status  - Encourage and assist patient to increase activity and self care with guidance from PT/OT  - Encourage visually impaired, hearing impaired and aphasic patients to use assistive/communication devices  Outcome: Progressing

## 2025-06-18 NOTE — PROGRESS NOTES
[facility]  part of [facility]    Progress Note    Imer Mcguire Patient Status:  Inpatient    1931 MRN [id number]   Location [facility] 5SW/SE Attending Severo Lacey MD   Hosp Day # 4 PCP OMAR LEAVITT       SUBJECTIVE:  Feeling better.  Breathing is better.  Son-in-law is at the bedside      OBJECTIVE:  Vital signs in last 24 hours:  /56 (BP Location: Right arm)   Pulse 72   Temp 97 °F (36.1 °C) (Temporal)   Resp 18   Ht 5' 8\" (1.727 m)   Wt 154 lb 8.7 oz (70.1 kg)   SpO2 93%   BMI 23.50 kg/m²     Intake/Output:    Intake/Output Summary (Last 24 hours) at 2025 0542  Last data filed at 2025 0514  Gross per 24 hour   Intake 1583.72 ml   Output 650 ml   Net 933.72 ml       Wt Readings from Last 3 Encounters:   25 154 lb 8.7 oz (70.1 kg)   24 147 lb 6.4 oz (66.9 kg)   17 174 lb 4.8 oz (79.1 kg)       Exam  Gen: No acute distress, alert   HEENT: No pallor no icterus  Pulm: Lungs crackles are noted on the right side.  CV: Heart with irregular rate and rhythm, no peripheral edema  Abd: Abdomen soft, nontender, nondistended, no organomegaly, bowel sounds present  Skin: no rashes or lesions  CNS: Alert    Data Review:     Labs:   Lab Results   Component Value Date    WBC 33.0 2025    HGB 13.8 2025    HCT 39.9 2025    .0 2025    .0 2025    CREATSERUM 2.78 2025    BUN 72 2025     2025    K 4.3 2025     2025    CO2 16.0 2025    GLU 95 2025    CA 8.5 2025    PTT 41.3 2025       LABS  Recent Labs   Lab 25  1409 06/15/25  0617 25  0455 25  1405 25  1920 25  0437 25  1143 25  1827 25  0158   RBC 5.08 5.07 4.70 4.43  --  4.55  --   --  4.35   HGB 15.8 15.8 14.7 14.1  --  14.4  --   --  13.8   HCT 47.9 48.4 43.9 42.0  --  44.3  --   --  39.9   MCV 94.3 95.5 93.4 94.8  --  97.4  --   --  91.7    MCH 31.1 31.2 31.3 31.8  --  31.6  --   --  31.7   MCHC 33.0 32.6 33.5 33.6  --  32.5  --   --  34.6   RDW 13.3 13.5 13.7 13.7  --  13.7  --   --  13.7   NEPRELIM 21.83* 25.87* 34.39*  --   --  37.47*  --   --  30.49*   WBC 23.2* 26.6* 35.8* 33.8*  --  39.8*  --   --  33.0*   .0 216.0 195.0 186.0  --  145.0*  145.0*  --   --  124.0*  124.0*   AST 43* 53*  --   --   --   --   --   --   --    ALT 32 34  --   --   --   --   --   --   --    PTT 28.5  --   --  31.4   < > 38.6* 47.4* 39.8* 41.3*   INR 1.14  --   --   --   --   --   --   --   --    GLU 99 70 91 105*  --  98  --   --  95    < > = values in this interval not displayed.       Imaging:      Meds:   Current Hospital Medications[1]    Assessment  Problem List[2]  Sepsis due to pneumonia (HCC)  Pneumonia.,   Community-acquired bacterial bacterial   Secondary to Legionella  Patient on Zithromax       New onset atrial fibrillation with rapid ventricular response  Cardiology consulted.  Patient started on amiodarone      Acute hypoxic respiratory failure patient is requiring 5 L of oxygen.     Continue oxygen supplementation.     Acute kidney injury.  on IV fluids.,        Coronary disease stable.     Hypothyroidism continue the patient on levothyroxine.     Patient stable.    Thrombocytopenia    Discussed with nursing staff.  Further management will depend on the clinical course of the patient     Plan:   Continue IV antibiotics.  Continue continue other treatments.    Discussed with the patient's son-in-law at the bedside      Active Orders   LAB    Basic Metabolic Panel (8)     Frequency: Q3 Days     Number of Occurrences: Until Specified    CBC, Platelet; No Differential     Frequency: Q3 Days     Number of Occurrences: Until Specified    Platelet Count     Frequency: Daily Lab     Number of Occurrences: 3 Days    PTT, Activated     Frequency: Timed draw     Number of Occurrences: 1 Occurrences     Order Comments: RN to modify draw (start) time if  needed.       Diet    Cardiac diet Cardiac; Is Patient on Accuchecks? No     Frequency: Effective Now     Number of Occurrences: Until Specified   Nursing    Cardiac monitoring     Frequency: Until Discontinued     Number of Occurrences: Until Specified    Cardiac monitoring     Frequency: Continuous     Number of Occurrences: Until Specified    Confirm that patient does not have a history of allergy or sensitivity to Heparin.     Frequency: Until Discontinued     Number of Occurrences: Until Specified    Discontinue routine platelet count, CBC, and BMP when Heparin discontinued.     Frequency: Once     Number of Occurrences: 1 Occurrences    Follow up for COPD/Pneumonia     Frequency: Once     Number of Occurrences: 1 Occurrences     Order Comments: Follow up  about one week after DC      Increase infusion dose 100 units/hr     Frequency: Once     Number of Occurrences: 1 Occurrences    Increase infusion dose 100 units/hr     Frequency: Once     Number of Occurrences: 1 Occurrences    Increase infusion dose 100 units/hr     Frequency: Once     Number of Occurrences: 1 Occurrences    Increase infusion dose 100 units/hr     Frequency: Once     Number of Occurrences: 1 Occurrences    Initiate electrolyte protocol     Frequency: Until Discontinued     Number of Occurrences: Until Specified    Initiate heparin IV for ACS/A-fib protocol     Frequency: Until Discontinued     Number of Occurrences: Until Specified    Intake and Output     Frequency: Q Shift     Number of Occurrences: Until Specified    No Intramuscular injections while patient on Heparin.     Frequency: Until Discontinued     Number of Occurrences: Until Specified    Notify physician     Frequency: Once     Number of Occurrences: 1 Occurrences     Order Comments: If patient experiences abnormal bleeding or bruising, black tarry stools, or significant change in mental status or level of consciousness.      Notify physician (cardiology or ordering  physician):     Frequency: Until Discontinued     Number of Occurrences: Until Specified     Order Comments: If you are discontinuing a drip without any oral diltiazem medication orders      Notify physician for BP (cardiology or ordering physician):     Frequency: Until Discontinued     Number of Occurrences: Until Specified     Order Comments: For SBP < 80 mmHg or symptomatic hypotension, discontinue drip and notify physician      Notify physician for HR (cardiology or ordering physician):     Frequency: Until Discontinued     Number of Occurrences: Until Specified     Order Comments: 1. If you are on max intravenous dose and the HR is >120  2. If HR is >140 and it has been one hour since you transitioned to oral from IV diltiazem  3. If rate < 75 for > 5 min or symptomatic bradycardia, discontinue the drip and notify physician  4. For pause greater than 3.0 seconds, hold drip and notify physician      Notify physician if 3 consecutive PTT results require intervention     Frequency: Until Discontinued     Number of Occurrences: Until Specified    Stop amiodarone and call the attending cardiologist     Frequency: PRN     Number of Occurrences: Until Specified     Order Comments: If systolic blood pressure falls to less than 90 mmHg, heart rate drops to less than 60 beats per minute, if  EKG demonstrates greater than Mobitz 1 Heart Block     Code Status    Full code Continuous     Frequency: Continuous     Number of Occurrences: Until Specified   Medications    acetaminophen (Tylenol) tab 650 mg     Frequency: Q6H PRN     Dose: 650 mg     Route: Oral    albuterol (Ventolin) (2.5 MG/3ML) 0.083% nebulizer solution 2.5 mg     Frequency: Q4H PRN     Dose: 2.5 mg     Route: Nebulization    amiodarone (Pacerone) tab 400 mg     Frequency: BID with meals     Dose: 400 mg     Route: Oral    aspirin tab 325 mg     Frequency: Daily     Dose: 325 mg     Route: Oral    atorvastatin (Lipitor) tab 40 mg     Frequency: Nightly      Dose: 40 mg     Route: Oral    azithromycin (Zithromax) 500 mg in sodium chloride 0.9% 250mL IVPB premix     Frequency: Q24H     Dose: 500 mg     Route: Intravenous    calcium carbonate (Tums) chewable tab 500 mg     Frequency: BID PRN     Dose: 500 mg     Route: Oral    heparin (Porcine) 25356 units/250mL infusion ACS/AFIB CONTINUOUS     Frequency: Continuous     Dose: 200-3,000 Units/hr     Route: Intravenous    levothyroxine (Synthroid) tab 100 mcg     Frequency: Daily     Dose: 100 mcg     Route: Oral    metoprolol succinate ER (Toprol XL) 24 hr tab 25 mg     Frequency: 2x Daily(Beta Blocker)     Dose: 25 mg     Route: Oral       Severo Lacey MD           [1]   Current Facility-Administered Medications   Medication Dose Route Frequency    metoprolol succinate ER (Toprol XL) 24 hr tab 25 mg  25 mg Oral 2x Daily(Beta Blocker)    amiodarone (Pacerone) tab 400 mg  400 mg Oral BID with meals    azithromycin (Zithromax) 500 mg in sodium chloride 0.9% 250mL IVPB premix  500 mg Intravenous Q24H    heparin (Porcine) 68223 units/250mL infusion ACS/AFIB CONTINUOUS  200-3,000 Units/hr Intravenous Continuous    albuterol (Ventolin) (2.5 MG/3ML) 0.083% nebulizer solution 2.5 mg  2.5 mg Nebulization Q4H PRN    aspirin tab 325 mg  325 mg Oral Daily    atorvastatin (Lipitor) tab 40 mg  40 mg Oral Nightly    levothyroxine (Synthroid) tab 100 mcg  100 mcg Oral Daily    acetaminophen (Tylenol) tab 650 mg  650 mg Oral Q6H PRN    calcium carbonate (Tums) chewable tab 500 mg  500 mg Oral BID PRN   [2]   Patient Active Problem List  Diagnosis    Coronary artery disease involving native heart    Metabolic encephalopathy    Myopia with astigmatism and presbyopia, bilateral    Age-related nuclear cataract of both eyes    After cataract not obscuring vision, bilateral    Acute chest pain    Sepsis due to pneumonia (HCC)

## 2025-06-18 NOTE — PROGRESS NOTES
INFECTIOUS DISEASE PROGRESS NOTE  [facility]  part of [facility] ID PROGRESS NOTE    Imer Mcguire Patient Status:  Inpatient    1931 MRN [id number]   Location [facility] 2W/SW Attending Severo Lacey MD   Hosp Day # 4 PCP OMAR LEAVITT     Subjective:  ROS reviewed. Family at bedside. Down to 6L HFNC. Feels better. Appetite not great. Has not had a BM since admission.    ASSESSMENT:    Antibiotics: IV azithromycin (IV zosyn, levaquin)     # Legionella pneumonia with hypoxia with +Legionella urine Ag   -Sputum cx with oral contamination    -CT C/A/P with extensive right lung airspace disease   -Air conditioner serviced earlier this month   -[place]   # AFib on amiodarone drip  # SIERRA on CKD  # Leukocytosis with bandemia  # CAD s/p CABG, HTN, HLD        PLAN:  -  Continue on azithromycin, day 5 of 10.  -  Daily EKG.  -  Follow fever curve, wbc.  -  Reviewed labs, micro, imaging reports, available old records.  -  Case d/w patient, family, cardiology, RN.     History of Present Illness:  89-year-old male with a history of CAD status post CABG in 2017, HTN, HLD, CKD who now presents to hospital sick/14 with fatigue, weakness, malaise for 1 week, cough, dyspnea.  Found to be febrile to 103.1 on admission with hypoxia, tachycardia.  Initial WBC of 26 now up to 40.  Given IV Zosyn, vancomycin, azithromycin.  Initially with worsening O2 requirements up to 15 L now improved to 6 L.  Also with A-fib on amiodarone.  Initial chest x-ray with upper lung airspace opacification consistent with pneumonia.  Blood cultures no growth.  Urine Legionella positive.  MRSA nares negative.  Also SIERRA with worsening kidney function.     Has been having issues with his air conditioning. 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    Physical Exam:  /65 (BP Location: Right arm)   Pulse 63   Temp 97 °F (36.1 °C) (Temporal)   Resp 21   Ht 5' 8\" (1.727  m)   Wt 154 lb 8.7 oz (70.1 kg)   SpO2 95%   BMI 23.50 kg/m²     Gen:   Awake, in bed  HEENT:  EOMI, neck supple  CV/lungs:  RRR, CTAB  Abdom:  Soft, NT/ND, +BS  Skin/extrem:  No rashes, no c/c/e  :   Primofit+  Lines:  PIV+    Laboratory Data: Reviewed    Microbiology: Reviewed    Radiology: Reviewed      [facility]  [phone]  6/18/2025

## 2025-06-18 NOTE — PLAN OF CARE
While on Zithromax and high dose Amiodarone, daily EKGs to monitor Qtc.     Gabriella Sloan, MSN, FNP-BC, CCK  06/18/25   10:55 AM  [phone] [place]  [phone] ward

## 2025-06-19 ENCOUNTER — APPOINTMENT (OUTPATIENT)
Dept: ULTRASOUND IMAGING | Facility: HOSPITAL | Age: OVER 89
End: 2025-06-19
Attending: INTERNAL MEDICINE
Payer: MEDICARE

## 2025-06-19 LAB
ANION GAP SERPL CALC-SCNC: 11 MMOL/L (ref 0–18)
APTT PPP: 44.3 SECONDS (ref 23–36)
ATRIAL RATE: 60 BPM
BASOPHILS # BLD: 0 X10(3) UL (ref 0–0.2)
BASOPHILS NFR BLD: 0 %
BUN BLD-MCNC: 113 MG/DL (ref 9–23)
BUN/CREAT SERPL: 36.8 (ref 10–20)
CALCIUM BLD-MCNC: 8.5 MG/DL (ref 8.7–10.4)
CHLORIDE SERPL-SCNC: 106 MMOL/L (ref 98–112)
CO2 SERPL-SCNC: 19 MMOL/L (ref 21–32)
CREAT BLD-MCNC: 3.07 MG/DL (ref 0.7–1.3)
DEPRECATED RDW RBC AUTO: 51.3 FL (ref 35.1–46.3)
EGFRCR SERPLBLD CKD-EPI 2021: 18 ML/MIN/1.73M2 (ref 60–?)
EOSINOPHIL # BLD: 0 X10(3) UL (ref 0–0.7)
EOSINOPHIL NFR BLD: 0 %
ERYTHROCYTE [DISTWIDTH] IN BLOOD BY AUTOMATED COUNT: 15.9 % (ref 11–15)
GLUCOSE BLD-MCNC: 90 MG/DL (ref 70–99)
HCT VFR BLD AUTO: 34.2 % (ref 39–53)
HCT VFR BLD AUTO: 36.8 % (ref 39–53)
HGB BLD-MCNC: 12 G/DL (ref 13–17.5)
HGB BLD-MCNC: 12.7 G/DL (ref 13–17.5)
LYMPHOCYTES NFR BLD: 0.85 X10(3) UL (ref 1–4)
LYMPHOCYTES NFR BLD: 4 %
MCH RBC QN AUTO: 32 PG (ref 26–34)
MCHC RBC AUTO-ENTMCNC: 35.1 G/DL (ref 31–37)
MCV RBC AUTO: 91.2 FL (ref 80–100)
MONOCYTES # BLD: 0.43 X10(3) UL (ref 0.1–1)
MONOCYTES NFR BLD: 2 %
MORPHOLOGY: NORMAL
NEUTROPHILS # BLD AUTO: 19 X10 (3) UL (ref 1.5–7.7)
NEUTROPHILS NFR BLD: 94 %
NEUTS HYPERSEG # BLD: 20.02 X10(3) UL (ref 1.5–7.7)
OSMOLALITY SERPL CALC.SUM OF ELEC: 317 MOSM/KG (ref 275–295)
P AXIS: -16 DEGREES
P-R INTERVAL: 168 MS
PLATELET # BLD AUTO: 167 10(3)UL (ref 150–450)
PLATELET # BLD AUTO: 168 10(3)UL (ref 150–450)
PLATELET MORPHOLOGY: NORMAL
PLATELETS.RETICULATED NFR BLD AUTO: 5.5 % (ref 0–7)
POTASSIUM SERPL-SCNC: 3.3 MMOL/L (ref 3.5–5.1)
POTASSIUM SERPL-SCNC: 3.8 MMOL/L (ref 3.5–5.1)
Q-T INTERVAL: 456 MS
QRS DURATION: 92 MS
QTC CALCULATION (BEZET): 456 MS
R AXIS: 51 DEGREES
RBC # BLD AUTO: 3.75 X10(6)UL (ref 3.8–5.8)
SODIUM SERPL-SCNC: 136 MMOL/L (ref 136–145)
T AXIS: 61 DEGREES
TOTAL CELLS COUNTED BLD: 100
VENTRICULAR RATE: 60 BPM
WBC # BLD AUTO: 21.3 X10(3) UL (ref 4–11)

## 2025-06-19 PROCEDURE — 76770 US EXAM ABDO BACK WALL COMP: CPT | Performed by: INTERNAL MEDICINE

## 2025-06-19 PROCEDURE — 99223 1ST HOSP IP/OBS HIGH 75: CPT | Performed by: INTERNAL MEDICINE

## 2025-06-19 PROCEDURE — 99232 SBSQ HOSP IP/OBS MODERATE 35: CPT | Performed by: INTERNAL MEDICINE

## 2025-06-19 RX ORDER — POTASSIUM CHLORIDE 14.9 MG/ML
20 INJECTION INTRAVENOUS ONCE
Status: COMPLETED | OUTPATIENT
Start: 2025-06-19 | End: 2025-06-20

## 2025-06-19 RX ORDER — SODIUM CHLORIDE 9 MG/ML
INJECTION, SOLUTION INTRAVENOUS CONTINUOUS
Status: DISCONTINUED | OUTPATIENT
Start: 2025-06-19 | End: 2025-06-21

## 2025-06-19 RX ORDER — POTASSIUM CHLORIDE 14.9 MG/ML
20 INJECTION INTRAVENOUS ONCE
Status: COMPLETED | OUTPATIENT
Start: 2025-06-19 | End: 2025-06-19

## 2025-06-19 RX ORDER — ASPIRIN 81 MG/1
81 TABLET ORAL DAILY
Status: DISCONTINUED | OUTPATIENT
Start: 2025-06-19 | End: 2025-06-26

## 2025-06-19 NOTE — SLP NOTE
SPEECH DAILY NOTE - INPATIENT    ASSESSMENT & PLAN   ASSESSMENT      Proper PPE worn. Hands sanitized upon entrance/exit Pt room.         Pt alert, afebrile and now on 3L/min 02 via NC (previous session on 6L). Pt seen for swallow treatment 2/2 RN report with Pt coughing on oral intake. Niece present. Pt agreed to participate. Pt's preferred method of learning verbal. Pt upright in chair and presented with overall weakness and fatigue. Pt observed with current diet of solid/thin liquids (and mildly-thick liquids) for monitoring diet tolerance. Swallowing precautions/strategies discussed as SLP fed Pt oral trials. Prolonged effortful mastication on solid consistency attributed to fatigue and overall weakness. Oral cavity essentially clear post swallows. Pharyngeal response appeared delayed per hyolaryngeal elevation to completion (weak rise/strength per palpation). No overt CSA on solid nor trials of mildly-thick liquids. Rec downgrade diet to BITE SIZE/MILDLY-THICK liquids/no straw/pills whole in pureed. Collaborated with RN and MD regarding Pt's swallowing plan of care. Sp02 ~95% during this session. Call light within Pt's reach upon SLP discharge from room.      CXR 6/17/25:  CONCLUSION:     Slightly improved aeration of the right lower lobe.  Persistent extensive opacities in the right upper lobe.  No new abnormality.             PLAN:    To f/u x1-2 sessions to ensure safe intake of NEW DOWNGRADED diet and reinforce swallowing/aspiration precautions. To monitor for new CXR results. Diet upgrade may be most appropriate at next level of care. Family education to be continued as available.        Diet Recommendations - Solids: Mechanical soft chopped/ Soft & Bite Sized  Diet Recommendations - Liquids: Nectar thick liquids/ Mildly thick    Compensatory Strategies Recommended: Alternate consistencies  Aspiration Precautions: No straw, Small sips, Small bites, Slow rate, Upright position  Medication Administration  Recommendations: Whole in puree    Patient Experiencing Pain: No  Treatment Plan  Treatment Plan/Recommendations: Aspiration precautions    Interdisciplinary Communication: Discussed with RN  Plan posted at bedside        GOALS  Goal #1 The patient will tolerate regular consistency and thin liquids without overt signs or symptoms of aspiration with 100 % accuracy over 2 session(s).    Diet downgraded to BITE SIZE/MILDLY-thick liquids no straw. F/u for safe tolerance. Upgrade next LOC?   GOAL MODIFIED 6/19/25     Goal #2 The patient/family/caregiver will demonstrate understanding and implementation of aspiration precautions and swallow strategies independently over 2 session(s).    Swallowing precautions posted in room. Discussed with niece; v/u.      In Progress   Goal #3 The patient will utilize compensatory strategies as outlined by  BSSE (clinical evaluation) including Slow rate, Small bites, Small sips, Alternate liquids/solids, No straws, Upright 90 degrees, Eliminate distractions with minimal assistance 100 % of the time across 2 sessions.    SLP fed Pt d/t fatigue; strategies executed.     In Progress   FOLLOW UP  Follow Up Needed (Documentation Required): Yes  SLP Follow-up Date: 06/20/25  Duration: 1 week    Session: 2    If you have any questions, please contact   Terri Canales [facility]/SLP  Speech-Language Pathologist  [facility]  #[phone]

## 2025-06-19 NOTE — PHYSICAL THERAPY NOTE
PHYSICAL THERAPY TREATMENT NOTE - INPATIENT     Room Number: 523/523-A       Presenting Problem: sepsis due to pneumonia, legionella with new onset a-fib with RVR  Co-Morbidities : CAD s/p CABG, CKD,    Problem List  Principal Problem:    Sepsis due to pneumonia (HCC)      PHYSICAL THERAPY ASSESSMENT   Patient demonstrates fair progress this session, goals  remain in progress.      Patient is requiring contact guard assist, minimal assist, and moderate assist as a result of the following impairments: decreased functional strength, decreased endurance/aerobic capacity, impaired standing balance, impaired coordination, impaired motor planning, decreased muscular endurance, and medical status.     Patient continues to function below baseline with bed mobility, transfers, gait, stair negotiation, standing prolonged periods, and performing household tasks.  Next session anticipate patient to progress bed mobility, transfers, gait, and standing prolonged periods.  Physical Therapy will continue to follow patient for duration of hospitalization.    Patient continues to benefit from continued skilled PT services: to facilitate return to prior level of function as patient demonstrates high motivation with excellent tolerance to an intensive therapy program .    PLAN DURING HOSPITALIZATION  Nursing Mobility Recommendation : 1 Assist (with 2nd person present for safety)  PT Device Recommendation: Rolling walker, Gait belt  PT Treatment Plan: Bed mobility, Endurance, Energy conservation, Patient education, Gait training, Strengthening, Stoop training, Stair training, Transfer training  Frequency (Obs): 5x/week     SUBJECTIVE  Agreeable     OBJECTIVE  Precautions: Bed/chair alarm, Hard of hearing, Cardiac    PAIN ASSESSMENT   Ratin  Location: no complaints  Management Techniques: Activity promotion, Repositioning    BALANCE  Static Sitting: Good  Dynamic Sitting: Fair +  Static Standing: Poor +  Dynamic Standing:  Poor    ACTIVITY TOLERANCE  Pulse: 59  Heart Rate Source: Monitor     BP: 90/54 (with activity)  BP Location: Right arm  BP Method: Automatic  Patient Position: Sitting     O2 WALK  Oxygen Therapy  SPO2% on Oxygen at Rest: 94  At rest oxygen flow (liters per minute): 3  SPO2% Ambulation on Oxygen: 90  Ambulation oxygen flow (liters per minute): 3    AM-PAC '6-Clicks' INPATIENT SHORT FORM - BASIC MOBILITY  How much difficulty does the patient currently have...  Patient Difficulty: Turning over in bed (including adjusting bedclothes, sheets and blankets)?: A Little   Patient Difficulty: Sitting down on and standing up from a chair with arms (e.g., wheelchair, bedside commode, etc.): A Little   Patient Difficulty: Moving from lying on back to sitting on the side of the bed?: A Little   How much help from another person does the patient currently need...   Help from Another: Moving to and from a bed to a chair (including a wheelchair)?: A Lot   Help from Another: Need to walk in hospital room?: A Lot   Help from Another: Climbing 3-5 steps with a railing?: A Lot     AM-PAC Score:  Raw Score: 15   Approx Degree of Impairment: 57.7%   Standardized Score (AM-PAC Scale): 39.45   CMS Modifier (G-Code): CK    FUNCTIONAL ABILITY STATUS  Functional Mobility/Gait Assessment  Gait Assistance: Moderate assistance  Distance (ft): 3 ft and 5 ft  Assistive Device: Rolling walker  Pattern: Shuffle (decreased maribel speed, decreased step length, flexed posture. Cues provided for step sequencing and rolling walker management.)  Rolling: contact guard assist  Supine to Sit: contact guard assist, increased time to complete  Sit to Stand: minimal assist from EOB and rolling commode    Skilled Therapy Provided: Patient in bed with niece present in room upon arrival. RN approved activity. Educated patient on POC and benefits of mobilization. Agreeable to participate. Patient reporting no pain. Patient benefits from increased cues, rest  breaks, and physical assistance in order to complete functional mobility tasks. Patient with delayed processing and impaired motor planning, requiring increased time and cues to initiate tasks and follow commands. Patient able to take a few steps to and from the rolling commode with the use of the rolling walker. Following activity, patient reporting slightly dizziness that improved once seated in bedside chair. BP noted to be 90/54 mmHg - RN aware. Encouraged patient to sit in bedside chair 2-3x/day in order to improve tolerance for out of bed activities.     The patient's Approx Degree of Impairment: 57.7% has been calculated based on documentation in the [facility] '6 clicks' Inpatient Daily Activity Short Form.  Research supports that patients with this level of impairment may benefit from rehab.  Final disposition will be made by interdisciplinary medical team.    Patient End of Session: Up in chair, Needs met, Call light within reach, RN aware of session/findings, All patient questions and concerns addressed, Hospital anti-slip socks, Alarm set, Family present    CURRENT GOALS   Goals to be met by: 6/30/25  Patient Goal Patient's self-stated goal is: to go home   Goal #1 Patient is able to demonstrate supine - sit EOB @ level: modified independent      Goal #1   Current Status  CGA   Goal #2 Patient is able to demonstrate transfers Sit to/from Stand at assistance level: supervision with walker - rolling      Goal #2  Current Status  Min A from EOB and rolling commode with RW   Goal #3 Patient is able to ambulate 100 feet with assist device: walker - rolling at assistance level: minimum assistance   Goal #3   Current Status  Mod A 3 ft and 5 ft with RW   Goal #4 Patient will negotiate 4 stairs/one curb w/ assistive device and minimal assistance   Goal #4   Current Status  NT   Goal #5 Patient to demonstrate independence with home activity/exercise instructions provided to patient in preparation for discharge.   Goal  #5   Current Status  Ongoing     Therapeutic Activity: 34 minutes

## 2025-06-19 NOTE — PROGRESS NOTES
Progress Note  Imer Mcguire Patient Status:  Inpatient    1931 MRN [id number]   Location [facility]5W Attending Severo Lacey MD   Hosp Day # 5 PCP OMAR LEAVITT     Subjective:  Episode of hemoptysis and melena this morning, hgb drop 1g overnight  Family states he has had multiple bloody stools since yesterday    Objective:  /59 (BP Location: Right arm)   Pulse 56   Temp 97.6 °F (36.4 °C) (Oral)   Resp 18   Ht 5' 8\" (1.727 m)   Wt 154 lb 8.7 oz (70.1 kg)   SpO2 95%   BMI 23.50 kg/m²     Telemetry: SR, PACs    Intake/Output:    Intake/Output Summary (Last 24 hours) at 2025 0906  Last data filed at 2025 0506  Gross per 24 hour   Intake 800 ml   Output 700 ml   Net 100 ml     Last 3 Weights   25 0500 154 lb 8.7 oz (70.1 kg)   25 1811 155 lb (70.3 kg)   25 1357 155 lb (70.3 kg)   24 0441 147 lb 6.4 oz (66.9 kg)   24 1757 141 lb 1.6 oz (64 kg)   24 1353 150 lb (68 kg)   17 0700 174 lb 4.8 oz (79.1 kg)   17 0700 174 lb 1.6 oz (79 kg)   17 0600 173 lb 12.8 oz (78.8 kg)   17 0600 175 lb 6.4 oz (79.6 kg)   17 0500 177 lb 3.2 oz (80.4 kg)   17 0600 177 lb 3.2 oz (80.4 kg)   17 0547 177 lb 5 oz (80.4 kg)   17 0916 170 lb (77.1 kg)     Labs:  Recent Labs   Lab 25  1405 25  0437 25  0158   * 98 95   BUN 67* 73* 72*   CREATSERUM 2.39* 2.74* 2.78*   EGFRCR 25* 21* 21*   CA 8.3* 8.5* 8.5*    134* 134*   K 4.6 5.1 4.3    104 105   CO2 21.0 20.0* 16.0*     Recent Labs   Lab 25  0455 25  1405 25  0437 25  0158 25  0622   RBC 4.70 4.43 4.55 4.35  --    HGB 14.7 14.1 14.4 13.8 12.7*   HCT 43.9 42.0 44.3 39.9 36.8*   MCV 93.4 94.8 97.4 91.7  --    MCH 31.3 31.8 31.6 31.7  --    MCHC 33.5 33.6 32.5 34.6  --    RDW 13.7 13.7 13.7 13.7  --    NEPRELIM 34.39*  --  37.47* 30.49*  --    WBC 35.8* 33.8* 39.8* 33.0*  --    .0 186.0 145.0*   145.0* 124.0*  124.0* 168.0     Recent Labs   Lab 06/18/25  2218   CK 27*     Lab Results   Component Value Date/Time    HDL 42 07/17/2018 07:27 AM    LDL 68 07/17/2018 07:27 AM    TRIG 73 07/17/2018 07:27 AM     No results found for: \"DDIMER\"  Lab Results   Component Value Date    TSH 3.139 06/17/2025     Review of Systems:   Constitutional: No fevers, chills, fatigue or night sweats.  Respiratory: Denies cough, wheezing or shortness of breath.  CV: Denies chest pain, palpitations, orthopnea, PND or dizziness.  GI: No nausea, vomiting or diarrhea. No blood in stools.    Physical Exam:   General: Alert, cooperative, no distress, appears stated age.  Neck: no JVD.  Lungs: rhonchi bilaterally.  Chest wall: No tenderness or deformity.  Heart: Regular rate and rhythm, S1, S2 normal, no murmur, click, rub or gallop.  Abdomen: Soft, non-tender. Bowel sounds normal. No masses,  No organomegaly.  Extremities: Extremities normal, atraumatic, no cyanosis or edema.  Pulses: 2+ and symmetric all extremities.  Neurologic: Grossly intact.    Medications:  Scheduled Medications[1]  Medication Infusions[2]    Assessment:    89 year old male with PMH of CAD s/p CABG, CKD, HTN, HLD who presented with legionella pneumonia. Cardiology was consulted for new onset AFRVR.     New onset atrial fibrillation with RVR  2/2 infection as above, inflammation  -IV amiodarone, now on PO 400mg BID  -will load for one week, then decrease to 200mg daily starting on 6/25  -LVEF preserved 50-55% with moderate aortic stenosis, mild-mod tricuspid regurgitation  -CHADS-VASc= 4 for age, CAD, HTN  -toprol for beta blockade  -AC with heparin, now having bloody stools and hemoptysis, will stop heparin gtt  -will need DOAC when deemed safe due to elevated CHADS-VASc  -daily EKG to monitor QT interval with QT prolonging abx    Pneumonia 2/2 legionella  Admitted with hypoxic respiratory failure 2/2 pna  -pulm following  -remains on 5L NC  -Abx with  Azithromycin, zosyn, levaquin per ID  -sputum cx contaminated  -CT A/P with extensive right lung airspace disease  -WBC 33 on 6/18, repeat pending, afebrile    CAD s/p CABG x4, 2017  CABG x4  (LIMA-LAD, SVG-RCA, SVG-OM1,SVG-OM2) 1/25/2017  -Denies anginal symptoms  -on full dose asa, high intensity statin, BB  -EKG stable without acute ischemic changes    HLD-statin, LDL 70 in 2023    Moderate aortic stenosis   Mean gradient 17mmHg    Mild to moderate tricuspid regurgitation  OP echo in 2024 noted moderate TR, stable    SIERRA on CKD3  Cr up to 3.07 on labs today  -Baseline cr 1  -admitting cr 1.5  -primary notes document IVF infusing, pt is not currently on IVF, would benefit from it as he admits to dry mouth  -would appreciate nephrology recs given continuously rising Scr    Hyponatremia  Na 134 on 6/17 and 6/18, now improved to 136    Hypothyroid-synthroid    Acute bleeding  Multiple melanotic stools noted per family over the past 24 hours, hemoptysis event this morning  -consider GI consult for melena  -discussed with family that hemoptysis could be 2/2 pna, will defer w/u decision to attending  -Hgb 15.8 on admission, was 13.8 on 6/18, today 12.0  -stop heparin gtt, decrease asa to 81mg daily    Plan:  -add on CBC, BMP   -EKG now and daily to monitor QT with azithromax  -stop heparin due to melena and hemoptysis  -decrease asa to 81mg daily  -consider GI consult  -continue amiodarone BID for one week, decrease to 200mg daily on 6/25  -continue asa, statin, toprol  -nephrology consult with rising Cr      Plan of care discussed with patient, RN.        Yesi Arboleda, MSN, APN, FNP-BC, CCK  6/19/2025  9:06 AM           [1]    metoprolol succinate ER  25 mg Oral 2x Daily(Beta Blocker)    amiodarone  400 mg Oral BID with meals    azithromycin  500 mg Intravenous Q24H    aspirin  325 mg Oral Daily    atorvastatin  40 mg Oral Nightly    levothyroxine  100 mcg Oral Daily   [2]    continuous dose heparin 1,400 Units/hr  (06/19/25 2807)

## 2025-06-19 NOTE — CONSULTS
[facility]  part of [facility]    Report of Consultation    Imer Mcguire Patient Status:  Inpatient    1931 MRN [id number]   Location [facility]5W Attending Severo Lacey MD   Hosp Day # 5 PCP OMAR LEAVITT     Date of Admission:  2025  Date of Consult:  2025   Reason for Consultation:   SIERRA    History of Present Illness:   Patient is a 89 year old male who was admitted to the hospital for Sepsis due to pneumonia (HCC):  The patient has a baseline creatinine of 1.5, while he has been here in the hospital he has been diagnosed with Legionella pneumonia.    His creatinine is up to 3 today.  I am asked to see him for this      Past Medical History  Past Medical History:   Atherosclerosis of coronary artery    Cortical senile cataract    Disorder of thyroid    Dyslipidemia    Essential hypertension    High blood pressure    Hypothyroidism    Senile cataract       Past Surgical History  Past Surgical History:  Procedure Laterality Date    Cataract extraction w/  intraocular lens implant Right 2019    Dr. Schwarz @ [facility]    Cataract extraction w/  intraocular lens implant Left 2019    Dr. Schwarz @ [facility]       Family History  Family History  Problem Relation Age of Onset    Other (Other) Father     Cancer Mother     Cancer Brother     Diabetes Neg     Glaucoma Neg        Social History  Social History  Socioeconomic History    Marital status:    Tobacco Use    Smoking status: Never   Vaping Use    Vaping status: Never Used   Substance and Sexual Activity    Alcohol use: Yes     Alcohol/week: 2.0 standard drinks of alcohol     Types: 2 Glasses of wine per week    Drug use: No     Social Drivers of Health     Food Insecurity: No Food Insecurity (2025)    NCSS - Food Insecurity     Worried About Running Out of Food in the Last Year: No     Ran Out of Food in the Last Year: No   Transportation Needs: No Transportation Needs (2025)    NCSS - Transportation      Lack of Transportation: No   Housing Stability: Not At Risk (6/14/2025)    NCSS - Housing/Utilities     Has Housing: Yes     Worried About Losing Housing: No     Unable to Get Utilities: No       Current Medications:  Current Facility-Administered Medications   Medication Dose Route Frequency    aspirin DR tab 81 mg  81 mg Oral Daily    potassium chloride 20 mEq/100mL IVPB premix 20 mEq  20 mEq Intravenous Once    sodium chloride 0.9% infusion   Intravenous Continuous    metoprolol succinate ER (Toprol XL) 24 hr tab 25 mg  25 mg Oral 2x Daily(Beta Blocker)    amiodarone (Pacerone) tab 400 mg  400 mg Oral BID with meals    azithromycin (Zithromax) 500 mg in sodium chloride 0.9% 250mL IVPB premix  500 mg Intravenous Q24H    albuterol (Ventolin) (2.5 MG/3ML) 0.083% nebulizer solution 2.5 mg  2.5 mg Nebulization Q4H PRN    atorvastatin (Lipitor) tab 40 mg  40 mg Oral Nightly    levothyroxine (Synthroid) tab 100 mcg  100 mcg Oral Daily    acetaminophen (Tylenol) tab 650 mg  650 mg Oral Q6H PRN    calcium carbonate (Tums) chewable tab 500 mg  500 mg Oral BID PRN     Medications Prior to Admission   Medication Sig    lisinopril 2.5 MG Oral Tab Take 1 tablet (2.5 mg total) by mouth in the morning.    atorvastatin 40 MG Oral Tab Take 1 tablet (40 mg total) by mouth nightly.    aspirin 325 MG Oral Tab Take 1 tablet (325 mg total) by mouth in the morning.    levothyroxine 75 MCG Oral Tab Take 100 mcg by mouth in the morning.         Review of Systems:     General: Weak        A comprehensive 12 point review of systems was completed.  Pertinent positives as above and all the rest were negative.     Physical Exam:   /58 (BP Location: Right arm)   Pulse 58   Temp 97.3 °F (36.3 °C) (Oral)   Resp 18   Ht 5' 8\" (1.727 m)   Wt 154 lb 8.7 oz (70.1 kg)   SpO2 92%   BMI 23.50 kg/m²      Intake/Output Summary (Last 24 hours) at 6/19/2025 1359  Last data filed at 6/19/2025 1340  Gross per 24 hour   Intake 1270 ml   Output 700  ml   Net 570 ml     Wt Readings from Last 1 Encounters:   06/18/25 154 lb 8.7 oz (70.1 kg)       Exam  Gen: No acute distress  Heent: NC AT, mucous memb clear, neck supple  Pulm: Lungs clear, normal respiratory effort  CV: Heart with regular rate and rhythm, no edema  Abd: Abdomen soft, nontender, nondistended, no organomegaly, bowel sounds present  Skin: no symptoms reported  Psych: alert and oriented        Results:     Laboratory Data:  Recent Labs   Lab 06/17/25  0437 06/18/25  0158 06/19/25  0622 06/19/25  1104   RBC 4.55 4.35  --  3.75*   HGB 14.4 13.8 12.7* 12.0*   HCT 44.3 39.9 36.8* 34.2*   MCV 97.4 91.7  --  91.2   MCH 31.6 31.7  --  32.0   MCHC 32.5 34.6  --  35.1   RDW 13.7 13.7  --  15.9*   NEPRELIM 37.47* 30.49*  --  19.00*   WBC 39.8* 33.0*  --  21.3*   .0*  145.0* 124.0*  124.0* 168.0 167.0         Recent Labs   Lab 06/17/25  0437 06/18/25  0158 06/19/25  0630   GLU 98 95 90   BUN 73* 72* 113*   CREATSERUM 2.74* 2.78* 3.07*   CA 8.5* 8.5* 8.5*   * 134* 136   K 5.1 4.3 3.8    105 106   CO2 20.0* 16.0* 19.0*        Imaging:  No results found.            Impression/Receommendations:   1 - SIERRA  Possibly due to volume depletion.  Will start IV fluids.  Check renal ultrasound    Lisinopril on hold    2 -respiratory failure/Legionella pneumonia  Being seen by pulmonary and infectious disease.  He is on azithromycin    Discussed with the patient's niece at the bedside  Thank you for allowing me to participate in the care of your patient.    ARIANA GONZALEZ MD  6/19/2025

## 2025-06-19 NOTE — PLAN OF CARE
Problem: Patient Centered Care  Goal: Patient preferences are identified and integrated in the patient's plan of care  Description: Interventions:  - What would you like us to know as we care for you? From home alone  - Provide timely, complete, and accurate information to patient/family  - Incorporate patient and family knowledge, values, beliefs, and cultural backgrounds into the planning and delivery of care  - Encourage patient/family to participate in care and decision-making at the level they choose  - Honor patient and family perspectives and choices  Outcome: Progressing     Problem: Patient/Family Goals  Goal: Patient/Family Long Term Goal  Description: Patient's Long Term Goal: discharge from hospital    Interventions:  -Monitor VS  -Monitor Appropriate labs  -Follow MD orders  -Diagnostics per order  -Update/informing patient and family on plan of care  -Discharge planning   - See additional Care Plan goals for specific interventions  Outcome: Progressing  Goal: Patient/Family Short Term Goal  Description: Patient's Short Term Goal: Feel better     Interventions:   -Monitor VS  -Monitor Appropriate labs  -Follow MD orders  -Diagnostics per order  -Update/informing patient and family on plan of care  -Discharge planning   - See additional Care Plan goals for specific interventions  Outcome: Progressing     Problem: PAIN - ADULT  Goal: Verbalizes/displays adequate comfort level or patient's stated pain goal  Description: INTERVENTIONS:  - Encourage pt to monitor pain and request assistance  - Assess pain using appropriate pain scale  - Administer analgesics based on type and severity of pain and evaluate response  - Implement non-pharmacological measures as appropriate and evaluate response  - Consider cultural and social influences on pain and pain management  - Manage/alleviate anxiety  - Utilize distraction and/or relaxation techniques  - Monitor for opioid side effects  - Notify MD/LIP if  interventions unsuccessful or patient reports new pain  - Anticipate increased pain with activity and pre-medicate as appropriate  Outcome: Progressing     Problem: SAFETY ADULT - FALL  Goal: Free from fall injury  Description: INTERVENTIONS:  - Assess pt frequently for physical needs  - Identify cognitive and physical deficits and behaviors that affect risk of falls.  - [place] fall precautions as indicated by assessment.  - Educate pt/family on patient safety including physical limitations  - Instruct pt to call for assistance with activity based on assessment  - Modify environment to reduce risk of injury  - Provide assistive devices as appropriate  - Consider OT/PT consult to assist with strengthening/mobility  - Encourage toileting schedule  Outcome: Progressing     Problem: DISCHARGE PLANNING  Goal: Discharge to home or other facility with appropriate resources  Description: INTERVENTIONS:  - Identify barriers to discharge w/pt and caregiver  - Include patient/family/discharge partner in discharge planning  - Arrange for needed discharge resources and transportation as appropriate  - Identify discharge learning needs (meds, wound care, etc)  - Arrange for interpreters to assist at discharge as needed  - Consider post-discharge preferences of patient/family/discharge partner  - Complete POLST form as appropriate  - Assess patient's ability to be responsible for managing their own health  - Refer to Case Management Department for coordinating discharge planning if the patient needs post-hospital services based on physician/LIP order or complex needs related to functional status, cognitive ability or social support system  Outcome: Progressing     Problem: RESPIRATORY - ADULT  Goal: Achieves optimal ventilation and oxygenation  Description: INTERVENTIONS:  - Assess for changes in respiratory status  - Assess for changes in mentation and behavior  - Position to facilitate oxygenation and minimize respiratory  effort  - Oxygen supplementation based on oxygen saturation or ABGs  - Provide Smoking Cessation handout, if applicable  - Encourage broncho-pulmonary hygiene including cough, deep breathe, Incentive Spirometry  - Assess the need for suctioning and perform as needed  - Assess and instruct to report SOB or any respiratory difficulty  - Respiratory Therapy support as indicated  - Manage/alleviate anxiety  - Monitor for signs/symptoms of CO2 retention  Outcome: Progressing     Problem: Impaired Functional Mobility  Goal: Achieve highest/safest level of mobility/gait  Description: Interventions:  - Assess patient's functional ability and stability  - Promote increasing activity/tolerance for mobility and gait  - Educate and engage patient/family in tolerated activity level and precautions  Outcome: Progressing     Problem: CARDIOVASCULAR - ADULT  Goal: Maintains optimal cardiac output and hemodynamic stability  Description: INTERVENTIONS:  - Monitor vital signs, rhythm, and trends  - Monitor for bleeding, hypotension and signs of decreased cardiac output  - Evaluate effectiveness of vasoactive medications to optimize hemodynamic stability  - Monitor arterial and/or venous puncture sites for bleeding and/or hematoma  - Assess quality of pulses, skin color and temperature  - Assess for signs of decreased coronary artery perfusion - ex. Angina  - Evaluate fluid balance, assess for edema, trend weights  Outcome: Progressing  Goal: Absence of cardiac arrhythmias or at baseline  Description: INTERVENTIONS:  - Continuous cardiac monitoring, monitor vital signs, obtain 12 lead EKG if indicated  - Evaluate effectiveness of antiarrhythmic and heart rate control medications as ordered  - Initiate emergency measures for life threatening arrhythmias  - Monitor electrolytes and administer replacement therapy as ordered  Outcome: Progressing     Problem: NEUROLOGICAL - ADULT  Goal: Achieves stable or improved neurological  status  Description: INTERVENTIONS  - Assess for and report changes in neurological status  - Initiate measures to prevent increased intracranial pressure  - Maintain blood pressure and fluid volume within ordered parameters to optimize cerebral perfusion and minimize risk of hemorrhage  - Monitor temperature, glucose, and sodium. Initiate appropriate interventions as ordered  Outcome: Progressing  Goal: Absence of seizures  Description: INTERVENTIONS  - Monitor for seizure activity  - Administer anti-seizure medications as ordered  - Monitor neurological status  Outcome: Progressing  Goal: Remains free of injury related to seizure activity  Description: INTERVENTIONS:  - Maintain airway, patient safety  and administer oxygen as ordered  - Monitor patient for seizure activity, document and report duration and description of seizure to MD/LIP  - If seizure occurs, turn patient to side and suction secretions as needed  - Reorient patient post seizure  - Seizure pads on all 4 side rails  - Instruct patient/family to notify RN of any seizure activity  - Instruct patient/family to call for assistance with activity based on assessment  Outcome: Progressing  Goal: Achieves maximal functionality and self care  Description: INTERVENTIONS  - Monitor swallowing and airway patency with patient fatigue and changes in neurological status  - Encourage and assist patient to increase activity and self care with guidance from PT/OT  - Encourage visually impaired, hearing impaired and aphasic patients to use assistive/communication devices  Outcome: Progressing

## 2025-06-19 NOTE — OCCUPATIONAL THERAPY NOTE
OCCUPATIONAL THERAPY TREATMENT NOTE - INPATIENT        Room Number: 523/523-A     Presenting Problem: Sepsis due to PNA    Problem List  Principal Problem:    Sepsis due to pneumonia (HCC)      OCCUPATIONAL THERAPY ASSESSMENT   Patient demonstrates limited progress this session, goals remain in progress.    Patient is requiring contact guard assist, moderate assist, and maximum assist as a result of the following impairments: decreased functional strength, decreased endurance, impaired standing balance, impaired coordination, impaired motor planning, decreased muscular endurance, cognitive deficits (A&O x2-3, attention, sequencing, delayed processing), medical status, and increased O2 needs from baseline.    Patient continues to function below baseline with toileting, bathing, lower body dressing, grooming, bed mobility, transfers, static standing balance, dynamic standing balance, functional standing tolerance, and aerobic capacity.  Next session anticipate patient to progress toileting, lower body dressing, transfers, and functional standing tolerance.  Occupational Therapy will continue to follow patient for duration of hospitalization.    Patient continues to benefit from continued skilled OT services: to facilitate return to prior level of function as patient demonstrates high motivation with excellent tolerance to an intensive therapy program.     PLAN DURING HOSPITALIZATION     OT Treatment Plan: Balance activities, Energy conservation/work simplification techniques, ADL training, Functional transfer training, Endurance training, Cognitive reorientation, Patient/Family education, Patient/Family training, Compensatory technique education     SUBJECTIVE  \"We have lots of musicians in our family.\"    OBJECTIVE  Precautions: Bed/chair alarm, Hard of hearing, Other (Comment) (supplemental O2)       PAIN ASSESSMENT  Ratin    ACTIVITY TOLERANCE           BP: 90/54  BP Location: Right arm  BP Method:  Automatic  Patient Position: Sitting    O2 SATURATIONS  Oxygen Therapy  SPO2% on Oxygen at Rest: 94  At rest oxygen flow (liters per minute): 3  SPO2% Ambulation on Oxygen: 90  Ambulation oxygen flow (liters per minute): 3    ACTIVITIES OF DAILY LIVING ASSESSMENT  AM-PAC ‘6-Clicks’ Inpatient Daily Activity Short Form  How much help from another person does the patient currently need…  -   Putting on and taking off regular lower body clothing?: A Lot  -   Bathing (including washing, rinsing, drying)?: A Lot  -   Toileting, which includes using toilet, bedpan or urinal? : A Lot  -   Putting on and taking off regular upper body clothing?: A Little  -   Taking care of personal grooming such as brushing teeth?: A Little  -   Eating meals?: A Little    AM-PAC Score:  Score: 15  Approx Degree of Impairment: 56.46%  Standardized Score (AM-PAC Scale): 34.69  CMS Modifier (G-Code): CK    FUNCTIONAL TRANSFER ASSESSMENT  Sit to Stand: Edge of Bed  Edge of Bed: Minimal Assist  Commode Transfer: Moderate Assist    BED MOBILITY  Supine to Sit : Contact Guard Assist    BALANCE ASSESSMENT  Static Sitting: Contact Guard Assist  Static Standing: Minimal Assist  Dynamic sitting: CGA  Dynamic Standing: Mod assist      FUNCTIONAL ADL ASSESSMENT  LB Dressing Seated: Maximum Assist (to doff soiled brief and jair fresh one from rolling chair)  LB Dressing Standing: Maximum Assist (to jair/doff brief before and after toileting)  Toileting Standing: Maximum Assist (for bowel jamil cares)      Skilled Therapy Provided:   RN cleared pt for participation. Session coordinated with PT. Pt received in bed with niece present. Pt agreeable to participation in therapy. Pt tolerated treatment fairly well but continues to require significant assist for ADLs, transfers, and mobility due to decreased strength, impaired motor planning/processing/sequencing, and decreased aerobic capacity. Pt required mod assist to complete bed to rolling chair transfer w/  RW support; benefited from verbal cues for sequencing and RW management. Pt required max assist for all ADLs; could not initiate doffing/donning of briefs despite multiple verbal/tactile cues from therapists and required max assist for bowel pericares. Pt with decreased activity tolerance; required mod assist to complete ~10 steps of functional mobility after toileting to complete rolling chair to recliner chair transfer. Vitals monitored and reported above; pt reporting slight dizziness after mobility that improved with seated rest break. Pt remained in recliner with all needs in reach and alarm on. RN aware.       EDUCATION PROVIDED  Patient Education : Role of Occupational Therapy; Plan of Care; Functional Transfer Techniques; Fall Prevention; Posture/Positioning; Energy Conservation; Proper Body Mechanics  Patient's Response to Education: Verbalized Understanding; Returned Demonstration  Family/Caregiver Education : Role of Occupational Therapy; Plan of Care  Family/Caregiver's Response to Education: Verbalized Understanding    The patient's Approx Degree of Impairment: 56.46% has been calculated based on documentation in the [facility] '6 clicks' Inpatient Daily Activity Short Form.  Research supports that patients with this level of impairment may benefit from rehab.  Final disposition will be made by interdisciplinary medical team.    Patient End of Session: Up in chair, Needs met, Call light within reach, RN aware of session/findings, Hospital anti-slip socks, All patient questions and concerns addressed, Alarm set, Family present    OT Goals:  Patients self stated goal is: none stated     Patient will complete functional transfer with SBA  Comment: ongoing- mod assist     Patient will complete toileting with SBA  Comment: ongoing- max assist      Patient will tolerate standing for 5 minutes in prep for adls with SBA   Comment: ongoing- mod assist     Patient will complete item retrieval with SBA  Comment: na           Goals  on: 25  Frequency: 3-5x/week    OT Session Time: 35 minutes  Self-Care Home Management: 35 minutes

## 2025-06-19 NOTE — PROGRESS NOTES
Double RN skin check done prior to transfer off Unit. Skin check performed by this RN and Kandy RN.     Wounds are as follows: bottom reddened no open area. Mepilex in place, bruising on both hands.    Will remain available for any further questions or concerns.

## 2025-06-19 NOTE — PROGRESS NOTES
[facility]  part of [facility]     Progress Note    Imer Mcguire Patient Status:  Inpatient    1931 MRN [id number]   Location [facility] 2W/SW Attending Severo Lacey MD   Hosp Day # 5 PCP OMAR LEAVITT       Subjective:   Patient seen and examined.  Currently on 5 L oxygen.  Some occasional cough.  Dyspnea gradually improving per patient    Objective:   Blood pressure 111/59, pulse 56, temperature 97.6 °F (36.4 °C), temperature source Oral, resp. rate 18, height 5' 8\" (1.727 m), weight 154 lb 8.7 oz (70.1 kg), SpO2 95%.  Intake/Output:   Last 3 shifts: I/O last 3 completed shifts:  In: 1705 [P.O.:740; I.V.:715; IV PIGGYBACK:250]  Out: 1000 [Urine:1000]   This shift: No intake/output data recorded.     Vent Settings:      Hemodynamic parameters (last 24 hours):      Scheduled Meds: Current Hospital Medications[1]    Continuous Infusions: Medication Infusions[2]    Physical Exam  Constitutional: no acute distress  Eyes: PERRL  ENT: nares pateint  Neck: supple, no JVD  Cardio: RRR, S1 S2  Respiratory: Right-sided crackles  GI: abdomen soft, non tender, active bowel sounds, no organomegaly  Extremities: no clubbing, cyanosis, edema  Neurologic: no gross motor deficits  Skin: warm, dry      Results:     Lab Results   Component Value Date    HGB 12.7 2025    HCT 36.8 2025    .0 2025    CREATSERUM 3.07 2025     2025     2025    K 3.8 2025     2025    CO2 19.0 2025    GLU 90 2025    CA 8.5 2025    PTT 44.3 2025    CK 27 2025       CT CHEST+ABDOMEN+PELVIS(CPT=71250/84466)  Result Date: 2025  CONCLUSION:  1. Extensive right lung airspace disease with intrinsic air bronchograms (most pronounced in the right upper lobe), which is compatible with suspected contents of lesion along pneumonia.  Follow-up to resolution is advised. 2. Small right and trace left pleural effusions.   Probable associated compressive atelectasis at the lung bases.  Mild-moderate anasarca with trace intra-abdominal ascites.  These findings suggest fluid overload and/or mild congestive failure. 3. Coronary and peripheral atherosclerosis with coronary artery bypass. 4. Prominent in number and borderline enlarged mediastinal lymph nodes, which are probably reactive to pneumonia. 5. Punctate nonobstructing left lower pole renal calculus.  Indeterminate attenuation 1.2 cm right interpolar renal cortical lesion is incompletely characterized, but statistically relates to a benign proteinaceous/hemorrhagic cyst.  There is also left lower pole renal cortical scarring. 6. Prostatomegaly; enlarged prostate gland indents urinary bladder base. Circumferential urinary bladder wall thickening, which may relate to incomplete distention, chronic partial outlet obstruction prostate gland enlargement or cystitis.  If there are referable symptoms, urinalysis correlation is requested. 7. Colonic diverticulosis. 8. Small retrocardiac hiatal hernia. 9. Cholelithiasis. 10. Chronic-appearing right greater than left sacroiliitis. 11. Lesser incidental findings as above.     elm-remote  Dictated by (CST): Toan Ohara MD on 6/17/2025 at 3:48 PM     Finalized by (CST): Toan Ohara MD on 6/17/2025 at 4:01 PM            EKG 12 Lead  Result Date: 6/18/2025  Normal sinus rhythm with Premature atrial complexes Abnormal ECG When compared with ECG of 16-JUN-2025 14:59, Vent. rate has decreased BY  61 BPM Confirmed by vincent teran ([id number]) on 6/18/2025 4:44:09 PM      Assessment   1.  Severe Legionella pneumonia  2.  Acute hypoxemic respiratory failure  3.  Fevers  4.  Leukocytosis  5.  Coronary artery disease  6.  Acute kidney injury  7.  Hypertension  8.  Atrial fibrillation with rapid ventricular response     Plan   -Patient presents with evidence of fatigue malaise some associated cough and mild dyspnea.  Hypoxic on presentation to  emergency department.  - Legionella antigen positive.    - CT chest on 6/17/2025 with extensive right-sided opacities seen consistent with pneumonia.  Trace pleural effusion present.  - Monitor leukocytosis at this time  - Antibiotic therapy per ID recommendations.  Levaquin discontinued currently on azithromycin.  - Wean oxygen as tolerated currently on 5 L  - Atrial fibrillation management per cardiology has been started on amiodarone gtt initially now on p.o. amiodarone.  -Heparin GTT  -PT/OT  - Discussed with family at bedside.      Bernabe Davis DO  Pulmonary Critical Care Medicine  [facility]          [1]   Current Facility-Administered Medications   Medication Dose Route Frequency    aspirin DR tab 81 mg  81 mg Oral Daily    metoprolol succinate ER (Toprol XL) 24 hr tab 25 mg  25 mg Oral 2x Daily(Beta Blocker)    amiodarone (Pacerone) tab 400 mg  400 mg Oral BID with meals    azithromycin (Zithromax) 500 mg in sodium chloride 0.9% 250mL IVPB premix  500 mg Intravenous Q24H    albuterol (Ventolin) (2.5 MG/3ML) 0.083% nebulizer solution 2.5 mg  2.5 mg Nebulization Q4H PRN    atorvastatin (Lipitor) tab 40 mg  40 mg Oral Nightly    levothyroxine (Synthroid) tab 100 mcg  100 mcg Oral Daily    acetaminophen (Tylenol) tab 650 mg  650 mg Oral Q6H PRN    calcium carbonate (Tums) chewable tab 500 mg  500 mg Oral BID PRN   [2]

## 2025-06-19 NOTE — PROGRESS NOTES
[facility]  part of [facility]    Progress Note    Imer Mcguire Patient Status:  Inpatient    1931 MRN [id number]   Location [facility] 5SW/SE Attending Severo Lacey MD   Hosp Day # 5 PCP OMAR LEAVITT       SUBJECTIVE:  Feeling better.  Breathing is better.  Malia Puckettece is at bedside       OBJECTIVE:  Vital signs in last 24 hours:  /55 (BP Location: Right arm)   Pulse 62   Temp 97.3 °F (36.3 °C) (Oral)   Resp 18   Ht 5' 8\" (1.727 m)   Wt 154 lb 8.7 oz (70.1 kg)   SpO2 98%   BMI 23.50 kg/m²     Intake/Output:    Intake/Output Summary (Last 24 hours) at 2025 0551  Last data filed at 2025 0506  Gross per 24 hour   Intake 900 ml   Output 700 ml   Net 200 ml       Wt Readings from Last 3 Encounters:   25 154 lb 8.7 oz (70.1 kg)   24 147 lb 6.4 oz (66.9 kg)   17 174 lb 4.8 oz (79.1 kg)       Exam  Gen: No acute distress, alert   HEENT: No pallor no icterus  Pulm: Lungs crackles are noted on the right side.  Nonmeat better than yesterday  CV: Heart with irregular rate and rhythm, no peripheral edema  Abd: Abdomen soft, nontender, nondistended, no organomegaly, bowel sounds present  Skin: no rashes or lesions  CNS: Alert    Data Review:     Labs:   Lab Results   Component Value Date    PTT 52.9 2025    CK 27 2025       LABS  Recent Labs   Lab 25  1409 06/15/25  0617 25  0455 25  1405 25  1920 25  0437 25  1143 25  0158 25  1057 25  1846 25  2218   RBC 5.08 5.07 4.70 4.43  --  4.55  --  4.35  --   --   --    HGB 15.8 15.8 14.7 14.1  --  14.4  --  13.8  --   --   --    HCT 47.9 48.4 43.9 42.0  --  44.3  --  39.9  --   --   --    MCV 94.3 95.5 93.4 94.8  --  97.4  --  91.7  --   --   --    MCH 31.1 31.2 31.3 31.8  --  31.6  --  31.7  --   --   --    MCHC 33.0 32.6 33.5 33.6  --  32.5  --  34.6  --   --   --    RDW 13.3 13.5 13.7 13.7  --  13.7  --  13.7  --   --   --     NEPRELIM 21.83* 25.87* 34.39*  --   --  37.47*  --  30.49*  --   --   --    WBC 23.2* 26.6* 35.8* 33.8*  --  39.8*  --  33.0*  --   --   --    .0 216.0 195.0 186.0  --  145.0*  145.0*  --  124.0*  124.0*  --   --   --    AST 43* 53*  --   --   --   --   --   --   --   --   --    ALT 32 34  --   --   --   --   --   --   --   --   --    PTT 28.5  --   --  31.4   < > 38.6*   < > 41.3* 48.0* 52.9*  --    INR 1.14  --   --   --   --   --   --   --   --   --   --    CK  --   --   --   --   --   --   --   --   --   --  27*   GLU 99 70 91 105*  --  98  --  95  --   --   --     < > = values in this interval not displayed.       Imaging:      Meds:   Current Hospital Medications[1]    Assessment  Problem List[2]  Sepsis due to pneumonia (HCC)    Pneumonia.,   Community-acquired bacterial bacterial   Secondary to Legionella  Patient on Zithromax       New onset atrial fibrillation with rapid ventricular response  Cardiology consulted.  Patient started on amiodarone      Acute hypoxic respiratory failure patient is requiring 5 L of oxygen.     Continue oxygen supplementation.     Acute kidney injury.  on IV fluids.,  Noted        Coronary disease stable.     Hypothyroidism continue the patient on levothyroxine.     Patient stable.    Thrombocytopenia    Discussed with nursing staff.  Further management will depend on the clinical course of the patient     Plan:   Continue IV antibiotics.  Continue continue other treatments.    Discussed with family at the bedside      Active Orders   LAB    Basic Metabolic Panel (8)     Frequency: Q3 Days     Number of Occurrences: Until Specified    CBC, Platelet; No Differential     Frequency: Q3 Days     Number of Occurrences: Until Specified    Hemoglobin & Hematocrit     Frequency: STAT     Number of Occurrences: 1 Occurrences    Platelet Count     Frequency: Daily Lab     Number of Occurrences: 3 Days    PTT, Activated     Frequency: Tomorrow AM draw     Number of Occurrences:  1 Occurrences   PT    IP PT eval and treat     Frequency: Once     Number of Occurrences: 1 Occurrences   Diet    Cardiac diet Cardiac; Is Patient on Accuchecks? No     Frequency: Effective Now     Number of Occurrences: Until Specified   Nursing    Cardiac monitoring     Frequency: Until Discontinued     Number of Occurrences: Until Specified    Cardiac monitoring     Frequency: Continuous     Number of Occurrences: Until Specified    Confirm that patient does not have a history of allergy or sensitivity to Heparin.     Frequency: Until Discontinued     Number of Occurrences: Until Specified    Discontinue routine platelet count, CBC, and BMP when Heparin discontinued.     Frequency: Once     Number of Occurrences: 1 Occurrences    Follow up for COPD/Pneumonia     Frequency: Once     Number of Occurrences: 1 Occurrences     Order Comments: Follow up  about one week after DC      Increase infusion dose 100 units/hr     Frequency: Once     Number of Occurrences: 1 Occurrences    Increase infusion dose 100 units/hr     Frequency: Once     Number of Occurrences: 1 Occurrences    Increase infusion dose 100 units/hr     Frequency: Once     Number of Occurrences: 1 Occurrences    Increase infusion dose 100 units/hr     Frequency: Once     Number of Occurrences: 1 Occurrences    Increase infusion dose 100 units/hr     Frequency: Once     Number of Occurrences: 1 Occurrences    Initiate electrolyte protocol     Frequency: Until Discontinued     Number of Occurrences: Until Specified    Initiate heparin IV for ACS/A-fib protocol     Frequency: Until Discontinued     Number of Occurrences: Until Specified    Intake and Output     Frequency: Q Shift     Number of Occurrences: Until Specified    No Intramuscular injections while patient on Heparin.     Frequency: Until Discontinued     Number of Occurrences: Until Specified    Notify physician     Frequency: Once     Number of Occurrences: 1 Occurrences     Order Comments: If  patient experiences abnormal bleeding or bruising, black tarry stools, or significant change in mental status or level of consciousness.      Notify physician (cardiology or ordering physician):     Frequency: Until Discontinued     Number of Occurrences: Until Specified     Order Comments: If you are discontinuing a drip without any oral diltiazem medication orders      Notify physician for BP (cardiology or ordering physician):     Frequency: Until Discontinued     Number of Occurrences: Until Specified     Order Comments: For SBP < 80 mmHg or symptomatic hypotension, discontinue drip and notify physician      Notify physician for HR (cardiology or ordering physician):     Frequency: Until Discontinued     Number of Occurrences: Until Specified     Order Comments: 1. If you are on max intravenous dose and the HR is >120  2. If HR is >140 and it has been one hour since you transitioned to oral from IV diltiazem  3. If rate < 75 for > 5 min or symptomatic bradycardia, discontinue the drip and notify physician  4. For pause greater than 3.0 seconds, hold drip and notify physician      Notify physician if 3 consecutive PTT results require intervention     Frequency: Until Discontinued     Number of Occurrences: Until Specified    Stop amiodarone and call the attending cardiologist     Frequency: PRN     Number of Occurrences: Until Specified     Order Comments: If systolic blood pressure falls to less than 90 mmHg, heart rate drops to less than 60 beats per minute, if  EKG demonstrates greater than Mobitz 1 Heart Block      Tele Box     Frequency: Once     Number of Occurrences: 1 Occurrences   Code Status    Full code Continuous     Frequency: Continuous     Number of Occurrences: Until Specified   Medications    acetaminophen (Tylenol) tab 650 mg     Frequency: Q6H PRN     Dose: 650 mg     Route: Oral    albuterol (Ventolin) (2.5 MG/3ML) 0.083% nebulizer solution 2.5 mg     Frequency: Q4H PRN     Dose: 2.5 mg      Route: Nebulization    amiodarone (Pacerone) tab 400 mg     Frequency: BID with meals     Dose: 400 mg     Route: Oral    aspirin tab 325 mg     Frequency: Daily     Dose: 325 mg     Route: Oral    atorvastatin (Lipitor) tab 40 mg     Frequency: Nightly     Dose: 40 mg     Route: Oral    azithromycin (Zithromax) 500 mg in sodium chloride 0.9% 250mL IVPB premix     Frequency: Q24H     Dose: 500 mg     Route: Intravenous    calcium carbonate (Tums) chewable tab 500 mg     Frequency: BID PRN     Dose: 500 mg     Route: Oral    heparin (Porcine) 52442 units/250mL infusion ACS/AFIB CONTINUOUS     Frequency: Continuous     Dose: 200-3,000 Units/hr     Route: Intravenous    levothyroxine (Synthroid) tab 100 mcg     Frequency: Daily     Dose: 100 mcg     Route: Oral    metoprolol succinate ER (Toprol XL) 24 hr tab 25 mg     Frequency: 2x Daily(Beta Blocker)     Dose: 25 mg     Route: Oral       Severo Lacey MD           [1]   Current Facility-Administered Medications   Medication Dose Route Frequency    metoprolol succinate ER (Toprol XL) 24 hr tab 25 mg  25 mg Oral 2x Daily(Beta Blocker)    amiodarone (Pacerone) tab 400 mg  400 mg Oral BID with meals    azithromycin (Zithromax) 500 mg in sodium chloride 0.9% 250mL IVPB premix  500 mg Intravenous Q24H    heparin (Porcine) 71435 units/250mL infusion ACS/AFIB CONTINUOUS  200-3,000 Units/hr Intravenous Continuous    albuterol (Ventolin) (2.5 MG/3ML) 0.083% nebulizer solution 2.5 mg  2.5 mg Nebulization Q4H PRN    aspirin tab 325 mg  325 mg Oral Daily    atorvastatin (Lipitor) tab 40 mg  40 mg Oral Nightly    levothyroxine (Synthroid) tab 100 mcg  100 mcg Oral Daily    acetaminophen (Tylenol) tab 650 mg  650 mg Oral Q6H PRN    calcium carbonate (Tums) chewable tab 500 mg  500 mg Oral BID PRN   [2]   Patient Active Problem List  Diagnosis    Coronary artery disease involving native heart    Metabolic encephalopathy    Myopia with astigmatism and presbyopia, bilateral     Age-related nuclear cataract of both eyes    After cataract not obscuring vision, bilateral    Acute chest pain    Sepsis due to pneumonia (HCC)

## 2025-06-19 NOTE — PROGRESS NOTES
Transferred to room 523 on tele box and O2 at 6 liter per high flow cannula.. Reported off to Yumiko DILL.Bilateral hearing aids, glasses and phone in the bag.

## 2025-06-19 NOTE — PROGRESS NOTES
INFECTIOUS DISEASE PROGRESS NOTE  [facility]  part of [facility] ID PROGRESS NOTE    Imer Mcguire Patient Status:  Inpatient    1931 MRN [id number]   Location [facility] 2W/SW Attending Severo Lacey MD   Hosp Day # 5 PCP OMAR LEAVITT     Subjective:  ROS reviewed. Down to 3L HFNC. Poor appetite.    ASSESSMENT:    Antibiotics: IV azithromycin (IV zosyn, levaquin)     # Legionella pneumonia with hypoxia with +Legionella urine Ag   -Sputum cx with oral contamination    -CT C/A/P with extensive right lung airspace disease   -Air conditioner serviced earlier this month   -[place]   # AFib on amiodarone drip  # SIERRA on CKD  # Leukocytosis with bandemia  # CAD s/p CABG, HTN, HLD        PLAN:  -  Continue on azithromycin, day 6.  -  Daily EKG.  -  Follow fever curve, wbc.  -  Reviewed labs, micro, imaging reports, available old records.  -  Case d/w patient, family, RN.     History of Present Illness:  89-year-old male with a history of CAD status post CABG in 2017, HTN, HLD, CKD who now presents to hospital sick/14 with fatigue, weakness, malaise for 1 week, cough, dyspnea.  Found to be febrile to 103.1 on admission with hypoxia, tachycardia.  Initial WBC of 26 now up to 40.  Given IV Zosyn, vancomycin, azithromycin.  Initially with worsening O2 requirements up to 15 L now improved to 6 L.  Also with A-fib on amiodarone.  Initial chest x-ray with upper lung airspace opacification consistent with pneumonia.  Blood cultures no growth.  Urine Legionella positive.  MRSA nares negative.  Also SIERRA with worsening kidney function.     Has been having issues with his air conditioning. 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    Physical Exam:  /59 (BP Location: Right arm)   Pulse 56   Temp 97.6 °F (36.4 °C) (Oral)   Resp 18   Ht 5' 8\" (1.727 m)   Wt 154 lb 8.7 oz (70.1 kg)   SpO2 94%   BMI 23.50 kg/m²     Gen:   Awake, in  bed  HEENT:  EOMI, neck supple  CV/lungs:  RRR, CTAB  Abdom:  Soft, no TTP  Skin/extrem:  No rashes, no c/c/e  :   Primofit+  Lines:  PIV+    Laboratory Data: Reviewed    Microbiology: Reviewed    Radiology: Reviewed      [facility]  [phone]  6/19/2025

## 2025-06-19 NOTE — PLAN OF CARE
Problem: Patient Centered Care  Goal: Patient preferences are identified and integrated in the patient's plan of care  Description: Interventions:  - What would you like us to know as we care for you? From home alone  - Provide timely, complete, and accurate information to patient/family  - Incorporate patient and family knowledge, values, beliefs, and cultural backgrounds into the planning and delivery of care  - Encourage patient/family to participate in care and decision-making at the level they choose  - Honor patient and family perspectives and choices  Outcome: Progressing      Problem: PAIN - ADULT  Goal: Verbalizes/displays adequate comfort level or patient's stated pain goal  Description: INTERVENTIONS:  - Encourage pt to monitor pain and request assistance  - Assess pain using appropriate pain scale  - Administer analgesics based on type and severity of pain and evaluate response  - Implement non-pharmacological measures as appropriate and evaluate response  - Consider cultural and social influences on pain and pain management  - Manage/alleviate anxiety  - Utilize distraction and/or relaxation techniques  - Monitor for opioid side effects  - Notify MD/LIP if interventions unsuccessful or patient reports new pain  - Anticipate increased pain with activity and pre-medicate as appropriate  Outcome: Progressing     Problem: SAFETY ADULT - FALL  Goal: Free from fall injury  Description: INTERVENTIONS:  - Assess pt frequently for physical needs  - Identify cognitive and physical deficits and behaviors that affect risk of falls.  - Ivor fall precautions as indicated by assessment.  - Educate pt/family on patient safety including physical limitations  - Instruct pt to call for assistance with activity based on assessment  - Modify environment to reduce risk of injury  - Provide assistive devices as appropriate  - Consider OT/PT consult to assist with strengthening/mobility  - Encourage toileting  schedule  Outcome: Progressing     Problem: DISCHARGE PLANNING  Goal: Discharge to home or other facility with appropriate resources  Description: INTERVENTIONS:  - Identify barriers to discharge w/pt and caregiver  - Include patient/family/discharge partner in discharge planning  - Arrange for needed discharge resources and transportation as appropriate  - Identify discharge learning needs (meds, wound care, etc)  - Arrange for interpreters to assist at discharge as needed  - Consider post-discharge preferences of patient/family/discharge partner  - Complete POLST form as appropriate  - Assess patient's ability to be responsible for managing their own health  - Refer to Case Management Department for coordinating discharge planning if the patient needs post-hospital services based on physician/LIP order or complex needs related to functional status, cognitive ability or social support system  Outcome: Progressing     Problem: RESPIRATORY - ADULT  Goal: Achieves optimal ventilation and oxygenation  Description: INTERVENTIONS:  - Assess for changes in respiratory status  - Assess for changes in mentation and behavior  - Position to facilitate oxygenation and minimize respiratory effort  - Oxygen supplementation based on oxygen saturation or ABGs  - Provide Smoking Cessation handout, if applicable  - Encourage broncho-pulmonary hygiene including cough, deep breathe, Incentive Spirometry  - Assess the need for suctioning and perform as needed  - Assess and instruct to report SOB or any respiratory difficulty  - Respiratory Therapy support as indicated  - Manage/alleviate anxiety  - Monitor for signs/symptoms of CO2 retention  Outcome: Progressing     Problem: Impaired Functional Mobility  Goal: Achieve highest/safest level of mobility/gait  Description: Interventions:  - Assess patient's functional ability and stability  - Promote increasing activity/tolerance for mobility and gait  - Educate and engage patient/family  in tolerated activity level and precautions  Outcome: Progressing     Problem: CARDIOVASCULAR - ADULT  Goal: Maintains optimal cardiac output and hemodynamic stability  Description: INTERVENTIONS:  - Monitor vital signs, rhythm, and trends  - Monitor for bleeding, hypotension and signs of decreased cardiac output  - Evaluate effectiveness of vasoactive medications to optimize hemodynamic stability  - Monitor arterial and/or venous puncture sites for bleeding and/or hematoma  - Assess quality of pulses, skin color and temperature  - Assess for signs of decreased coronary artery perfusion - ex. Angina  - Evaluate fluid balance, assess for edema, trend weights  Outcome: Progressing  Goal: Absence of cardiac arrhythmias or at baseline  Description: INTERVENTIONS:  - Continuous cardiac monitoring, monitor vital signs, obtain 12 lead EKG if indicated  - Evaluate effectiveness of antiarrhythmic and heart rate control medications as ordered  - Initiate emergency measures for life threatening arrhythmias  - Monitor electrolytes and administer replacement therapy as ordered  Outcome: Progressing     Problem: NEUROLOGICAL - ADULT  Goal: Achieves stable or improved neurological status  Description: INTERVENTIONS  - Assess for and report changes in neurological status  - Initiate measures to prevent increased intracranial pressure  - Maintain blood pressure and fluid volume within ordered parameters to optimize cerebral perfusion and minimize risk of hemorrhage  - Monitor temperature, glucose, and sodium. Initiate appropriate interventions as ordered  Outcome: Progressing  Goal: Absence of seizures  Description: INTERVENTIONS  - Monitor for seizure activity  - Administer anti-seizure medications as ordered  - Monitor neurological status  Outcome: Progressing  Goal: Remains free of injury related to seizure activity  Description: INTERVENTIONS:  - Maintain airway, patient safety  and administer oxygen as ordered  - Monitor  patient for seizure activity, document and report duration and description of seizure to MD/LIP  - If seizure occurs, turn patient to side and suction secretions as needed  - Reorient patient post seizure  - Seizure pads on all 4 side rails  - Instruct patient/family to notify RN of any seizure activity  - Instruct patient/family to call for assistance with activity based on assessment  Outcome: Progressing  Goal: Achieves maximal functionality and self care  Description: INTERVENTIONS  - Monitor swallowing and airway patency with patient fatigue and changes in neurological status  - Encourage and assist patient to increase activity and self care with guidance from PT/OT  - Encourage visually impaired, hearing impaired and aphasic patients to use assistive/communication devices  Outcome: Progressing     Problem: METABOLIC/FLUID AND ELECTROLYTES - ADULT  Goal: Electrolytes maintained within normal limits  Description: INTERVENTIONS:  - Monitor labs and rhythm and assess patient for signs and symptoms of electrolyte imbalances  - Administer electrolyte replacement as ordered  - Monitor response to electrolyte replacements, including rhythm and repeat lab results as appropriate  - Fluid restriction as ordered  - Instruct patient on fluid and nutrition restrictions as appropriate  Outcome: Progressing     Problem: METABOLIC/FLUID AND ELECTROLYTES - ADULT  Goal: Hemodynamic stability and optimal renal function maintained  Description: INTERVENTIONS:  - Monitor labs and assess for signs and symptoms of volume excess or deficit  - Monitor intake, output and patient weight  - Monitor urine specific gravity, serum osmolarity and serum sodium as indicated or ordered  - Monitor response to interventions for patient's volume status, including labs, urine output, blood pressure (other measures as available)  - Encourage oral intake as appropriate  - Instruct patient on fluid and nutrition restrictions as appropriate  Outcome:  Progressing     Vital signs are stable. Denies pain or discomfort. Weaned off oxygen. Bloody sputum and black stool noted. MD notified. Heparin drip discontinued. Potassium replaced per protocol.  Cough with foods, speech consulted.  Recommended bite sized and mildly thickened liquid.  IVF infusing. US pending.  Safety precautions in place.

## 2025-06-19 NOTE — PROGRESS NOTES
Provider Clarification     Additional information on the status of sepsis  severe sepsis with acute renal failure and acute respiratory failure    This note is part of the patient's medical record.

## 2025-06-20 LAB
ALBUMIN SERPL-MCNC: 2.7 G/DL (ref 3.2–4.8)
ANION GAP SERPL CALC-SCNC: 12 MMOL/L (ref 0–18)
ANION GAP SERPL CALC-SCNC: 12 MMOL/L (ref 0–18)
BASOPHILS # BLD: 0 X10(3) UL (ref 0–0.2)
BASOPHILS NFR BLD: 0 %
BUN BLD-MCNC: 120 MG/DL (ref 9–23)
BUN BLD-MCNC: 120 MG/DL (ref 9–23)
BUN/CREAT SERPL: 40.3 (ref 10–20)
BUN/CREAT SERPL: 40.3 (ref 10–20)
CALCIUM BLD-MCNC: 7.9 MG/DL (ref 8.7–10.4)
CALCIUM BLD-MCNC: 7.9 MG/DL (ref 8.7–10.4)
CHLORIDE SERPL-SCNC: 113 MMOL/L (ref 98–112)
CHLORIDE SERPL-SCNC: 113 MMOL/L (ref 98–112)
CO2 SERPL-SCNC: 18 MMOL/L (ref 21–32)
CO2 SERPL-SCNC: 18 MMOL/L (ref 21–32)
CREAT BLD-MCNC: 2.98 MG/DL (ref 0.7–1.3)
CREAT BLD-MCNC: 2.98 MG/DL (ref 0.7–1.3)
DEPRECATED RDW RBC AUTO: 49.1 FL (ref 35.1–46.3)
EGFRCR SERPLBLD CKD-EPI 2021: 19 ML/MIN/1.73M2 (ref 60–?)
EGFRCR SERPLBLD CKD-EPI 2021: 19 ML/MIN/1.73M2 (ref 60–?)
EOSINOPHIL # BLD: 0.22 X10(3) UL (ref 0–0.7)
EOSINOPHIL NFR BLD: 1 %
ERYTHROCYTE [DISTWIDTH] IN BLOOD BY AUTOMATED COUNT: 14.2 % (ref 11–15)
GLUCOSE BLD-MCNC: 95 MG/DL (ref 70–99)
GLUCOSE BLD-MCNC: 95 MG/DL (ref 70–99)
HCT VFR BLD AUTO: 34.3 % (ref 39–53)
HEMOCCULT STL QL: POSITIVE
HGB BLD-MCNC: 11.7 G/DL (ref 13–17.5)
HGB BLD-MCNC: 12.2 G/DL (ref 13–17.5)
LYMPHOCYTES NFR BLD: 0.65 X10(3) UL (ref 1–4)
LYMPHOCYTES NFR BLD: 3 %
MCH RBC QN AUTO: 31.8 PG (ref 26–34)
MCHC RBC AUTO-ENTMCNC: 34.1 G/DL (ref 31–37)
MCV RBC AUTO: 93.2 FL (ref 80–100)
METAMYELOCYTES # BLD: 0.22 X10(3) UL (ref ?–0.01)
METAMYELOCYTES NFR BLD: 1 %
MONOCYTES # BLD: 0.43 X10(3) UL (ref 0.1–1)
MONOCYTES NFR BLD: 2 %
NEUTROPHILS # BLD AUTO: 18.44 X10 (3) UL (ref 1.5–7.7)
NEUTROPHILS NFR BLD: 93 %
NEUTS HYPERSEG # BLD: 20 X10(3) UL (ref 1.5–7.7)
OSMOLALITY SERPL CALC.SUM OF ELEC: 334 MOSM/KG (ref 275–295)
OSMOLALITY SERPL CALC.SUM OF ELEC: 334 MOSM/KG (ref 275–295)
PHOSPHATE SERPL-MCNC: 5.1 MG/DL (ref 2.4–5.1)
PLATELET # BLD AUTO: 164 10(3)UL (ref 150–450)
PLATELET MORPHOLOGY: NORMAL
POTASSIUM SERPL-SCNC: 3.5 MMOL/L (ref 3.5–5.1)
POTASSIUM SERPL-SCNC: 3.6 MMOL/L (ref 3.5–5.1)
POTASSIUM SERPL-SCNC: 3.6 MMOL/L (ref 3.5–5.1)
RBC # BLD AUTO: 3.68 X10(6)UL (ref 3.8–5.8)
SODIUM SERPL-SCNC: 143 MMOL/L (ref 136–145)
SODIUM SERPL-SCNC: 143 MMOL/L (ref 136–145)
TOTAL CELLS COUNTED BLD: 100
WBC # BLD AUTO: 21.5 X10(3) UL (ref 4–11)

## 2025-06-20 PROCEDURE — 99232 SBSQ HOSP IP/OBS MODERATE 35: CPT | Performed by: INTERNAL MEDICINE

## 2025-06-20 PROCEDURE — 99233 SBSQ HOSP IP/OBS HIGH 50: CPT | Performed by: INTERNAL MEDICINE

## 2025-06-20 PROCEDURE — 99223 1ST HOSP IP/OBS HIGH 75: CPT | Performed by: STUDENT IN AN ORGANIZED HEALTH CARE EDUCATION/TRAINING PROGRAM

## 2025-06-20 RX ORDER — GUAIFENESIN 600 MG/1
600 TABLET, EXTENDED RELEASE ORAL 2 TIMES DAILY
Status: DISCONTINUED | OUTPATIENT
Start: 2025-06-20 | End: 2025-06-26

## 2025-06-20 NOTE — PROGRESS NOTES
[facility]  part of [facility]    Progress Note    Imer Mcguire Patient Status:  Inpatient    1931 MRN [id number]   Location [facility] 5SW/SE Attending Severo aLcey MD   Hosp Day # 6 PCP OMAR LEAVITT       SUBJECTIVE:  Feeling fine.  No shortness of breath     Nurse reports black stools   OBJECTIVE:  Vital signs in last 24 hours:  /53 (BP Location: Right arm)   Pulse 58   Temp 97.3 °F (36.3 °C) (Oral)   Resp 16   Ht 5' 8\" (1.727 m)   Wt 160 lb 9.6 oz (72.8 kg)   SpO2 97%   BMI 24.42 kg/m²     Intake/Output:    Intake/Output Summary (Last 24 hours) at 2025 0710  Last data filed at 2025 0503  Gross per 24 hour   Intake 1898.25 ml   Output 700 ml   Net 1198.25 ml       Wt Readings from Last 3 Encounters:   25 160 lb 9.6 oz (72.8 kg)   24 147 lb 6.4 oz (66.9 kg)   17 174 lb 4.8 oz (79.1 kg)       Exam  Gen: No acute distress, alert   HEENT: No pallor no icterus  Pulm: Lungs crackles are noted on the right side.  Nonmeat better than yesterday  CV: Heart with irregular rate and rhythm, no peripheral edema  Abd: Abdomen soft, nontender, nondistended, no organomegaly, bowel sounds present  Skin: no rashes or lesions  CNS: Alert    Data Review:     Labs:   Lab Results   Component Value Date    WBC 21.5 2025    HGB 11.7 2025    HCT 34.3 2025    .0 2025    CREATSERUM 2.98 2025    CREATSERUM 2.98 2025     2025     2025     2025     2025    K 3.6 2025    K 3.6 2025    K 3.5 2025     2025     2025    CO2 18.0 2025    CO2 18.0 2025    GLU 95 2025    GLU 95 2025    CA 7.9 2025    CA 7.9 2025    ALB 2.7 2025    PHOS 5.1 2025       LABS  Recent Labs   Lab 25  1409 06/15/25  0617 25  0455 25  0158 25  1057 25  1846 25  2218  06/19/25  0622 06/19/25  0630 06/19/25  1104 06/20/25  0501   RBC 5.08 5.07   < > 4.35  --   --   --   --   --  3.75* 3.68*   HGB 15.8 15.8   < > 13.8  --   --   --  12.7*  --  12.0* 11.7*   HCT 47.9 48.4   < > 39.9  --   --   --  36.8*  --  34.2* 34.3*   MCV 94.3 95.5   < > 91.7  --   --   --   --   --  91.2 93.2   MCH 31.1 31.2   < > 31.7  --   --   --   --   --  32.0 31.8   MCHC 33.0 32.6   < > 34.6  --   --   --   --   --  35.1 34.1   RDW 13.3 13.5   < > 13.7  --   --   --   --   --  15.9* 14.2   NEPRELIM 21.83* 25.87*   < > 30.49*  --   --   --   --   --  19.00* 18.44*   WBC 23.2* 26.6*   < > 33.0*  --   --   --   --   --  21.3* 21.5*   .0 216.0   < > 124.0*  124.0*  --   --   --  168.0  --  167.0 164.0   AST 43* 53*  --   --   --   --   --   --   --   --   --    ALT 32 34  --   --   --   --   --   --   --   --   --    PTT 28.5  --    < > 41.3* 48.0* 52.9*  --  44.3*  --   --   --    INR 1.14  --   --   --   --   --   --   --   --   --   --    CK  --   --   --   --   --   --  27*  --   --   --   --    GLU 99 70   < > 95  --   --   --   --  90  --  95  95    < > = values in this interval not displayed.       Imaging:      Meds:   Current Hospital Medications[1]    Assessment  Problem List[2]  Sepsis due to pneumonia (HCC)    Pneumonia.,   Community-acquired bacterial bacterial   Secondary to Legionella  Patient on Zithromax       New onset atrial fibrillation with rapid ventricular response  Cardiology consulted.  Patient started on amiodarone      Acute hypoxic respiratory failure patient is requiring 5 L of oxygen.     Continue oxygen supplementation.     Acute kidney injury.  on IV fluids.,  Noted        Coronary disease stable.     Hypothyroidism continue the patient on levothyroxine.     Patient stable.    Thrombocytopenia    Melena  Will check stool for occult blood   GI consult    Discussed with nursing staff.  Further management will depend on the clinical course of the patient     Plan:    Continue IV antibiotics.  Continue continue other treatments.    Discussed with family at the bedside      Active Orders   LAB    Basic Metabolic Panel (8)     Frequency: Daily Lab     Number of Occurrences: 3 Days    CBC With Differential With Platelet     Frequency: Daily Lab     Number of Occurrences: 3 Days   ECG    EKG 12 Lead     Frequency: Daily     Number of Occurrences: Until Specified   Diet    Cardiac diet Cardiac, No Straw; Fluid Consistency: Nectar Thick / Mildly Thick Liquids; Texture Consistency: Chopped / Soft & Bite Sized; Is Patient on Accuchecks? No     Frequency: Effective Now     Number of Occurrences: Until Specified     Order Comments: Pills whole in pureed     Nursing    Cardiac monitoring     Frequency: Until Discontinued     Number of Occurrences: Until Specified    Cardiac monitoring     Frequency: Continuous     Number of Occurrences: Until Specified    Daily weights     Frequency: Until Discontinued     Number of Occurrences: Until Specified    Daily weights     Frequency: Until Discontinued     Number of Occurrences: Until Specified    Follow up for COPD/Pneumonia     Frequency: Once     Number of Occurrences: 1 Occurrences     Order Comments: Follow up  about one week after DC      Increase infusion dose 100 units/hr     Frequency: Once     Number of Occurrences: 1 Occurrences    Increase infusion dose 100 units/hr     Frequency: Once     Number of Occurrences: 1 Occurrences    Increase infusion dose 100 units/hr     Frequency: Once     Number of Occurrences: 1 Occurrences    Increase infusion dose 100 units/hr     Frequency: Once     Number of Occurrences: 1 Occurrences    Increase infusion dose 100 units/hr     Frequency: Once     Number of Occurrences: 1 Occurrences    Increase infusion dose 100 units/hr     Frequency: Once     Number of Occurrences: 1 Occurrences    Initiate electrolyte protocol     Frequency: Until Discontinued     Number of Occurrences: Until Specified     Intake and Output     Frequency: Q Shift     Number of Occurrences: Until Specified    Notify physician (cardiology or ordering physician):     Frequency: Until Discontinued     Number of Occurrences: Until Specified     Order Comments: If you are discontinuing a drip without any oral diltiazem medication orders      Notify physician for BP (cardiology or ordering physician):     Frequency: Until Discontinued     Number of Occurrences: Until Specified     Order Comments: For SBP < 80 mmHg or symptomatic hypotension, discontinue drip and notify physician      Notify physician for HR (cardiology or ordering physician):     Frequency: Until Discontinued     Number of Occurrences: Until Specified     Order Comments: 1. If you are on max intravenous dose and the HR is >120  2. If HR is >140 and it has been one hour since you transitioned to oral from IV diltiazem  3. If rate < 75 for > 5 min or symptomatic bradycardia, discontinue the drip and notify physician  4. For pause greater than 3.0 seconds, hold drip and notify physician      Stop amiodarone and call the attending cardiologist     Frequency: PRN     Number of Occurrences: Until Specified     Order Comments: If systolic blood pressure falls to less than 90 mmHg, heart rate drops to less than 60 beats per minute, if  EKG demonstrates greater than Mobitz 1 Heart Block      Strict intake and output     Frequency: Until Discontinued     Number of Occurrences: Until Specified    Strict intake and output     Frequency: Until Discontinued     Number of Occurrences: Until Specified    Tele Box     Frequency: Once     Number of Occurrences: 1 Occurrences    Tele Box     Frequency: Once     Number of Occurrences: 1 Occurrences   Code Status    Full code Continuous     Frequency: Continuous     Number of Occurrences: Until Specified   Medications    acetaminophen (Tylenol) tab 650 mg     Frequency: Q6H PRN     Dose: 650 mg     Route: Oral    albuterol (Ventolin) (2.5  MG/3ML) 0.083% nebulizer solution 2.5 mg     Frequency: Q4H PRN     Dose: 2.5 mg     Route: Nebulization    amiodarone (Pacerone) tab 400 mg     Frequency: BID with meals     Dose: 400 mg     Route: Oral    aspirin DR tab 81 mg     Frequency: Daily     Dose: 81 mg     Route: Oral    atorvastatin (Lipitor) tab 40 mg     Frequency: Nightly     Dose: 40 mg     Route: Oral    azithromycin (Zithromax) 500 mg in sodium chloride 0.9% 250mL IVPB premix     Frequency: Q24H     Dose: 500 mg     Route: Intravenous    calcium carbonate (Tums) chewable tab 500 mg     Frequency: BID PRN     Dose: 500 mg     Route: Oral    levothyroxine (Synthroid) tab 100 mcg     Frequency: Daily     Dose: 100 mcg     Route: Oral    metoprolol succinate ER (Toprol XL) 24 hr tab 25 mg     Frequency: 2x Daily(Beta Blocker)     Dose: 25 mg     Route: Oral    sodium chloride 0.9% infusion     Frequency: Continuous     Route: Intravenous       Severo Lacey MD           [1]   Current Facility-Administered Medications   Medication Dose Route Frequency    aspirin DR tab 81 mg  81 mg Oral Daily    sodium chloride 0.9% infusion   Intravenous Continuous    metoprolol succinate ER (Toprol XL) 24 hr tab 25 mg  25 mg Oral 2x Daily(Beta Blocker)    amiodarone (Pacerone) tab 400 mg  400 mg Oral BID with meals    azithromycin (Zithromax) 500 mg in sodium chloride 0.9% 250mL IVPB premix  500 mg Intravenous Q24H    albuterol (Ventolin) (2.5 MG/3ML) 0.083% nebulizer solution 2.5 mg  2.5 mg Nebulization Q4H PRN    atorvastatin (Lipitor) tab 40 mg  40 mg Oral Nightly    levothyroxine (Synthroid) tab 100 mcg  100 mcg Oral Daily    acetaminophen (Tylenol) tab 650 mg  650 mg Oral Q6H PRN    calcium carbonate (Tums) chewable tab 500 mg  500 mg Oral BID PRN   [2]   Patient Active Problem List  Diagnosis    Coronary artery disease involving native heart    Metabolic encephalopathy    Myopia with astigmatism and presbyopia, bilateral    Age-related nuclear cataract of  both eyes    After cataract not obscuring vision, bilateral    Acute chest pain    Sepsis due to pneumonia (HCC)

## 2025-06-20 NOTE — PROGRESS NOTES
[facility]  part of [facility]    Progress Note    Imer Mcguire Patient Status:  Inpatient    1931 MRN [id number]   Location [facility]5W Attending Severo Lacey MD   Hosp Day # 6 PCP OMAR LEAVITT       Subjective:   Imer Mcguire is a(n) 89 year old male who I am seeing for SIERRA      Resting    Objective:   /64 (BP Location: Right arm)   Pulse 61   Temp 97.5 °F (36.4 °C) (Oral)   Resp 20   Ht 5' 8\" (1.727 m)   Wt 160 lb 9.6 oz (72.8 kg)   SpO2 91%   BMI 24.42 kg/m²      Intake/Output Summary (Last 24 hours) at 2025 1211  Last data filed at 2025 0503  Gross per 24 hour   Intake 1521.25 ml   Output 700 ml   Net 821.25 ml     Wt Readings from Last 1 Encounters:   25 160 lb 9.6 oz (72.8 kg)       Exam  Gen: No acute distress, Heent: NC AT, mucous memb clear, neck supple  Pulm: Lungs clear, normal respiratory effort  CV: Heart with regular rate and rhythm, no edema  Abd: Abdomen soft, nontender, nondistended, no organomegaly, bowel sounds present  Skin: no symptoms reported  Psych: alert and oriented    Assessment and Plan:     1 - SIERRA  Improved on IV fluids.  Renal ultrasound was negative.  Will drop the rate of IV fluids  Lisinopril on hold     Patient with suspected GI bleeding.  I feel this is the cause of his BUN being elevated at a proportion to the creatinine    2 -respiratory failure/Legionella pneumonia  Being seen by pulmonary and infectious disease.  He is on azithromycin    3 -anemia  Patient reports dark stools.  Stool guaiac is being ordered     Discussed with the patient's son Yesi at bedside            Results:     Recent Labs   Lab 25  0158 25  0622 25  1104 25  0501   RBC 4.35  --  3.75* 3.68*   HGB 13.8 12.7* 12.0* 11.7*   HCT 39.9 36.8* 34.2* 34.3*   MCV 91.7  --  91.2 93.2   MCH 31.7  --  32.0 31.8   MCHC 34.6  --  35.1 34.1   RDW 13.7  --  15.9* 14.2   NEPRELIM 30.49*  --  19.00* 18.44*   WBC 33.0*  --   21.3* 21.5*   .0*  124.0* 168.0 167.0 164.0         Recent Labs   Lab 06/18/25  0158 06/19/25  0630 06/19/25 2042 06/20/25  0501   GLU 95 90  --  95  95   BUN 72* 113*  --  120*  120*   CREATSERUM 2.78* 3.07*  --  2.98*  2.98*   CA 8.5* 8.5*  --  7.9*  7.9*   * 136  --  143  143   K 4.3 3.8 3.3* 3.6  3.6  3.5    106  --  113*  113*   CO2 16.0* 19.0*  --  18.0*  18.0*          No results found.        ARIANA GONZALEZ MD  6/20/2025

## 2025-06-20 NOTE — PROGRESS NOTES
[facility]  part of [facility]     Progress Note    Imer Mcguire Patient Status:  Inpatient    1931 MRN [id number]   Location [facility] 2W/SW Attending Severo Lacey MD   Hosp Day # 6 PCP OMAR LEAVITT       Subjective:   Patient seen and examined.  Currently on 5 L oxygen.  Some occasional cough.  Dyspnea gradually improving per patient admits to some black stools    Objective:   Blood pressure 126/60, pulse 61, temperature 97.1 °F (36.2 °C), temperature source Temporal, resp. rate 18, height 5' 8\" (1.727 m), weight 160 lb 9.6 oz (72.8 kg), SpO2 92%.  Intake/Output:   Last 3 shifts: I/O last 3 completed shifts:  In: 2498.3 [P.O.:860; I.V.:1038.3; IV PIGGYBACK:600]  Out: 1100 [Urine:1100]   This shift: No intake/output data recorded.     Vent Settings:      Hemodynamic parameters (last 24 hours):      Scheduled Meds: Current Hospital Medications[1]    Continuous Infusions: Medication Infusions[2]    Physical Exam  Constitutional: no acute distress  Eyes: PERRL  ENT: nares pateint  Neck: supple, no JVD  Cardio: RRR, S1 S2  Respiratory: Right-sided crackles  GI: abdomen soft, non tender, active bowel sounds, no organomegaly  Extremities: no clubbing, cyanosis, edema  Neurologic: no gross motor deficits  Skin: warm, dry      Results:     Lab Results   Component Value Date    WBC 21.5 2025    HGB 11.7 2025    HCT 34.3 2025    .0 2025    CREATSERUM 2.98 2025    CREATSERUM 2.98 2025     2025     2025     2025     2025    K 3.6 2025    K 3.6 2025    K 3.5 2025     2025     2025    CO2 18.0 2025    CO2 18.0 2025    GLU 95 2025    GLU 95 2025    CA 7.9 2025    CA 7.9 2025    ALB 2.7 2025    PHOS 5.1 2025       No results found.      EKG 12 Lead  Result Date: 2025  Sinus rhythm with Premature atrial  complexes Otherwise normal ECG When compared with ECG of 18-JUN-2025 11:08, No significant change was found Confirmed by vincent teran ([id number]) on 6/19/2025 4:11:26 PM    EKG 12 Lead  Result Date: 6/18/2025  Normal sinus rhythm with Premature atrial complexes Abnormal ECG When compared with ECG of 16-JUN-2025 14:59, Vent. rate has decreased BY  61 BPM Confirmed by vincent teran ([id number]) on 6/18/2025 4:44:09 PM      Assessment   1.  Severe Legionella pneumonia  2.  Acute hypoxemic respiratory failure  3.  Fevers  4.  Leukocytosis  5.  Coronary artery disease  6.  Acute kidney injury  7.  Hypertension  8.  Atrial fibrillation with rapid ventricular response     Plan   -Patient presents with evidence of fatigue malaise some associated cough and mild dyspnea.  Hypoxic on presentation to emergency department.  - Legionella antigen positive.    - CT chest on 6/17/2025 with extensive right-sided opacities seen consistent with pneumonia.  Trace pleural effusion present.  - Monitor leukocytosis at this time  - Antibiotic therapy per ID recommendations.  Levaquin discontinued currently on azithromycin.  - Wean oxygen as tolerated currently on 2 L  - Atrial fibrillation management per cardiology has been started on amiodarone gtt initially now on p.o. amiodarone.  - Daughter concerned about black stools with some gradual decline in hemoglobin.  Nurse to discuss possible GI consultation with primary care physician.  Will hold aspirin for now  - PT/OT  - Discussed with family at bedside.      Bernabe Davis DO  Pulmonary Critical Care Medicine  [facility]          [1]   Current Facility-Administered Medications   Medication Dose Route Frequency    potassium chloride 40 mEq in 250mL sodium chloride 0.9% IVPB premix  40 mEq Intravenous Once    aspirin DR tab 81 mg  81 mg Oral Daily    sodium chloride 0.9% infusion   Intravenous Continuous    metoprolol succinate ER (Toprol XL) 24 hr tab 25 mg  25 mg Oral 2x Daily(Beta Blocker)     amiodarone (Pacerone) tab 400 mg  400 mg Oral BID with meals    azithromycin (Zithromax) 500 mg in sodium chloride 0.9% 250mL IVPB premix  500 mg Intravenous Q24H    albuterol (Ventolin) (2.5 MG/3ML) 0.083% nebulizer solution 2.5 mg  2.5 mg Nebulization Q4H PRN    atorvastatin (Lipitor) tab 40 mg  40 mg Oral Nightly    levothyroxine (Synthroid) tab 100 mcg  100 mcg Oral Daily    acetaminophen (Tylenol) tab 650 mg  650 mg Oral Q6H PRN    calcium carbonate (Tums) chewable tab 500 mg  500 mg Oral BID PRN   [2]    sodium chloride 75 mL/hr at 06/20/25 [id number]

## 2025-06-20 NOTE — PROGRESS NOTES
INFECTIOUS DISEASE PROGRESS NOTE  [facility]  part of [facility] ID PROGRESS NOTE    Imer Mcguire Patient Status:  Inpatient    1931 MRN [id number]   Location [facility] 2W/SW Attending Severo Lacey MD   Hosp Day # 6 PCP OMAR LEAVITT     Subjective:  ROS reviewed. Down to 1L NC. Having dark stools. On thickened liquids but RN notes new food regurgitation.    ASSESSMENT:    Antibiotics: IV azithromycin (IV zosyn, levaquin)     # Legionella pneumonia with hypoxia with +Legionella urine Ag   -Sputum cx with oral contamination    -CT C/A/P with extensive right lung airspace disease   -Air conditioner serviced earlier this month   -[place]requ[place]   # AFib on amiodarone drip  # SIERRA on CKD  # Leukocytosis with bandemia  # CAD s/p CABG, HTN, HLD        PLAN:  -  Continue on azithromycin, day #7/10.  -  Daily EKG.  -  Follow fever curve, wbc.  -  Reviewed labs, micro, imaging reports, available old records.  -  Case d/w patient, family, RN.     History of Present Illness:  89-year-old male with a history of CAD status post CABG in 2017, HTN, HLD, CKD who now presents to hospital sick/14 with fatigue, weakness, malaise for 1 week, cough, dyspnea.  Found to be febrile to 103.1 on admission with hypoxia, tachycardia.  Initial WBC of 26 now up to 40.  Given IV Zosyn, vancomycin, azithromycin.  Initially with worsening O2 requirements up to 15 L now improved to 6 L.  Also with A-fib on amiodarone.  Initial chest x-ray with upper lung airspace opacification consistent with pneumonia.  Blood cultures no growth.  Urine Legionella positive.  MRSA nares negative.  Also SIERRA with worsening kidney function.     Has been having issues with his air conditioning. 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    Physical Exam:  /64 (BP Location: Right arm)   Pulse 61   Temp 97.5 °F (36.4 °C) (Oral)   Resp 20   Ht 5' 8\" (1.727 m)   Wt 160 lb 9.6 oz  (72.8 kg)   SpO2 92%   BMI 24.42 kg/m²     Gen:   Awake, in bed  HEENT:  EOMI, neck supple  CV/lungs:  RRR, CTAB  Abdom:  Soft, no TTP  Skin/extrem:  No rashes, no c/c/e  :   Primofit+  Lines:  PIV+    Laboratory Data: Reviewed    Microbiology: Reviewed    Radiology: Reviewed      [facility]  [phone]  6/20/2025

## 2025-06-20 NOTE — CONSULTS
[facility]   Gastroenterology Consultation Note    Imer Mcguire  Patient Status:    Inpatient  Date of Admission:         6/14/2025, Hospital day #6  Attending:   Severo Lacey MD  PCP:     OMAR LEAVITT    Chief Complaint:  Melena    History of Present Illness:  Imer Mcguire is a 89 year old male w/ a history of CAD s/p CABG, HTN, HLD, hypothyroidism who presented to the hospital with pneumonia and subsequently developed atrial fibrillation.     He was receiving aspirin 325mg in the hospital, when he went into afib with RVR he was started on a heparin drip. After 72 hours of heparin/full dose aspirin he had an episode of melena on 6/19 and another on 6/20, given this heparin drip was stopped, aspirin changed to 81mg and GI was consulted.    The patient's breathing and clinical status have significant improved over the last several days. His vital signs are stable.    Family is at bedside.    History:  Past Medical History[1]  Past Surgical History[2]  Family History[3]   reports that he has never smoked. He does not have any smokeless tobacco history on file. He reports current alcohol use of about 2.0 standard drinks of alcohol per week. He reports that he does not use drugs.    Allergies:  Allergies[4]    Medications:  Current Hospital Medications[5]    Review of Systems:  CONSTITUTIONAL:  negative for fevers, rigors  EYES:  negative for diplopia   RESPIRATORY:  negative for severe shortness of breath  CARDIOVASCULAR:  negative for crushing sub-sternal chest pain  GASTROINTESTINAL:  see HPI  GENITOURINARY:  negative for dysuria or gross hematuria  INTEGUMENT/BREAST:  SKIN:  negative for jaundice   ALLERGIC/IMMUNOLOGIC:  negative for hay fever  ENDOCRINE:  negative for cold intolerance and heat intolerance  MUSCULOSKELETAL:  negative for joint effusion/severe erythema  BEHAVIOR/PSYCH:  negative for psychotic behavior    Physical Exam:    Blood pressure 126/61, pulse 60, temperature 97.6  °F (36.4 °C), temperature source Oral, resp. rate 18, height 5' 8\" (1.727 m), weight 160 lb 9.6 oz (72.8 kg), SpO2 94%. Body mass index is 24.42 kg/m².    Gen- Patient appears comfortable and in no acute discomfort  HEENT: the sclera appears anicteric, oropharynx clear, mucus membranes appear moist  CV- regular rate and rhythm, the extremities are warm and well perfused   Lung- Moves air well; No labored breathing  Abdomen- soft, non-tender exam in all quadrants without rigidity or guarding, non-distended  Skin- No jaundice  Ext: no edema is evident.   Neuro- Alert and interactive  Psych- appropriate, non-agitated    Laboratory Data:  Lab Results   Component Value Date    WBC 21.5 06/20/2025    HGB 11.7 06/20/2025    HCT 34.3 06/20/2025    .0 06/20/2025    CREATSERUM 2.98 06/20/2025    CREATSERUM 2.98 06/20/2025     06/20/2025     06/20/2025     06/20/2025     06/20/2025    K 3.6 06/20/2025    K 3.6 06/20/2025    K 3.5 06/20/2025     06/20/2025     06/20/2025    CO2 18.0 06/20/2025    CO2 18.0 06/20/2025    GLU 95 06/20/2025    GLU 95 06/20/2025    CA 7.9 06/20/2025    CA 7.9 06/20/2025    ALB 2.7 06/20/2025    PHOS 5.1 06/20/2025       Imaging:  No results found.    Assessment & Plan   Imer Mcguire is a 89 year old male w/ a history of CAD s/p CABG, HTN, HLD, hypothyroidism who presented to the hospital with pneumonia and subsequently developed atrial fibrillation.     He was receiving aspirin 325mg in the hospital, when he went into afib with RVR he was started on a heparin drip. After 72 hours of heparin/full dose aspirin he had an episode of melena on 6/19 and another on 6/20, given this heparin drip was stopped, aspirin changed to 81mg and GI was consulted.    #Melena  -Likely related to esophagitis vs PUD that was exacerbated by use of full dose aspirin/heparin drip and hospitalization.  -Hemoglobin is rather stable as are his vital signs.  -Had imaging of his  chest/abdomen/pelvis that showed a hiatal hernia.  -I discussed with the family today that we could perform and EGD to evaluate for source of bleeding and possible intervention -- given his age, comorbid conditions and recent pneumonia the family would like to hold off on endoscopic evaluation for now, which is very reasonable.    Recommend:  -Will give BID IV Pantoprazole.  -CLD.  -Trend hemoglobin.  -Given family wants to hold off on EGD will manage conservatively for now, will consider Egd if there is a major change in clinical status.    Miguel Suarez MD  [facility] Gastroenterology        This note was partially prepared using Dragon Medical voice recognition dictation software. As a result, errors may occur. When identified, these errors have been corrected. While every attempt is made to correct errors during dictation, discrepancies may still exist.          [1]   Past Medical History:   Atherosclerosis of coronary artery    Cortical senile cataract    Disorder of thyroid    Dyslipidemia    Essential hypertension    High blood pressure    Hypothyroidism    Senile cataract   [2]   Past Surgical History:  Procedure Laterality Date    Cataract extraction w/  intraocular lens implant Right 01/08/2019    Dr. Schwarz @ [facility]    Cataract extraction w/  intraocular lens implant Left 03/2019    Dr. Schwarz @ [facility]   [3]   Family History  Problem Relation Age of Onset    Other (Other) Father     Cancer Mother     Cancer Brother     Diabetes Neg     Glaucoma Neg    [4] No Known Allergies  [5]   Current Facility-Administered Medications:     guaiFENesin ER (Mucinex) 12 hr tab 600 mg, 600 mg, Oral, BID    pantoprazole (Protonix) 40 mg in sodium chloride 0.9% PF 10 mL IV push, 40 mg, Intravenous, Q12H    [START ON 6/21/2025] sodium ferric gluconate (Ferrlecit) 125 mg in sodium chloride 0.9% 100mL IVPB premix, 125 mg, Intravenous, Daily    [Held by provider] aspirin DR tab 81 mg, 81 mg, Oral, Daily    sodium chloride  0.9% infusion, , Intravenous, Continuous    metoprolol succinate ER (Toprol XL) 24 hr tab 25 mg, 25 mg, Oral, 2x Daily(Beta Blocker)    amiodarone (Pacerone) tab 400 mg, 400 mg, Oral, BID with meals    azithromycin (Zithromax) 500 mg in sodium chloride 0.9% 250mL IVPB premix, 500 mg, Intravenous, Q24H    albuterol (Ventolin) (2.5 MG/3ML) 0.083% nebulizer solution 2.5 mg, 2.5 mg, Nebulization, Q4H PRN    atorvastatin (Lipitor) tab 40 mg, 40 mg, Oral, Nightly    levothyroxine (Synthroid) tab 100 mcg, 100 mcg, Oral, Daily    acetaminophen (Tylenol) tab 650 mg, 650 mg, Oral, Q6H PRN    calcium carbonate (Tums) chewable tab 500 mg, 500 mg, Oral, BID PRN

## 2025-06-20 NOTE — CM/SW NOTE
CM contacted patient's daughter Carli. Discussed current recommendation for AIR. CM sent referrals for AIR, only accepting is [facility]. CM reserved [facility] in [facility].    PLAN: TBD-likely AIR-pending course of stay and medical clearance    / to remain available for support and/or discharge planning     Tawnya MCDANIELN RN   Nurse    [phone]

## 2025-06-20 NOTE — PROGRESS NOTES
Progress Note  Imer Mcguire Patient Status:  Inpatient    1931 MRN [id number]   Location [facility]5W Attending Severo Lacey MD   Hosp Day # 6 PCP OMAR LEAVITT     Subjective   Feeling better each day. Denies hemoptysis this morning so far. No chest pain, dyspnea, or sob. Family at bedside.       Objective:   /64 (BP Location: Right arm)   Pulse 61   Temp 97.5 °F (36.4 °C) (Oral)   Resp 20   Ht 5' 8\" (1.727 m)   Wt 160 lb 9.6 oz (72.8 kg)   SpO2 91%   BMI 24.42 kg/m²     Telemetry: sinus rhythm     Intake/Output:    Intake/Output Summary (Last 24 hours) at 2025 1255  Last data filed at 2025 0503  Gross per 24 hour   Intake 1521.25 ml   Output 700 ml   Net 821.25 ml       Wt Readings from Last 3 Encounters:   25 160 lb 9.6 oz (72.8 kg)   24 147 lb 6.4 oz (66.9 kg)   17 174 lb 4.8 oz (79.1 kg)         Labs:  Recent Labs   Lab 25  0158 25  0630 25  2042 25  0501   GLU 95 90  --  95  95   BUN 72* 113*  --  120*  120*   CREATSERUM 2.78* 3.07*  --  2.98*  2.98*   EGFRCR 21* 18*  --  19*  19*   CA 8.5* 8.5*  --  7.9*  7.9*   * 136  --  143  143   K 4.3 3.8 3.3* 3.6  3.6  3.5    106  --  113*  113*   CO2 16.0* 19.0*  --  18.0*  18.0*     Recent Labs   Lab 25  0158 25  0622 25  1104 25  0501   RBC 4.35  --  3.75* 3.68*   HGB 13.8 12.7* 12.0* 11.7*   HCT 39.9 36.8* 34.2* 34.3*   MCV 91.7  --  91.2 93.2   MCH 31.7  --  32.0 31.8   MCHC 34.6  --  35.1 34.1   RDW 13.7  --  15.9* 14.2   NEPRELIM 30.49*  --  19.00* 18.44*   WBC 33.0*  --  21.3* 21.5*   .0*  124.0* 168.0 167.0 164.0         Recent Labs   Lab 25  2218   CK 27*         Review of Systems:     10 point review of systems completed and negative except as noted in HPI      Exam:     Physical Exam:  General: Alert and oriented x 3. No apparent distress.   HEENT: Normocephalic, neck supple, no thyromegaly or adenopathy.  Neck:  No JVD, carotids 2+, no bruits.  Cardiac: Regular rate and rhythm. S1, S2 normal. No murmur, pericardial rub, S3, or extra cardiac sounds.  Lungs: mildly diminished.  Normal excursions and effort.  Abdomen: Soft, non-tender. BS-present.  Extremities: Without clubbing or cyanosis. No edema.    Neurologic: Alert and oriented, normal affect. No focal defects  Skin: Warm and dry.       Diagnostic Studies:     Echo 6/17/25   Conclusions:     1. Left ventricle: The cavity size was normal. Wall thickness was normal.      Systolic function was at the lower limits of normal. The estimated      ejection fraction was 50-55%, by biplane method of disks. Unable to      assess LV diastolic function due to heart rhythm.   2. Right ventricle: Systolic function was reduced.   3. Aortic valve: The findings were consistent with moderate stenosis. The      peak systolic velocity was 2.62m/sec. The mean systolic gradient was 17mm      Hg. The valve area (VTI) was 1.07cm^2.   4. Tricuspid valve: There was mild-moderate regurgitation.   Impressions:  This study is compared with previous dated 09/22/2024: No   significant change since prior study.     Assessment and Plan:     Assessment:  # new onset of atrial fibrillation in the setting of pneumonia/ hypoxic respiratory failure   In sinus rhythm on PO amiodarone   AC initially with heparin gtt but developed hemoptysis and melena, + occult blood heparin currently held    [id number] ~ 3 (age, vascular disease)   # pneumonia legionella   Admitted with hypoxic respiratory failure   Oxygen now down to 1L NC > wean as able   Abx per ID   # CAD with h/o CABG (2017)   Stable, on full dose of statin, high intensity statin, and BB   # Moderate aortic stenosis   peak velocity was 2.62m/sec, mean gradient 17 mmHg, valve area 1.07cm^2  Continue close surveillance   # acute bleeding secondary to heparin IV   Occult blood +, Asa and heparin held   Monitor Hgb, if down trending consider GI consult   #  SIERRA on CKD - Cr peaked at 3.07 > improving with fluids   Lisinopril held   Nephrology following     Plan:  Continue to hold heparin and asa, monitor Hgb closely   Remains in sinus rhythm on amiodarone and BB   Replace potassium to maintain >=4, check magnesium   Wean oxygen as able   Encourage ambulation   Monitor tele     Plan of care discussed with patient, RN.      Eileen Seema, APRN  6/20/2025  Ph [phone] ([place])  Ph [phone] ([place])      =======================================================  Note reviewed and labs reviewed.  Agree with above assessment and plan.  In regards to CAD s/p CABG, stable on high-intensity statin and beta-blocker. Moderate aortic stenosis with close surveillance ongoing. Acute bleeding likely secondary to IV heparin, aspirin also held; hemoglobin monitoring in place with plan for GI consult if downward trend occurs. SIERRA on CKD  now improving; lisinopril held and nephrology following.  In regards to AF, patient remains in SR on amiodarone and beta-blocker. Given bleeding risk, anticoagulation is currently held; close hemoglobin monitoring. Oxygen support is being weaned as tolerated with encouragement of ambulation and ongoing telemetry monitoring.  I have personally performed the medical decision making in its entirety.   Nilson Barnett DO

## 2025-06-20 NOTE — PLAN OF CARE
Problem: Patient Centered Care  Goal: Patient preferences are identified and integrated in the patient's plan of care  Description: Interventions:  - What would you like us to know as we care for you? From home alone  - Provide timely, complete, and accurate information to patient/family  - Incorporate patient and family knowledge, values, beliefs, and cultural backgrounds into the planning and delivery of care  - Encourage patient/family to participate in care and decision-making at the level they choose  - Honor patient and family perspectives and choices  Outcome: Progressing     Problem: Patient/Family Goals  Goal: Patient/Family Long Term Goal  Description: Patient's Long Term Goal:     Interventions:    - See additional Care Plan goals for specific interventions  Outcome: Progressing  Goal: Patient/Family Short Term Goal  Description: Patient's Short Term Goal:     Interventions:     - See additional Care Plan goals for specific interventions  Outcome: Progressing     Problem: PAIN - ADULT  Goal: Verbalizes/displays adequate comfort level or patient's stated pain goal  Description: INTERVENTIONS:  - Encourage pt to monitor pain and request assistance  - Assess pain using appropriate pain scale  - Administer analgesics based on type and severity of pain and evaluate response  - Implement non-pharmacological measures as appropriate and evaluate response  - Consider cultural and social influences on pain and pain management  - Manage/alleviate anxiety  - Utilize distraction and/or relaxation techniques  - Monitor for opioid side effects  - Notify MD/LIP if interventions unsuccessful or patient reports new pain  - Anticipate increased pain with activity and pre-medicate as appropriate  Outcome: Progressing     Problem: SAFETY ADULT - FALL  Goal: Free from fall injury  Description: INTERVENTIONS:  - Assess pt frequently for physical needs  - Identify cognitive and physical deficits and behaviors that affect risk  of falls.  - [place] fall precautions as indicated by assessment.  - Educate pt/family on patient safety including physical limitations  - Instruct pt to call for assistance with activity based on assessment  - Modify environment to reduce risk of injury  - Provide assistive devices as appropriate  - Consider OT/PT consult to assist with strengthening/mobility  - Encourage toileting schedule  Outcome: Progressing     Problem: DISCHARGE PLANNING  Goal: Discharge to home or other facility with appropriate resources  Description: INTERVENTIONS:  - Identify barriers to discharge w/pt and caregiver  - Include patient/family/discharge partner in discharge planning  - Arrange for needed discharge resources and transportation as appropriate  - Identify discharge learning needs (meds, wound care, etc)  - Arrange for interpreters to assist at discharge as needed  - Consider post-discharge preferences of patient/family/discharge partner  - Complete POLST form as appropriate  - Assess patient's ability to be responsible for managing their own health  - Refer to Case Management Department for coordinating discharge planning if the patient needs post-hospital services based on physician/LIP order or complex needs related to functional status, cognitive ability or social support system  Outcome: Progressing     Problem: RESPIRATORY - ADULT  Goal: Achieves optimal ventilation and oxygenation  Description: INTERVENTIONS:  - Assess for changes in respiratory status  - Assess for changes in mentation and behavior  - Position to facilitate oxygenation and minimize respiratory effort  - Oxygen supplementation based on oxygen saturation or ABGs  - Provide Smoking Cessation handout, if applicable  - Encourage broncho-pulmonary hygiene including cough, deep breathe, Incentive Spirometry  - Assess the need for suctioning and perform as needed  - Assess and instruct to report SOB or any respiratory difficulty  - Respiratory Therapy support  as indicated  - Manage/alleviate anxiety  - Monitor for signs/symptoms of CO2 retention  Outcome: Progressing     Problem: Impaired Functional Mobility  Goal: Achieve highest/safest level of mobility/gait  Description: Interventions:  - Assess patient's functional ability and stability  - Promote increasing activity/tolerance for mobility and gait  - Educate and engage patient/family in tolerated activity level and precautions    Outcome: Progressing     Problem: CARDIOVASCULAR - ADULT  Goal: Maintains optimal cardiac output and hemodynamic stability  Description: INTERVENTIONS:  - Monitor vital signs, rhythm, and trends  - Monitor for bleeding, hypotension and signs of decreased cardiac output  - Evaluate effectiveness of vasoactive medications to optimize hemodynamic stability  - Monitor arterial and/or venous puncture sites for bleeding and/or hematoma  - Assess quality of pulses, skin color and temperature  - Assess for signs of decreased coronary artery perfusion - ex. Angina  - Evaluate fluid balance, assess for edema, trend weights  Outcome: Progressing  Goal: Absence of cardiac arrhythmias or at baseline  Description: INTERVENTIONS:  - Continuous cardiac monitoring, monitor vital signs, obtain 12 lead EKG if indicated  - Evaluate effectiveness of antiarrhythmic and heart rate control medications as ordered  - Initiate emergency measures for life threatening arrhythmias  - Monitor electrolytes and administer replacement therapy as ordered  Outcome: Progressing     Problem: NEUROLOGICAL - ADULT  Goal: Achieves stable or improved neurological status  Description: INTERVENTIONS  - Assess for and report changes in neurological status  - Initiate measures to prevent increased intracranial pressure  - Maintain blood pressure and fluid volume within ordered parameters to optimize cerebral perfusion and minimize risk of hemorrhage  - Monitor temperature, glucose, and sodium. Initiate appropriate interventions as  ordered  Outcome: Progressing  Goal: Absence of seizures  Description: INTERVENTIONS  - Monitor for seizure activity  - Administer anti-seizure medications as ordered  - Monitor neurological status  Outcome: Progressing  Goal: Remains free of injury related to seizure activity  Description: INTERVENTIONS:  - Maintain airway, patient safety  and administer oxygen as ordered  - Monitor patient for seizure activity, document and report duration and description of seizure to MD/LIP  - If seizure occurs, turn patient to side and suction secretions as needed  - Reorient patient post seizure  - Seizure pads on all 4 side rails  - Instruct patient/family to notify RN of any seizure activity  - Instruct patient/family to call for assistance with activity based on assessment  Outcome: Progressing  Goal: Achieves maximal functionality and self care  Description: INTERVENTIONS  - Monitor swallowing and airway patency with patient fatigue and changes in neurological status  - Encourage and assist patient to increase activity and self care with guidance from PT/OT  - Encourage visually impaired, hearing impaired and aphasic patients to use assistive/communication devices  Outcome: Progressing     Problem: METABOLIC/FLUID AND ELECTROLYTES - ADULT  Goal: Electrolytes maintained within normal limits  Description: INTERVENTIONS:  - Monitor labs and rhythm and assess patient for signs and symptoms of electrolyte imbalances  - Administer electrolyte replacement as ordered  - Monitor response to electrolyte replacements, including rhythm and repeat lab results as appropriate  - Fluid restriction as ordered  - Instruct patient on fluid and nutrition restrictions as appropriate  Outcome: Progressing  Goal: Hemodynamic stability and optimal renal function maintained  Description: INTERVENTIONS:  - Monitor labs and assess for signs and symptoms of volume excess or deficit  - Monitor intake, output and patient weight  - Monitor urine  specific gravity, serum osmolarity and serum sodium as indicated or ordered  - Monitor response to interventions for patient's volume status, including labs, urine output, blood pressure (other measures as available)  - Encourage oral intake as appropriate  - Instruct patient on fluid and nutrition restrictions as appropriate  Outcome: Progressing     Problem: RISK FOR INFECTION - ADULT  Goal: Absence of fever/infection during anticipated neutropenic period  Description: INTERVENTIONS  - Monitor WBC  - Administer growth factors as ordered  - Implement neutropenic guidelines  Outcome: Progressing

## 2025-06-20 NOTE — CONSULTS
PHYSICAL MEDICINE AND REHABILITATION CONSULTATION     CC: Impaired mobility and ADL dysfunction secondary to debility associated with acute hypoxic respiratory failure due to community-acquired pneumonia (Legionella)    HPI: This is a this is a 89-year-old male with a past medical history of hypertension hyperlipidemia, coronary artery disease, coronary artery bypass graft (2017), aortic stenosis,, hypothyroidism cataracts.  Patient presented to [facility] emergency department with dyspnea and was admitted on 6/14/2025 for evaluation and management of acute hypoxic respiratory failure and pneumonia.    Course:  - Acute hypoxic respiratory failure  - Legionella Ag (6/15/2025): Positive  - Sepsis/pneumonia  -Infectious disease consultation (6/17/2025): Legionella pneumonia/intravenous azithromycin,  - New onset atrial fibrillation with rapid ventricular response: Intravenous diltiazem, amiodarone with conversion back to sinus rhythm.  Initial anticoagulation therapy with heparin drip now on hold due to concerns of hemoptysis, melena and occult positive stools, cardiology on consultation  - Chest x-ray (6/16/2025): Worsening multifocal pneumonia with patchy bilateral lower and right upper lobe opacities since initial chest x-ray of 6/14/2025  - Acute kidney treated with  current BUN/creatinine 120/2.98 (6/20), admission BUN/creatinine 32/1.58 (6/14) nephrology on consultation  -Dark occult blood positive stools, with downward trending hemoglobin/hematocrit with concerns for GI bleed, considering GI consult, initial hemoglobin/hematocrit on admission 15.8/47.9 (6/14) current hemoglobin/hematocrit 11.7/34.3 (6/20)        PM&R has now been consulted in order to assess patient's functional status and make recommendations for rehabilitation.    ROS:  Per history of present illness and including impairments in basic self-care and mobility.    Current medications:  Current  Medications[1]    Allergies:  Allergies[2]    Past Medical History:  Past Medical History[3]    Past Surgical History:  Past Surgical History[4]    Family History:   Family History[5]    HOME SITUATION  Type of Home: House  Home Layout: Two level  Lives With: Alone  Toilet and Equipment: Standard height toilet  Shower/Tub and Equipment: Walk-in shower (has shower chair if needed)  Occupation/Status: part time at [employer]  Drives: Yes  Patient Regularly Uses: Glasses, Hearing aides (has a chair lift and RW)    Prior Level of [place]: Patient lives at home alone. He is normally active and still drives and manages his home     Current functional status    Diet recommendations as per speech therapist on 6/20/2025:  Diet Recommendations - Solids: Mechanical soft chopped/ Soft & Bite Sized  Diet Recommendations - Liquids: Nectar thick liquids/ Mildly thick     Compensatory Strategies Recommended: Alternate consistencies  Aspiration Precautions: No straw, Small sips, Small bites, Slow rate, Upright position  Medication Administration Recommendations: Whole in puree    Self-care as per occupational therapist on 6/19/2025:  FUNCTIONAL TRANSFER ASSESSMENT  Sit to Stand: Edge of Bed  Edge of Bed: Minimal Assist  Commode Transfer: Moderate Assist     BED MOBILITY  Supine to Sit : Contact Guard Assist     BALANCE ASSESSMENT  Static Sitting: Contact Guard Assist  Static Standing: Minimal Assist  Dynamic sitting: CGA  Dynamic Standing: Mod assist       FUNCTIONAL ADL ASSESSMENT  LB Dressing Seated: Maximum Assist (to doff soiled brief and jair fresh one from rolling chair)  LB Dressing Standing: Maximum Assist (to jair/doff brief before and after toileting)  Toileting Standing: Maximum Assist (for bowel jamil cares)    Mobility as per physical therapist on 6/19/2025:  FUNCTIONAL ABILITY STATUS  Functional Mobility/Gait Assessment  Gait Assistance: Moderate assistance  Distance (ft): 3 ft and 5 ft  Assistive Device: Rolling  walker  Pattern: Shuffle (decreased maribel speed, decreased step length, flexed posture. Cues provided for step sequencing and rolling walker management.)  Rolling: contact guard assist  Supine to Sit: contact guard assist, increased time to complete  Sit to Stand: minimal assist from EOB and rolling commode      Primary insurance: Medicare    FULL CODE    OBJECTIVE:    /64 (BP Location: Right arm)   Pulse 61   Temp 97.5 °F (36.4 °C) (Oral)   Resp 20   Ht 5' 8\" (1.727 m)   Wt 160 lb 9.6 oz (72.8 kg)   SpO2 92%   BMI 24.42 kg/m²   General: Patient lying in bed sleeping easily arousable in no acute distress, family member (nephew at bedside)  Head: Normocephalic, atraumatic, without obvious abnormality.  Eyes: conjunctiva/lids without erythema/icterus, EOMI.  E/N/T: No scars noted on bilateral ears, Lips normal.  Neck: supple, symmetrical,   Lungs: Non labored on RA,  No accessory muscle noted  Heart: Reg Rate, patient appears well-perfused  Abdomen: soft, non-tender, non-distended. Extrems: no clubbing/cyanosis noted in digits or nails  Skin: skin color/turgor appear normal,   Psych: awake,alert, oriented; normal mood and affect  MSK: Manual muscle testing reveals active movement against gravity tolerating resistance throughout bilateral upper lower extremities.  Neuro: CN 2-12 grossly intact, sensation intact to LT in BUE/BLE; speech clear and fluent    Data Review:    Lab Results   Component Value Date    WBC 21.5 06/20/2025    HGB 11.7 06/20/2025    HCT 34.3 06/20/2025    .0 06/20/2025    CREATSERUM 2.98 06/20/2025    CREATSERUM 2.98 06/20/2025     06/20/2025     06/20/2025     06/20/2025     06/20/2025    K 3.6 06/20/2025    K 3.6 06/20/2025    K 3.5 06/20/2025     06/20/2025     06/20/2025    CO2 18.0 06/20/2025    CO2 18.0 06/20/2025    GLU 95 06/20/2025    GLU 95 06/20/2025    CA 7.9 06/20/2025    CA 7.9 06/20/2025    ALB 2.7 06/20/2025    PHOS 5.1  06/20/2025       Imaging:    PROCEDURE: CT CHEST ABDOMEN PELVIS (CPT=71250/65277)     COMPARISON: [facility], XR CHEST AP PORTABLE (CPT=71045), 9/22/2024, 2:57 PM.  [facility], XR CHEST AP PORTABLE (CPT=71045), 6/17/2025, 6:30 AM.  [facility], XR CHEST AP PORTABLE (CPT=71045), 6/16/2025,  10:01 AM.  [facility], XR CHEST AP PORTABLE (CPT=71045), 6/14/2025, 3:16 PM.     INDICATIONS: Legionella pneumonia, hypoxia, leukocytosis, bandemia     TECHNIQUE: Multidetector CT images of the chest, abdomen and pelvis were obtained without intravenous contrast material. Automated exposure control for dose reduction was used. Adjustment of the mA and/or kV was done based on the patient's size.  Iterative reconstruction technique for dose reduction was employed.       FINDINGS:  COMMENT: Evaluation of the vasculature, griffin, and of the abdominal viscera for the presence of intraparenchymal pathology is suboptimal in the absence of contrast infusion. Overall examination quality is degraded by beam hardening artifact throughout the   imaged volume secondary to the patient's bilateral arm-down positioning.  CARDIAC: The heart is not enlarged.  Multi-vessel coronary artery calcification with coronary artery bypass.  VASCULATURE: The thoracic aorta has unremarkable configuration without aneurysmal dilatation.    LUNGS/PLEURA: Extensive right lung airspace disease with intrinsic air bronchograms, which is most pronounced in the right upper lobe, but also extends to the right middle lobe and superior segment right lower lobe.  Small right and trace left dependent  pleural effusions.  Probable associated compressive atelectasis adjacent to the effusions in both lower lobes.  No pneumothorax.  There are multiple calcified granulomas.  AIRWAYS: The tracheobronchial tree is without central mass or obstructing lesion.  MEDIASTINUM/GRIFFIN: Scattered prominent in number but  nonenlarged/subcentimeter short axis mediastinal lymph nodes.  CHEST WALL: No axillary mass or lymphadenopathy.       LIVER: No enlargement, atrophy, abnormal density, or significant focal lesion is identified within the parameters of this noncontrast study.    BILIARY: Cholelithiasis.  No significant gallbladder luminal distention or pericholecystic inflammation.  PANCREAS: Negative unenhanced appearance for fluid collection, ductal dilatation, or atrophy.    SPLEEN: No enlargement.    ADRENALS:   No defined mass or abnormal enlargement.    KIDNEYS:   Punctate 1-2 mm nonobstructing left lower pole renal calculus.  No hydronephrosis.  Left lower pole renal cortical scarring.  Variant left extrarenal pelvis and/or parapelvic cysts.  Indeterminate attenuation 1.2 cm right interpolar renal  cortical lesion.  GI/MESENTERY: There is no evidence of bowel obstruction.  Tiny retrocardiac hiatal hernia; inferior transverse segment duodenal diverticulum.  Colonic diverticulosis with mild-to-moderate colonic fecal burden, please note that some segments of the colon   are incompletely distended and suboptimally evaluated.  Candidate small normal appendix in the right lower quadrant.  URINARY BLADDER: Incompletely distended with circumferential bladder wall thickening.  PELVIC NODES: No lymphadenopathy.    PELVIC ORGANS: Enlarged 5.1 cm diameter prostate gland, which indents urinary bladder base.  VASCULATURE:   Approximate 2 cm infrarenal abdominal aortic ectasia with left lateral ulcerated plaque but no waylon abdominal aortic aneurysm.  Moderate atherosclerosis.  RETROPERITONEUM: No mass or lymphadenopathy is apparent.    BONES:   Demineralization.  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  Moderate dextroscoliosis of the thoracolumbar spine.  Chronic-appearing right greater than left sacroiliitis.  Partially imaged left glenohumeral joint  osteoarthritis.  Multilevel thoracolumbar spine degeneration.   Degenerative anterolisthesis of L4 relative to L5 and L5 relative to S1.  Sternotomy wires.  Chronic/healed left posterior 10th rib fracture.  ABDOMINAL WALL: Small fat containing bilateral inguinal hernias.  Mild-moderate anasarca.  OTHER: Trace intra-abdominal ascites.  No free intraperitoneal air.              Impression  CONCLUSION:  1. Extensive right lung airspace disease with intrinsic air bronchograms (most pronounced in the right upper lobe), which is compatible with suspected contents of lesion along pneumonia.  Follow-up to resolution is advised.  2. Small right and trace left pleural effusions.  Probable associated compressive atelectasis at the lung bases.  Mild-moderate anasarca with trace intra-abdominal ascites.  These findings suggest fluid overload and/or mild congestive failure.  3. Coronary and peripheral atherosclerosis with coronary artery bypass.  4. Prominent in number and borderline enlarged mediastinal lymph nodes, which are probably reactive to pneumonia.  5. Punctate nonobstructing left lower pole renal calculus.  Indeterminate attenuation 1.2 cm right interpolar renal cortical lesion is incompletely characterized, but statistically relates to a benign proteinaceous/hemorrhagic cyst.  There is also left  lower pole renal cortical scarring.  6. Prostatomegaly; enlarged prostate gland indents urinary bladder base. Circumferential urinary bladder wall thickening, which may relate to incomplete distention, chronic partial outlet obstruction prostate gland enlargement or cystitis.  If there are  referable symptoms, urinalysis correlation is requested.  7. Colonic diverticulosis.  8. Small retrocardiac hiatal hernia.  9. Cholelithiasis.  10. Chronic-appearing right greater than left sacroiliitis.  11. Lesser incidental findings as above.          elm-remote     Dictated by (CST): Toan Ohara MD on 6/17/2025 at 3:48 PM      Finalized by (CST): Toan Ohara MD on 6/17/2025 at   4:01 PM       PROCEDURE: US KIDNEY/BLADDER (CPT=76770)      COMPARISON: None.      INDICATIONS: SIERRA      TECHNIQUE: Ultrasound examination was performed to visualize the kidneys and bladder.       FINDINGS:   RIGHT KIDNEY: Right kidney is 10.6 cm in length.  It is diffusely echogenic. There is a 9 x 8 x 6-mm anechoic structure with imperceptible walls and increased through-transmission within the upper pole, compatible with simple cyst.  In the lower pole   there is a 17 x 13 x 16 mm simple cyst.  There is also a 3 x 2 x 3 mm nonobstructing stone in a lower pole calyx.  No suspicious solid lesion.  No hydronephrosis.   LEFT KIDNEY: Left kidney measures 13.1 cm in length.  It is diffusely echogenic.  14 x 14 x 13 mm simple cyst in the midpole cortex.  2 x 1 x 2 mm nonobstructing stone in a mid pole calyx.  No suspicious solid lesion or hydronephrosis.   BLADDER: Bladder is distended with urine measuring 62 x 67 x 85 mm (183.2 mL).  Base of the urinary bladder is deformed by the prostate.  Prominent trabeculations.  Prostate measures 48 x 38 x 40 mm (38.5 mL).      CONCLUSION:   1.  Both kidneys are echogenic suggesting medical renal disease.   2.  Bilateral simple renal cysts.  Bilateral nonobstructing intrarenal calculi.   3.  Urinary bladder is distended with urine, containing approximately 183 mL.  The base of the urinary bladder is deformed by the prostate.  Prominent bladder trabeculations which could be due to incomplete distention or neurogenic change.             DICTATED BY (CST): KENNEDY LOPEZ MD ON 6/19/2025 AT 6:11 PM       FINALIZED BY (CST): KENNEDY LOPEZ MD ON 6/19/2025 AT 6:14 PM           ASSESSMENT:    REHAB DIAGNOSIS:  1. Impaired mobility/ADL dysfunction secondary to debility associated with acute hypoxic respiratory failure due to community-acquired pneumonia (Legionella)  2.  New onset atrial fibrillation rapid ventricular response  3.  Acute kidney injury  4.  Acute blood loss anemia with concerns of GI  bleeding  5.  Oropharyngeal dysphagia    IMPAIRMENTS: ADLs, functional mobility, balance, dysphagia, strength, endurance, self care, transfers, hygiene    RECOMMENDATIONS:    Continue bedside rehabilitation services while undergoing ongoing medical management.  Based on current levels of functional impairment and associated medical comorbidities the patient is appropriate for acute inpatient rehabilitation when medically stable utilizing comprehensive rehabilitation services consisting of occupational therapy, physical therapy, speech-language pathology, neuropsychology.  Medical stability concerns: Acute kidney injury, worsening anemia, new onset atrial fibrillation holding of anticoagulation therapy and concerns of GI bleed in process of determining plan of care.     Additionally 24 hr rehab nursing also beneficial for medication management, pressure sore prevention, bowel and bladder management, skin management. Physiatric medical oversight to monitor kidney function, cardiac function, pulmonary status, DVT prevention. ELOS 2 weeks. Rehab potential is good     DC goal is home at a modified independent level.    Continue to follow for rehabilitation needs during this hospitalization make further recommendations as indicated based on any changes.    Thank you for this consultation,  Salomón Mccray III, MD   [facility]            [1]    [COMPLETED] potassium chloride 40 mEq in 250mL sodium chloride 0.9% IVPB premix  40 mEq Intravenous Once    guaiFENesin ER (Mucinex) 12 hr tab 600 mg  600 mg Oral BID    pantoprazole (Protonix) 40 mg in sodium chloride 0.9% PF 10 mL IV push  40 mg Intravenous Q12H    [Held by provider] aspirin DR tab 81 mg  81 mg Oral Daily    [COMPLETED] potassium chloride 20 mEq/100mL IVPB premix 20 mEq  20 mEq Intravenous Once    sodium chloride 0.9% infusion   Intravenous Continuous    [COMPLETED] potassium chloride 20 mEq/100mL IVPB premix 20 mEq  20 mEq Intravenous Once     [] lidocaine (cardiac) (Xylocaine) 20 mg/mL injection        [] atropine 0.1 MG/ML injection        [] EPINEPHrine (Adrenalin) 1 MG/10ML (0.1 MG/ML) injection (Cardiac Arrest)        metoprolol succinate ER (Toprol XL) 24 hr tab 25 mg  25 mg Oral 2x Daily(Beta Blocker)    amiodarone (Pacerone) tab 400 mg  400 mg Oral BID with meals    [COMPLETED] Perflutren Lipid Microsphere (DEFINITY) 6.52 MG/ML injection 1.5 mL  1.5 mL Intravenous ONCE PRN    [COMPLETED] dilTIAZem (cardIZEM) 25 mg/5mL injection        [COMPLETED] dilTIAZem (cardIZEM) 25 mg/5mL injection 10 mg  10 mg Intravenous Once    azithromycin (Zithromax) 500 mg in sodium chloride 0.9% 250mL IVPB premix  500 mg Intravenous Q24H    [COMPLETED] amiodarone (Cordarone) 150 mg in dextrose 5% 100 mL IV bolus  150 mg Intravenous Once    Followed by    [] amiodarone in dextrose 4.14% (Cordarone) 360 mg/200mL infusion premix  1 mg/min Intravenous Continuous    Followed by    [] amiodarone in dextrose 4.14% (Cordarone) 360 mg/200mL infusion premix  0.5 mg/min Intravenous Continuous    [COMPLETED] heparin (Porcine) 1000 UNIT/ML injection - BOLUS IV 4,200 Units  60 Units/kg Intravenous Once    [COMPLETED] heparin (Porcine) 84812 units/250 mL infusion (ACS/AFIB) INITIAL DOSE  12 Units/kg/hr Intravenous Once    albuterol (Ventolin) (2.5 MG/3ML) 0.083% nebulizer solution 2.5 mg  2.5 mg Nebulization Q4H PRN    [COMPLETED] potassium chloride (Klor-Con M20) tab 40 mEq  40 mEq Oral Once    [COMPLETED] sodium chloride 0.9 % IV bolus 2,109 mL  30 mL/kg Intravenous Once    [COMPLETED] vancomycin (Vancocin) 1.75 g in sodium chloride 0.9% 500mL IVPB premix  25 mg/kg Intravenous Once    [COMPLETED] piperacillin-tazobactam (Zosyn) 4.5 g in dextrose 5% 100 mL IVPB-ADDV  4.5 g Intravenous Once    [COMPLETED] acetaminophen (Tylenol) tab 650 mg  650 mg Oral Once    atorvastatin (Lipitor) tab 40 mg  40 mg Oral Nightly    levothyroxine (Synthroid) tab 100  mcg  100 mcg Oral Daily    acetaminophen (Tylenol) tab 650 mg  650 mg Oral Q6H PRN    calcium carbonate (Tums) chewable tab 500 mg  500 mg Oral BID PRN   [2] No Known Allergies  [3]   Past Medical History:   Atherosclerosis of coronary artery    Cortical senile cataract    Disorder of thyroid    Dyslipidemia    Essential hypertension    High blood pressure    Hypothyroidism    Senile cataract   [4]   Past Surgical History:  Procedure Laterality Date    Cataract extraction w/  intraocular lens implant Right 01/08/2019    Dr. Schwarz @ [facility]    Cataract extraction w/  intraocular lens implant Left 03/2019    Dr. Schwarz @ [facility]   [5]   Family History  Problem Relation Age of Onset    Other (Other) Father     Cancer Mother     Cancer Brother     Diabetes Neg     Glaucoma Neg

## 2025-06-20 NOTE — SLP NOTE
SPEECH DAILY NOTE - INPATIENT    ASSESSMENT & PLAN   ASSESSMENT  PPE REQUIRED. THIS THERAPIST WORE GLOVES FOR DURATION OF SESSION. HANDS WASHED UPON ENTRANCE/EXIT.    SLP f/u for ongoing dysphagia tx/meal assessment per recommendations of soft bite sized/mildly thick liquids per RE BSE. RN reports pt coughing up food? Pt denies any swallowing challenges.     Pt positioned upright in bed, alert/cooperative/son in law present. Pt afebrile, tolerating 1L/Min O2NC with oxygen status 93% prior to the start of oral trials. SLP reviewed aspiration precautions and safe swallowing compensatory strategies with the patient. Safe swallow guidelines remain written on the white board in purple. Patient and family v/u. Provided no assistance, pt tolerates soft solids and mildly thick liquids via cup with no overt clinical signs/symptoms of aspiration. Pt presented with trials of hard solids. Pt with adequate oral acceptance and bilabial seal. Pt with intact bite, reduced/prolonged mastication, and delayed A/P transfer. Pharyngeal swallow response appears delayed with reduced laryngeal elevation/excursion. 1x delayed/productive (thick yellow mucous) cough observed. No other clinical signs of aspiration (e.g., immediate/delayed throat clear, immediate cough, wet vocal quality, increased O2 effort) observed across all trials. Oxygen status remained stable t/o the entire session. Recommend remain on current diet with strict adherence to aspiration precautions and swallow strategies.    SLP to f/u with meal assessment x2-3, monitor  CXR, and VFSS if any overt CSA and/or decline in CXR. RN alerted with results and recommendations.     MOST RECENT CXR 6/17   CONCLUSION:   Slightly improved aeration of the right lower lobe.  Persistent extensive opacities in the right upper lobe.  No new abnormality.         Diet Recommendations - Solids: Mechanical soft chopped/ Soft & Bite Sized  Diet Recommendations - Liquids: Nectar thick liquids/  Mildly thick    Compensatory Strategies Recommended: Alternate consistencies  Aspiration Precautions: No straw, Small sips, Small bites, Slow rate, Upright position  Medication Administration Recommendations: Whole in puree    Patient Experiencing Pain: No              Treatment Plan  Treatment Plan/Recommendations: Aspiration precautions    Interdisciplinary Communication: Discussed with RN          GOALS  Goal #1 The patient will tolerate soft bite sized consistency and mildly thick liquids without overt signs or symptoms of aspiration with 100 % accuracy over 2 session(s).  Revised 6/19   Goal #2 The patient/family/caregiver will demonstrate understanding and implementation of aspiration precautions and swallow strategies independently over 2 session(s).    In Progress   Goal #3 The patient will utilize compensatory strategies as outlined by  BSSE (clinical evaluation) including Slow rate, Small bites, Small sips, Alternate liquids/solids, No straws, Upright 90 degrees, Eliminate distractions with minimal assistance 100 % of the time across 2 sessions.  In Progress     FOLLOW UP  Follow Up Needed (Documentation Required): Yes  SLP Follow-up Date: 06/21/25  Duration: 1 week    Session: 1 post RE EVAL    If you have any questions, please contact RYAN Dykes M.S. CCC-SLP  Speech Language Pathologist  Phone Number [phone]

## 2025-06-20 NOTE — DIETARY NOTE
ADULT NUTRITION INITIAL ASSESSMENT    Pt is at moderate nutrition risk.  Pt does not meet malnutrition criteria.      RECOMMENDATIONS TO MD: See nutrition intervention for ONS (oral nutrition supplements)    ADMITTING DIAGNOSIS:  Sepsis due to pneumonia (HCC) [J18.9, A41.9]  PERTINENT PAST MEDICAL HISTORY: Past Medical History[1]    PATIENT STATUS: Initial 06/20/25: Pt assessed due to re-screening for length of stay (LOS) with decreased oral intake. Chart reviewed, pt admitted from home with c/o weakness to lower extremities x 1 week. Chest x-ray with right sided opacity concerning for underlying pneumonia. Legionella urinary antigen positive per note review. Discussion with RN, regurgitating food - SLP re-consulted. Currently on chopped/soft & bite sized with mildly (nectar) thick liquids as of  6/19/25. Intakes reviewed, 0-100% x 10 meals since last visit (averaging 37% overall). Pt visited, son-in-law at bedside assisting in diet/weight history. Pt/son-in-law reports good intake/appetite prior to admission (PTA). Decline in intake since admission r/t some swallowing difficulties with increased mucous - SLP following. No weight loss reported. Current weight 160# 9.6 oz. EMR weight review utilizing care everywhere, last known weight # 12.8 oz on 2/3/25 - 8.2# or 4.9% weight loss x 4 months (non-significant). Per EMR weight review, pt noted weighing 153# on 10/2/24 - weight gain and 156# on 9/27/24 - weight gain and 147# 6 oz on 9/23/24 - weight gain. Encouraged increased energy and protein intake. Discussed adding oral nutritional supplement (ONS) to help maximize nutrition. +ONS per discussion. Adjusted diet to promote increased po intake.    FOOD/NUTRITION RELATED HISTORY:  Appetite: decreased appetite/intake since admission. Good intake PTA  Intake: ~0-100% x10 meals documented since admit - averaging 37% overall  Intake Meeting Needs: No, but oral nutrition supplements (ONS) to maximize  Percent Meals  Eaten (last 6 days)       Date/Time Percent Meals Eaten (%)    06/14/25 2005 10 %    06/15/25 1105 100 %    06/15/25 1227 0 %     Percent Meals Eaten (%): pt refused lunch at 06/15/25 1227    06/16/25 0910 5 %     Percent Meals Eaten (%): at a muffin at bedside at 06/16/25 0910    06/17/25 2200 10 %    06/18/25 0800 25 %    06/18/25 1400 75 %    06/19/25 1100 70 %    06/19/25 1340 50 %    06/19/25 2100 25 %           Food Allergies: No Known Food Allergies (NKFA)  Cultural/Ethnic/Holiness Preferences: Not Obtained    GASTROINTESTINAL: +BM 6/20/25 - large;black;soft and Swallow evaluation noted  U/O 700 ml (0.4 ml/kg/hr)    MEDICATIONS: reviewed Electrolyte replacement per protocol (cardiac)  IVF noted   potassium chloride  40 mEq Intravenous Once    pantoprazole  40 mg Intravenous Daily    guaiFENesin ER  600 mg Oral BID    [Held by provider] aspirin  81 mg Oral Daily    metoprolol succinate ER  25 mg Oral 2x Daily(Beta Blocker)    amiodarone  400 mg Oral BID with meals    azithromycin  500 mg Intravenous Q24H    atorvastatin  40 mg Oral Nightly    levothyroxine  100 mcg Oral Daily      sodium chloride 42 mL/hr at 06/20/25 1214     LABS: reviewed 5.6% on 6/16/25  Recent Labs     06/18/25  0158 06/19/25  0630 06/19/25  2042 06/20/25  0501   GLU 95 90  --  95  95   BUN 72* 113*  --  120*  120*   CREATSERUM 2.78* 3.07*  --  2.98*  2.98*   CA 8.5* 8.5*  --  7.9*  7.9*   * 136  --  143  143   K 4.3 3.8 3.3* 3.6  3.6  3.5    106  --  113*  113*   CO2 16.0* 19.0*  --  18.0*  18.0*   PHOS  --   --   --  5.1   OSMOCALC 299* 317*  --  334*  334*     WEIGHT HISTORY:  Patient Weight(s) for the past 336 hrs:   Weight   06/20/25 0449 72.8 kg (160 lb 9.6 oz)   06/18/25 0500 70.1 kg (154 lb 8.7 oz)   06/14/25 1811 70.3 kg (155 lb)   06/14/25 1357 70.3 kg (155 lb)     Wt Readings from Last 10 Encounters:   06/20/25 72.8 kg (160 lb 9.6 oz)   09/23/24 66.9 kg (147 lb 6.4 oz)   01/31/17 79.1 kg (174 lb 4.8 oz)    01/18/17 78 kg (172 lb)     ANTHROPOMETRICS:  HT: 172.7 cm (5' 8\")  Wt Readings from Last 1 Encounters:   06/20/25 72.8 kg (160 lb 9.6 oz)     Last weight: Likely accurate  BMI: Body mass index is 24.42 kg/m².  Dosing Weight: 72.8 kg - taken on 6/20/25, utilized for anthropometric calculations  BMI CLASSIFICATION: 18.5-24.9 kg/m2 - WNL  IBW/lbs (Calculated) Male: 154 lbs             104% IBW  Usual Body Wt: 150-160 lbs per EMR review       100-107% UBW    NUTRITION RELATED PHYSICAL FINDINGS:  - Nutrition Focused Physical Exam (NFPE): mild deficits appropriate for advanced age  - Fluid Accumulation: none  per flowsheet review --> See RN documentation for details  - Skin Integrity: at risk per flowsheet review --> See RN documentation for details    NUTRITION DIAGNOSIS/PROBLEM:   Inadequate oral intake related to Decreased ability to consume sufficient energy as evidenced by decreased appetite/intake since admission    NUTRITION INTERVENTION:   NUTRITION PRESCRIPTION:   Estimated Nutrition needs: --dosing wt of 72.8 kg - wt taken on 6/20/25  Calories: 6944-4590 calories/day (25-28 calories per kg Dosing wt)  Protein: 87+ g protein/day (1.2+ g protein/kg Dosing wt)    - Diet:       Procedures    Sodium restricted diet Sodium Restriction: 3-4 GM NA; Fluid Consistency: Nectar Thick / Mildly Thick Liquids; Texture Consistency: Chopped / Soft & Bite Sized; Is Patient on Accuchecks? No; Misc Restriction: No Straw    **adjusted Cardiac diet to 3-4 grams Sodium restriction to help promote increased po intake    - Nutrition Care Plan: Adjusted diet to least restrictive to maximize intake, Encouraged increased PO intake, Encouraged small frequent meals with emphasis on high calorie/high protein, and Initiated ONS (oral nutritional supplements)  - ONS (Oral Nutrition Supplements)/Meals/Snacks: Magic Cup (290 calories/ 9 g protein each) Daily Chocolate   - Vitamin and mineral supplements: none  - Feeding assistance: meal set  up  - Nutrition education: Discussed importance of adequate energy and protein intake    - Coordination of nutrition care: collaboration with other providers  - Discharge and transfer of nutrition care to new setting or provider: monitor plans from home alone    MONITOR AND EVALUATE/NUTRITION GOALS:  - Food and Nutrient Intake:      Monitor: adequacy of PO intake, tolerance of PO intake, and adequacy of supplement intake  - Food and Nutrient Administration:      Monitor: N/A  - Anthropometric Measurement:    Monitor weight  - Nutrition Goals:      maintain wt within 5%, PO and supplement greater than 75% of needs, good supplement intake, labs within acceptable limits, euglycemia, minimize lean body mass loss, prevent skin breakdown, support body systems, and improved GI status    DIETITIAN FOLLOW UP: RD to follow and monitor nutrition status    Noelle Charles MS, SHANI, RDN, LDN  Clinical Dietitian  P: [phone]       [1]   Past Medical History:   Atherosclerosis of coronary artery    Cortical senile cataract    Disorder of thyroid    Dyslipidemia    Essential hypertension    High blood pressure    Hypothyroidism    Senile cataract

## 2025-06-21 PROBLEM — K92.2 GASTROINTESTINAL HEMORRHAGE: Status: ACTIVE | Noted: 2025-06-21

## 2025-06-21 PROBLEM — N17.9 AKI (ACUTE KIDNEY INJURY): Status: ACTIVE | Noted: 2025-06-21

## 2025-06-21 LAB
ALBUMIN SERPL-MCNC: 2.8 G/DL (ref 3.2–4.8)
ANION GAP SERPL CALC-SCNC: 8 MMOL/L (ref 0–18)
ANION GAP SERPL CALC-SCNC: 8 MMOL/L (ref 0–18)
BASOPHILS # BLD: 0 X10(3) UL (ref 0–0.2)
BASOPHILS NFR BLD: 0 %
BUN BLD-MCNC: 113 MG/DL (ref 9–23)
BUN BLD-MCNC: 82 MG/DL (ref 9–23)
BUN/CREAT SERPL: 44.8 (ref 10–20)
BUN/CREAT SERPL: 47.4 (ref 10–20)
CALCIUM BLD-MCNC: 5.6 MG/DL (ref 8.7–10.4)
CALCIUM BLD-MCNC: 8.3 MG/DL (ref 8.7–10.4)
CHLORIDE SERPL-SCNC: 119 MMOL/L (ref 98–112)
CHLORIDE SERPL-SCNC: 126 MMOL/L (ref 98–112)
CO2 SERPL-SCNC: 14 MMOL/L (ref 21–32)
CO2 SERPL-SCNC: 19 MMOL/L (ref 21–32)
CREAT BLD-MCNC: 1.73 MG/DL (ref 0.7–1.3)
CREAT BLD-MCNC: 2.52 MG/DL (ref 0.7–1.3)
DEPRECATED RDW RBC AUTO: 51.2 FL (ref 35.1–46.3)
EGFRCR SERPLBLD CKD-EPI 2021: 23 ML/MIN/1.73M2 (ref 60–?)
EGFRCR SERPLBLD CKD-EPI 2021: 36 ML/MIN/1.73M2 (ref 60–?)
EOSINOPHIL # BLD: 0 X10(3) UL (ref 0–0.7)
EOSINOPHIL NFR BLD: 0 %
ERYTHROCYTE [DISTWIDTH] IN BLOOD BY AUTOMATED COUNT: 14.6 % (ref 11–15)
GLUCOSE BLD-MCNC: 66 MG/DL (ref 70–99)
GLUCOSE BLD-MCNC: 96 MG/DL (ref 70–99)
HCT VFR BLD AUTO: 31.5 % (ref 39–53)
HGB BLD-MCNC: 10.2 G/DL (ref 13–17.5)
LYMPHOCYTES NFR BLD: 0.97 X10(3) UL (ref 1–4)
LYMPHOCYTES NFR BLD: 5 %
MCH RBC QN AUTO: 31 PG (ref 26–34)
MCHC RBC AUTO-ENTMCNC: 32.4 G/DL (ref 31–37)
MCV RBC AUTO: 95.7 FL (ref 80–100)
METAMYELOCYTES # BLD: 0.19 X10(3) UL (ref ?–0.01)
METAMYELOCYTES NFR BLD: 1 %
MONOCYTES # BLD: 0.39 X10(3) UL (ref 0.1–1)
MONOCYTES NFR BLD: 2 %
MYELOCYTES # BLD: 0.19 X10(3) UL (ref ?–0.01)
MYELOCYTES NFR BLD: 1 %
NEUTROPHILS # BLD AUTO: 16.04 X10 (3) UL (ref 1.5–7.7)
NEUTROPHILS NFR BLD: 90 %
NEUTS BAND NFR BLD: 1 %
NEUTS HYPERSEG # BLD: 17.65 X10(3) UL (ref 1.5–7.7)
OSMOLALITY SERPL CALC.SUM OF ELEC: 329 MOSM/KG (ref 275–295)
OSMOLALITY SERPL CALC.SUM OF ELEC: 338 MOSM/KG (ref 275–295)
PHOSPHATE SERPL-MCNC: 4.3 MG/DL (ref 2.4–5.1)
PLATELET # BLD AUTO: 177 10(3)UL (ref 150–450)
PLATELET MORPHOLOGY: NORMAL
POTASSIUM SERPL-SCNC: 2.7 MMOL/L (ref 3.5–5.1)
POTASSIUM SERPL-SCNC: 2.7 MMOL/L (ref 3.5–5.1)
POTASSIUM SERPL-SCNC: 3.3 MMOL/L (ref 3.5–5.1)
POTASSIUM SERPL-SCNC: 3.8 MMOL/L (ref 3.5–5.1)
RBC # BLD AUTO: 3.29 X10(6)UL (ref 3.8–5.8)
SODIUM SERPL-SCNC: 146 MMOL/L (ref 136–145)
SODIUM SERPL-SCNC: 148 MMOL/L (ref 136–145)
TOTAL CELLS COUNTED BLD: 100
WBC # BLD AUTO: 19.4 X10(3) UL (ref 4–11)

## 2025-06-21 PROCEDURE — 99233 SBSQ HOSP IP/OBS HIGH 50: CPT | Performed by: STUDENT IN AN ORGANIZED HEALTH CARE EDUCATION/TRAINING PROGRAM

## 2025-06-21 PROCEDURE — 99233 SBSQ HOSP IP/OBS HIGH 50: CPT | Performed by: INTERNAL MEDICINE

## 2025-06-21 PROCEDURE — 99232 SBSQ HOSP IP/OBS MODERATE 35: CPT | Performed by: INTERNAL MEDICINE

## 2025-06-21 RX ORDER — POTASSIUM CHLORIDE 14.9 MG/ML
20 INJECTION INTRAVENOUS ONCE
Status: DISCONTINUED | OUTPATIENT
Start: 2025-06-21 | End: 2025-06-26

## 2025-06-21 RX ORDER — METOPROLOL SUCCINATE 25 MG/1
25 TABLET, EXTENDED RELEASE ORAL
Status: DISCONTINUED | OUTPATIENT
Start: 2025-06-22 | End: 2025-06-23

## 2025-06-21 RX ORDER — POTASSIUM CHLORIDE 1500 MG/1
40 TABLET, EXTENDED RELEASE ORAL ONCE
Status: COMPLETED | OUTPATIENT
Start: 2025-06-21 | End: 2025-06-21

## 2025-06-21 RX ORDER — AMIODARONE HYDROCHLORIDE 200 MG/1
200 TABLET ORAL 2 TIMES DAILY WITH MEALS
Status: DISCONTINUED | OUTPATIENT
Start: 2025-06-21 | End: 2025-06-22

## 2025-06-21 NOTE — PLAN OF CARE
Room air, stand by assist to the chair, family at bedside, call light in place     Problem: Patient Centered Care  Goal: Patient preferences are identified and integrated in the patient's plan of care  Description: Interventions:  - What would you like us to know as we care for you? From home alone  - Provide timely, complete, and accurate information to patient/family  - Incorporate patient and family knowledge, values, beliefs, and cultural backgrounds into the planning and delivery of care  - Encourage patient/family to participate in care and decision-making at the level they choose  - Honor patient and family perspectives and choices  Outcome: Progressing       Problem: PAIN - ADULT  Goal: Verbalizes/displays adequate comfort level or patient's stated pain goal  Description: INTERVENTIONS:  - Encourage pt to monitor pain and request assistance  - Assess pain using appropriate pain scale  - Administer analgesics based on type and severity of pain and evaluate response  - Implement non-pharmacological measures as appropriate and evaluate response  - Consider cultural and social influences on pain and pain management  - Manage/alleviate anxiety  - Utilize distraction and/or relaxation techniques  - Monitor for opioid side effects  - Notify MD/LIP if interventions unsuccessful or patient reports new pain  - Anticipate increased pain with activity and pre-medicate as appropriate  Outcome: Progressing     Problem: SAFETY ADULT - FALL  Goal: Free from fall injury  Description: INTERVENTIONS:  - Assess pt frequently for physical needs  - Identify cognitive and physical deficits and behaviors that affect risk of falls.  - [place] fall precautions as indicated by assessment.  - Educate pt/family on patient safety including physical limitations  - Instruct pt to call for assistance with activity based on assessment  - Modify environment to reduce risk of injury  - Provide assistive devices as appropriate  - Consider  OT/PT consult to assist with strengthening/mobility  - Encourage toileting schedule  Outcome: Progressing     Problem: DISCHARGE PLANNING  Goal: Discharge to home or other facility with appropriate resources  Description: INTERVENTIONS:  - Identify barriers to discharge w/pt and caregiver  - Include patient/family/discharge partner in discharge planning  - Arrange for needed discharge resources and transportation as appropriate  - Identify discharge learning needs (meds, wound care, etc)  - Arrange for interpreters to assist at discharge as needed  - Consider post-discharge preferences of patient/family/discharge partner  - Complete POLST form as appropriate  - Assess patient's ability to be responsible for managing their own health  - Refer to Case Management Department for coordinating discharge planning if the patient needs post-hospital services based on physician/LIP order or complex needs related to functional status, cognitive ability or social support system  Outcome: Progressing     Problem: RISK FOR INFECTION - ADULT  Goal: Absence of fever/infection during anticipated neutropenic period  Description: INTERVENTIONS  - Monitor WBC  - Administer growth factors as ordered  - Implement neutropenic guidelines  Outcome: Progressing     Problem: RESPIRATORY - ADULT  Goal: Achieves optimal ventilation and oxygenation  Description: INTERVENTIONS:  - Assess for changes in respiratory status  - Assess for changes in mentation and behavior  - Position to facilitate oxygenation and minimize respiratory effort  - Oxygen supplementation based on oxygen saturation or ABGs  - Provide Smoking Cessation handout, if applicable  - Encourage broncho-pulmonary hygiene including cough, deep breathe, Incentive Spirometry  - Assess the need for suctioning and perform as needed  - Assess and instruct to report SOB or any respiratory difficulty  - Respiratory Therapy support as indicated  - Manage/alleviate anxiety  - Monitor for  signs/symptoms of CO2 retention  Outcome: Progressing     Problem: Impaired Functional Mobility  Goal: Achieve highest/safest level of mobility/gait  Description: Interventions:  - Assess patient's functional ability and stability  - Promote increasing activity/tolerance for mobility and gait  - Educate and engage patient/family in tolerated activity level and precautions    Outcome: Progressing     Problem: METABOLIC/FLUID AND ELECTROLYTES - ADULT  Goal: Electrolytes maintained within normal limits  Description: INTERVENTIONS:  - Monitor labs and rhythm and assess patient for signs and symptoms of electrolyte imbalances  - Administer electrolyte replacement as ordered  - Monitor response to electrolyte replacements, including rhythm and repeat lab results as appropriate  - Fluid restriction as ordered  - Instruct patient on fluid and nutrition restrictions as appropriate  Outcome: Progressing  Goal: Hemodynamic stability and optimal renal function maintained  Description: INTERVENTIONS:  - Monitor labs and assess for signs and symptoms of volume excess or deficit  - Monitor intake, output and patient weight  - Monitor urine specific gravity, serum osmolarity and serum sodium as indicated or ordered  - Monitor response to interventions for patient's volume status, including labs, urine output, blood pressure (other measures as available)  - Encourage oral intake as appropriate  - Instruct patient on fluid and nutrition restrictions as appropriate  Outcome: Progressing     Problem: CARDIOVASCULAR - ADULT  Goal: Maintains optimal cardiac output and hemodynamic stability  Description: INTERVENTIONS:  - Monitor vital signs, rhythm, and trends  - Monitor for bleeding, hypotension and signs of decreased cardiac output  - Evaluate effectiveness of vasoactive medications to optimize hemodynamic stability  - Monitor arterial and/or venous puncture sites for bleeding and/or hematoma  - Assess quality of pulses, skin color and  temperature  - Assess for signs of decreased coronary artery perfusion - ex. Angina  - Evaluate fluid balance, assess for edema, trend weights  Outcome: Progressing  Goal: Absence of cardiac arrhythmias or at baseline  Description: INTERVENTIONS:  - Continuous cardiac monitoring, monitor vital signs, obtain 12 lead EKG if indicated  - Evaluate effectiveness of antiarrhythmic and heart rate control medications as ordered  - Initiate emergency measures for life threatening arrhythmias  - Monitor electrolytes and administer replacement therapy as ordered  Outcome: Progressing     Problem: NEUROLOGICAL - ADULT  Goal: Achieves stable or improved neurological status  Description: INTERVENTIONS  - Assess for and report changes in neurological status  - Initiate measures to prevent increased intracranial pressure  - Maintain blood pressure and fluid volume within ordered parameters to optimize cerebral perfusion and minimize risk of hemorrhage  - Monitor temperature, glucose, and sodium. Initiate appropriate interventions as ordered  Outcome: Progressing  Goal: Absence of seizures  Description: INTERVENTIONS  - Monitor for seizure activity  - Administer anti-seizure medications as ordered  - Monitor neurological status  Outcome: Progressing  Goal: Remains free of injury related to seizure activity  Description: INTERVENTIONS:  - Maintain airway, patient safety  and administer oxygen as ordered  - Monitor patient for seizure activity, document and report duration and description of seizure to MD/LIP  - If seizure occurs, turn patient to side and suction secretions as needed  - Reorient patient post seizure  - Seizure pads on all 4 side rails  - Instruct patient/family to notify RN of any seizure activity  - Instruct patient/family to call for assistance with activity based on assessment  Outcome: Progressing  Goal: Achieves maximal functionality and self care  Description: INTERVENTIONS  - Monitor swallowing and airway  patency with patient fatigue and changes in neurological status  - Encourage and assist patient to increase activity and self care with guidance from PT/OT  - Encourage visually impaired, hearing impaired and aphasic patients to use assistive/communication devices  Outcome: Progressing      Bcc Histology Text: There were numerous aggregates of basaloid cells.

## 2025-06-21 NOTE — PROGRESS NOTES
[facility]  part of [facility]     Progress Note    Imer Mcguire Patient Status:  Inpatient    1931 MRN [id number]   Location [facility] 2W/SW Attending Severo Lacey MD   Hosp Day # 7 PCP OMAR LEAVITT       Subjective:   Patient seen and examined.  Weaned off oxygen.  Denies significant dyspnea at rest.  Mild dyspnea with exertion.  Some occasional cough present.    Objective:   Blood pressure 153/64, pulse 55, temperature 97.6 °F (36.4 °C), temperature source Oral, resp. rate 17, height 5' 8\" (1.727 m), weight 159 lb 4.8 oz (72.3 kg), SpO2 93%.  Intake/Output:   Last 3 shifts: I/O last 3 completed shifts:  In: 3000.8 [P.O.:450; I.V.:0.8; IV PIGGYBACK:500]  Out: 1850 [Urine:1850]   This shift: No intake/output data recorded.     Vent Settings:      Hemodynamic parameters (last 24 hours):      Scheduled Meds: Current Hospital Medications[1]    Continuous Infusions: Medication Infusions[2]    Physical Exam  Constitutional: no acute distress  Eyes: PERRL  ENT: nares pateint  Neck: supple, no JVD  Cardio: RRR, S1 S2  Respiratory: Right-sided crackles  GI: abdomen soft, non tender, active bowel sounds, no organomegaly  Extremities: no clubbing, cyanosis, edema  Neurologic: no gross motor deficits  Skin: warm, dry      Results:     Lab Results   Component Value Date    WBC 19.4 2025    HGB 10.2 2025    HCT 31.5 2025    .0 2025    CREATSERUM 1.73 2025    BUN 82 2025     2025    K 2.7 2025    K 2.7 2025     2025    CO2 14.0 2025    GLU 66 2025    CA 5.6 2025       No results found.      EKG 12 Lead  Result Date: 2025  Sinus rhythm with Premature atrial complexes Otherwise normal ECG When compared with ECG of 2025 11:08, No significant change was found Confirmed by vincent teran ([id number]) on 2025 4:11:26 PM      Assessment   1.  Severe Legionella pneumonia  2.  Acute  hypoxemic respiratory failure  3.  Fevers  4.  Leukocytosis  5.  Coronary artery disease  6.  Acute kidney injury  7.  Hypertension  8.  Atrial fibrillation with rapid ventricular response     Plan   -Patient presents with evidence of fatigue malaise some associated cough and mild dyspnea.  Hypoxic on presentation to emergency department.  - Legionella antigen positive.    - CT chest on 6/17/2025 with extensive right-sided opacities seen consistent with pneumonia.  Trace pleural effusion present.  - Monitor leukocytosis at this time  - Antibiotic therapy per ID recommendations.  Levaquin discontinued currently on azithromycin.  - Weaned off of oxygen  - Atrial fibrillation management per cardiology has been started on amiodarone gtt initially now on p.o. amiodarone.  - Patient with evidence of some melena noted.  Further recommendations per GI.  Started on PPI therapy.  Closely monitor.  EGD on hold for now.  - PT/OT  - Discussed with family at bedside.      Bernabe Davis DO  Pulmonary Critical Care Medicine  [facility]          [1]   Current Facility-Administered Medications   Medication Dose Route Frequency    potassium chloride 40 mEq in 250mL sodium chloride 0.9% IVPB premix  40 mEq Intravenous Once    Followed by    potassium chloride 20 mEq/100mL IVPB premix 20 mEq  20 mEq Intravenous Once    guaiFENesin ER (Mucinex) 12 hr tab 600 mg  600 mg Oral BID    pantoprazole (Protonix) 40 mg in sodium chloride 0.9% PF 10 mL IV push  40 mg Intravenous Q12H    sodium ferric gluconate (Ferrlecit) 125 mg in sodium chloride 0.9% 100mL IVPB premix  125 mg Intravenous Daily    [Held by provider] aspirin DR tab 81 mg  81 mg Oral Daily    sodium chloride 0.9% infusion   Intravenous Continuous    metoprolol succinate ER (Toprol XL) 24 hr tab 25 mg  25 mg Oral 2x Daily(Beta Blocker)    amiodarone (Pacerone) tab 400 mg  400 mg Oral BID with meals    azithromycin (Zithromax) 500 mg in sodium chloride 0.9% 250mL IVPB premix   500 mg Intravenous Q24H    albuterol (Ventolin) (2.5 MG/3ML) 0.083% nebulizer solution 2.5 mg  2.5 mg Nebulization Q4H PRN    atorvastatin (Lipitor) tab 40 mg  40 mg Oral Nightly    levothyroxine (Synthroid) tab 100 mcg  100 mcg Oral Daily    acetaminophen (Tylenol) tab 650 mg  650 mg Oral Q6H PRN    calcium carbonate (Tums) chewable tab 500 mg  500 mg Oral BID PRN   [2]    sodium chloride 42 mL/hr at 06/20/25 2022

## 2025-06-21 NOTE — PROGRESS NOTES
[facility]  part of [facility]    Progress Note    Imer Mcguire Patient Status:  Inpatient    1931 MRN [id number]   Location [facility] 5SW/SE Attending Severo Lacey MD   Hosp Day # 7 PCP OMAR LEAVITT       SUBJECTIVE:  Feeling fine.  No shortness of breath   No chest pain no fever no chills.  Nursing staff reports no complaints    OBJECTIVE:  Vital signs in last 24 hours:  /74 (BP Location: Right arm)   Pulse 60   Temp 97.3 °F (36.3 °C) (Oral)   Resp 18   Ht 5' 8\" (1.727 m)   Wt 159 lb 4.8 oz (72.3 kg)   SpO2 94%   BMI 24.22 kg/m²     Intake/Output:    Intake/Output Summary (Last 24 hours) at 2025 1254  Last data filed at 2025 1040  Gross per 24 hour   Intake 1810.8 ml   Output 1760 ml   Net 50.8 ml       Wt Readings from Last 3 Encounters:   25 159 lb 4.8 oz (72.3 kg)   24 147 lb 6.4 oz (66.9 kg)   17 174 lb 4.8 oz (79.1 kg)       Exam  Gen: No acute distress, alert   HEENT: No pallor no icterus  Pulm: Lungs crackles are noted on the right side.  Nonmeat better than yesterday  CV: Heart with irregular rate and rhythm, no peripheral edema  Abd: Abdomen soft, nontender, nondistended, no organomegaly, bowel sounds present  Skin: no rashes or lesions  CNS: Alert    Data Review:     Labs:   Lab Results   Component Value Date    WBC 19.4 2025    HGB 10.2 2025    HCT 31.5 2025    .0 2025    CREATSERUM 2.52 2025     2025     2025    K 3.8 2025     2025    CO2 19.0 2025    GLU 96 2025    CA 8.3 2025    ALB 2.8 2025    PHOS 4.3 2025       LABS  Recent Labs   Lab 25  1409 06/15/25  0617 25  0455 25  1057 25  1846 25  2218 25  0622 25  0630 25  1104 25  0501 25  2034 25  0538 25  0858   RBC 5.08 5.07   < >  --   --   --   --   --  3.75* 3.68*  --  3.29*  --    HGB  15.8 15.8   < >  --   --   --  12.7*  --  12.0* 11.7* 12.2* 10.2*  --    HCT 47.9 48.4   < >  --   --   --  36.8*  --  34.2* 34.3*  --  31.5*  --    MCV 94.3 95.5   < >  --   --   --   --   --  91.2 93.2  --  95.7  --    MCH 31.1 31.2   < >  --   --   --   --   --  32.0 31.8  --  31.0  --    MCHC 33.0 32.6   < >  --   --   --   --   --  35.1 34.1  --  32.4  --    RDW 13.3 13.5   < >  --   --   --   --   --  15.9* 14.2  --  14.6  --    NEPRELIM 21.83* 25.87*   < >  --   --   --   --   --  19.00* 18.44*  --  16.04*  --    WBC 23.2* 26.6*   < >  --   --   --   --   --  21.3* 21.5*  --  19.4*  --    .0 216.0   < >  --   --   --  168.0  --  167.0 164.0  --  177.0  --    AST 43* 53*  --   --   --   --   --   --   --   --   --   --   --    ALT 32 34  --   --   --   --   --   --   --   --   --   --   --    PTT 28.5  --    < > 48.0* 52.9*  --  44.3*  --   --   --   --   --   --    INR 1.14  --   --   --   --   --   --   --   --   --   --   --   --    CK  --   --   --   --   --  27*  --   --   --   --   --   --   --    GLU 99 70   < >  --   --   --   --    < >  --  95  95  --  66* 96    < > = values in this interval not displayed.       Imaging:      Meds:   Current Hospital Medications[1]    Assessment  Problem List[2]  Sepsis due to pneumonia (HCC)    Pneumonia.,   Community-acquired bacterial bacterial   Secondary to Legionella  Patient on Zithromax       New onset atrial fibrillation with rapid ventricular response  Cardiology consulted.  Patient started on amiodarone      Acute hypoxic respiratory failure  Much improved  Continue oxygen supplementation.     Acute kidney injury.  on IV fluids.,  Improving        Coronary disease stable.     Hypothyroidism continue the patient on levothyroxine.     Patient stable.    Thrombocytopenia    Melena  GI consult    Discussed with nursing staff.  Further management will depend on the clinical course of the patient     Plan:   Continue IV antibiotics.  Continue continue  other treatments.    Discussed with family at the bedside      Active Orders   LAB    Basic Metabolic Panel (8)     Frequency: Daily Lab     Number of Occurrences: 3 Days    CBC With Differential With Platelet     Frequency: Daily Lab     Number of Occurrences: 3 Days    Potassium     Frequency: Timed draw     Number of Occurrences: 1 Occurrences     Order Comments: DO NOT DISCONTINUE MUST REMAIN FOR ELECTROLYTE PROTOCOL       Nourishments    Dietary Nutrition Supplements Daily     Frequency: Daily     Number of Occurrences: Until Specified     Order Comments: Magic Cup @ 2pm - Chocolate     Respiratory Care    Acapella Until Discontinued     Frequency: Until Discontinued     Number of Occurrences: Until Specified   ECG    EKG 12 Lead     Frequency: Daily     Number of Occurrences: Until Specified   Diet    Sodium restricted diet Sodium Restriction: 3-4 GM NA; Fluid Consistency: Nectar Thick / Mildly Thick Liquids; Texture Consistency: Chopped / Soft & Bite Sized; Is Patient on Accuchecks? No; Misc Restriction: No Straw     Frequency: Effective Now     Number of Occurrences: Until Specified   Nursing    Cardiac monitoring     Frequency: Until Discontinued     Number of Occurrences: Until Specified    Cardiac monitoring     Frequency: Continuous     Number of Occurrences: Until Specified    Daily weights     Frequency: Until Discontinued     Number of Occurrences: Until Specified    Daily weights     Frequency: Until Discontinued     Number of Occurrences: Until Specified    Follow up for COPD/Pneumonia     Frequency: Once     Number of Occurrences: 1 Occurrences     Order Comments: Follow up  about one week after DC      Increase infusion dose 100 units/hr     Frequency: Once     Number of Occurrences: 1 Occurrences    Increase infusion dose 100 units/hr     Frequency: Once     Number of Occurrences: 1 Occurrences    Increase infusion dose 100 units/hr     Frequency: Once     Number of Occurrences: 1 Occurrences     Increase infusion dose 100 units/hr     Frequency: Once     Number of Occurrences: 1 Occurrences    Increase infusion dose 100 units/hr     Frequency: Once     Number of Occurrences: 1 Occurrences    Increase infusion dose 100 units/hr     Frequency: Once     Number of Occurrences: 1 Occurrences    Initiate electrolyte protocol     Frequency: Until Discontinued     Number of Occurrences: Until Specified    Intake and Output     Frequency: Q Shift     Number of Occurrences: Until Specified    Notify physician (cardiology or ordering physician):     Frequency: Until Discontinued     Number of Occurrences: Until Specified     Order Comments: If you are discontinuing a drip without any oral diltiazem medication orders      Notify physician for BP (cardiology or ordering physician):     Frequency: Until Discontinued     Number of Occurrences: Until Specified     Order Comments: For SBP < 80 mmHg or symptomatic hypotension, discontinue drip and notify physician      Notify physician for HR (cardiology or ordering physician):     Frequency: Until Discontinued     Number of Occurrences: Until Specified     Order Comments: 1. If you are on max intravenous dose and the HR is >120  2. If HR is >140 and it has been one hour since you transitioned to oral from IV diltiazem  3. If rate < 75 for > 5 min or symptomatic bradycardia, discontinue the drip and notify physician  4. For pause greater than 3.0 seconds, hold drip and notify physician      Stop amiodarone and call the attending cardiologist     Frequency: PRN     Number of Occurrences: Until Specified     Order Comments: If systolic blood pressure falls to less than 90 mmHg, heart rate drops to less than 60 beats per minute, if  EKG demonstrates greater than Mobitz 1 Heart Block      Strict intake and output     Frequency: Until Discontinued     Number of Occurrences: Until Specified    Strict intake and output     Frequency: Until Discontinued     Number of Occurrences:  Until Specified    Tele Box     Frequency: Once     Number of Occurrences: 1 Occurrences    Tele Box     Frequency: Once     Number of Occurrences: 1 Occurrences   Code Status    Full code Continuous     Frequency: Continuous     Number of Occurrences: Until Specified   Consult    Consult to Gastroenterology     Frequency: Once     Number of Occurrences: 1 Occurrences   Medications    acetaminophen (Tylenol) tab 650 mg     Frequency: Q6H PRN     Dose: 650 mg     Route: Oral    albuterol (Ventolin) (2.5 MG/3ML) 0.083% nebulizer solution 2.5 mg     Frequency: Q4H PRN     Dose: 2.5 mg     Route: Nebulization    amiodarone (Pacerone) tab 400 mg     Frequency: BID with meals     Dose: 400 mg     Route: Oral    aspirin DR tab 81 mg     Frequency: Daily     Dose: 81 mg     Route: Oral    atorvastatin (Lipitor) tab 40 mg     Frequency: Nightly     Dose: 40 mg     Route: Oral    azithromycin (Zithromax) 500 mg in sodium chloride 0.9% 250mL IVPB premix     Frequency: Q24H     Dose: 500 mg     Route: Intravenous    calcium carbonate (Tums) chewable tab 500 mg     Frequency: BID PRN     Dose: 500 mg     Route: Oral    guaiFENesin ER (Mucinex) 12 hr tab 600 mg     Frequency: BID     Dose: 600 mg     Route: Oral    levothyroxine (Synthroid) tab 100 mcg     Frequency: Daily     Dose: 100 mcg     Route: Oral    metoprolol succinate ER (Toprol XL) 24 hr tab 25 mg     Frequency: Daily Beta Blocker     Dose: 25 mg     Route: Oral    pantoprazole (Protonix) 40 mg in sodium chloride 0.9% PF 10 mL IV push     Frequency: Q12H     Dose: 40 mg     Route: Intravenous    potassium chloride 20 mEq/100mL IVPB premix 20 mEq     Frequency: Once     Dose: 20 mEq     Route: Intravenous    sodium bicarbonate 75 mEq in sodium chloride 0.45% 1,075 mL infusion     Frequency: Continuous     Dose: 75 mEq     Route: Intravenous    sodium ferric gluconate (Ferrlecit) 125 mg in sodium chloride 0.9% 100mL IVPB premix     Frequency: Daily     Dose: 125 mg      Route: Intravenous     Order Comments: Cumulative dose should not exceed 1g during single treatment course.       Severo Lacey MD           [1]   Current Facility-Administered Medications   Medication Dose Route Frequency    potassium chloride 20 mEq/100mL IVPB premix 20 mEq  20 mEq Intravenous Once    sodium bicarbonate 75 mEq in sodium chloride 0.45% 1,075 mL infusion  75 mEq Intravenous Continuous    [START ON 6/22/2025] metoprolol succinate ER (Toprol XL) 24 hr tab 25 mg  25 mg Oral Daily Beta Blocker    guaiFENesin ER (Mucinex) 12 hr tab 600 mg  600 mg Oral BID    pantoprazole (Protonix) 40 mg in sodium chloride 0.9% PF 10 mL IV push  40 mg Intravenous Q12H    sodium ferric gluconate (Ferrlecit) 125 mg in sodium chloride 0.9% 100mL IVPB premix  125 mg Intravenous Daily    aspirin DR tab 81 mg  81 mg Oral Daily    amiodarone (Pacerone) tab 400 mg  400 mg Oral BID with meals    azithromycin (Zithromax) 500 mg in sodium chloride 0.9% 250mL IVPB premix  500 mg Intravenous Q24H    albuterol (Ventolin) (2.5 MG/3ML) 0.083% nebulizer solution 2.5 mg  2.5 mg Nebulization Q4H PRN    atorvastatin (Lipitor) tab 40 mg  40 mg Oral Nightly    levothyroxine (Synthroid) tab 100 mcg  100 mcg Oral Daily    acetaminophen (Tylenol) tab 650 mg  650 mg Oral Q6H PRN    calcium carbonate (Tums) chewable tab 500 mg  500 mg Oral BID PRN   [2]   Patient Active Problem List  Diagnosis    Coronary artery disease involving native heart    Metabolic encephalopathy    Myopia with astigmatism and presbyopia, bilateral    Age-related nuclear cataract of both eyes    After cataract not obscuring vision, bilateral    Acute chest pain    Sepsis due to pneumonia (HCC)

## 2025-06-21 NOTE — PLAN OF CARE
Problem: Patient Centered Care  Goal: Patient preferences are identified and integrated in the patient's plan of care  Description: Interventions:  - What would you like us to know as we care for you? From home alone  - Provide timely, complete, and accurate information to patient/family  - Incorporate patient and family knowledge, values, beliefs, and cultural backgrounds into the planning and delivery of care  - Encourage patient/family to participate in care and decision-making at the level they choose  - Honor patient and family perspectives and choices  Outcome: Progressing     Problem: Patient/Family Goals  Goal: Patient/Family Long Term Goal  Description: Patient's Long Term Goal: to discharge     Interventions:  - Monitor vital signs  - Monitor appropriate labs   - Pain management   - Administer medications per order   - Follow MD orders   - Diagnostics per order   - Update/inform patient and family on plan of care   - Discharge planning   - See additional Care Plan goals for specific interventions  Outcome: Progressing  Goal: Patient/Family Short Term Goal  Description: Patient's Short Term Goal: to feel better     Interventions:   - Monitor vital signs  - Monitor appropriate labs   - Pain management   - Administer medications per order   - Follow MD orders   - Diagnostics per order   - Update/inform patient and family on plan of care   - Discharge planning   - See additional Care Plan goals for specific interventions  Outcome: Progressing     Problem: PAIN - ADULT  Goal: Verbalizes/displays adequate comfort level or patient's stated pain goal  Description: INTERVENTIONS:  - Encourage pt to monitor pain and request assistance  - Assess pain using appropriate pain scale  - Administer analgesics based on type and severity of pain and evaluate response  - Implement non-pharmacological measures as appropriate and evaluate response  - Consider cultural and social influences on pain and pain management  -  Manage/alleviate anxiety  - Utilize distraction and/or relaxation techniques  - Monitor for opioid side effects  - Notify MD/LIP if interventions unsuccessful or patient reports new pain  - Anticipate increased pain with activity and pre-medicate as appropriate  Outcome: Progressing     Problem: SAFETY ADULT - FALL  Goal: Free from fall injury  Description: INTERVENTIONS:  - Assess pt frequently for physical needs  - Identify cognitive and physical deficits and behaviors that affect risk of falls.  - [place] fall precautions as indicated by assessment.  - Educate pt/family on patient safety including physical limitations  - Instruct pt to call for assistance with activity based on assessment  - Modify environment to reduce risk of injury  - Provide assistive devices as appropriate  - Consider OT/PT consult to assist with strengthening/mobility  - Encourage toileting schedule  Outcome: Progressing     Problem: DISCHARGE PLANNING  Goal: Discharge to home or other facility with appropriate resources  Description: INTERVENTIONS:  - Identify barriers to discharge w/pt and caregiver  - Include patient/family/discharge partner in discharge planning  - Arrange for needed discharge resources and transportation as appropriate  - Identify discharge learning needs (meds, wound care, etc)  - Arrange for interpreters to assist at discharge as needed  - Consider post-discharge preferences of patient/family/discharge partner  - Complete POLST form as appropriate  - Assess patient's ability to be responsible for managing their own health  - Refer to Case Management Department for coordinating discharge planning if the patient needs post-hospital services based on physician/LIP order or complex needs related to functional status, cognitive ability or social support system  Outcome: Progressing     Problem: RESPIRATORY - ADULT  Goal: Achieves optimal ventilation and oxygenation  Description: INTERVENTIONS:  - Assess for changes in  respiratory status  - Assess for changes in mentation and behavior  - Position to facilitate oxygenation and minimize respiratory effort  - Oxygen supplementation based on oxygen saturation or ABGs  - Provide Smoking Cessation handout, if applicable  - Encourage broncho-pulmonary hygiene including cough, deep breathe, Incentive Spirometry  - Assess the need for suctioning and perform as needed  - Assess and instruct to report SOB or any respiratory difficulty  - Respiratory Therapy support as indicated  - Manage/alleviate anxiety  - Monitor for signs/symptoms of CO2 retention  Outcome: Progressing     Problem: Impaired Functional Mobility  Goal: Achieve highest/safest level of mobility/gait  Description: Interventions:  - Assess patient's functional ability and stability  - Promote increasing activity/tolerance for mobility and gait  - Educate and engage patient/family in tolerated activity level and precautions  - Recommend use of  RW for transfers and ambulation  Outcome: Progressing     Problem: CARDIOVASCULAR - ADULT  Goal: Maintains optimal cardiac output and hemodynamic stability  Description: INTERVENTIONS:  - Monitor vital signs, rhythm, and trends  - Monitor for bleeding, hypotension and signs of decreased cardiac output  - Evaluate effectiveness of vasoactive medications to optimize hemodynamic stability  - Monitor arterial and/or venous puncture sites for bleeding and/or hematoma  - Assess quality of pulses, skin color and temperature  - Assess for signs of decreased coronary artery perfusion - ex. Angina  - Evaluate fluid balance, assess for edema, trend weights  Outcome: Progressing  Goal: Absence of cardiac arrhythmias or at baseline  Description: INTERVENTIONS:  - Continuous cardiac monitoring, monitor vital signs, obtain 12 lead EKG if indicated  - Evaluate effectiveness of antiarrhythmic and heart rate control medications as ordered  - Initiate emergency measures for life threatening arrhythmias  -  Monitor electrolytes and administer replacement therapy as ordered  Outcome: Progressing     Problem: NEUROLOGICAL - ADULT  Goal: Achieves stable or improved neurological status  Description: INTERVENTIONS  - Assess for and report changes in neurological status  - Initiate measures to prevent increased intracranial pressure  - Maintain blood pressure and fluid volume within ordered parameters to optimize cerebral perfusion and minimize risk of hemorrhage  - Monitor temperature, glucose, and sodium. Initiate appropriate interventions as ordered  Outcome: Progressing  Goal: Absence of seizures  Description: INTERVENTIONS  - Monitor for seizure activity  - Administer anti-seizure medications as ordered  - Monitor neurological status  Outcome: Progressing  Goal: Remains free of injury related to seizure activity  Description: INTERVENTIONS:  - Maintain airway, patient safety  and administer oxygen as ordered  - Monitor patient for seizure activity, document and report duration and description of seizure to MD/LIP  - If seizure occurs, turn patient to side and suction secretions as needed  - Reorient patient post seizure  - Seizure pads on all 4 side rails  - Instruct patient/family to notify RN of any seizure activity  - Instruct patient/family to call for assistance with activity based on assessment  Outcome: Progressing  Goal: Achieves maximal functionality and self care  Description: INTERVENTIONS  - Monitor swallowing and airway patency with patient fatigue and changes in neurological status  - Encourage and assist patient to increase activity and self care with guidance from PT/OT  - Encourage visually impaired, hearing impaired and aphasic patients to use assistive/communication devices  Outcome: Progressing     Problem: METABOLIC/FLUID AND ELECTROLYTES - ADULT  Goal: Electrolytes maintained within normal limits  Description: INTERVENTIONS:  - Monitor labs and rhythm and assess patient for signs and symptoms of  electrolyte imbalances  - Administer electrolyte replacement as ordered  - Monitor response to electrolyte replacements, including rhythm and repeat lab results as appropriate  - Fluid restriction as ordered  - Instruct patient on fluid and nutrition restrictions as appropriate  Outcome: Progressing  Goal: Hemodynamic stability and optimal renal function maintained  Description: INTERVENTIONS:  - Monitor labs and assess for signs and symptoms of volume excess or deficit  - Monitor intake, output and patient weight  - Monitor urine specific gravity, serum osmolarity and serum sodium as indicated or ordered  - Monitor response to interventions for patient's volume status, including labs, urine output, blood pressure (other measures as available)  - Encourage oral intake as appropriate  - Instruct patient on fluid and nutrition restrictions as appropriate  Outcome: Progressing     Problem: RISK FOR INFECTION - ADULT  Goal: Absence of fever/infection during anticipated neutropenic period  Description: INTERVENTIONS  - Monitor WBC  - Administer growth factors as ordered  - Implement neutropenic guidelines  Outcome: Progressing

## 2025-06-21 NOTE — PROGRESS NOTES
Progress Note  Imer Mcguire Patient Status:  Inpatient    1931 MRN [id number]   Location [facility]5W Attending Severo Lacey MD   Hosp Day # 7 PCP OMAR LEAVITT     Subjective   Feeling better each day. Denies hemoptysis this morning so far. No chest pain, dyspnea, or sob. Family at bedside.         Objective:   /64 (BP Location: Right arm)   Pulse 55   Temp 97.6 °F (36.4 °C) (Oral)   Resp 17   Ht 5' 8\" (1.727 m)   Wt 159 lb 4.8 oz (72.3 kg)   SpO2 93%   BMI 24.22 kg/m²     Telemetry: sinus bradycardia 55 bpm    Intake/Output:    Intake/Output Summary (Last 24 hours) at 2025 1111  Last data filed at 2025 1040  Gross per 24 hour   Intake 1810.8 ml   Output 1760 ml   Net 50.8 ml       Wt Readings from Last 3 Encounters:   25 159 lb 4.8 oz (72.3 kg)   24 147 lb 6.4 oz (66.9 kg)   17 174 lb 4.8 oz (79.1 kg)         Labs:  Recent Labs   Lab 25  0501 25  0538 25  0858   GLU 95  95 66* 96   *  120* 82* 113*   CREATSERUM 2.98*  2.98* 1.73* 2.52*   EGFRCR 19*  19* 36* 23*   CA 7.9*  7.9* 5.6* 8.3*     143 148* 146*   K 3.6  3.6  3.5 2.7*  2.7* 3.8   *  113* 126* 119*   CO2 18.0*  18.0* 14.0* 19.0*     Recent Labs   Lab 25  1104 25  0501 25  20325  0538   RBC 3.75* 3.68*  --  3.29*   HGB 12.0* 11.7* 12.2* 10.2*   HCT 34.2* 34.3*  --  31.5*   MCV 91.2 93.2  --  95.7   MCH 32.0 31.8  --  31.0   MCHC 35.1 34.1  --  32.4   RDW 15.9* 14.2  --  14.6   NEPRELIM 19.00* 18.44*  --  16.04*   WBC 21.3* 21.5*  --  19.4*   .0 164.0  --  177.0         Recent Labs   Lab 25  2218   CK 27*         Review of Systems:     10 point review of systems completed and negative except as noted in HPI      Exam:     Physical Exam:  General: Alert and oriented x 3. No apparent distress.   HEENT: Normocephalic, neck supple, no thyromegaly or adenopathy.  Neck: No JVD, carotids 2+, no bruits.  Cardiac:  Regular rate and bradycardic rhythm. S1, S2 normal. No murmur, pericardial rub, S3, or extra cardiac sounds.  Lungs: mildly diminished.  Normal excursions and effort.  Abdomen: Soft, non-tender. BS-present.  Extremities: Without clubbing or cyanosis. No edema.    Neurologic: Alert and oriented, normal affect. No focal defects  Skin: Warm and dry.       Diagnostic Studies:     Echo 6/17/25   Conclusions:     1. Left ventricle: The cavity size was normal. Wall thickness was normal.      Systolic function was at the lower limits of normal. The estimated      ejection fraction was 50-55%, by biplane method of disks. Unable to      assess LV diastolic function due to heart rhythm.   2. Right ventricle: Systolic function was reduced.   3. Aortic valve: The findings were consistent with moderate stenosis. The      peak systolic velocity was 2.62m/sec. The mean systolic gradient was 17mm      Hg. The valve area (VTI) was 1.07cm^2.   4. Tricuspid valve: There was mild-moderate regurgitation.   Impressions:  This study is compared with previous dated 09/22/2024: No   significant change since prior study.     Assessment and Plan:     Assessment:  # new onset of atrial fibrillation in the setting of pneumonia/ hypoxic respiratory failure   In sinus rhythm on PO amiodarone   AC initially with heparin gtt but developed hemoptysis and melena, + occult blood heparin currently held    [id number] ~ 3 (age, vascular disease)   # pneumonia legionella   Admitted with hypoxic respiratory failure   Now satting well on room air.  Abx per ID   # CAD with h/o CABG (2017)   Stable, on full dose of statin, high intensity statin, and BB   # Moderate aortic stenosis   peak velocity was 2.62m/sec, mean gradient 17 mmHg, valve area 1.07cm^2  Continue close surveillance   # acute bleeding secondary to heparin IV   Occult blood +, Asa and heparin held   GI seeing the patient.  # SIERRA on CKD - Cr peaked at 3.07 > improving with fluids   Lisinopril  held   Nephrology following creatinine 2.5 today continue IV fluids    Plan:  Discussed with GI services no plans for endoscopy right now okay to resume low-dose aspirin 81 mg daily conservative management  Sinus bradycardia decrease metoprolol succinate to 25 mg daily and continue loading with p.o. amiodarone 400 mg twice daily for 5 days followed by 200 mg twice daily.  Outpatient Watchman evaluation at a later date      Plan of care discussed with patient, family bedside.  Will follow    L3    Shiraz Javier MD.

## 2025-06-21 NOTE — PROGRESS NOTES
[facility]  part of [facility]    Progress Note    Imer Mcguire Patient Status:  Inpatient    1931 MRN [id number]   Location [facility]5W Attending Severo Lacey MD   Hosp Day # 7 PCP OMAR LEAVITT       Subjective:   Imer Mcguire is a(n) 89 year old male who I am seeing for SIERRA    Lying comfortably on bed.  Denies any chest pain or shortness of breath.  No abdominal pain    Objective:   /74 (BP Location: Right arm)   Pulse 60   Temp 97.3 °F (36.3 °C) (Oral)   Resp 18   Ht 5' 8\" (1.727 m)   Wt 159 lb 4.8 oz (72.3 kg)   SpO2 94%   BMI 24.22 kg/m²      Intake/Output Summary (Last 24 hours) at 2025 1411  Last data filed at 2025 1040  Gross per 24 hour   Intake 1810.8 ml   Output 1760 ml   Net 50.8 ml     Wt Readings from Last 1 Encounters:   25 159 lb 4.8 oz (72.3 kg)       Exam  Gen: No acute distress, Heent: NC AT, mucous memb clear, neck supple  Pulm: Lungs clear, normal respiratory effort  CV: Heart with regular rate and rhythm, no edema  Abd: Abdomen soft, nontender, nondistended, no organomegaly, bowel sounds present  Skin: no symptoms reported  Psych: alert and oriented    Assessment and Plan:     1 - SIERRA: Nonoliguric  Creatinine improved at 2.5 mg/dL.  Continue to monitor recovery renal ultrasound was negative.    IV fluid bicarb GGT at 42 mL/h.  Reassess tomorrow   lisinopril on hold  Last creatinine 1.0 mg/dL in 2024  BUN disproportionate to creatinine-likely secondary to GI bleed    2 -respiratory failure/Legionella pneumonia hypoxic on admission  Being seen by pulmonary and infectious disease.  He is on azithromycin    3 -anemia: GI bleed  Off of anticoagulation heparin  GI input noted-no plan for endoscopy.  Low-dose aspirin resumed  Hemoglobin gradually declined-10.2 today.  On PPI    4.new onset atrial fibrillation: Now sinus bradycardia  Metoprolol dose reduced  On p.o. amiodarone.  Heparin GTT was discontinued for melena      Discussed with nursing    Results:     Recent Labs   Lab 06/19/25  1104 06/20/25  0501 06/20/25 2034 06/21/25  0538   RBC 3.75* 3.68*  --  3.29*   HGB 12.0* 11.7* 12.2* 10.2*   HCT 34.2* 34.3*  --  31.5*   MCV 91.2 93.2  --  95.7   MCH 32.0 31.8  --  31.0   MCHC 35.1 34.1  --  32.4   RDW 15.9* 14.2  --  14.6   NEPRELIM 19.00* 18.44*  --  16.04*   WBC 21.3* 21.5*  --  19.4*   .0 164.0  --  177.0         Recent Labs   Lab 06/20/25  0501 06/21/25  0538 06/21/25  0858   GLU 95  95 66* 96   *  120* 82* 113*   CREATSERUM 2.98*  2.98* 1.73* 2.52*   CA 7.9*  7.9* 5.6* 8.3*     143 148* 146*   K 3.6  3.6  3.5 2.7*  2.7* 3.8   *  113* 126* 119*   CO2 18.0*  18.0* 14.0* 19.0*          No results found.        Wyatt Bosch MD  6/21/2025

## 2025-06-21 NOTE — PROGRESS NOTES
[facility]     Gastroenterology Progress Note    Imer Mcguire Patient Status:  Inpatient    1931 MRN [id number]   Location [facility]5W Attending Severo Lacey MD   Hosp Day # 7 PCP OMAR LEAVITT       Subjective:   Continues to improve. He still has a cough but no vomiting. No longer having melena, had a brown smear this afternoon.    Spoke to family bedside.    Objective:   Blood pressure 143/74, pulse 60, temperature 97.3 °F (36.3 °C), temperature source Oral, resp. rate 18, height 5' 8\" (1.727 m), weight 159 lb 4.8 oz (72.3 kg), SpO2 94%. Body mass index is 24.22 kg/m².    Gen- Patient appears comfortable and in no acute discomfort  HEENT: the sclera appears anicteric, oropharynx clear, mucus membranes appear moist  CV- regular rate and rhythm, the extremities are warm and well perfused   Lung- Moves air well; No labored breathing  Abdomen- soft, non-tender exam in all quadrants without rigidity or guarding, non-distended  Skin- No jaundice  Ext: no edema is evident.   Neuro- Alert and interactive  Psych- appropriate, non-agitated    Results:     Lab Results   Component Value Date    WBC 19.4 (H) 2025    HGB 10.2 (L) 2025    HCT 31.5 (L) 2025    .0 2025    CREATSERUM 2.52 (H) 2025     (H) 2025     (H) 2025    K 3.8 2025     (H) 2025    CO2 19.0 (L) 2025    GLU 96 2025    CA 8.3 (L) 2025    ALB 2.8 (L) 2025    ALKPHO 103 06/15/2025    BILT 1.3 (H) 06/15/2025    TP 5.8 06/15/2025    AST 53 (H) 06/15/2025    ALT 34 06/15/2025    PTT 44.3 (H) 2025    INR 1.14 2025    TSH 3.139 2025    MG 2.3 2025    PHOS 4.3 2025    CK 27 (L) 2025       No results found.        Assessment and Plan:   Imer Mcguire is a 89 year old male w/ a history of CAD s/p CABG, HTN, HLD, hypothyroidism who presented to the hospital with pneumonia and subsequently  developed atrial fibrillation.      He was receiving aspirin 325mg in the hospital, when he went into afib with RVR he was started on a heparin drip. After 72 hours of heparin/full dose aspirin he had an episode of melena on 6/19 and another on 6/20, given this heparin drip was stopped, aspirin changed to 81mg and GI was consulted.    #Melena  -Likely related to esophagitis vs PUD that was exacerbated by use of full dose aspirin/heparin drip and hospitalization.  -Hemoglobin is rather stable as are his vital signs.  -Had imaging of his chest/abdomen/pelvis that showed a hiatal hernia no other relevant findings of the GI tract.  -I discussed with the family that we could perform an EGD to evaluate for source of bleeding and possible intervention -- given his age, comorbid conditions and recent pneumonia the family would like to hold off on endoscopic evaluation, which is very reasonable.    *Overall he remains stable, no longer having melena -- had a brown smear today. Hemoglobin drifted down slightly but that's to be expected.     Recommend:  -Continue indefinite BID Pantoprazole (IV while admitted).  -Can resume anti-platelet/anticoagulation therapy as indicated/needed per cardiology.  -Given family wants to hold off on EGD will manage conservatively for now, will consider Egd if there is a major change in clinical status.  -Will follow    Miguel Suarez MD  [facility]astroenter[facility]

## 2025-06-22 PROBLEM — E87.0 HYPERNATREMIA: Status: ACTIVE | Noted: 2025-06-22

## 2025-06-22 LAB
ANION GAP SERPL CALC-SCNC: 9 MMOL/L (ref 0–18)
BASE EXCESS BLD CALC-SCNC: -3.9 MMOL/L (ref ?–2)
BASOPHILS # BLD: 0 X10(3) UL (ref 0–0.2)
BASOPHILS NFR BLD: 0 %
BUN BLD-MCNC: 78 MG/DL (ref 9–23)
BUN/CREAT SERPL: 38 (ref 10–20)
CALCIUM BLD-MCNC: 8.4 MG/DL (ref 8.7–10.4)
CHLORIDE SERPL-SCNC: 124 MMOL/L (ref 98–112)
CO2 SERPL-SCNC: 18 MMOL/L (ref 21–32)
CREAT BLD-MCNC: 2.05 MG/DL (ref 0.7–1.3)
DEPRECATED RDW RBC AUTO: 49.5 FL (ref 35.1–46.3)
EGFRCR SERPLBLD CKD-EPI 2021: 30 ML/MIN/1.73M2 (ref 60–?)
EOSINOPHIL # BLD: 0 X10(3) UL (ref 0–0.7)
EOSINOPHIL NFR BLD: 0 %
ERYTHROCYTE [DISTWIDTH] IN BLOOD BY AUTOMATED COUNT: 14.6 % (ref 11–15)
GLUCOSE BLD-MCNC: 90 MG/DL (ref 70–99)
HCO3 BLDA-SCNC: 21.7 MEQ/L (ref 21–27)
HCT VFR BLD AUTO: 38.8 % (ref 39–53)
HCT VFR BLD AUTO: 41 % (ref 39–53)
HGB BLD-MCNC: 13 G/DL (ref 13–17.5)
HGB BLD-MCNC: 13.6 G/DL (ref 13–17.5)
HGB BLD-MCNC: 13.6 G/DL (ref 13–17.5)
LYMPHOCYTES NFR BLD: 0.68 X10(3) UL (ref 1–4)
LYMPHOCYTES NFR BLD: 3 %
MAGNESIUM SERPL-MCNC: 2.7 MG/DL (ref 1.6–2.6)
MCH RBC QN AUTO: 31.1 PG (ref 26–34)
MCHC RBC AUTO-ENTMCNC: 33.5 G/DL (ref 31–37)
MCV RBC AUTO: 92.8 FL (ref 80–100)
METAMYELOCYTES # BLD: 0.23 X10(3) UL (ref ?–0.01)
METAMYELOCYTES NFR BLD: 1 %
MONOCYTES # BLD: 0.91 X10(3) UL (ref 0.1–1)
MONOCYTES NFR BLD: 4 %
MYELOCYTES # BLD: 0.91 X10(3) UL (ref ?–0.01)
MYELOCYTES NFR BLD: 4 %
NEUTROPHILS # BLD AUTO: 18.98 X10 (3) UL (ref 1.5–7.7)
NEUTROPHILS NFR BLD: 88 %
NEUTS HYPERSEG # BLD: 19.98 X10(3) UL (ref 1.5–7.7)
O2 CT BLD-SCNC: 16.6 VOL% (ref 15–23)
OSMOLALITY SERPL CALC.SUM OF ELEC: 335 MOSM/KG (ref 275–295)
PCO2 BLDA: 25 MM HG (ref 35–45)
PH BLDA: 7.47 [PH] (ref 7.35–7.45)
PLATELET # BLD AUTO: 287 10(3)UL (ref 150–450)
PLATELET MORPHOLOGY: NORMAL
PO2 BLDA: 62 MM HG (ref 80–100)
POTASSIUM SERPL-SCNC: 4.2 MMOL/L (ref 3.5–5.1)
POTASSIUM SERPL-SCNC: 4.2 MMOL/L (ref 3.5–5.1)
PUNCTURE CHARGE: YES
RBC # BLD AUTO: 4.18 X10(6)UL (ref 3.8–5.8)
SAO2 % BLDA: 93.2 % (ref 94–100)
SODIUM SERPL-SCNC: 151 MMOL/L (ref 136–145)
TOTAL CELLS COUNTED BLD: 100
WBC # BLD AUTO: 22.7 X10(3) UL (ref 4–11)

## 2025-06-22 PROCEDURE — 99233 SBSQ HOSP IP/OBS HIGH 50: CPT | Performed by: INTERNAL MEDICINE

## 2025-06-22 PROCEDURE — 99233 SBSQ HOSP IP/OBS HIGH 50: CPT | Performed by: STUDENT IN AN ORGANIZED HEALTH CARE EDUCATION/TRAINING PROGRAM

## 2025-06-22 RX ORDER — HYDRALAZINE HYDROCHLORIDE 25 MG/1
25 TABLET, FILM COATED ORAL EVERY 8 HOURS SCHEDULED
Status: DISCONTINUED | OUTPATIENT
Start: 2025-06-22 | End: 2025-06-26

## 2025-06-22 RX ORDER — AMIODARONE HYDROCHLORIDE 200 MG/1
200 TABLET ORAL DAILY
Status: DISCONTINUED | OUTPATIENT
Start: 2025-06-23 | End: 2025-06-26

## 2025-06-22 RX ORDER — DEXTROSE MONOHYDRATE 50 MG/ML
INJECTION, SOLUTION INTRAVENOUS CONTINUOUS
Status: DISCONTINUED | OUTPATIENT
Start: 2025-06-22 | End: 2025-06-25

## 2025-06-22 RX ORDER — SODIUM BICARBONATE 650 MG/1
325 TABLET ORAL 2 TIMES DAILY
Status: COMPLETED | OUTPATIENT
Start: 2025-06-22 | End: 2025-06-22

## 2025-06-22 RX ORDER — IPRATROPIUM BROMIDE AND ALBUTEROL SULFATE 2.5; .5 MG/3ML; MG/3ML
3 SOLUTION RESPIRATORY (INHALATION) EVERY 8 HOURS
Status: DISCONTINUED | OUTPATIENT
Start: 2025-06-22 | End: 2025-06-26

## 2025-06-22 RX ORDER — DEXTROSE MONOHYDRATE AND SODIUM CHLORIDE 5; .45 G/100ML; G/100ML
INJECTION, SOLUTION INTRAVENOUS CONTINUOUS
Status: DISCONTINUED | OUTPATIENT
Start: 2025-06-22 | End: 2025-06-22

## 2025-06-22 NOTE — PROGRESS NOTES
[facility]     Gastroenterology Progress Note    Imer Mcguire Patient Status:  Inpatient    1931 MRN [id number]   Location [facility]5W Attending Severo Lacey MD   Hosp Day # 8 PCP OMAR LEAVITT       Subjective:   Continues to cough. No vomiting.    No stool output.    Hgb 10 --> 13.      Objective:   Blood pressure 138/90, pulse 59, temperature 97.6 °F (36.4 °C), temperature source Axillary, resp. rate 20, height 5' 8\" (1.727 m), weight 158 lb 9.6 oz (71.9 kg), SpO2 91%. Body mass index is 24.12 kg/m².    Gen- weak, lethargic  HEENT: the sclera appears anicteric  CV- regular rate and rhythm, the extremities are warm and well perfused   Lung- Moves air well; No labored breathing  Abdomen- soft, non-tender exam  Skin- No jaundice  Ext: edema present in lower extremities  Neuro- Alert and interactive, and gross movements of extremities normal    Results:     Lab Results   Component Value Date    WBC 22.7 (H) 2025    HGB 13.0 2025    HCT 38.8 (L) 2025    .0 2025    CREATSERUM 2.05 (H) 2025    BUN 78 (H) 2025     (H) 2025    K 4.2 2025    K 4.2 2025     (H) 2025    CO2 18.0 (L) 2025    GLU 90 2025    CA 8.4 (L) 2025    ALB 2.8 (L) 2025    ALKPHO 103 06/15/2025    BILT 1.3 (H) 06/15/2025    TP 5.8 06/15/2025    AST 53 (H) 06/15/2025    ALT 34 06/15/2025    PTT 44.3 (H) 2025    INR 1.14 2025    TSH 3.139 2025    MG 2.7 (H) 2025    PHOS 4.3 2025    CK 27 (L) 2025       No results found.        Assessment and Plan:   lavon Mcguire is a 89 year old male w/ a history of CAD s/p CABG, HTN, HLD, hypothyroidism who presented to the hospital with pneumonia and subsequently developed atrial fibrillation.      He was receiving aspirin 325mg in the hospital, when he went into afib with RVR he was started on a heparin drip. After 72 hours of  heparin/full dose aspirin he had an episode of melena on 6/19 and another on 6/20, given this heparin drip was stopped, aspirin changed to 81mg and GI was consulted.     #Melena  -Likely related to esophagitis vs PUD that was exacerbated by use of full dose aspirin/heparin drip and hospitalization.  -Hemoglobin is rather stable as are his vital signs.  -Had imaging of his chest/abdomen/pelvis that showed a hiatal hernia no other relevant findings of the GI tract.  -I discussed with the family that we could perform an EGD to evaluate for source of bleeding and possible intervention -- given his age, comorbid conditions and recent pneumonia the family would like to hold off on endoscopic evaluation, which is very reasonable.     *Overall he remains stable, no longer having melena -- Hemoglobin up-trended this morning.    He continues to have a cough in the setting of pneumonia and is now refusing to engage in some aspects of his medical care.     Recommend:  -Continue indefinite BID Pantoprazole (IV while admitted).  -Can resume anti-platelet/anticoagulation therapy as indicated/needed per cardiology.  -Given family wants to hold off on EGD and he remains stable with up-trending hemoglobin,  will manage conservatively.  -Can consider Egd if there is a major change in clinical status but given recent pneumonia, afib and age of 89 with chronic co-morbid conditions he is not a great anesthesia candidate.    Hemoglobin is stable. Will sign off.    Miguel Suarez MD  [facility]astroenter[facility]

## 2025-06-22 NOTE — PROGRESS NOTES
Progress Note  Imer Mcguire Patient Status:  Inpatient    1931 MRN [id number]   Location [facility]5W Attending Severo Lacey MD   Hosp Day # 8 PCP OMAR LEAVITT     Subjective   Not eating much per nursing and family report. Did not sleep well. Cardiac wise he has no complaints. BLE mildly edematous. Son at bedside.       Objective:   BP (!) 183/66 (BP Location: Right arm)   Pulse 57   Temp 97.6 °F (36.4 °C) (Axillary)   Resp 17   Ht 5' 8\" (1.727 m)   Wt 158 lb 9.6 oz (71.9 kg)   SpO2 93%   BMI 24.12 kg/m²     Telemetry: sinus bradycardia in mid 50's to 60's     Intake/Output:    Intake/Output Summary (Last 24 hours) at 2025 0806  Last data filed at 2025 0646  Gross per 24 hour   Intake 1561.3 ml   Output 1380 ml   Net 181.3 ml       Wt Readings from Last 3 Encounters:   25 158 lb 9.6 oz (71.9 kg)   24 147 lb 6.4 oz (66.9 kg)   17 174 lb 4.8 oz (79.1 kg)         Labs:  Recent Labs   Lab 25  0538 25  0858 25  1738 25  0508   GLU 66* 96  --  90   BUN 82* 113*  --  78*   CREATSERUM 1.73* 2.52*  --  2.05*   EGFRCR 36* 23*  --  30*   CA 5.6* 8.3*  --  8.4*   * 146*  --  151*   K 2.7*  2.7* 3.8 3.3* 4.2  4.2   * 119*  --  124*   CO2 14.0* 19.0*  --  18.0*     Recent Labs   Lab 25  0501 25  2034 25  0538 25  0508   RBC 3.68*  --  3.29* 4.18   HGB 11.7* 12.2* 10.2* 13.0   HCT 34.3*  --  31.5* 38.8*   MCV 93.2  --  95.7 92.8   MCH 31.8  --  31.0 31.1   MCHC 34.1  --  32.4 33.5   RDW 14.2  --  14.6 14.6   NEPRELIM 18.44*  --  16.04* 18.98*   WBC 21.5*  --  19.4* 22.7*   .0  --  177.0 287.0         Recent Labs   Lab 25  2218   CK 27*         Review of Systems:     10 point review of systems completed and negative except as noted in HPI      Exam:     Physical Exam:  General: Alert and oriented x 3. No apparent distress.   HEENT: Normocephalic, neck supple, no thyromegaly or adenopathy.  Neck: No  JVD, carotids 2+, no bruits.  Cardiac: Regular rate and rhythm. S1, S2 normal. 2/6 MICHAEL   Lungs: mild crackles  Normal excursions and effort.  Abdomen: Soft, non-tender. BS-present.  Extremities: Without clubbing or cyanosis. Mild ble edema.    Neurologic: Alert and oriented, normal affect. No focal defects  Skin: Warm and dry.       Diagnostic Studies:        Echo 6/17/25   Conclusions:     1. Left ventricle: The cavity size was normal. Wall thickness was normal.      Systolic function was at the lower limits of normal. The estimated      ejection fraction was 50-55%, by biplane method of disks. Unable to      assess LV diastolic function due to heart rhythm.   2. Right ventricle: Systolic function was reduced.   3. Aortic valve: The findings were consistent with moderate stenosis. The      peak systolic velocity was 2.62m/sec. The mean systolic gradient was 17mm      Hg. The valve area (VTI) was 1.07cm^2.   4. Tricuspid valve: There was mild-moderate regurgitation.   Impressions:  This study is compared with previous dated 09/22/2024: No   significant change since prior study.      Assessment and Plan:      Assessment:  # new onset of atrial fibrillation in the setting of pneumonia/ hypoxic respiratory failure   Now sinus bradycardic, PO amiodarone decreased to 200 mg bid, metoprolol decreased   AC initially with heparin gtt but developed hemoptysis and melena, + occult blood heparin currently held, continue baby asa   [id number] ~ 4 (age, vascular disease, htn)   # pneumonia legionella   Admitted with hypoxic respiratory failure   Oxygen now down to 1L NC > wean as able   Abx per ID   # CAD with h/o CABG (2017)   Stable, on full dose of statin, high intensity statin, and BB   # Moderate aortic stenosis   peak velocity was 2.62m/sec, mean gradient 17 mmHg, valve area 1.07cm^2  Continue close surveillance   # acute bleeding secondary to heparin IV   Occult blood +, Asa and heparin held   Monitor Hgb, if down  trending consider GI consult   # SIERRA on CKD - Cr peaked at 3.07 > improving with fluids   Lisinopril held   Mild lower extremity edema   Nephrology following      Plan:  Sinus bradycardia with rates in mid 50's, amiodarone decreased to 200 mg bid, continue low dose metoprolol   Per GI no plans for endoscopy at this time, plan for conservative management with asa 81 mg, no AC at this time, can consider if recurrent/breakthrough  afib   BP elevated overnight and this morning, add hydralazine 25 mg tid, lisinopril currently held with SIERRA, further recommendation per nephrology   Wean oxygen as able   Encourage ambulation       Plan of care discussed with patient, RN.      Eileen Stephenson, APRN  6/22/2025  Ph [phone] (Ranjit)  Ph [phone] ([place])        L3  Seen and examined bedside. Agree with the present management, will sign off call if needs to be seen as needed sinus bradycardia with rates in mid 50's, amiodarone decreased to 200 mg bid, continue low dose metoprolol   Per GI no plans for endoscopy at this time, plan for conservative management with asa 81 mg, no AC at this time, can consider if recurrent/breakthrough  afib   Renal sufficiency management as per nephrology  Thank you for allowing me to participate in the care of your patient, feel free to contact me if you have any questions.    Shiraz Javier MD  [facility]  Interventional Cardiology.  [phone]

## 2025-06-22 NOTE — PLAN OF CARE
Problem: Patient Centered Care  Goal: Patient preferences are identified and integrated in the patient's plan of care  Description: Interventions:  - What would you like us to know as we care for you? From home alone  - Provide timely, complete, and accurate information to patient/family  - Incorporate patient and family knowledge, values, beliefs, and cultural backgrounds into the planning and delivery of care  - Encourage patient/family to participate in care and decision-making at the level they choose  - Honor patient and family perspectives and choices  Outcome: Progressing     Problem: Patient/Family Goals  Goal: Patient/Family Long Term Goal  Description: Patient's Long Term Goal: to discharge home     Interventions:  - - Monitor vital signs  - Monitor appropriate labs   - Pain management   - Administer medications per order   - Follow MD orders   - Diagnostics per order   - Update/inform patient and family on plan of care   - Discharge planning   - See additional Care Plan goals for specific interventions  Outcome: Progressing  Goal: Patient/Family Short Term Goal  Description: Patient's Short Term Goal: to feel better     Interventions:   - - Monitor vital signs  - Monitor appropriate labs   - Pain management   - Administer medications per order   - Follow MD orders   - Diagnostics per order   - Update/inform patient and family on plan of care   - Discharge planning   - See additional Care Plan goals for specific interventions  Outcome: Progressing     Problem: PAIN - ADULT  Goal: Verbalizes/displays adequate comfort level or patient's stated pain goal  Description: INTERVENTIONS:  - Encourage pt to monitor pain and request assistance  - Assess pain using appropriate pain scale  - Administer analgesics based on type and severity of pain and evaluate response  - Implement non-pharmacological measures as appropriate and evaluate response  - Consider cultural and social influences on pain and pain  management  - Manage/alleviate anxiety  - Utilize distraction and/or relaxation techniques  - Monitor for opioid side effects  - Notify MD/LIP if interventions unsuccessful or patient reports new pain  - Anticipate increased pain with activity and pre-medicate as appropriate  Outcome: Progressing     Problem: SAFETY ADULT - FALL  Goal: Free from fall injury  Description: INTERVENTIONS:  - Assess pt frequently for physical needs  - Identify cognitive and physical deficits and behaviors that affect risk of falls.  - [place] fall precautions as indicated by assessment.  - Educate pt/family on patient safety including physical limitations  - Instruct pt to call for assistance with activity based on assessment  - Modify environment to reduce risk of injury  - Provide assistive devices as appropriate  - Consider OT/PT consult to assist with strengthening/mobility  - Encourage toileting schedule  Outcome: Progressing     Problem: DISCHARGE PLANNING  Goal: Discharge to home or other facility with appropriate resources  Description: INTERVENTIONS:  - Identify barriers to discharge w/pt and caregiver  - Include patient/family/discharge partner in discharge planning  - Arrange for needed discharge resources and transportation as appropriate  - Identify discharge learning needs (meds, wound care, etc)  - Arrange for interpreters to assist at discharge as needed  - Consider post-discharge preferences of patient/family/discharge partner  - Complete POLST form as appropriate  - Assess patient's ability to be responsible for managing their own health  - Refer to Case Management Department for coordinating discharge planning if the patient needs post-hospital services based on physician/LIP order or complex needs related to functional status, cognitive ability or social support system  Outcome: Progressing     Problem: RESPIRATORY - ADULT  Goal: Achieves optimal ventilation and oxygenation  Description: INTERVENTIONS:  - Assess  for changes in respiratory status  - Assess for changes in mentation and behavior  - Position to facilitate oxygenation and minimize respiratory effort  - Oxygen supplementation based on oxygen saturation or ABGs  - Provide Smoking Cessation handout, if applicable  - Encourage broncho-pulmonary hygiene including cough, deep breathe, Incentive Spirometry  - Assess the need for suctioning and perform as needed  - Assess and instruct to report SOB or any respiratory difficulty  - Respiratory Therapy support as indicated  - Manage/alleviate anxiety  - Monitor for signs/symptoms of CO2 retention  Outcome: Progressing     Problem: Impaired Functional Mobility  Goal: Achieve highest/safest level of mobility/gait  Description: Interventions:  - Assess patient's functional ability and stability  - Promote increasing activity/tolerance for mobility and gait  - Educate and engage patient/family in tolerated activity level and precautions  - Recommend use of  RW for transfers and ambulation  Outcome: Progressing     Problem: CARDIOVASCULAR - ADULT  Goal: Maintains optimal cardiac output and hemodynamic stability  Description: INTERVENTIONS:  - Monitor vital signs, rhythm, and trends  - Monitor for bleeding, hypotension and signs of decreased cardiac output  - Evaluate effectiveness of vasoactive medications to optimize hemodynamic stability  - Monitor arterial and/or venous puncture sites for bleeding and/or hematoma  - Assess quality of pulses, skin color and temperature  - Assess for signs of decreased coronary artery perfusion - ex. Angina  - Evaluate fluid balance, assess for edema, trend weights  Outcome: Progressing  Goal: Absence of cardiac arrhythmias or at baseline  Description: INTERVENTIONS:  - Continuous cardiac monitoring, monitor vital signs, obtain 12 lead EKG if indicated  - Evaluate effectiveness of antiarrhythmic and heart rate control medications as ordered  - Initiate emergency measures for life threatening  arrhythmias  - Monitor electrolytes and administer replacement therapy as ordered  Outcome: Progressing     Problem: NEUROLOGICAL - ADULT  Goal: Achieves stable or improved neurological status  Description: INTERVENTIONS  - Assess for and report changes in neurological status  - Initiate measures to prevent increased intracranial pressure  - Maintain blood pressure and fluid volume within ordered parameters to optimize cerebral perfusion and minimize risk of hemorrhage  - Monitor temperature, glucose, and sodium. Initiate appropriate interventions as ordered  Outcome: Progressing  Goal: Absence of seizures  Description: INTERVENTIONS  - Monitor for seizure activity  - Administer anti-seizure medications as ordered  - Monitor neurological status  Outcome: Progressing  Goal: Remains free of injury related to seizure activity  Description: INTERVENTIONS:  - Maintain airway, patient safety  and administer oxygen as ordered  - Monitor patient for seizure activity, document and report duration and description of seizure to MD/LIP  - If seizure occurs, turn patient to side and suction secretions as needed  - Reorient patient post seizure  - Seizure pads on all 4 side rails  - Instruct patient/family to notify RN of any seizure activity  - Instruct patient/family to call for assistance with activity based on assessment  Outcome: Progressing  Goal: Achieves maximal functionality and self care  Description: INTERVENTIONS  - Monitor swallowing and airway patency with patient fatigue and changes in neurological status  - Encourage and assist patient to increase activity and self care with guidance from PT/OT  - Encourage visually impaired, hearing impaired and aphasic patients to use assistive/communication devices  Outcome: Progressing     Problem: METABOLIC/FLUID AND ELECTROLYTES - ADULT  Goal: Electrolytes maintained within normal limits  Description: INTERVENTIONS:  - Monitor labs and rhythm and assess patient for signs and  symptoms of electrolyte imbalances  - Administer electrolyte replacement as ordered  - Monitor response to electrolyte replacements, including rhythm and repeat lab results as appropriate  - Fluid restriction as ordered  - Instruct patient on fluid and nutrition restrictions as appropriate  Outcome: Progressing  Goal: Hemodynamic stability and optimal renal function maintained  Description: INTERVENTIONS:  - Monitor labs and assess for signs and symptoms of volume excess or deficit  - Monitor intake, output and patient weight  - Monitor urine specific gravity, serum osmolarity and serum sodium as indicated or ordered  - Monitor response to interventions for patient's volume status, including labs, urine output, blood pressure (other measures as available)  - Encourage oral intake as appropriate  - Instruct patient on fluid and nutrition restrictions as appropriate  Outcome: Progressing     Problem: RISK FOR INFECTION - ADULT  Goal: Absence of fever/infection during anticipated neutropenic period  Description: INTERVENTIONS  - Monitor WBC  - Administer growth factors as ordered  - Implement neutropenic guidelines  Outcome: Progressing

## 2025-06-22 NOTE — PROGRESS NOTES
[facility]  part of [facility]    Progress Note    Imer Mcguire Patient Status:  Inpatient    1931 MRN [id number]   Location [facility]5W Attending Severo Lacey MD   Hosp Day # 8 PCP OMAR LEAVITT       Subjective:   Imer Mcguire is a(n) 89 year old male who I am seeing for SIERRA    Lying comfortably on bed.  Receiving nebs treatment    Objective:   /62 (BP Location: Right arm)   Pulse 62   Temp 98.2 °F (36.8 °C) (Axillary)   Resp 19   Ht 5' 8\" (1.727 m)   Wt 158 lb 9.6 oz (71.9 kg)   SpO2 94%   BMI 24.12 kg/m²      Intake/Output Summary (Last 24 hours) at 2025 1229  Last data filed at 2025 0900  Gross per 24 hour   Intake 1471.3 ml   Output 1070 ml   Net 401.3 ml     Wt Readings from Last 1 Encounters:   25 158 lb 9.6 oz (71.9 kg)       Exam  Gen: No acute distress, Heent: NC AT, mucous memb clear, neck supple  Pulm: Lungs clear, normal respiratory effort  CV: Heart with regular rate and rhythm, no edema  Abd: Abdomen soft, nontender, nondistended, no organomegaly, bowel sounds present  Skin: no symptoms reported  Psych: alert and oriented    Assessment and Plan:     1 - SIERRA: Nonoliguric: UO 1380   Creatinine improved at 2.0 mg/dL.  Continue to monitor recovery   renal ultrasound was negative.    Bicarb gtt. Changed to dextrose for hypernatremia  lisinopril on hold  Last creatinine 1.0 mg/dL in 2024  BUN disproportionate to creatinine-likely secondary to GI bleed  Stable bicarb at 18 - will give couple of doses of po bicarb    2 -respiratory failure/Legionella pneumonia hypoxic on admission  Being seen by pulmonary and infectious disease.  He is on azithromycin  Leukocytosis not improving in between 20-22,000    3 -anemia: GI bleed  Off of anticoagulation heparin  GI input noted-no plan for endoscopy.  Low-dose aspirin resumed  Hemoglobin gradually declined-10.2 today.  On PPI.  IV iron for 3 doses    4.new onset atrial fibrillation:  Now sinus bradycardia  Metoprolol dose reduced  On p.o. amiodarone.  Heparin GTT was discontinued for melena    5.hypernatremia:   Change IV fluid to dextrose.  Recheck in a.m.  Encourage oral intake of water     Discussed with nursing and son at bedside    Results:     Recent Labs   Lab 06/20/25  0501 06/20/25 2034 06/21/25  0538 06/22/25  0508   RBC 3.68*  --  3.29* 4.18   HGB 11.7* 12.2* 10.2* 13.0   HCT 34.3*  --  31.5* 38.8*   MCV 93.2  --  95.7 92.8   MCH 31.8  --  31.0 31.1   MCHC 34.1  --  32.4 33.5   RDW 14.2  --  14.6 14.6   NEPRELIM 18.44*  --  16.04* 18.98*   WBC 21.5*  --  19.4* 22.7*   .0  --  177.0 287.0         Recent Labs   Lab 06/21/25  0538 06/21/25  0858 06/21/25  1738 06/22/25  0508   GLU 66* 96  --  90   BUN 82* 113*  --  78*   CREATSERUM 1.73* 2.52*  --  2.05*   CA 5.6* 8.3*  --  8.4*   * 146*  --  151*   K 2.7*  2.7* 3.8 3.3* 4.2  4.2   * 119*  --  124*   CO2 14.0* 19.0*  --  18.0*          No results found.        Wyatt Bosch MD  6/22/2025

## 2025-06-22 NOTE — PROGRESS NOTES
Pulmonary Medicine Inpatient Progress Note                 Subjective:  On RA  Afebrile  Coughed up some blood secretions        ALLERGIES:  Allergies[1]     MEDS:  Home Medications:  Medications Taking[2]  Scheduled Medication:  Scheduled Medications[3]  Continuous Infusing Medication:  Medication Infusions[4]  PRN Medications:  PRN Medications[5]       PHYSICAL EXAM:  BP (!) 183/66 (BP Location: Right arm)   Pulse 57   Temp 97.6 °F (36.4 °C) (Axillary)   Resp 17   Ht 5' 8\" (1.727 m)   Wt 158 lb 9.6 oz (71.9 kg)   SpO2 93%   BMI 24.12 kg/m²   CONSTITUTIONAL: alert, oriented, no apparent distress  HEENT: atraumatic normocephalic  MOUTH: mucous membranes are moist. No OP exudates  NECK/THROAT: no JVD. Trachea midline. No obvious thyromegaly  LUNG: mild wheezing, right sided crackles, coarseness. Chest symmetric with respiratory motion  HEART: regular rate and rhythm, no obvious murmers or gallops note  ABD: soft non tender. + bowel sounds. No organomegaly noted  EXT: no clubbing, cyanosis, or edema noted       IMAGES:  Chest CT 6/17/25 dense right sided infiltrates/consolidation with left lower lobe infiltrates   CONCLUSION:   1. Extensive right lung airspace disease with intrinsic air bronchograms (most pronounced in the right upper lobe), which is compatible with suspected contents of lesion along pneumonia.  Follow-up to resolution is advised.   2. Small right and trace left pleural effusions.  Probable associated compressive atelectasis at the lung bases.  Mild-moderate anasarca with trace intra-abdominal ascites.  These findings suggest fluid overload and/or mild congestive failure.   3. Coronary and peripheral atherosclerosis with coronary artery bypass.   4. Prominent in number and borderline enlarged mediastinal lymph nodes, which are probably reactive to pneumonia.   5. Punctate nonobstructing left lower pole renal calculus.  Indeterminate attenuation 1.2 cm right interpolar renal cortical lesion is  incompletely characterized, but statistically relates to a benign proteinaceous/hemorrhagic cyst.  There is also left   lower pole renal cortical scarring.   6. Prostatomegaly; enlarged prostate gland indents urinary bladder base. Circumferential urinary bladder wall thickening, which may relate to incomplete distention, chronic partial outlet obstruction prostate gland enlargement or cystitis.  If there are   referable symptoms, urinalysis correlation is requested.   7. Colonic diverticulosis.   8. Small retrocardiac hiatal hernia.   9. Cholelithiasis.   10. Chronic-appearing right greater than left sacroiliitis.   11. Lesser incidental findings as above     LABS:  Recent Labs   Lab 06/20/25  0501 06/20/25  2034 06/21/25  0538 06/22/25  0508   RBC 3.68*  --  3.29* 4.18   HGB 11.7* 12.2* 10.2* 13.0   HCT 34.3*  --  31.5* 38.8*   MCV 93.2  --  95.7 92.8   MCH 31.8  --  31.0 31.1   MCHC 34.1  --  32.4 33.5   RDW 14.2  --  14.6 14.6   NEPRELIM 18.44*  --  16.04* 18.98*   WBC 21.5*  --  19.4* 22.7*   .0  --  177.0 287.0       Recent Labs   Lab 06/20/25  0501 06/21/25  0538 06/21/25  0858 06/21/25  1738 06/22/25  0508   GLU 95  95 66* 96  --  90   *  120* 82* 113*  --  78*   CREATSERUM 2.98*  2.98* 1.73* 2.52*  --  2.05*   EGFRCR 19*  19* 36* 23*  --  30*   CA 7.9*  7.9* 5.6* 8.3*  --  8.4*   ALB 2.7*  --  2.8*  --   --      143 148* 146*  --  151*   K 3.6  3.6  3.5 2.7*  2.7* 3.8 3.3* 4.2  4.2   *  113* 126* 119*  --  124*   CO2 18.0*  18.0* 14.0* 19.0*  --  18.0*          ASSESSMENT/PLAN:  Severe legionella PNA  -ID managing abx and levaquin--->azithro  -WBC ct remains elevated. May take time to improve  -weaned off supp 02  -mild hemoptysis is expected. Continue to monitor   -starting duonebs for wheezing    A-fib   -cards following  -on amio, bb    [place]  -GI following-managing conservatively   -on PPI    SIERRA  -Cr this am improving   -renal following    Proph  -DVT:  SCD    Dispo  -full code    Thank you for the opportunity to care for Imer Mcguire.     MANUEL Juares DO, MPH  Pulmonary Critical Care Medicine  [facility]          [1] No Known Allergies  [2]   Outpatient Medications Marked as Taking for the 6/14/25 encounter (Hospital Encounter)   Medication Sig Dispense Refill    lisinopril 2.5 MG Oral Tab Take 1 tablet (2.5 mg total) by mouth in the morning.      atorvastatin 40 MG Oral Tab Take 1 tablet (40 mg total) by mouth nightly.      aspirin 325 MG Oral Tab Take 1 tablet (325 mg total) by mouth in the morning.      levothyroxine 75 MCG Oral Tab Take 100 mcg by mouth in the morning.     [3]    hydrALAZINE  25 mg Oral Q8H TIFFANIE    potassium chloride  20 mEq Intravenous Once    metoprolol succinate ER  25 mg Oral Daily Beta Blocker    amiodarone  200 mg Oral BID with meals    guaiFENesin ER  600 mg Oral BID    pantoprazole  40 mg Intravenous Q12H    sodium ferric gluconate  125 mg Intravenous Daily    aspirin  81 mg Oral Daily    azithromycin  500 mg Intravenous Q24H    atorvastatin  40 mg Oral Nightly    levothyroxine  100 mcg Oral Daily   [4]    dextrose 50 mL/hr at 06/22/25 0759   [5]   albuterol    acetaminophen    calcium carbonate

## 2025-06-22 NOTE — PROGRESS NOTES
[facility]  part of [facility]    Progress Note    Imer Mcguire Patient Status:  Inpatient    1931 MRN [id number]   Location [facility] 5SW/SE Attending Severo Lacey MD   Hosp Day # 8 PCP OMAR LEAVITT       SUBJECTIVE:  Daughter at the bedside reports the patient having poor appetite today.  Nursing staff reported patient had a melanotic stool lethargic today      OBJECTIVE:  Vital signs in last 24 hours:  BP (!) 183/66 (BP Location: Right arm)   Pulse 57   Temp 97.6 °F (36.4 °C) (Axillary)   Resp 17   Ht 5' 8\" (1.727 m)   Wt 158 lb 9.6 oz (71.9 kg)   SpO2 93%   BMI 24.12 kg/m²     Intake/Output:    Intake/Output Summary (Last 24 hours) at 2025 0938  Last data filed at 2025 0646  Gross per 24 hour   Intake 1561.3 ml   Output 1380 ml   Net 181.3 ml       Wt Readings from Last 3 Encounters:   25 158 lb 9.6 oz (71.9 kg)   24 147 lb 6.4 oz (66.9 kg)   17 174 lb 4.8 oz (79.1 kg)       Exam  Gen: No acute distress,   HEENT: No pallor no icterus  Pulm: Lungs crackles are noted on the right side.  Nonmeat better than yesterday  CV: Heart with irregular rate and rhythm, no peripheral edema  Abd: Abdomen soft, nontender, nondistended, no organomegaly, bowel sounds present  Skin: no rashes or lesions  CNS: Patient is lethargic    Data Review:     Labs:   Lab Results   Component Value Date    WBC 22.7 2025    HGB 13.0 2025    HCT 38.8 2025    .0 2025    CREATSERUM 2.05 2025    BUN 78 2025     2025    K 4.2 2025    K 4.2 2025     2025    CO2 18.0 2025    GLU 90 2025    CA 8.4 2025    MG 2.7 2025       LABS  Recent Labs   Lab 25  1057 25  1846 25  2218 25  0622 25  0630 25  0501 25  2034 25  0538 25  0858 25  0508   RBC  --   --   --   --    < > 3.68*  --  3.29*  --  4.18   HGB  --   --   --   12.7*   < > 11.7* 12.2* 10.2*  --  13.0   HCT  --   --   --  36.8*   < > 34.3*  --  31.5*  --  38.8*   MCV  --   --   --   --    < > 93.2  --  95.7  --  92.8   MCH  --   --   --   --    < > 31.8  --  31.0  --  31.1   MCHC  --   --   --   --    < > 34.1  --  32.4  --  33.5   RDW  --   --   --   --    < > 14.2  --  14.6  --  14.6   NEPRELIM  --   --   --   --    < > 18.44*  --  16.04*  --  18.98*   WBC  --   --   --   --    < > 21.5*  --  19.4*  --  22.7*   PLT  --   --   --  168.0   < > 164.0  --  177.0  --  287.0   PTT 48.0* 52.9*  --  44.3*  --   --   --   --   --   --    CK  --   --  27*  --   --   --   --   --   --   --    GLU  --   --   --   --    < > 95  95  --  66* 96 90    < > = values in this interval not displayed.       Imaging:      Meds:   Current Hospital Medications[1]    Assessment  Problem List[2]  Sepsis due to pneumonia (HCC)    Pneumonia.,   Community-acquired bacterial bacterial   Secondary to Legionella  Patient on Zithromax       New onset atrial fibrillation with rapid ventricular response  Cardiology consulted.  Patient started on amiodarone  Heart rate is better      Acute hypoxic respiratory failure  Much improved  Continue oxygen supplementation.     Acute kidney injury.  on IV fluids.,  Improving     hypernatremia     Coronary disease stable.     Hypothyroidism continue the patient on levothyroxine.     Patient stable.    Thrombocytopenia    Melena  GI consult    Discussed with nursing staff.  Further management will depend on the clinical course of the patient     Plan:   Continue IV antibiotics.  Continue continue other treatments.    Discussed with family at the bedside      Active Orders   LAB    Basic Metabolic Panel (8)     Frequency: Tomorrow AM draw     Number of Occurrences: 1 Occurrences    CBC With Differential With Platelet     Frequency: Tomorrow AM draw     Number of Occurrences: 1 Occurrences   Nourishments    Dietary Nutrition Supplements Daily     Frequency: Daily      Number of Occurrences: Until Specified     Order Comments: Magic Cup @ 2pm - Chocolate     Respiratory Care    Acapella Until Discontinued     Frequency: Until Discontinued     Number of Occurrences: Until Specified   ECG    EKG 12 Lead     Frequency: Daily     Number of Occurrences: Until Specified   Diet    Sodium restricted diet Sodium Restriction: 3-4 GM NA; Fluid Consistency: Nectar Thick / Mildly Thick Liquids; Texture Consistency: Chopped / Soft & Bite Sized; Is Patient on Accuchecks? No; Misc Restriction: No Straw     Frequency: Effective Now     Number of Occurrences: Until Specified   Nursing    Cardiac monitoring     Frequency: Until Discontinued     Number of Occurrences: Until Specified    Cardiac monitoring     Frequency: Continuous     Number of Occurrences: Until Specified    Daily weights     Frequency: Until Discontinued     Number of Occurrences: Until Specified    Daily weights     Frequency: Until Discontinued     Number of Occurrences: Until Specified    Follow up for COPD/Pneumonia     Frequency: Once     Number of Occurrences: 1 Occurrences     Order Comments: Follow up  about one week after DC      Increase infusion dose 100 units/hr     Frequency: Once     Number of Occurrences: 1 Occurrences    Increase infusion dose 100 units/hr     Frequency: Once     Number of Occurrences: 1 Occurrences    Increase infusion dose 100 units/hr     Frequency: Once     Number of Occurrences: 1 Occurrences    Increase infusion dose 100 units/hr     Frequency: Once     Number of Occurrences: 1 Occurrences    Increase infusion dose 100 units/hr     Frequency: Once     Number of Occurrences: 1 Occurrences    Increase infusion dose 100 units/hr     Frequency: Once     Number of Occurrences: 1 Occurrences    Initiate electrolyte protocol     Frequency: Until Discontinued     Number of Occurrences: Until Specified    Intake and Output     Frequency: Q Shift     Number of Occurrences: Until Specified    Notify  physician (cardiology or ordering physician):     Frequency: Until Discontinued     Number of Occurrences: Until Specified     Order Comments: If you are discontinuing a drip without any oral diltiazem medication orders      Notify physician for BP (cardiology or ordering physician):     Frequency: Until Discontinued     Number of Occurrences: Until Specified     Order Comments: For SBP < 80 mmHg or symptomatic hypotension, discontinue drip and notify physician      Notify physician for HR (cardiology or ordering physician):     Frequency: Until Discontinued     Number of Occurrences: Until Specified     Order Comments: 1. If you are on max intravenous dose and the HR is >120  2. If HR is >140 and it has been one hour since you transitioned to oral from IV diltiazem  3. If rate < 75 for > 5 min or symptomatic bradycardia, discontinue the drip and notify physician  4. For pause greater than 3.0 seconds, hold drip and notify physician      Stop amiodarone and call the attending cardiologist     Frequency: PRN     Number of Occurrences: Until Specified     Order Comments: If systolic blood pressure falls to less than 90 mmHg, heart rate drops to less than 60 beats per minute, if  EKG demonstrates greater than Mobitz 1 Heart Block      Strict intake and output     Frequency: Until Discontinued     Number of Occurrences: Until Specified    Strict intake and output     Frequency: Until Discontinued     Number of Occurrences: Until Specified    Tele Box     Frequency: Once     Number of Occurrences: 1 Occurrences    Tele Box     Frequency: Once     Number of Occurrences: 1 Occurrences   Code Status    Full code Continuous     Frequency: Continuous     Number of Occurrences: Until Specified   Consult    Consult to Gastroenterology     Frequency: Once     Number of Occurrences: 1 Occurrences   Medications    acetaminophen (Tylenol) tab 650 mg     Frequency: Q6H PRN     Dose: 650 mg     Route: Oral    albuterol (Ventolin)  (2.5 MG/3ML) 0.083% nebulizer solution 2.5 mg     Frequency: Q4H PRN     Dose: 2.5 mg     Route: Nebulization    amiodarone (Pacerone) tab 200 mg     Frequency: BID with meals     Dose: 200 mg     Route: Oral    aspirin DR tab 81 mg     Frequency: Daily     Dose: 81 mg     Route: Oral    atorvastatin (Lipitor) tab 40 mg     Frequency: Nightly     Dose: 40 mg     Route: Oral    azithromycin (Zithromax) 500 mg in sodium chloride 0.9% 250mL IVPB premix     Frequency: Q24H     Dose: 500 mg     Route: Intravenous    calcium carbonate (Tums) chewable tab 500 mg     Frequency: BID PRN     Dose: 500 mg     Route: Oral    dextrose 5% infusion     Frequency: Continuous     Route: Intravenous    guaiFENesin ER (Mucinex) 12 hr tab 600 mg     Frequency: BID     Dose: 600 mg     Route: Oral    hydrALAZINE (Apresoline) tab 25 mg     Frequency: Q8H TIFFANIE     Dose: 25 mg     Route: Oral    levothyroxine (Synthroid) tab 100 mcg     Frequency: Daily     Dose: 100 mcg     Route: Oral    metoprolol succinate ER (Toprol XL) 24 hr tab 25 mg     Frequency: Daily Beta Blocker     Dose: 25 mg     Route: Oral    pantoprazole (Protonix) 40 mg in sodium chloride 0.9% PF 10 mL IV push     Frequency: Q12H     Dose: 40 mg     Route: Intravenous    potassium chloride 20 mEq/100mL IVPB premix 20 mEq     Frequency: Once     Dose: 20 mEq     Route: Intravenous    sodium ferric gluconate (Ferrlecit) 125 mg in sodium chloride 0.9% 100mL IVPB premix     Frequency: Daily     Dose: 125 mg     Route: Intravenous     Order Comments: Cumulative dose should not exceed 1g during single treatment course.       Severo Lacey MD           [1]   Current Facility-Administered Medications   Medication Dose Route Frequency    dextrose 5% infusion   Intravenous Continuous    hydrALAZINE (Apresoline) tab 25 mg  25 mg Oral Q8H TIFFANIE    potassium chloride 20 mEq/100mL IVPB premix 20 mEq  20 mEq Intravenous Once    metoprolol succinate ER (Toprol XL) 24 hr tab 25 mg  25 mg  Oral Daily Beta Blocker    amiodarone (Pacerone) tab 200 mg  200 mg Oral BID with meals    guaiFENesin ER (Mucinex) 12 hr tab 600 mg  600 mg Oral BID    pantoprazole (Protonix) 40 mg in sodium chloride 0.9% PF 10 mL IV push  40 mg Intravenous Q12H    sodium ferric gluconate (Ferrlecit) 125 mg in sodium chloride 0.9% 100mL IVPB premix  125 mg Intravenous Daily    aspirin DR tab 81 mg  81 mg Oral Daily    azithromycin (Zithromax) 500 mg in sodium chloride 0.9% 250mL IVPB premix  500 mg Intravenous Q24H    albuterol (Ventolin) (2.5 MG/3ML) 0.083% nebulizer solution 2.5 mg  2.5 mg Nebulization Q4H PRN    atorvastatin (Lipitor) tab 40 mg  40 mg Oral Nightly    levothyroxine (Synthroid) tab 100 mcg  100 mcg Oral Daily    acetaminophen (Tylenol) tab 650 mg  650 mg Oral Q6H PRN    calcium carbonate (Tums) chewable tab 500 mg  500 mg Oral BID PRN   [2]   Patient Active Problem List  Diagnosis    Coronary artery disease involving native heart    Metabolic encephalopathy    Myopia with astigmatism and presbyopia, bilateral    Age-related nuclear cataract of both eyes    After cataract not obscuring vision, bilateral    Acute chest pain    Sepsis due to pneumonia (HCC)    SIERRA (acute kidney injury)    Gastrointestinal hemorrhage

## 2025-06-23 ENCOUNTER — APPOINTMENT (OUTPATIENT)
Dept: GENERAL RADIOLOGY | Facility: HOSPITAL | Age: OVER 89
End: 2025-06-23
Attending: PHYSICIAN ASSISTANT
Payer: MEDICARE

## 2025-06-23 LAB
ANION GAP SERPL CALC-SCNC: 7 MMOL/L (ref 0–18)
ATRIAL RATE: 52 BPM
BASOPHILS # BLD: 0 X10(3) UL (ref 0–0.2)
BASOPHILS NFR BLD: 0 %
BUN BLD-MCNC: 69 MG/DL (ref 9–23)
BUN/CREAT SERPL: 40.4 (ref 10–20)
CALCIUM BLD-MCNC: 8.3 MG/DL (ref 8.7–10.4)
CHLORIDE SERPL-SCNC: 124 MMOL/L (ref 98–112)
CO2 SERPL-SCNC: 22 MMOL/L (ref 21–32)
CREAT BLD-MCNC: 1.71 MG/DL (ref 0.7–1.3)
DEPRECATED RDW RBC AUTO: 50.7 FL (ref 35.1–46.3)
EGFRCR SERPLBLD CKD-EPI 2021: 37 ML/MIN/1.73M2 (ref 60–?)
EOSINOPHIL # BLD: 0 X10(3) UL (ref 0–0.7)
EOSINOPHIL NFR BLD: 0 %
ERYTHROCYTE [DISTWIDTH] IN BLOOD BY AUTOMATED COUNT: 14.7 % (ref 11–15)
GLUCOSE BLD-MCNC: 94 MG/DL (ref 70–99)
GLUCOSE BLDC GLUCOMTR-MCNC: 143 MG/DL (ref 70–99)
HCT VFR BLD AUTO: 40.3 % (ref 39–53)
HGB BLD-MCNC: 13.4 G/DL (ref 13–17.5)
LYMPHOCYTES NFR BLD: 0.47 X10(3) UL (ref 1–4)
LYMPHOCYTES NFR BLD: 2 %
MCH RBC QN AUTO: 32 PG (ref 26–34)
MCHC RBC AUTO-ENTMCNC: 33.3 G/DL (ref 31–37)
MCV RBC AUTO: 96.2 FL (ref 80–100)
MONOCYTES # BLD: 0.24 X10(3) UL (ref 0.1–1)
MONOCYTES NFR BLD: 1 %
MORPHOLOGY: NORMAL
NEUTROPHILS # BLD AUTO: 20.33 X10 (3) UL (ref 1.5–7.7)
NEUTROPHILS NFR BLD: 96 %
NEUTS BAND NFR BLD: 1 %
NEUTS HYPERSEG # BLD: 22.99 X10(3) UL (ref 1.5–7.7)
OSMOLALITY SERPL CALC.SUM OF ELEC: 336 MOSM/KG (ref 275–295)
P AXIS: 33 DEGREES
P-R INTERVAL: 140 MS
PLATELET # BLD AUTO: 329 10(3)UL (ref 150–450)
PLATELET MORPHOLOGY: NORMAL
POTASSIUM SERPL-SCNC: 4.4 MMOL/L (ref 3.5–5.1)
Q-T INTERVAL: 556 MS
QRS DURATION: 88 MS
QTC CALCULATION (BEZET): 517 MS
R AXIS: 70 DEGREES
RBC # BLD AUTO: 4.19 X10(6)UL (ref 3.8–5.8)
SODIUM SERPL-SCNC: 153 MMOL/L (ref 136–145)
T AXIS: 105 DEGREES
TOTAL CELLS COUNTED BLD: 100
VENTRICULAR RATE: 52 BPM
WBC # BLD AUTO: 23.7 X10(3) UL (ref 4–11)

## 2025-06-23 PROCEDURE — 71045 X-RAY EXAM CHEST 1 VIEW: CPT | Performed by: PHYSICIAN ASSISTANT

## 2025-06-23 PROCEDURE — 99233 SBSQ HOSP IP/OBS HIGH 50: CPT | Performed by: INTERNAL MEDICINE

## 2025-06-23 RX ORDER — LEVOTHYROXINE SODIUM 100 UG/1
100 TABLET ORAL
Status: DISCONTINUED | OUTPATIENT
Start: 2025-06-24 | End: 2025-06-26

## 2025-06-23 NOTE — PROGRESS NOTES
[facility]  part of [facility]    Progress Note    Imer Mcguire Patient Status:  Inpatient    1931 MRN [id number]   Location [facility] 5SW/SE Attending Severo Lacey MD   Hosp Day # 9 PCP OMAR LEAVITT       SUBJECTIVE:    Son-in-law is at the bedside reports patient is doing much better today eating better.  Also participated in the physical therapy patient is alert denies any chest pain abdominal pain patient tells me he wants to go to rehab  OBJECTIVE:  Vital signs in last 24 hours:  BP (!) 171/63 (BP Location: Right arm)   Pulse 56   Temp 97.6 °F (36.4 °C) (Axillary)   Resp 24   Ht 5' 8\" (1.727 m)   Wt 158 lb 1.6 oz (71.7 kg)   SpO2 92%   BMI 24.04 kg/m²     Intake/Output:    Intake/Output Summary (Last 24 hours) at 2025 1211  Last data filed at 2025 0519  Gross per 24 hour   Intake 100 ml   Output 1070 ml   Net -970 ml       Wt Readings from Last 3 Encounters:   25 158 lb 1.6 oz (71.7 kg)   24 147 lb 6.4 oz (66.9 kg)   17 174 lb 4.8 oz (79.1 kg)       Exam  Gen: No acute distress,   HEENT: No pallor no icterus  Pulm: Lungs crackles are noted on the right side.  Nonmeat better than yesterday  CV: Heart with irregular rate and rhythm, no peripheral edema  Abd: Abdomen soft, nontender, nondistended, no organomegaly, bowel sounds present  Skin: no rashes or lesions  CNS: Patient is alert    Data Review:     Labs:   Lab Results   Component Value Date    WBC 23.7 2025    HGB 13.4 2025    HCT 40.3 2025    .0 2025    CREATSERUM 1.71 2025    BUN 69 2025     2025    K 4.4 2025     2025    CO2 22.0 2025    GLU 94 2025    CA 8.3 2025       LABS  Recent Labs   Lab 25  1057 25  1846 25  2218 25  0622 25  0630 25  0538 25  0858 25  0508 25  0606   RBC  --   --   --   --    < > 3.29*  --  4.18   --  4.19   HGB  --   --   --  12.7*   < > 10.2*  --  13.0 13.6  13.6 13.4   HCT  --   --   --  36.8*   < > 31.5*  --  38.8* 41.0 40.3   MCV  --   --   --   --    < > 95.7  --  92.8  --  96.2   MCH  --   --   --   --    < > 31.0  --  31.1  --  32.0   MCHC  --   --   --   --    < > 32.4  --  33.5  --  33.3   RDW  --   --   --   --    < > 14.6  --  14.6  --  14.7   NEPRELIM  --   --   --   --    < > 16.04*  --  18.98*  --  20.33*   WBC  --   --   --   --    < > 19.4*  --  22.7*  --  23.7*   PLT  --   --   --  168.0   < > 177.0  --  287.0  --  329.0   PTT 48.0* 52.9*  --  44.3*  --   --   --   --   --   --    CK  --   --  27*  --   --   --   --   --   --   --    GLU  --   --   --   --    < > 66* 96 90  --  94    < > = values in this interval not displayed.       Imaging:      Meds:   Current Hospital Medications[1]    Assessment  Problem List[2]  Sepsis due to pneumonia (HCC)    Pneumonia.,   Community-acquired bacterial bacterial   Secondary to Legionella  Patient on Zithromax       New onset atrial fibrillation with rapid ventricular response  Cardiology consulted.  Patient started on amiodarone  Heart rate is better      Acute hypoxic respiratory failure  Much improved  Continue oxygen supplementation.     Acute kidney injury.  on IV fluids.,  Improving     Hypernatremia  Patient is on D5     Coronary disease stable.     Hypothyroidism continue the patient on levothyroxine.     Patient stable.    Thrombocytopenia    Melena  GI consult    Discussed with nursing staff.  Further management will depend on the clinical course of the patient     Plan:   Continue IV antibiotics.  Continue continue other treatments.    Discussed with family at the bedside      Active Orders   Nourishments    Dietary Nutrition Supplements Daily     Frequency: Daily     Number of Occurrences: Until Specified     Order Comments: Magic Cup @ 2pm - Chocolate     Respiratory Care    Acapella Until Discontinued     Frequency: Until Discontinued      Number of Occurrences: Until Specified   ECG    EKG 12 Lead     Frequency: Daily     Number of Occurrences: Until Specified   Diet    Sodium restricted diet Sodium Restriction: 3-4 GM NA; Fluid Consistency: Nectar Thick / Mildly Thick Liquids; Texture Consistency: Chopped / Soft & Bite Sized; Is Patient on Accuchecks? No; Misc Restriction: No Straw     Frequency: Effective Now     Number of Occurrences: Until Specified   Nursing    Cardiac monitoring     Frequency: Until Discontinued     Number of Occurrences: Until Specified    Cardiac monitoring     Frequency: Continuous     Number of Occurrences: Until Specified    Daily weights     Frequency: Until Discontinued     Number of Occurrences: Until Specified    Daily weights     Frequency: Until Discontinued     Number of Occurrences: Until Specified    Follow up for COPD/Pneumonia     Frequency: Once     Number of Occurrences: 1 Occurrences     Order Comments: Follow up  about one week after DC      Increase infusion dose 100 units/hr     Frequency: Once     Number of Occurrences: 1 Occurrences    Increase infusion dose 100 units/hr     Frequency: Once     Number of Occurrences: 1 Occurrences    Increase infusion dose 100 units/hr     Frequency: Once     Number of Occurrences: 1 Occurrences    Increase infusion dose 100 units/hr     Frequency: Once     Number of Occurrences: 1 Occurrences    Increase infusion dose 100 units/hr     Frequency: Once     Number of Occurrences: 1 Occurrences    Increase infusion dose 100 units/hr     Frequency: Once     Number of Occurrences: 1 Occurrences    Initiate electrolyte protocol     Frequency: Until Discontinued     Number of Occurrences: Until Specified    Intake and Output     Frequency: Q Shift     Number of Occurrences: Until Specified    Notify physician (cardiology or ordering physician):     Frequency: Until Discontinued     Number of Occurrences: Until Specified     Order Comments: If you are discontinuing a drip  without any oral diltiazem medication orders      Notify physician for BP (cardiology or ordering physician):     Frequency: Until Discontinued     Number of Occurrences: Until Specified     Order Comments: For SBP < 80 mmHg or symptomatic hypotension, discontinue drip and notify physician      Notify physician for HR (cardiology or ordering physician):     Frequency: Until Discontinued     Number of Occurrences: Until Specified     Order Comments: 1. If you are on max intravenous dose and the HR is >120  2. If HR is >140 and it has been one hour since you transitioned to oral from IV diltiazem  3. If rate < 75 for > 5 min or symptomatic bradycardia, discontinue the drip and notify physician  4. For pause greater than 3.0 seconds, hold drip and notify physician      Place sequential compression device     Frequency: Continuous     Number of Occurrences: Until Specified    Stop amiodarone and call the attending cardiologist     Frequency: PRN     Number of Occurrences: Until Specified     Order Comments: If systolic blood pressure falls to less than 90 mmHg, heart rate drops to less than 60 beats per minute, if  EKG demonstrates greater than Mobitz 1 Heart Block      Strict intake and output     Frequency: Until Discontinued     Number of Occurrences: Until Specified    Strict intake and output     Frequency: Until Discontinued     Number of Occurrences: Until Specified    Tele Box     Frequency: Once     Number of Occurrences: 1 Occurrences    Tele Box     Frequency: Once     Number of Occurrences: 1 Occurrences   Code Status    Full code Continuous     Frequency: Continuous     Number of Occurrences: Until Specified   Consult    Consult to Gastroenterology     Frequency: Once     Number of Occurrences: 1 Occurrences   Medications    acetaminophen (Tylenol) tab 650 mg     Frequency: Q6H PRN     Dose: 650 mg     Route: Oral    albuterol (Ventolin) (2.5 MG/3ML) 0.083% nebulizer solution 2.5 mg     Frequency: Q4H PRN      Dose: 2.5 mg     Route: Nebulization    amiodarone (Pacerone) tab 200 mg     Frequency: Daily     Dose: 200 mg     Route: Oral    aspirin DR tab 81 mg     Frequency: Daily     Dose: 81 mg     Route: Oral    atorvastatin (Lipitor) tab 40 mg     Frequency: Nightly     Dose: 40 mg     Route: Oral    azithromycin (Zithromax) 500 mg in sodium chloride 0.9% 250mL IVPB premix     Frequency: Q24H     Dose: 500 mg     Route: Intravenous    calcium carbonate (Tums) chewable tab 500 mg     Frequency: BID PRN     Dose: 500 mg     Route: Oral    dextrose 5% infusion     Frequency: Continuous     Route: Intravenous    guaiFENesin ER (Mucinex) 12 hr tab 600 mg     Frequency: BID     Dose: 600 mg     Route: Oral    hydrALAZINE (Apresoline) tab 25 mg     Frequency: Q8H TIFFANIE     Dose: 25 mg     Route: Oral    ipratropium-albuterol (Duoneb) 0.5-2.5 (3) MG/3ML inhalation solution 3 mL     Frequency: Q8H     Dose: 3 mL     Route: Nebulization    levothyroxine (Synthroid) tab 100 mcg     Frequency: Daily @ 0700     Dose: 100 mcg     Route: Oral    metoprolol succinate ER (Toprol XL) 24 hr tab 25 mg     Frequency: Daily Beta Blocker     Dose: 25 mg     Route: Oral    pantoprazole (Protonix) 40 mg in sodium chloride 0.9% PF 10 mL IV push     Frequency: Q12H     Dose: 40 mg     Route: Intravenous    potassium chloride 20 mEq/100mL IVPB premix 20 mEq     Frequency: Once     Dose: 20 mEq     Route: Intravenous       Severo Lacey MD           [1]   Current Facility-Administered Medications   Medication Dose Route Frequency    [START ON 6/24/2025] levothyroxine (Synthroid) tab 100 mcg  100 mcg Oral Daily @ 0700    dextrose 5% infusion   Intravenous Continuous    hydrALAZINE (Apresoline) tab 25 mg  25 mg Oral Q8H TIFFANIE    ipratropium-albuterol (Duoneb) 0.5-2.5 (3) MG/3ML inhalation solution 3 mL  3 mL Nebulization Q8H    amiodarone (Pacerone) tab 200 mg  200 mg Oral Daily    potassium chloride 20 mEq/100mL IVPB premix 20 mEq  20 mEq  Intravenous Once    metoprolol succinate ER (Toprol XL) 24 hr tab 25 mg  25 mg Oral Daily Beta Blocker    guaiFENesin ER (Mucinex) 12 hr tab 600 mg  600 mg Oral BID    pantoprazole (Protonix) 40 mg in sodium chloride 0.9% PF 10 mL IV push  40 mg Intravenous Q12H    aspirin DR tab 81 mg  81 mg Oral Daily    azithromycin (Zithromax) 500 mg in sodium chloride 0.9% 250mL IVPB premix  500 mg Intravenous Q24H    albuterol (Ventolin) (2.5 MG/3ML) 0.083% nebulizer solution 2.5 mg  2.5 mg Nebulization Q4H PRN    atorvastatin (Lipitor) tab 40 mg  40 mg Oral Nightly    acetaminophen (Tylenol) tab 650 mg  650 mg Oral Q6H PRN    calcium carbonate (Tums) chewable tab 500 mg  500 mg Oral BID PRN   [2]   Patient Active Problem List  Diagnosis    Coronary artery disease involving native heart    Metabolic encephalopathy    Myopia with astigmatism and presbyopia, bilateral    Age-related nuclear cataract of both eyes    After cataract not obscuring vision, bilateral    Acute chest pain    Sepsis due to pneumonia (HCC)    SIERRA (acute kidney injury)    Gastrointestinal hemorrhage    Hypernatremia

## 2025-06-23 NOTE — PROGRESS NOTES
INFECTIOUS DISEASE PROGRESS NOTE  [facility]  part of [facility] ID PROGRESS NOTE    Imer Mcguire Patient Status:  Inpatient    1931 MRN [id number]   Location [facility] 2W/SW Attending Severo Lacey MD   Hosp Day # 9 PCP OMAR LEAVITT     Subjective:  ROS reviewed. Feels better. Room air. Tolerated PO today. Some confusion overnight. Better today.     ASSESSMENT:    Antibiotics: IV azithromycin (IV zosyn, levaquin)     # Legionella pneumonia with hypoxia with +Legionella urine Ag   -Sputum cx with oral contamination    -CT C/A/P with extensive right lung airspace disease   -Air conditioner serviced earlier this month   -[place]   # AFib on amiodarone drip  # SIERRA on CKD  # Leukocytosis with bandemia  # CAD s/p CABG, HTN, HLD        PLAN:    -  Continue on azithromycin, day #10/10 - stop after today  -  Daily EKG.  -  check BCx with leukocytosis  -  Follow fever curve, wbc.  -  Reviewed labs, micro, imaging reports  -  Case d/w patient, family, RN, Cards, Primary     History of Present Illness:  89-year-old male with a history of CAD status post CABG in 2017, HTN, HLD, CKD who now presents to hospital sick/14 with fatigue, weakness, malaise for 1 week, cough, dyspnea.  Found to be febrile to 103.1 on admission with hypoxia, tachycardia.  Initial WBC of 26 now up to 40.  Given IV Zosyn, vancomycin, azithromycin.  Initially with worsening O2 requirements up to 15 L now improved to 6 L.  Also with A-fib on amiodarone.  Initial chest x-ray with upper lung airspace opacification consistent with pneumonia.  Blood cultures no growth.  Urine Legionella positive.  MRSA nares negative.  Also SIERRA with worsening kidney function.     Has been having issues with his air conditioning. 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    Physical Exam:  BP (!) 171/63 (BP Location: Right arm)   Pulse 56   Temp 97.6 °F (36.4 °C) (Axillary)   Resp 24    Ht 172.7 cm (5' 8\")   Wt 158 lb 1.6 oz (71.7 kg)   SpO2 92%   BMI 24.04 kg/m²     Gen:   Awake, in bed, room air  HEENT:  EOMI, neck supple  Abdom:  Soft, no TTP  Skin/extrem:  No rashes, no c/c/e  :   Primofit+  Lines:  PIV+    Laboratory Data: Reviewed    Microbiology: Reviewed    Radiology: Reviewed      MD [facility]  [phone]  6/20/2025

## 2025-06-23 NOTE — PLAN OF CARE
Problem: Patient Centered Care  Goal: Patient preferences are identified and integrated in the patient's plan of care  Description: Interventions:  - What would you like us to know as we care for you? From home alone  - Provide timely, complete, and accurate information to patient/family  - Incorporate patient and family knowledge, values, beliefs, and cultural backgrounds into the planning and delivery of care  - Encourage patient/family to participate in care and decision-making at the level they choose  - Honor patient and family perspectives and choices  Outcome: Progressing     Problem: Patient/Family Goals  Goal: Patient/Family Long Term Goal  Description: Patient's Long Term Goal: discharge    Interventions:  - - Monitor vitals  - Monitor appropriate labs  - Pain management  - Follow MD order  - Diagnostics per order  - Update/Informing patient and family on plan of care  - Discharge planning  - See additional Care Plan goals for specific interventions  Outcome: Progressing  Goal: Patient/Family Short Term Goal  Description: Patient's Short Term Goal: feel better    Interventions:   - - Monitor vitals  - Monitor appropriate labs  - Pain management  - Follow MD order  - Diagnostics per order  - Update/Informing patient and family on plan of care  - Discharge planning  - See additional Care Plan goals for specific interventions  Outcome: Progressing     Problem: PAIN - ADULT  Goal: Verbalizes/displays adequate comfort level or patient's stated pain goal  Description: INTERVENTIONS:  - Encourage pt to monitor pain and request assistance  - Assess pain using appropriate pain scale  - Administer analgesics based on type and severity of pain and evaluate response  - Implement non-pharmacological measures as appropriate and evaluate response  - Consider cultural and social influences on pain and pain management  - Manage/alleviate anxiety  - Utilize distraction and/or relaxation techniques  - Monitor for opioid  side effects  - Notify MD/LIP if interventions unsuccessful or patient reports new pain  - Anticipate increased pain with activity and pre-medicate as appropriate  Outcome: Progressing     Problem: SAFETY ADULT - FALL  Goal: Free from fall injury  Description: INTERVENTIONS:  - Assess pt frequently for physical needs  - Identify cognitive and physical deficits and behaviors that affect risk of falls.  - [facility] fall precautions as indicated by assessment.  - Educate pt/family on patient safety including physical limitations  - Instruct pt to call for assistance with activity based on assessment  - Modify environment to reduce risk of injury  - Provide assistive devices as appropriate  - Consider OT/PT consult to assist with strengthening/mobility  - Encourage toileting schedule  Outcome: Progressing     Problem: DISCHARGE PLANNING  Goal: Discharge to home or other facility with appropriate resources  Description: INTERVENTIONS:  - Identify barriers to discharge w/pt and caregiver  - Include patient/family/discharge partner in discharge planning  - Arrange for needed discharge resources and transportation as appropriate  - Identify discharge learning needs (meds, wound care, etc)  - Arrange for interpreters to assist at discharge as needed  - Consider post-discharge preferences of patient/family/discharge partner  - Complete POLST form as appropriate  - Assess patient's ability to be responsible for managing their own health  - Refer to Case Management Department for coordinating discharge planning if the patient needs post-hospital services based on physician/LIP order or complex needs related to functional status, cognitive ability or social support system  Outcome: Progressing     Problem: RISK FOR INFECTION - ADULT  Goal: Absence of fever/infection during anticipated neutropenic period  Description: INTERVENTIONS  - Monitor WBC  - Administer growth factors as ordered  - Implement neutropenic  guidelines  Outcome: Progressing     Problem: RESPIRATORY - ADULT  Goal: Achieves optimal ventilation and oxygenation  Description: INTERVENTIONS:  - Assess for changes in respiratory status  - Assess for changes in mentation and behavior  - Position to facilitate oxygenation and minimize respiratory effort  - Oxygen supplementation based on oxygen saturation or ABGs  - Provide Smoking Cessation handout, if applicable  - Encourage broncho-pulmonary hygiene including cough, deep breathe, Incentive Spirometry  - Assess the need for suctioning and perform as needed  - Assess and instruct to report SOB or any respiratory difficulty  - Respiratory Therapy support as indicated  - Manage/alleviate anxiety  - Monitor for signs/symptoms of CO2 retention  Outcome: Progressing     Problem: Impaired Functional Mobility  Goal: Achieve highest/safest level of mobility/gait  Description: Interventions:  - Assess patient's functional ability and stability  - Promote increasing activity/tolerance for mobility and gait  - Educate and engage patient/family in tolerated activity level and precautions    Outcome: Progressing     Problem: METABOLIC/FLUID AND ELECTROLYTES - ADULT  Goal: Electrolytes maintained within normal limits  Description: INTERVENTIONS:  - Monitor labs and rhythm and assess patient for signs and symptoms of electrolyte imbalances  - Administer electrolyte replacement as ordered  - Monitor response to electrolyte replacements, including rhythm and repeat lab results as appropriate  - Fluid restriction as ordered  - Instruct patient on fluid and nutrition restrictions as appropriate  Outcome: Progressing  Goal: Hemodynamic stability and optimal renal function maintained  Description: INTERVENTIONS:  - Monitor labs and assess for signs and symptoms of volume excess or deficit  - Monitor intake, output and patient weight  - Monitor urine specific gravity, serum osmolarity and serum sodium as indicated or ordered  -  Monitor response to interventions for patient's volume status, including labs, urine output, blood pressure (other measures as available)  - Encourage oral intake as appropriate  - Instruct patient on fluid and nutrition restrictions as appropriate  Outcome: Progressing

## 2025-06-23 NOTE — PLAN OF CARE
Problem: Patient Centered Care  Goal: Patient preferences are identified and integrated in the patient's plan of care  Description: Interventions:  - What would you like us to know as we care for you? From home alone  - Provide timely, complete, and accurate information to patient/family  - Incorporate patient and family knowledge, values, beliefs, and cultural backgrounds into the planning and delivery of care  - Encourage patient/family to participate in care and decision-making at the level they choose  - Honor patient and family perspectives and choices  6/22/2025 2220 by Linda Langley RN  Outcome: Progressing  6/22/2025 2219 by Linda Langley RN  Outcome: Progressing     Problem: Patient/Family Goals  Goal: Patient/Family Long Term Goal  Description: Patient's Long Term Goal: discharge    Interventions:  - - Monitor vitals  - Monitor appropriate labs  - Pain management  - Follow MD order  - Diagnostics per order  - Update/Informing patient and family on plan of care  - Discharge planning  - See additional Care Plan goals for specific interventions  6/22/2025 2220 by Linda Langley RN  Outcome: Progressing  6/22/2025 2219 by Linda Langley RN  Outcome: Progressing  Goal: Patient/Family Short Term Goal  Description: Patient's Short Term Goal: feel better    Interventions:   - - Monitor vitals  - Monitor appropriate labs  - Pain management  - Follow MD order  - Diagnostics per order  - Update/Informing patient and family on plan of care  - Discharge planning  - See additional Care Plan goals for specific interventions  6/22/2025 2220 by Linda Langley RN  Outcome: Progressing  6/22/2025 2219 by Linda Langley RN  Outcome: Progressing     Problem: PAIN - ADULT  Goal: Verbalizes/displays adequate comfort level or patient's stated pain goal  Description: INTERVENTIONS:  - Encourage pt to monitor pain and request assistance  - Assess pain using appropriate pain scale  - Administer analgesics based on type and  severity of pain and evaluate response  - Implement non-pharmacological measures as appropriate and evaluate response  - Consider cultural and social influences on pain and pain management  - Manage/alleviate anxiety  - Utilize distraction and/or relaxation techniques  - Monitor for opioid side effects  - Notify MD/LIP if interventions unsuccessful or patient reports new pain  - Anticipate increased pain with activity and pre-medicate as appropriate  6/22/2025 2220 by Linda Langley RN  Outcome: Progressing  6/22/2025 2219 by Linda Langley RN  Outcome: Progressing     Problem: SAFETY ADULT - FALL  Goal: Free from fall injury  Description: INTERVENTIONS:  - Assess pt frequently for physical needs  - Identify cognitive and physical deficits and behaviors that affect risk of falls.  - [employer] fall precautions as indicated by assessment.  - Educate pt/family on patient safety including physical limitations  - Instruct pt to call for assistance with activity based on assessment  - Modify environment to reduce risk of injury  - Provide assistive devices as appropriate  - Consider OT/PT consult to assist with strengthening/mobility  - Encourage toileting schedule  6/22/2025 2220 by Linda Langley RN  Outcome: Progressing  6/22/2025 2219 by Linda Langley RN  Outcome: Progressing     Problem: DISCHARGE PLANNING  Goal: Discharge to home or other facility with appropriate resources  Description: INTERVENTIONS:  - Identify barriers to discharge w/pt and caregiver  - Include patient/family/discharge partner in discharge planning  - Arrange for needed discharge resources and transportation as appropriate  - Identify discharge learning needs (meds, wound care, etc)  - Arrange for interpreters to assist at discharge as needed  - Consider post-discharge preferences of patient/family/discharge partner  - Complete POLST form as appropriate  - Assess patient's ability to be responsible for managing their own health  - Refer to Case  Management Department for coordinating discharge planning if the patient needs post-hospital services based on physician/LIP order or complex needs related to functional status, cognitive ability or social support system  6/22/2025 2220 by Linda Langley RN  Outcome: Progressing  6/22/2025 2219 by Linda Langley RN  Outcome: Progressing     Problem: RESPIRATORY - ADULT  Goal: Achieves optimal ventilation and oxygenation  Description: INTERVENTIONS:  - Assess for changes in respiratory status  - Assess for changes in mentation and behavior  - Position to facilitate oxygenation and minimize respiratory effort  - Oxygen supplementation based on oxygen saturation or ABGs  - Provide Smoking Cessation handout, if applicable  - Encourage broncho-pulmonary hygiene including cough, deep breathe, Incentive Spirometry  - Assess the need for suctioning and perform as needed  - Assess and instruct to report SOB or any respiratory difficulty  - Respiratory Therapy support as indicated  - Manage/alleviate anxiety  - Monitor for signs/symptoms of CO2 retention  6/22/2025 2220 by Linda Langley RN  Outcome: Progressing  6/22/2025 2219 by Linda Langley RN  Outcome: Progressing     Problem: Impaired Functional Mobility  Goal: Achieve highest/safest level of mobility/gait  Description: Interventions:  - Assess patient's functional ability and stability  - Promote increasing activity/tolerance for mobility and gait  - Educate and engage patient/family in tolerated activity level and precautions    6/22/2025 2220 by Linda Langley RN  Outcome: Progressing  6/22/2025 2219 by Linda Langley RN  Outcome: Progressing     Problem: CARDIOVASCULAR - ADULT  Goal: Maintains optimal cardiac output and hemodynamic stability  Description: INTERVENTIONS:  - Monitor vital signs, rhythm, and trends  - Monitor for bleeding, hypotension and signs of decreased cardiac output  - Evaluate effectiveness of vasoactive medications to optimize hemodynamic  stability  - Monitor arterial and/or venous puncture sites for bleeding and/or hematoma  - Assess quality of pulses, skin color and temperature  - Assess for signs of decreased coronary artery perfusion - ex. Angina  - Evaluate fluid balance, assess for edema, trend weights  6/22/2025 2220 by Linda Langley RN  Outcome: Progressing  6/22/2025 2219 by Linda Langley RN  Outcome: Progressing  Goal: Absence of cardiac arrhythmias or at baseline  Description: INTERVENTIONS:  - Continuous cardiac monitoring, monitor vital signs, obtain 12 lead EKG if indicated  - Evaluate effectiveness of antiarrhythmic and heart rate control medications as ordered  - Initiate emergency measures for life threatening arrhythmias  - Monitor electrolytes and administer replacement therapy as ordered  6/22/2025 2220 by Linda Langley RN  Outcome: Progressing  6/22/2025 2219 by Linda Langley RN  Outcome: Progressing     Problem: NEUROLOGICAL - ADULT  Goal: Achieves stable or improved neurological status  Description: INTERVENTIONS  - Assess for and report changes in neurological status  - Initiate measures to prevent increased intracranial pressure  - Maintain blood pressure and fluid volume within ordered parameters to optimize cerebral perfusion and minimize risk of hemorrhage  - Monitor temperature, glucose, and sodium. Initiate appropriate interventions as ordered  6/22/2025 2220 by Linda Langley RN  Outcome: Progressing  6/22/2025 2219 by Linda Langley RN  Outcome: Progressing  Goal: Absence of seizures  Description: INTERVENTIONS  - Monitor for seizure activity  - Administer anti-seizure medications as ordered  - Monitor neurological status  6/22/2025 2220 by Linda Langley RN  Outcome: Progressing  6/22/2025 2219 by Linda Langley RN  Outcome: Progressing  Goal: Remains free of injury related to seizure activity  Description: INTERVENTIONS:  - Maintain airway, patient safety  and administer oxygen as ordered  - Monitor patient for  seizure activity, document and report duration and description of seizure to MD/LIP  - If seizure occurs, turn patient to side and suction secretions as needed  - Reorient patient post seizure  - Seizure pads on all 4 side rails  - Instruct patient/family to notify RN of any seizure activity  - Instruct patient/family to call for assistance with activity based on assessment  6/22/2025 2220 by Linda Langley RN  Outcome: Progressing  6/22/2025 2219 by Linda Langley RN  Outcome: Progressing  Goal: Achieves maximal functionality and self care  Description: INTERVENTIONS  - Monitor swallowing and airway patency with patient fatigue and changes in neurological status  - Encourage and assist patient to increase activity and self care with guidance from PT/OT  - Encourage visually impaired, hearing impaired and aphasic patients to use assistive/communication devices  6/22/2025 2220 by Linda Langley RN  Outcome: Progressing  6/22/2025 2219 by Linda Langley RN  Outcome: Progressing     Problem: METABOLIC/FLUID AND ELECTROLYTES - ADULT  Goal: Electrolytes maintained within normal limits  Description: INTERVENTIONS:  - Monitor labs and rhythm and assess patient for signs and symptoms of electrolyte imbalances  - Administer electrolyte replacement as ordered  - Monitor response to electrolyte replacements, including rhythm and repeat lab results as appropriate  - Fluid restriction as ordered  - Instruct patient on fluid and nutrition restrictions as appropriate  6/22/2025 2220 by Linda Langley RN  Outcome: Progressing  6/22/2025 2219 by Linda Langley RN  Outcome: Progressing  Goal: Hemodynamic stability and optimal renal function maintained  Description: INTERVENTIONS:  - Monitor labs and assess for signs and symptoms of volume excess or deficit  - Monitor intake, output and patient weight  - Monitor urine specific gravity, serum osmolarity and serum sodium as indicated or ordered  - Monitor response to interventions for  patient's volume status, including labs, urine output, blood pressure (other measures as available)  - Encourage oral intake as appropriate  - Instruct patient on fluid and nutrition restrictions as appropriate  6/22/2025 2220 by Linda Langley RN  Outcome: Progressing  6/22/2025 2219 by Linda Langley RN  Outcome: Progressing     Problem: RISK FOR INFECTION - ADULT  Goal: Absence of fever/infection during anticipated neutropenic period  Description: INTERVENTIONS  - Monitor WBC  - Administer growth factors as ordered  - Implement neutropenic guidelines  6/22/2025 2220 by Linda Langley, RN  Outcome: Progressing  6/22/2025 2219 by Linda Langley, RN  Outcome: Progressing

## 2025-06-23 NOTE — PROGRESS NOTES
[facility]  Nephrology Daily Progress Note    Imer Mcguire  [id number]  89 year old      HPI:   Imer Mcguire is a 89 year old male.  Awake and talking.  On room air.  On thickened liquids.      ROS:     Constitutional:  Negative for decreased activity, fever, irritability and lethargy  Endocrine:  Negative for abnormal sleep patterns, increased activity, polydipsia and polyphagia  Cardiovascular:  Negative for cool extremity and irregular heartbeat/palpitations  Gastrointestinal:  Negative for abdominal pain, constipation, decreased appetite, diarrhea and vomiting  Genitourinary:  Negative for dysuria and hematuria  Hema/Lymph:  Negative for easy bleeding and easy bruising  Integumentary:  Negative for pruritus and rash  Musculoskeletal:  Negative for bone/joint symptoms  Neurological:  Negative for gait disturbance  Psychiatric:  Negative for inappropriate interaction and psychiatric symptoms  Respiratory:  Negative for cough, dyspnea and wheezing      PHYSICAL EXAM:   Temp:  [97.3 °F (36.3 °C)-97.8 °F (36.6 °C)] 97.8 °F (36.6 °C)  Pulse:  [39-62] 61  Resp:  [18-24] 22  BP: (129-171)/(55-90) 168/65  SpO2:  [90 %-96 %] 96 %  Patient Weight for the past 72 hrs:   Weight   06/21/25 0500 159 lb 4.8 oz (72.3 kg)   06/22/25 0500 158 lb 9.6 oz (71.9 kg)   06/23/25 0519 158 lb 1.6 oz (71.7 kg)       Constitutional: appears well hydrated alert and responsive no acute distress noted  Neck/Thyroid: neck is supple without adenopathy  Lymphatic: no abnormal cervical, supraclavicular or axillary adenopathy is noted  Respiratory: normal to inspection lungs are clear to auscultation bilaterally normal respiratory effort  Cardiovascular: regular rate and rhythm no murmurs, gallups, or rubs  Abdomen: soft non-tender non-distended no organomegaly noted no masses  Musculoskeletal: full ROM all extremities good strength  no deformities  Extremities: Trace edema  Neurological: exam appropriate for age reflexes and  motor skills appropriate for age    Labs:  Lab Results   Component Value Date    WBC 23.7 06/23/2025    HGB 13.4 06/23/2025    HCT 40.3 06/23/2025    .0 06/23/2025    CREATSERUM 1.71 06/23/2025    BUN 69 06/23/2025     06/23/2025    K 4.4 06/23/2025     06/23/2025    CO2 22.0 06/23/2025    GLU 94 06/23/2025    CA 8.3 06/23/2025     Recent Labs   Lab 06/21/25  0538 06/22/25  0508 06/22/25 2003 06/23/25  0606   WBC 19.4* 22.7*  --  23.7*   HGB 10.2* 13.0 13.6  13.6 13.4   HCT 31.5* 38.8* 41.0 40.3   .0 287.0  --  329.0     Recent Labs   Lab 06/20/25  0501 06/21/25  0538 06/21/25  0858 06/21/25  1738 06/22/25  0508 06/23/25  0606   GLU 95  95   < > 96  --  90 94   *  120*   < > 113*  --  78* 69*   CREATSERUM 2.98*  2.98*   < > 2.52*  --  2.05* 1.71*   CA 7.9*  7.9*   < > 8.3*  --  8.4* 8.3*   ALB 2.7*  --  2.8*  --   --   --      143   < > 146*  --  151* 153*   K 3.6  3.6  3.5   < > 3.8 3.3* 4.2  4.2 4.4   *  113*   < > 119*  --  124* 124*   CO2 18.0*  18.0*   < > 19.0*  --  18.0* 22.0   PHOS 5.1  --  4.3  --   --   --     < > = values in this interval not displayed.       Imaging  XR CHEST AP PORTABLE  (CPT=71045)  Result Date: 6/23/2025  CONCLUSION:  1. Extensive right perihilar and right upper lobe pulmonary consolidation. 2. Background of diffuse reticulonodular interstitial prominence and bibasilar parenchymal abnormality.    Dictated by (CST): Nathaniel Parrish MD on 6/23/2025 at 11:14 AM     Finalized by (CST): Nathaniel Parrish MD on 6/23/2025 at 11:16 AM              Medications:  Current Hospital Medications[1]    Allergies:  Allergies[2]    Input/Output:    Intake/Output Summary (Last 24 hours) at 6/23/2025 1605  Last data filed at 6/23/2025 1536  Gross per 24 hour   Intake 460 ml   Output 1200 ml   Net -740 ml          ASSESSMENT/PLAN:   Assessment   Problem List[3]    Urine output 1070 mL.  Creatinine better at 1.71 but sodium now up to 153.   Looks dry.  Increase D5W.  On thickened liquids.  Encourage p.o. fluid intake.  Repeat labs in AM.             6/23/2025  Hipolito Baugh MD               [1]   Current Facility-Administered Medications:     [START ON 6/24/2025] levothyroxine (Synthroid) tab 100 mcg, 100 mcg, Oral, Daily @ 0700    [START ON 6/24/2025] metoprolol succinate (Toprol XL) partial tablet 12.5 mg, 12.5 mg, Oral, Daily Beta Blocker    dextrose 5% infusion, , Intravenous, Continuous    hydrALAZINE (Apresoline) tab 25 mg, 25 mg, Oral, Q8H TIFFANIE    ipratropium-albuterol (Duoneb) 0.5-2.5 (3) MG/3ML inhalation solution 3 mL, 3 mL, Nebulization, Q8H    amiodarone (Pacerone) tab 200 mg, 200 mg, Oral, Daily    [COMPLETED] potassium chloride 40 mEq in 250mL sodium chloride 0.9% IVPB premix, 40 mEq, Intravenous, Once **FOLLOWED BY** potassium chloride 20 mEq/100mL IVPB premix 20 mEq, 20 mEq, Intravenous, Once    guaiFENesin ER (Mucinex) 12 hr tab 600 mg, 600 mg, Oral, BID    pantoprazole (Protonix) 40 mg in sodium chloride 0.9% PF 10 mL IV push, 40 mg, Intravenous, Q12H    aspirin DR tab 81 mg, 81 mg, Oral, Daily    azithromycin (Zithromax) 500 mg in sodium chloride 0.9% 250mL IVPB premix, 500 mg, Intravenous, Q24H    albuterol (Ventolin) (2.5 MG/3ML) 0.083% nebulizer solution 2.5 mg, 2.5 mg, Nebulization, Q4H PRN    atorvastatin (Lipitor) tab 40 mg, 40 mg, Oral, Nightly    acetaminophen (Tylenol) tab 650 mg, 650 mg, Oral, Q6H PRN    calcium carbonate (Tums) chewable tab 500 mg, 500 mg, Oral, BID PRN  [2] No Known Allergies  [3]   Patient Active Problem List  Diagnosis    Coronary artery disease involving native heart    Metabolic encephalopathy    Myopia with astigmatism and presbyopia, bilateral    Age-related nuclear cataract of both eyes    After cataract not obscuring vision, bilateral    Acute chest pain    Sepsis due to pneumonia (HCC)    SIERRA (acute kidney injury)    Gastrointestinal hemorrhage    Hypernatremia

## 2025-06-23 NOTE — PROGRESS NOTES
[facility]  part of [facility]    Progress Note    Imer Mcguire Patient Status:  Inpatient    1931 MRN [id number]   Location [facility]5W Attending Severo Lacey MD   Hosp Day # 9 PCP OMAR LEAVITT     Subjective:   Seen and examined this morning while sitting in chair. Daughter present. Ongoing productive cough with thick white, occasionally blood-tinged phlegm. Has mild dyspnea. Not eating or drinking much. Had one episode of melena yesterday afternoon and one episode of dark brown stool overnight. No fever or chills. On room air.    Objective:   Blood pressure 154/71, pulse 62, temperature 97.5 °F (36.4 °C), temperature source Axillary, resp. rate 19, height 5' 8\" (1.727 m), weight 158 lb 1.6 oz (71.7 kg), SpO2 95%.  Physical Exam  Vitals and nursing note reviewed.   Constitutional:       General: He is awake. He is not in acute distress.     Appearance: He is ill-appearing.   HENT:      Head: Normocephalic and atraumatic.   Cardiovascular:      Rate and Rhythm: Normal rate and regular rhythm.   Pulmonary:      Effort: No respiratory distress.      Breath sounds: Wheezing, rhonchi and rales present.   Musculoskeletal:      Cervical back: Normal range of motion and neck supple.      Right lower leg: Edema (1+) present.      Left lower leg: Edema (1+) present.   Skin:     General: Skin is warm and dry.   Neurological:      General: No focal deficit present.      Mental Status: He is alert.   Psychiatric:         Mood and Affect: Mood normal.         Behavior: Behavior is cooperative.       Results:   Lab Results   Component Value Date    WBC 23.7 (H) 2025    HGB 13.4 2025    HCT 40.3 2025    .0 2025    CREATSERUM 1.71 (H) 2025    BUN 69 (H) 2025     (H) 2025    K 4.4 2025     (H) 2025    CO2 22.0 2025    GLU 94 2025    CA 8.3 (L) 2025    ALB 2.8 (L) 2025    ALKPHO 103 06/15/2025     BILT 1.3 (H) 06/15/2025    TP 5.8 06/15/2025    AST 53 (H) 06/15/2025    ALT 34 06/15/2025    PTT 44.3 (H) 06/19/2025    INR 1.14 06/14/2025    TSH 3.139 06/17/2025    MG 2.7 (H) 06/22/2025    PHOS 4.3 06/21/2025    TROPHS 25 09/22/2024    CK 27 (L) 06/18/2025     CXR 6/23/25:   1. Extensive right perihilar and right upper lobe pulmonary consolidation.   2. Background of diffuse reticulonodular interstitial prominence and bibasilar parenchymal abnormality.     Assessment & Plan:   Legionella pneumonia  [place]ikely [place]  Severe right sided pneumonia  CXR 6/23 with extensive right sided opacities with slight improvement  Significant persistent leukocytosis  Blood cultures no growth  ID following - on azithromycin  Oxygenation improved - now on room air  Plan:  -Antibiotics as per ID (on IV azithromycin day #10)  -EKG to check QT interval  -DuoNebs     Dysphagia  Plan:  -Aspiration precautions  -Modified diet as per speech therapy    Afib with RVR  New onset  With some episodes of sinus bradycardia  Off IV heparin gtt  Plan:  -Amiodarone and metoprolol as per cardiology  -Heparin gtt discontinued; no ASA 81 mg     SIERRA  Hypernatremia  Creatinine improved to 1.71  Sodium worse at 153  On IV dextrose for hypernatremia  Plan:  -I/Os  -IV dextrose  -As per renal    Melena  Seen by GI - managing conservatively  Given IV iron x3 doses  Hemoglobin improved  Plan:  -IV PPI BID as per GI    DVT prophylaxis: SCDs    Code status: Full code    Gunnar Hahn PA-C  Pulmonary Medicine  6/23/2025

## 2025-06-23 NOTE — OCCUPATIONAL THERAPY NOTE
OCCUPATIONAL THERAPY TREATMENT NOTE - INPATIENT        Room Number: 518/518-A     Presenting Problem: Sepsis due to PNA    Problem List  Principal Problem:    Sepsis due to pneumonia (HCC)  Active Problems:    SIERRA (acute kidney injury)    Gastrointestinal hemorrhage    Hypernatremia      OCCUPATIONAL THERAPY ASSESSMENT   Patient demonstrates fair progress this session, goals remain in progress.    Patient is requiring stand-by assist, contact guard assist, minimal assist, and maximum assist as a result of the following impairments: decreased functional strength, decreased endurance, impaired standing balance, impaired motor planning, decreased muscular endurance, cognitive deficits (processing, sequencing), and medical status.    Patient continues to function below baseline with toileting, bathing, lower body dressing, grooming, bed mobility, transfers, static standing balance, dynamic standing balance, and functional standing tolerance.  Next session anticipate patient to progress lower body dressing, grooming, bed mobility, transfers, and functional standing tolerance.  Occupational Therapy will continue to follow patient for duration of hospitalization.    Patient continues to benefit from continued skilled OT services: to facilitate return to prior level of function as patient demonstrates high motivation with excellent tolerance to an intensive therapy program.     PLAN DURING HOSPITALIZATION     OT Treatment Plan: Balance activities, Energy conservation/work simplification techniques, ADL training, Functional transfer training, Endurance training, Patient/Family education, Patient/Family training, Compensatory technique education     SUBJECTIVE  \"I would like to get back into bed.\"     OBJECTIVE  Precautions: Bed/chair alarm, Hard of hearing, Cardiac       PAIN ASSESSMENT  Ratin    ACTIVITY TOLERANCE           BP: (!) 163/69  BP Location: Right arm  BP Method: Automatic  Patient Position: Sitting    O2  SATURATIONS  Oxygen Therapy  SPO2% Ambulation on Room Air: 91    ACTIVITIES OF DAILY LIVING ASSESSMENT  AM-PAC ‘6-Clicks’ Inpatient Daily Activity Short Form  How much help from another person does the patient currently need…  -   Putting on and taking off regular lower body clothing?: A Lot  -   Bathing (including washing, rinsing, drying)?: A Lot  -   Toileting, which includes using toilet, bedpan or urinal? : A Lot  -   Putting on and taking off regular upper body clothing?: A Little  -   Taking care of personal grooming such as brushing teeth?: A Little  -   Eating meals?: A Little    AM-PAC Score:  Score: 15  Approx Degree of Impairment: 56.46%  Standardized Score (AM-PAC Scale): 34.69  CMS Modifier (G-Code): CK    FUNCTIONAL TRANSFER ASSESSMENT  Sit to Stand: Chair  Chair: Minimal Assist  Simulated toilet transfer: min assist   Comments: Pt completed 2 STS from recliner to simulate functional transfers. Pt benefited from verbal cues to scoot towards edge of chair and to push from armrests instead of on the RW.     FUNCTIONAL MOBILITY  Pt required Min assist with RW support to complete bathroom distance mobility to simulate household distances.   Comments: Pt benefited from frequent verbal cues for proper RW management during task.       BED MOBILITY  Sit to Supine (OT): Contact Guard Assist    BALANCE ASSESSMENT  Static Sitting: Supervision  Static Standing: Contact Guard Assist  Dynamic sitting: SBA  Dynamic Standing: Min assist     FUNCTIONAL ADL ASSESSMENT  Toileting Standing: Maximum Assist     Skilled Therapy Provided:   RN cleared pt for participation. Pt received in recliner with son-in-law present for session. Pt agreeable to participation in therapy. Pt demo fair progress this session but continues to require increased assist for functional transfers, mobility, and ADLs. Pt continues to require verbal and tactile cues when completing functional tasks due to impaired motor planning, problem solving,  and sequencing. Challenged pt's functional balance and activity tolerance with mobility task to simulate household distances and adl tasks; pt required min assist for functional transfers and mobility. Pt benefited from verbal/tactile cues for positioning and sequencing during functional transfers; pt demo fair carry-over only requiring one additional verbal cue to use proper body mechanics. Pt requesting to return back to bed at end of session; required CGA to complete sit to supine transition. Pt remained in bed with all needs in reach and alarm on at end of session. RN aware.       EDUCATION PROVIDED  Patient Education : Role of Occupational Therapy; Plan of Care; Functional Transfer Techniques; Fall Prevention; Posture/Positioning; Energy Conservation; Proper Body Mechanics  Patient's Response to Education: Verbalized Understanding; Returned Demonstration  Family/Caregiver Education : Role of Occupational Therapy; Plan of Care  Family/Caregiver's Response to Education: Verbalized Understanding    The patient's Approx Degree of Impairment: 56.46% has been calculated based on documentation in the [facility] '6 clicks' Inpatient Daily Activity Short Form.  Research supports that patients with this level of impairment may benefit from rehab.  Final disposition will be made by interdisciplinary medical team.    Patient End of Session: In bed, Needs met, Call light within reach, With  staff, RN aware of session/findings, All patient questions and concerns addressed, Hospital anti-slip socks, Alarm set, Family present    OT Goals:  Patients self stated goal is: none stated     Patient will complete functional transfer with SBA  Comment: ongoing- min assist     Patient will complete toileting with SBA  Comment: ongoing- max assist      Patient will tolerate standing for 5 minutes in prep for adls with SBA   Comment: ongoing- min assist     Patient will complete item retrieval with SBA  Comment: na          Goals  on:  07/17/25  Frequency: 3-5x/week  OT Session Time: 23 minutes  Therapeutic Activity: 23 minutes

## 2025-06-24 LAB
ALBUMIN SERPL-MCNC: 2.7 G/DL (ref 3.2–4.8)
ALBUMIN/GLOB SERPL: 1.2 {RATIO} (ref 1–2)
ALP LIVER SERPL-CCNC: 137 U/L (ref 45–117)
ALT SERPL-CCNC: 34 U/L (ref 10–49)
ANION GAP SERPL CALC-SCNC: 7 MMOL/L (ref 0–18)
AST SERPL-CCNC: 30 U/L (ref ?–34)
BASOPHILS # BLD: 0 X10(3) UL (ref 0–0.2)
BASOPHILS NFR BLD: 0 %
BILIRUB SERPL-MCNC: 1 MG/DL (ref 0.2–0.9)
BUN BLD-MCNC: 54 MG/DL (ref 9–23)
BUN/CREAT SERPL: 35.5 (ref 10–20)
CALCIUM BLD-MCNC: 8.1 MG/DL (ref 8.7–10.4)
CHLORIDE SERPL-SCNC: 121 MMOL/L (ref 98–112)
CO2 SERPL-SCNC: 23 MMOL/L (ref 21–32)
CREAT BLD-MCNC: 1.52 MG/DL (ref 0.7–1.3)
DEPRECATED RDW RBC AUTO: 50.6 FL (ref 35.1–46.3)
EGFRCR SERPLBLD CKD-EPI 2021: 42 ML/MIN/1.73M2 (ref 60–?)
EOSINOPHIL # BLD: 0.23 X10(3) UL (ref 0–0.7)
EOSINOPHIL NFR BLD: 1 %
ERYTHROCYTE [DISTWIDTH] IN BLOOD BY AUTOMATED COUNT: 14.9 % (ref 11–15)
GLOBULIN PLAS-MCNC: 2.3 G/DL (ref 2–3.5)
GLUCOSE BLD-MCNC: 102 MG/DL (ref 70–99)
HCT VFR BLD AUTO: 36.3 % (ref 39–53)
HGB BLD-MCNC: 12.1 G/DL (ref 13–17.5)
LYMPHOCYTES NFR BLD: 0.68 X10(3) UL (ref 1–4)
LYMPHOCYTES NFR BLD: 3 %
MAGNESIUM SERPL-MCNC: 2 MG/DL (ref 1.6–2.6)
MCH RBC QN AUTO: 31.4 PG (ref 26–34)
MCHC RBC AUTO-ENTMCNC: 33.3 G/DL (ref 31–37)
MCV RBC AUTO: 94.3 FL (ref 80–100)
MONOCYTES # BLD: 1.13 X10(3) UL (ref 0.1–1)
MONOCYTES NFR BLD: 5 %
MORPHOLOGY: NORMAL
NEUTROPHILS # BLD AUTO: 19.6 X10 (3) UL (ref 1.5–7.7)
NEUTROPHILS NFR BLD: 91 %
NEUTS HYPERSEG # BLD: 20.48 X10(3) UL (ref 1.5–7.7)
OSMOLALITY SERPL CALC.SUM OF ELEC: 327 MOSM/KG (ref 275–295)
PHOSPHATE SERPL-MCNC: 2.9 MG/DL (ref 2.4–5.1)
PLATELET # BLD AUTO: 358 10(3)UL (ref 150–450)
PLATELET MORPHOLOGY: NORMAL
POTASSIUM SERPL-SCNC: 4 MMOL/L (ref 3.5–5.1)
PROT SERPL-MCNC: 5 G/DL (ref 5.7–8.2)
RBC # BLD AUTO: 3.85 X10(6)UL (ref 3.8–5.8)
SODIUM SERPL-SCNC: 151 MMOL/L (ref 136–145)
TOTAL CELLS COUNTED BLD: 100
WBC # BLD AUTO: 22.5 X10(3) UL (ref 4–11)

## 2025-06-24 PROCEDURE — 99233 SBSQ HOSP IP/OBS HIGH 50: CPT | Performed by: INTERNAL MEDICINE

## 2025-06-24 PROCEDURE — 99232 SBSQ HOSP IP/OBS MODERATE 35: CPT | Performed by: INTERNAL MEDICINE

## 2025-06-24 RX ORDER — FUROSEMIDE 10 MG/ML
20 INJECTION INTRAMUSCULAR; INTRAVENOUS ONCE
Status: COMPLETED | OUTPATIENT
Start: 2025-06-24 | End: 2025-06-24

## 2025-06-24 NOTE — PROGRESS NOTES
[facility]  part of [facility]    Progress Note    Imer Mcguire Patient Status:  Inpatient    1931 MRN [id number]   Location [facility] 5SW/SE Attending Severo Lacey MD   Hosp Day # 10 PCP OMAR LEAVITT       SUBJECTIVE:    Son-in-law is at the bedside reports patient is doing fine.  Denies any shortness of breath.  He also worked with the physical therapist today  OBJECTIVE:  Vital signs in last 24 hours:  BP (!) 165/71 (BP Location: Left arm)   Pulse 62   Temp 97.7 °F (36.5 °C) (Oral)   Resp 18   Ht 5' 8\" (1.727 m)   Wt 158 lb (71.7 kg)   SpO2 94%   BMI 24.02 kg/m²     Intake/Output:    Intake/Output Summary (Last 24 hours) at 2025 1258  Last data filed at 2025 0940  Gross per 24 hour   Intake 2359.23 ml   Output 800 ml   Net 1559.23 ml       Wt Readings from Last 3 Encounters:   25 158 lb (71.7 kg)   24 147 lb 6.4 oz (66.9 kg)   17 174 lb 4.8 oz (79.1 kg)       Exam  Gen: No acute distress,   HEENT: No pallor no icterus  Pulm: Lungs crackles are noted on the right side.  Nonmeat better than yesterday  CV: Heart with irregular rate and rhythm, no peripheral edema  Abd: Abdomen soft, nontender, nondistended, no organomegaly, bowel sounds present  Skin: no rashes or lesions  CNS: Patient is alert    Data Review:     Labs:   Lab Results   Component Value Date    WBC 22.5 2025    HGB 12.1 2025    HCT 36.3 2025    .0 2025    CREATSERUM 1.52 2025    BUN 54 2025     2025    K 4.0 2025     2025    CO2 23.0 2025     2025    CA 8.1 2025    ALB 2.7 2025    ALKPHO 137 2025    BILT 1.0 2025    TP 5.0 2025    AST 30 2025    ALT 34 2025    MG 2.0 2025    PHOS 2.9 2025       LABS  Recent Labs   Lab 25  1057 25  1846 25  2218 25  0622 25  0630 25  0508 25  06/23/25  0606 06/24/25  0525   RBC  --   --   --   --    < > 4.18  --  4.19 3.85   HGB  --   --   --  12.7*   < > 13.0 13.6  13.6 13.4 12.1*   HCT  --   --   --  36.8*   < > 38.8* 41.0 40.3 36.3*   MCV  --   --   --   --    < > 92.8  --  96.2 94.3   MCH  --   --   --   --    < > 31.1  --  32.0 31.4   MCHC  --   --   --   --    < > 33.5  --  33.3 33.3   RDW  --   --   --   --    < > 14.6  --  14.7 14.9   NEPRELIM  --   --   --   --    < > 18.98*  --  20.33* 19.60*   WBC  --   --   --   --    < > 22.7*  --  23.7* 22.5*   PLT  --   --   --  168.0   < > 287.0  --  329.0 358.0   AST  --   --   --   --   --   --   --   --  30   ALT  --   --   --   --   --   --   --   --  34   PTT 48.0* 52.9*  --  44.3*  --   --   --   --   --    CK  --   --  27*  --   --   --   --   --   --    GLU  --   --   --   --    < > 90  --  94 102*    < > = values in this interval not displayed.       Imaging:      Meds:   Current Hospital Medications[1]    Assessment  Problem List[2]  Sepsis due to pneumonia (HCC)    Pneumonia.,   Community-acquired bacterial bacterial   Secondary to Legionella  Patient on Zithromax, completed today       New onset atrial fibrillation with rapid ventricular response  Cardiology consulted.  Patient started on amiodarone  Heart rate is better      Acute hypoxic respiratory failure  Much improved  Continue oxygen supplementation.     Acute kidney injury.  on IV fluids.,  Improving     Hypernatremia  Patient is on D5, better     Coronary disease stable.     Hypothyroidism continue the patient on levothyroxine.     Patient stable.    Thrombocytopenia    Melena  GI consult    Discussed with nursing staff.  Further management will depend on the clinical course of the patient     Plan:   Continue IV antibiotics.  Continue continue other treatments.    Discussed with family at the bedside      Active Orders   LAB    Basic Metabolic Panel (8)     Frequency: Tomorrow AM draw     Number of Occurrences: 1 Occurrences    CBC  With Differential With Platelet     Frequency: Tomorrow AM draw     Number of Occurrences: 1 Occurrences   Nourishments    Dietary Nutrition Supplements Daily     Frequency: Daily     Number of Occurrences: Until Specified     Order Comments: Magic Cup @ 2pm - Chocolate     Respiratory Care    Acapella Until Discontinued     Frequency: Until Discontinued     Number of Occurrences: Until Specified   ECG    EKG 12 Lead     Frequency: Once     Number of Occurrences: 1 Occurrences   Diet    Sodium restricted diet Sodium Restriction: 3-4 GM NA; Fluid Consistency: Nectar Thick / Mildly Thick Liquids; Texture Consistency: Chopped / Soft & Bite Sized; Is Patient on Accuchecks? No; Misc Restriction: No Straw     Frequency: Effective Now     Number of Occurrences: Until Specified   Nursing    Cardiac monitoring     Frequency: Until Discontinued     Number of Occurrences: Until Specified    Cardiac monitoring     Frequency: Continuous     Number of Occurrences: Until Specified    Daily weights     Frequency: Until Discontinued     Number of Occurrences: Until Specified    Daily weights     Frequency: Until Discontinued     Number of Occurrences: Until Specified    Follow up for COPD/Pneumonia     Frequency: Once     Number of Occurrences: 1 Occurrences     Order Comments: Follow up  about one week after DC      Increase infusion dose 100 units/hr     Frequency: Once     Number of Occurrences: 1 Occurrences    Increase infusion dose 100 units/hr     Frequency: Once     Number of Occurrences: 1 Occurrences    Increase infusion dose 100 units/hr     Frequency: Once     Number of Occurrences: 1 Occurrences    Increase infusion dose 100 units/hr     Frequency: Once     Number of Occurrences: 1 Occurrences    Increase infusion dose 100 units/hr     Frequency: Once     Number of Occurrences: 1 Occurrences    Increase infusion dose 100 units/hr     Frequency: Once     Number of Occurrences: 1 Occurrences    Initiate electrolyte  protocol     Frequency: Until Discontinued     Number of Occurrences: Until Specified    Intake and Output     Frequency: Q Shift     Number of Occurrences: Until Specified    Notify physician (cardiology or ordering physician):     Frequency: Until Discontinued     Number of Occurrences: Until Specified     Order Comments: If you are discontinuing a drip without any oral diltiazem medication orders      Notify physician for BP (cardiology or ordering physician):     Frequency: Until Discontinued     Number of Occurrences: Until Specified     Order Comments: For SBP < 80 mmHg or symptomatic hypotension, discontinue drip and notify physician      Notify physician for HR (cardiology or ordering physician):     Frequency: Until Discontinued     Number of Occurrences: Until Specified     Order Comments: 1. If you are on max intravenous dose and the HR is >120  2. If HR is >140 and it has been one hour since you transitioned to oral from IV diltiazem  3. If rate < 75 for > 5 min or symptomatic bradycardia, discontinue the drip and notify physician  4. For pause greater than 3.0 seconds, hold drip and notify physician      Place sequential compression device     Frequency: Continuous     Number of Occurrences: Until Specified    Stop amiodarone and call the attending cardiologist     Frequency: PRN     Number of Occurrences: Until Specified     Order Comments: If systolic blood pressure falls to less than 90 mmHg, heart rate drops to less than 60 beats per minute, if  EKG demonstrates greater than Mobitz 1 Heart Block      Strict intake and output     Frequency: Until Discontinued     Number of Occurrences: Until Specified    Strict intake and output     Frequency: Until Discontinued     Number of Occurrences: Until Specified    Tele Box     Frequency: Once     Number of Occurrences: 1 Occurrences    Tele Box     Frequency: Once     Number of Occurrences: 1 Occurrences   Code Status    Full code Continuous     Frequency:  Continuous     Number of Occurrences: Until Specified   Consult    Consult to Gastroenterology     Frequency: Once     Number of Occurrences: 1 Occurrences   Medications    acetaminophen (Tylenol) tab 650 mg     Frequency: Q6H PRN     Dose: 650 mg     Route: Oral    albuterol (Ventolin) (2.5 MG/3ML) 0.083% nebulizer solution 2.5 mg     Frequency: Q4H PRN     Dose: 2.5 mg     Route: Nebulization    amiodarone (Pacerone) tab 200 mg     Frequency: Daily     Dose: 200 mg     Route: Oral    aspirin DR tab 81 mg     Frequency: Daily     Dose: 81 mg     Route: Oral    atorvastatin (Lipitor) tab 40 mg     Frequency: Nightly     Dose: 40 mg     Route: Oral    calcium carbonate (Tums) chewable tab 500 mg     Frequency: BID PRN     Dose: 500 mg     Route: Oral    dextrose 5% infusion     Frequency: Continuous     Route: Intravenous    furosemide (Lasix) 10 mg/mL injection 20 mg     Frequency: Once     Dose: 20 mg     Route: Intravenous    guaiFENesin ER (Mucinex) 12 hr tab 600 mg     Frequency: BID     Dose: 600 mg     Route: Oral    hydrALAZINE (Apresoline) tab 25 mg     Frequency: Q8H TIFFANIE     Dose: 25 mg     Route: Oral    ipratropium-albuterol (Duoneb) 0.5-2.5 (3) MG/3ML inhalation solution 3 mL     Frequency: Q8H     Dose: 3 mL     Route: Nebulization    levothyroxine (Synthroid) tab 100 mcg     Frequency: Daily @ 0700     Dose: 100 mcg     Route: Oral    metoprolol succinate (Toprol XL) partial tablet 12.5 mg     Frequency: Daily Beta Blocker     Dose: 12.5 mg     Route: Oral    pantoprazole (Protonix) 40 mg in sodium chloride 0.9% PF 10 mL IV push     Frequency: Q12H     Dose: 40 mg     Route: Intravenous    potassium chloride 20 mEq/100mL IVPB premix 20 mEq     Frequency: Once     Dose: 20 mEq     Route: Intravenous       Severo Lacey MD           [1]   Current Facility-Administered Medications   Medication Dose Route Frequency    furosemide (Lasix) 10 mg/mL injection 20 mg  20 mg Intravenous Once    levothyroxine  (Synthroid) tab 100 mcg  100 mcg Oral Daily @ 0700    metoprolol succinate (Toprol XL) partial tablet 12.5 mg  12.5 mg Oral Daily Beta Blocker    dextrose 5% infusion   Intravenous Continuous    hydrALAZINE (Apresoline) tab 25 mg  25 mg Oral Q8H TIFFANIE    ipratropium-albuterol (Duoneb) 0.5-2.5 (3) MG/3ML inhalation solution 3 mL  3 mL Nebulization Q8H    amiodarone (Pacerone) tab 200 mg  200 mg Oral Daily    potassium chloride 20 mEq/100mL IVPB premix 20 mEq  20 mEq Intravenous Once    guaiFENesin ER (Mucinex) 12 hr tab 600 mg  600 mg Oral BID    pantoprazole (Protonix) 40 mg in sodium chloride 0.9% PF 10 mL IV push  40 mg Intravenous Q12H    aspirin DR tab 81 mg  81 mg Oral Daily    albuterol (Ventolin) (2.5 MG/3ML) 0.083% nebulizer solution 2.5 mg  2.5 mg Nebulization Q4H PRN    atorvastatin (Lipitor) tab 40 mg  40 mg Oral Nightly    acetaminophen (Tylenol) tab 650 mg  650 mg Oral Q6H PRN    calcium carbonate (Tums) chewable tab 500 mg  500 mg Oral BID PRN   [2]   Patient Active Problem List  Diagnosis    Coronary artery disease involving native heart    Metabolic encephalopathy    Myopia with astigmatism and presbyopia, bilateral    Age-related nuclear cataract of both eyes    After cataract not obscuring vision, bilateral    Acute chest pain    Sepsis due to pneumonia (HCC)    SIERRA (acute kidney injury)    Gastrointestinal hemorrhage    Hypernatremia

## 2025-06-24 NOTE — PLAN OF CARE
A&Ox3. Pt ambulates SBA with walker, voiding via purewick, loss of appetite- encouraged po intake, IV fluids infusing. Frequent rounding by nursing staff. Safety precautions maintained/call light within reach. Plan for video swallow study tomorrow.    Problem: Patient Centered Care  Goal: Patient preferences are identified and integrated in the patient's plan of care  Description: Interventions:  - What would you like us to know as we care for you? From home alone  - Provide timely, complete, and accurate information to patient/family  - Incorporate patient and family knowledge, values, beliefs, and cultural backgrounds into the planning and delivery of care  - Encourage patient/family to participate in care and decision-making at the level they choose  - Honor patient and family perspectives and choices  Outcome: Progressing     Problem: Patient/Family Goals  Goal: Patient/Family Long Term Goal  Description: Patient's Long Term Goal: discharge    Interventions:  - - Monitor vitals  - Monitor appropriate labs  - Pain management  - Follow MD order  - Diagnostics per order  - Update/Informing patient and family on plan of care  - Discharge planning  - See additional Care Plan goals for specific interventions  Outcome: Progressing  Goal: Patient/Family Short Term Goal  Description: Patient's Short Term Goal: feel better    Interventions:   - - Monitor vitals  - Monitor appropriate labs  - Pain management  - Follow MD order  - Diagnostics per order  - Update/Informing patient and family on plan of care  - Discharge planning  - See additional Care Plan goals for specific interventions  Outcome: Progressing     Problem: PAIN - ADULT  Goal: Verbalizes/displays adequate comfort level or patient's stated pain goal  Description: INTERVENTIONS:  - Encourage pt to monitor pain and request assistance  - Assess pain using appropriate pain scale  - Administer analgesics based on type and severity of pain and evaluate response  -  Implement non-pharmacological measures as appropriate and evaluate response  - Consider cultural and social influences on pain and pain management  - Manage/alleviate anxiety  - Utilize distraction and/or relaxation techniques  - Monitor for opioid side effects  - Notify MD/LIP if interventions unsuccessful or patient reports new pain  - Anticipate increased pain with activity and pre-medicate as appropriate  Outcome: Progressing     Problem: SAFETY ADULT - FALL  Goal: Free from fall injury  Description: INTERVENTIONS:  - Assess pt frequently for physical needs  - Identify cognitive and physical deficits and behaviors that affect risk of falls.  - [place] fall precautions as indicated by assessment.  - Educate pt/family on patient safety including physical limitations  - Instruct pt to call for assistance with activity based on assessment  - Modify environment to reduce risk of injury  - Provide assistive devices as appropriate  - Consider OT/PT consult to assist with strengthening/mobility  - Encourage toileting schedule  Outcome: Progressing     Problem: DISCHARGE PLANNING  Goal: Discharge to home or other facility with appropriate resources  Description: INTERVENTIONS:  - Identify barriers to discharge w/pt and caregiver  - Include patient/family/discharge partner in discharge planning  - Arrange for needed discharge resources and transportation as appropriate  - Identify discharge learning needs (meds, wound care, etc)  - Arrange for interpreters to assist at discharge as needed  - Consider post-discharge preferences of patient/family/discharge partner  - Complete POLST form as appropriate  - Assess patient's ability to be responsible for managing their own health  - Refer to Case Management Department for coordinating discharge planning if the patient needs post-hospital services based on physician/LIP order or complex needs related to functional status, cognitive ability or social support system  Outcome:  Progressing     Problem: RESPIRATORY - ADULT  Goal: Achieves optimal ventilation and oxygenation  Description: INTERVENTIONS:  - Assess for changes in respiratory status  - Assess for changes in mentation and behavior  - Position to facilitate oxygenation and minimize respiratory effort  - Oxygen supplementation based on oxygen saturation or ABGs  - Provide Smoking Cessation handout, if applicable  - Encourage broncho-pulmonary hygiene including cough, deep breathe, Incentive Spirometry  - Assess the need for suctioning and perform as needed  - Assess and instruct to report SOB or any respiratory difficulty  - Respiratory Therapy support as indicated  - Manage/alleviate anxiety  - Monitor for signs/symptoms of CO2 retention  Outcome: Progressing     Problem: Impaired Functional Mobility  Goal: Achieve highest/safest level of mobility/gait  Description: Interventions:  - Assess patient's functional ability and stability  - Promote increasing activity/tolerance for mobility and gait  - Educate and engage patient/family in tolerated activity level and precautions  Outcome: Progressing     Problem: CARDIOVASCULAR - ADULT  Goal: Maintains optimal cardiac output and hemodynamic stability  Description: INTERVENTIONS:  - Monitor vital signs, rhythm, and trends  - Monitor for bleeding, hypotension and signs of decreased cardiac output  - Evaluate effectiveness of vasoactive medications to optimize hemodynamic stability  - Monitor arterial and/or venous puncture sites for bleeding and/or hematoma  - Assess quality of pulses, skin color and temperature  - Assess for signs of decreased coronary artery perfusion - ex. Angina  - Evaluate fluid balance, assess for edema, trend weights  Outcome: Progressing  Goal: Absence of cardiac arrhythmias or at baseline  Description: INTERVENTIONS:  - Continuous cardiac monitoring, monitor vital signs, obtain 12 lead EKG if indicated  - Evaluate effectiveness of antiarrhythmic and heart rate  control medications as ordered  - Initiate emergency measures for life threatening arrhythmias  - Monitor electrolytes and administer replacement therapy as ordered  Outcome: Progressing     Problem: NEUROLOGICAL - ADULT  Goal: Achieves stable or improved neurological status  Description: INTERVENTIONS  - Assess for and report changes in neurological status  - Initiate measures to prevent increased intracranial pressure  - Maintain blood pressure and fluid volume within ordered parameters to optimize cerebral perfusion and minimize risk of hemorrhage  - Monitor temperature, glucose, and sodium. Initiate appropriate interventions as ordered  Outcome: Progressing  Goal: Absence of seizures  Description: INTERVENTIONS  - Monitor for seizure activity  - Administer anti-seizure medications as ordered  - Monitor neurological status  Outcome: Progressing  Goal: Remains free of injury related to seizure activity  Description: INTERVENTIONS:  - Maintain airway, patient safety  and administer oxygen as ordered  - Monitor patient for seizure activity, document and report duration and description of seizure to MD/LIP  - If seizure occurs, turn patient to side and suction secretions as needed  - Reorient patient post seizure  - Seizure pads on all 4 side rails  - Instruct patient/family to notify RN of any seizure activity  - Instruct patient/family to call for assistance with activity based on assessment  Outcome: Progressing  Goal: Achieves maximal functionality and self care  Description: INTERVENTIONS  - Monitor swallowing and airway patency with patient fatigue and changes in neurological status  - Encourage and assist patient to increase activity and self care with guidance from PT/OT  - Encourage visually impaired, hearing impaired and aphasic patients to use assistive/communication devices  Outcome: Progressing     Problem: METABOLIC/FLUID AND ELECTROLYTES - ADULT  Goal: Electrolytes maintained within normal  limits  Description: INTERVENTIONS:  - Monitor labs and rhythm and assess patient for signs and symptoms of electrolyte imbalances  - Administer electrolyte replacement as ordered  - Monitor response to electrolyte replacements, including rhythm and repeat lab results as appropriate  - Fluid restriction as ordered  - Instruct patient on fluid and nutrition restrictions as appropriate  Outcome: Progressing  Goal: Hemodynamic stability and optimal renal function maintained  Description: INTERVENTIONS:  - Monitor labs and assess for signs and symptoms of volume excess or deficit  - Monitor intake, output and patient weight  - Monitor urine specific gravity, serum osmolarity and serum sodium as indicated or ordered  - Monitor response to interventions for patient's volume status, including labs, urine output, blood pressure (other measures as available)  - Encourage oral intake as appropriate  - Instruct patient on fluid and nutrition restrictions as appropriate  Outcome: Progressing     Problem: RISK FOR INFECTION - ADULT  Goal: Absence of fever/infection during anticipated neutropenic period  Description: INTERVENTIONS  - Monitor WBC  - Administer growth factors as ordered  - Implement neutropenic guidelines  Outcome: Progressing

## 2025-06-24 NOTE — PHYSICAL THERAPY NOTE
PHYSICAL THERAPY TREATMENT NOTE - INPATIENT     Room Number: 518/518-A       Presenting Problem: sepsis due to pneumonia, legionella with new onset a-fib with RVR  Co-Morbidities : CAD s/p CABG, CKD,    Problem List  Principal Problem:    Sepsis due to pneumonia (HCC)  Active Problems:    SIERRA (acute kidney injury)    Gastrointestinal hemorrhage    Hypernatremia      PHYSICAL THERAPY ASSESSMENT   Patient demonstrates good  progress this session, goals  remain in progress.      Patient is requiring minimal assist and moderate assist as a result of the following impairments: decreased functional strength, decreased endurance/aerobic capacity, impaired standing balance, decreased muscular endurance, and medical status.     Patient continues to function below baseline with bed mobility, transfers, gait, stair negotiation, standing prolonged periods, and performing household tasks.  Next session anticipate patient to progress bed mobility, transfers, gait, maintaining seated position, and standing prolonged periods.  Physical Therapy will continue to follow patient for duration of hospitalization.    Patient continues to benefit from continued skilled PT services: to facilitate return to prior level of function as patient demonstrates high motivation with excellent tolerance to an intensive therapy program .    PLAN DURING HOSPITALIZATION  Nursing Mobility Recommendation : 1 Assist  PT Device Recommendation: Rolling walker, Gait belt  PT Treatment Plan: Bed mobility, Endurance, Energy conservation, Patient education, Gait training, Strengthening, Stoop training, Stair training, Transfer training  Frequency (Obs): 5x/week     SUBJECTIVE  \"I want to nap.\"    OBJECTIVE  Precautions: Bed/chair alarm, Hard of hearing    PAIN ASSESSMENT   Rating: 3  Location: no complaints  Management Techniques: Activity promotion, Repositioning    BALANCE  Static Sitting: Good  Dynamic Sitting: Fair +  Static Standing: Fair -  Dynamic  Standing: Poor +    ACTIVITY TOLERANCE  Pulse: 65  Heart Rate Source: Monitor    O2 WALK  Oxygen Therapy  SPO2% Ambulation on Room Air: 91    AM-PAC '6-Clicks' INPATIENT SHORT FORM - BASIC MOBILITY  How much difficulty does the patient currently have...  Patient Difficulty: Turning over in bed (including adjusting bedclothes, sheets and blankets)?: A Little   Patient Difficulty: Sitting down on and standing up from a chair with arms (e.g., wheelchair, bedside commode, etc.): A Lot   Patient Difficulty: Moving from lying on back to sitting on the side of the bed?: A Little   How much help from another person does the patient currently need...   Help from Another: Moving to and from a bed to a chair (including a wheelchair)?: A Little   Help from Another: Need to walk in hospital room?: A Little   Help from Another: Climbing 3-5 steps with a railing?: A Lot     AM-PAC Score:  Raw Score: 16   Approx Degree of Impairment: 54.16%   Standardized Score (AM-PAC Scale): 40.78   CMS Modifier (G-Code): CK    FUNCTIONAL ABILITY STATUS  Functional Mobility/Gait Assessment  Gait Assistance: Minimum assistance  Distance (ft): 25 ft  Assistive Device: Rolling walker  Pattern: Shuffle (decreased maribel speed, decreased step length, flexed posture.)  Sit to Supine: minimal assist  Sit to Stand: moderate assist from bedside chair    Skilled Therapy Provided: Patient sitting in bedside chair with son-in-law upon arrival. RN approved activity. Educated patient on POC and benefits of mobilization. Agreeable to participate. Patient reporting no pain. Patient benefits from increased time, cues, and physical assistance in order to complete functional mobility tasks. Patient agreeable to ambulate short distance in room, however, reporting fatigue and wanting to take a nap, requesting to return back to bed. Encouraged patient to continue sitting in bedside chair for all meals and to continue ambulating as tolerated with nursing staff.      The patient's Approx Degree of Impairment: 54.16% has been calculated based on documentation in the [facility] '6 clicks' Inpatient Daily Activity Short Form.  Research supports that patients with this level of impairment may benefit from rehab.  Final disposition will be made by interdisciplinary medical team.    Patient End of Session: In bed, Needs met, Call light within reach, RN aware of session/findings, All patient questions and concerns addressed, Hospital anti-slip socks, Alarm set, Family present    CURRENT GOALS   Goals to be met by: 6/30/25  Patient Goal Patient's self-stated goal is: to go home   Goal #1 Patient is able to demonstrate supine - sit EOB @ level: modified independent      Goal #1   Current Status  Min A to return   Goal #2 Patient is able to demonstrate transfers Sit to/from Stand at assistance level: supervision with walker - rolling      Goal #2  Current Status  Mod A with RW   Goal #3 Patient is able to ambulate 100 feet with assist device: walker - rolling at assistance level: minimum assistance   Goal #3   Current Status  Min A for 25 ft with RW   Goal #4 Patient will negotiate 4 stairs/one curb w/ assistive device and minimal assistance   Goal #4   Current Status  NT   Goal #5 Patient to demonstrate independence with home activity/exercise instructions provided to patient in preparation for discharge.   Goal #5   Current Status  Ongoing     Gait Training: 10 minutes

## 2025-06-24 NOTE — PROGRESS NOTES
[facility]  part of [facility]     Progress Note    Imer Mcguire Patient Status:  Inpatient    1931 MRN [id number]   Location [facility] 2W/SW Attending Severo Lacey MD   Hosp Day # 10 PCP OMAR LEAVITT       Subjective:   Patient seen and examined.  Weaned off oxygen.  Denies significant dyspnea at rest.  Occasional cough present  Objective:   Blood pressure 157/70, pulse 63, temperature 98 °F (36.7 °C), temperature source Oral, resp. rate 16, height 5' 8\" (1.727 m), weight 158 lb (71.7 kg), SpO2 94%.  Intake/Output:   Last 3 shifts: I/O last 3 completed shifts:  In: 2669.2 [P.O.:630; I.V.:.2]  Out: 1600 [Urine:1600]   This shift: I/O this shift:  In: 10 [I.V.:10]  Out: -      Vent Settings:      Hemodynamic parameters (last 24 hours):      Scheduled Meds: Current Hospital Medications[1]    Continuous Infusions: Medication Infusions[2]    Physical Exam  Constitutional: no acute distress  Eyes: PERRL  ENT: nares pateint  Neck: supple, no JVD  Cardio: RRR, S1 S2  Respiratory: Right-sided crackles  GI: abdomen soft, non tender, active bowel sounds, no organomegaly  Extremities: no clubbing, cyanosis, edema  Neurologic: no gross motor deficits  Skin: warm, dry      Results:     Lab Results   Component Value Date    WBC 22.5 2025    HGB 12.1 2025    HCT 36.3 2025    .0 2025    CREATSERUM 1.52 2025    BUN 54 2025     2025    K 4.0 2025     2025    CO2 23.0 2025     2025    CA 8.1 2025    ALB 2.7 2025    ALKPHO 137 2025    BILT 1.0 2025    TP 5.0 2025    AST 30 2025    ALT 34 2025    MG 2.0 2025    PHOS 2.9 2025       XR CHEST AP PORTABLE  (CPT=71045)  Result Date: 2025  CONCLUSION:  1. Extensive right perihilar and right upper lobe pulmonary consolidation. 2. Background of diffuse reticulonodular interstitial prominence and  bibasilar parenchymal abnormality.    Dictated by (CST): Nathaniel Parrish MD on 6/23/2025 at 11:14 AM     Finalized by (CST): Nathaniel Parrish MD on 6/23/2025 at 11:16 AM              EKG 12 Lead  Result Date: 6/24/2025  Sinus bradycardia T wave abnormality, consider anterior ischemia Prolonged QT interval or tu fusion, consider myocardial disease, electrolyte imbalance, or drug effects Abnormal ECG When compared with ECG of 23-JUN-2025 11:45, T wave inversion more evident in Anterior leads    EKG 12 Lead  Result Date: 6/23/2025  Sinus bradycardia Nonspecific T wave abnormality Prolonged QT interval or tu fusion, consider myocardial disease, electrolyte imbalance, or drug effects Abnormal ECG When compared with ECG of 19-JUN-2025 13:35, Premature atrial complexes are no longer Present Nonspecific T wave abnormality now evident in Inferior leads T wave inversion now evident in Anterior leads Confirmed by ERNESTO GUPTA CASH ([id number]) on 6/23/2025 6:08:57 PM      Assessment   1.  Severe Legionella pneumonia  2.  Acute hypoxemic respiratory failure  3.  Fevers  4.  Leukocytosis  5.  Coronary artery disease  6.  Acute kidney injury  7.  Hypertension  8.  Atrial fibrillation with rapid ventricular response     Plan   -Patient presents with evidence of fatigue malaise some associated cough and mild dyspnea.  Hypoxic on presentation to emergency department.  - Legionella antigen positive.    - CT chest on 6/17/2025 with extensive right-sided opacities seen consistent with pneumonia.  Trace pleural effusion present.  - Monitor leukocytosis at this time  - Antibiotic therapy per ID recommendations.  Completed course of azithromycin.  - Weaned off of oxygen  - Atrial fibrillation management per cardiology has been started on amiodarone gtt initially now on p.o. amiodarone.  - Patient with evidence of some melena noted.  Further recommendations per GI.  Started on PPI therapy.  Closely monitor.  EGD on hold for now.  - PT/OT  -  [place] for discharge from pulmonary perspective.  Follow-up in pulmonary clinic in 2 to 3 weeks time    Bernabe Davis DO  Pulmonary Critical Care Medicine  [facility]          [1]   Current Facility-Administered Medications   Medication Dose Route Frequency    levothyroxine (Synthroid) tab 100 mcg  100 mcg Oral Daily @ 0700    metoprolol succinate (Toprol XL) partial tablet 12.5 mg  12.5 mg Oral Daily Beta Blocker    dextrose 5% infusion   Intravenous Continuous    hydrALAZINE (Apresoline) tab 25 mg  25 mg Oral Q8H TIFFANIE    ipratropium-albuterol (Duoneb) 0.5-2.5 (3) MG/3ML inhalation solution 3 mL  3 mL Nebulization Q8H    amiodarone (Pacerone) tab 200 mg  200 mg Oral Daily    potassium chloride 20 mEq/100mL IVPB premix 20 mEq  20 mEq Intravenous Once    guaiFENesin ER (Mucinex) 12 hr tab 600 mg  600 mg Oral BID    pantoprazole (Protonix) 40 mg in sodium chloride 0.9% PF 10 mL IV push  40 mg Intravenous Q12H    aspirin DR tab 81 mg  81 mg Oral Daily    albuterol (Ventolin) (2.5 MG/3ML) 0.083% nebulizer solution 2.5 mg  2.5 mg Nebulization Q4H PRN    atorvastatin (Lipitor) tab 40 mg  40 mg Oral Nightly    acetaminophen (Tylenol) tab 650 mg  650 mg Oral Q6H PRN    calcium carbonate (Tums) chewable tab 500 mg  500 mg Oral BID PRN   [2]    dextrose 83 mL/hr at 06/24/25 0940

## 2025-06-24 NOTE — PROGRESS NOTES
[facility]  Nephrology Daily Progress Note    Imer Mcguire  [id number]  89 year old      HPI:   Imer Mcguire is a 89 year old male.  Up in chair and overall feeling better.  Still on thickened liquids.  No CP or SOB.       ROS:     Constitutional:  Negative for decreased activity, fever, irritability and lethargy  Endocrine:  Negative for abnormal sleep patterns, increased activity, polydipsia and polyphagia  Cardiovascular:  Negative for cool extremity and irregular heartbeat/palpitations  Gastrointestinal:  Negative for abdominal pain, constipation, decreased appetite, diarrhea and vomiting  Genitourinary:  Negative for dysuria and hematuria  Hema/Lymph:  Negative for easy bleeding and easy bruising  Integumentary:  Negative for pruritus and rash  Musculoskeletal:  Negative for bone/joint symptoms  Neurological:  Negative for gait disturbance  Psychiatric:  Negative for inappropriate interaction and psychiatric symptoms  Respiratory:  Negative for cough, dyspnea and wheezing      PHYSICAL EXAM:   Temp:  [97.6 °F (36.4 °C)-98 °F (36.7 °C)] 98 °F (36.7 °C)  Pulse:  [59-63] 63  Resp:  [16-22] 16  BP: (153-168)/(65-71) 157/70  SpO2:  [93 %-96 %] 94 %  Patient Weight for the past 72 hrs:   Weight   06/22/25 0500 158 lb 9.6 oz (71.9 kg)   06/23/25 0519 158 lb 1.6 oz (71.7 kg)   06/24/25 0500 158 lb (71.7 kg)       Constitutional: appears well hydrated alert and responsive no acute distress noted  Neck/Thyroid: neck is supple without adenopathy  Lymphatic: no abnormal cervical, supraclavicular or axillary adenopathy is noted  Respiratory: normal to inspection lungs are clear to auscultation bilaterally normal respiratory effort  Cardiovascular: regular rate and rhythm no murmurs, gallups, or rubs  Abdomen: soft non-tender non-distended no organomegaly noted no masses  Musculoskeletal: full ROM all extremities good strength  no deformities  Extremities: 1 + edema.   Neurological: exam appropriate for  age reflexes and motor skills appropriate for age    Labs:  Lab Results   Component Value Date    WBC 22.5 06/24/2025    HGB 12.1 06/24/2025    HCT 36.3 06/24/2025    .0 06/24/2025    CREATSERUM 1.52 06/24/2025    BUN 54 06/24/2025     06/24/2025    K 4.0 06/24/2025     06/24/2025    CO2 23.0 06/24/2025     06/24/2025    CA 8.1 06/24/2025    ALB 2.7 06/24/2025    ALKPHO 137 06/24/2025    BILT 1.0 06/24/2025    TP 5.0 06/24/2025    AST 30 06/24/2025    ALT 34 06/24/2025    MG 2.0 06/24/2025    PHOS 2.9 06/24/2025     Recent Labs   Lab 06/22/25  0508 06/22/25 2003 06/23/25  0606 06/24/25  0525   WBC 22.7*  --  23.7* 22.5*   HGB 13.0 13.6  13.6 13.4 12.1*   HCT 38.8* 41.0 40.3 36.3*   .0  --  329.0 358.0     Recent Labs   Lab 06/20/25  0501 06/21/25  0538 06/21/25  0858 06/21/25  1738 06/22/25  0508 06/23/25  0606 06/24/25  0525   GLU 95  95   < > 96  --  90 94 102*   *  120*   < > 113*  --  78* 69* 54*   CREATSERUM 2.98*  2.98*   < > 2.52*  --  2.05* 1.71* 1.52*   CA 7.9*  7.9*   < > 8.3*  --  8.4* 8.3* 8.1*   ALB 2.7*  --  2.8*  --   --   --  2.7*     143   < > 146*  --  151* 153* 151*   K 3.6  3.6  3.5   < > 3.8   < > 4.2  4.2 4.4 4.0   *  113*   < > 119*  --  124* 124* 121*   CO2 18.0*  18.0*   < > 19.0*  --  18.0* 22.0 23.0   ALKPHO  --   --   --   --   --   --  137*   AST  --   --   --   --   --   --  30   ALT  --   --   --   --   --   --  34   BILT  --   --   --   --   --   --  1.0*   TP  --   --   --   --   --   --  5.0*   PHOS 5.1  --  4.3  --   --   --  2.9    < > = values in this interval not displayed.       Imaging  XR CHEST AP PORTABLE  (CPT=71045)  Result Date: 6/23/2025  CONCLUSION:  1. Extensive right perihilar and right upper lobe pulmonary consolidation. 2. Background of diffuse reticulonodular interstitial prominence and bibasilar parenchymal abnormality.    Dictated by (CST): Nathaniel Parrish MD on 6/23/2025 at 11:14 AM      Finalized by (CST): Nathaniel Parrish MD on 6/23/2025 at 11:16 AM              Medications:  Current Hospital Medications[1]    Allergies:  Allergies[2]    Input/Output:    Intake/Output Summary (Last 24 hours) at 6/24/2025 1449  Last data filed at 6/24/2025 0940  Gross per 24 hour   Intake 2259.23 ml   Output 800 ml   Net 1459.23 ml          ASSESSMENT/PLAN:   Assessment   Problem List[3]    UO 1250 ml.  SIERRA continues to improve with creatinine down to 1.52.  Sodium still up 151.  Continue D5W.  Encouraged po fluid intake.  Discussed with family at bedside.              6/24/2025  Hipolito Baugh MD               [1]   Current Facility-Administered Medications:     levothyroxine (Synthroid) tab 100 mcg, 100 mcg, Oral, Daily @ 0700    metoprolol succinate (Toprol XL) partial tablet 12.5 mg, 12.5 mg, Oral, Daily Beta Blocker    dextrose 5% infusion, , Intravenous, Continuous    hydrALAZINE (Apresoline) tab 25 mg, 25 mg, Oral, Q8H TIFFANIE    ipratropium-albuterol (Duoneb) 0.5-2.5 (3) MG/3ML inhalation solution 3 mL, 3 mL, Nebulization, Q8H    amiodarone (Pacerone) tab 200 mg, 200 mg, Oral, Daily    [COMPLETED] potassium chloride 40 mEq in 250mL sodium chloride 0.9% IVPB premix, 40 mEq, Intravenous, Once **FOLLOWED BY** potassium chloride 20 mEq/100mL IVPB premix 20 mEq, 20 mEq, Intravenous, Once    guaiFENesin ER (Mucinex) 12 hr tab 600 mg, 600 mg, Oral, BID    pantoprazole (Protonix) 40 mg in sodium chloride 0.9% PF 10 mL IV push, 40 mg, Intravenous, Q12H    aspirin DR tab 81 mg, 81 mg, Oral, Daily    albuterol (Ventolin) (2.5 MG/3ML) 0.083% nebulizer solution 2.5 mg, 2.5 mg, Nebulization, Q4H PRN    atorvastatin (Lipitor) tab 40 mg, 40 mg, Oral, Nightly    acetaminophen (Tylenol) tab 650 mg, 650 mg, Oral, Q6H PRN    calcium carbonate (Tums) chewable tab 500 mg, 500 mg, Oral, BID PRN  [2] No Known Allergies  [3]   Patient Active Problem List  Diagnosis    Coronary artery disease involving native heart    Metabolic  encephalopathy    Myopia with astigmatism and presbyopia, bilateral    Age-related nuclear cataract of both eyes    After cataract not obscuring vision, bilateral    Acute chest pain    Sepsis due to pneumonia (HCC)    SIERRA (acute kidney injury)    Gastrointestinal hemorrhage    Hypernatremia

## 2025-06-24 NOTE — CM/SW NOTE
[facility] continues to follow for acute rehab.  Imer is not yet medically ready to transfer.    Mone Graham MBA BSN RN CRRSAURAV SIERRA  RN Case Manager [facility]

## 2025-06-24 NOTE — PLAN OF CARE
Patient is A&O 2-3 with intermittent confused. Patient ambulates with rolling walker and standby pivot. Monitoring vital signs, stable at this time. No acute changes at this moment. Pain medication provided as needed. Fall precautions in place- bed alarm on, bed locked in lowest position, call light within reach. Frequent rounding by nursing staff.       Problem: Patient Centered Care  Goal: Patient preferences are identified and integrated in the patient's plan of care  Description: Interventions:  - What would you like us to know as we care for you? From home alone  - Provide timely, complete, and accurate information to patient/family  - Incorporate patient and family knowledge, values, beliefs, and cultural backgrounds into the planning and delivery of care  - Encourage patient/family to participate in care and decision-making at the level they choose  - Honor patient and family perspectives and choices  Outcome: Progressing     Problem: Patient/Family Goals  Goal: Patient/Family Long Term Goal  Description: Patient's Long Term Goal: discharge    Interventions:  - - Monitor vitals  - Monitor appropriate labs  - Pain management  - Follow MD order  - Diagnostics per order  - Update/Informing patient and family on plan of care  - Discharge planning  - See additional Care Plan goals for specific interventions  Outcome: Progressing  Goal: Patient/Family Short Term Goal  Description: Patient's Short Term Goal: feel better    Interventions:   - - Monitor vitals  - Monitor appropriate labs  - Pain management  - Follow MD order  - Diagnostics per order  - Update/Informing patient and family on plan of care  - Discharge planning  - See additional Care Plan goals for specific interventions  Outcome: Progressing     Problem: PAIN - ADULT  Goal: Verbalizes/displays adequate comfort level or patient's stated pain goal  Description: INTERVENTIONS:  - Encourage pt to monitor pain and request assistance  - Assess pain using  appropriate pain scale  - Administer analgesics based on type and severity of pain and evaluate response  - Implement non-pharmacological measures as appropriate and evaluate response  - Consider cultural and social influences on pain and pain management  - Manage/alleviate anxiety  - Utilize distraction and/or relaxation techniques  - Monitor for opioid side effects  - Notify MD/LIP if interventions unsuccessful or patient reports new pain  - Anticipate increased pain with activity and pre-medicate as appropriate  Outcome: Progressing     Problem: SAFETY ADULT - FALL  Goal: Free from fall injury  Description: INTERVENTIONS:  - Assess pt frequently for physical needs  - Identify cognitive and physical deficits and behaviors that affect risk of falls.  - [place] fall precautions as indicated by assessment.  - Educate pt/family on patient safety including physical limitations  - Instruct pt to call for assistance with activity based on assessment  - Modify environment to reduce risk of injury  - Provide assistive devices as appropriate  - Consider OT/PT consult to assist with strengthening/mobility  - Encourage toileting schedule  Outcome: Progressing     Problem: DISCHARGE PLANNING  Goal: Discharge to home or other facility with appropriate resources  Description: INTERVENTIONS:  - Identify barriers to discharge w/pt and caregiver  - Include patient/family/discharge partner in discharge planning  - Arrange for needed discharge resources and transportation as appropriate  - Identify discharge learning needs (meds, wound care, etc)  - Arrange for interpreters to assist at discharge as needed  - Consider post-discharge preferences of patient/family/discharge partner  - Complete POLST form as appropriate  - Assess patient's ability to be responsible for managing their own health  - Refer to Case Management Department for coordinating discharge planning if the patient needs post-hospital services based on physician/LIP  order or complex needs related to functional status, cognitive ability or social support system  Outcome: Progressing     Problem: RESPIRATORY - ADULT  Goal: Achieves optimal ventilation and oxygenation  Description: INTERVENTIONS:  - Assess for changes in respiratory status  - Assess for changes in mentation and behavior  - Position to facilitate oxygenation and minimize respiratory effort  - Oxygen supplementation based on oxygen saturation or ABGs  - Provide Smoking Cessation handout, if applicable  - Encourage broncho-pulmonary hygiene including cough, deep breathe, Incentive Spirometry  - Assess the need for suctioning and perform as needed  - Assess and instruct to report SOB or any respiratory difficulty  - Respiratory Therapy support as indicated  - Manage/alleviate anxiety  - Monitor for signs/symptoms of CO2 retention  Outcome: Progressing     Problem: Impaired Functional Mobility  Goal: Achieve highest/safest level of mobility/gait  Description: Interventions:  - Assess patient's functional ability and stability  - Promote increasing activity/tolerance for mobility and gait  - Educate and engage patient/family in tolerated activity level and precautions  - Recommend use of  RW for transfers and ambulation  - Recommend patient transfer to bedside chair toward strongest side  Outcome: Progressing     Problem: CARDIOVASCULAR - ADULT  Goal: Maintains optimal cardiac output and hemodynamic stability  Description: INTERVENTIONS:  - Monitor vital signs, rhythm, and trends  - Monitor for bleeding, hypotension and signs of decreased cardiac output  - Evaluate effectiveness of vasoactive medications to optimize hemodynamic stability  - Monitor arterial and/or venous puncture sites for bleeding and/or hematoma  - Assess quality of pulses, skin color and temperature  - Assess for signs of decreased coronary artery perfusion - ex. Angina  - Evaluate fluid balance, assess for edema, trend weights  Outcome:  Progressing  Goal: Absence of cardiac arrhythmias or at baseline  Description: INTERVENTIONS:  - Continuous cardiac monitoring, monitor vital signs, obtain 12 lead EKG if indicated  - Evaluate effectiveness of antiarrhythmic and heart rate control medications as ordered  - Initiate emergency measures for life threatening arrhythmias  - Monitor electrolytes and administer replacement therapy as ordered  Outcome: Progressing     Problem: NEUROLOGICAL - ADULT  Goal: Achieves stable or improved neurological status  Description: INTERVENTIONS  - Assess for and report changes in neurological status  - Initiate measures to prevent increased intracranial pressure  - Maintain blood pressure and fluid volume within ordered parameters to optimize cerebral perfusion and minimize risk of hemorrhage  - Monitor temperature, glucose, and sodium. Initiate appropriate interventions as ordered  Outcome: Progressing  Goal: Absence of seizures  Description: INTERVENTIONS  - Monitor for seizure activity  - Administer anti-seizure medications as ordered  - Monitor neurological status  Outcome: Progressing  Goal: Remains free of injury related to seizure activity  Description: INTERVENTIONS:  - Maintain airway, patient safety  and administer oxygen as ordered  - Monitor patient for seizure activity, document and report duration and description of seizure to MD/LIP  - If seizure occurs, turn patient to side and suction secretions as needed  - Reorient patient post seizure  - Seizure pads on all 4 side rails  - Instruct patient/family to notify RN of any seizure activity  - Instruct patient/family to call for assistance with activity based on assessment  Outcome: Progressing  Goal: Achieves maximal functionality and self care  Description: INTERVENTIONS  - Monitor swallowing and airway patency with patient fatigue and changes in neurological status  - Encourage and assist patient to increase activity and self care with guidance from PT/OT  -  Encourage visually impaired, hearing impaired and aphasic patients to use assistive/communication devices  Outcome: Progressing     Problem: METABOLIC/FLUID AND ELECTROLYTES - ADULT  Goal: Electrolytes maintained within normal limits  Description: INTERVENTIONS:  - Monitor labs and rhythm and assess patient for signs and symptoms of electrolyte imbalances  - Administer electrolyte replacement as ordered  - Monitor response to electrolyte replacements, including rhythm and repeat lab results as appropriate  - Fluid restriction as ordered  - Instruct patient on fluid and nutrition restrictions as appropriate  Outcome: Progressing  Goal: Hemodynamic stability and optimal renal function maintained  Description: INTERVENTIONS:  - Monitor labs and assess for signs and symptoms of volume excess or deficit  - Monitor intake, output and patient weight  - Monitor urine specific gravity, serum osmolarity and serum sodium as indicated or ordered  - Monitor response to interventions for patient's volume status, including labs, urine output, blood pressure (other measures as available)  - Encourage oral intake as appropriate  - Instruct patient on fluid and nutrition restrictions as appropriate  Outcome: Progressing     Problem: RISK FOR INFECTION - ADULT  Goal: Absence of fever/infection during anticipated neutropenic period  Description: INTERVENTIONS  - Monitor WBC  - Administer growth factors as ordered  - Implement neutropenic guidelines  Outcome: Progressing

## 2025-06-24 NOTE — PROGRESS NOTES
Progress Note  Imer Mcguire Patient Status:  Inpatient    1931 MRN [id number]   Location [facility]5W Attending Severo Lacey MD   Hosp Day # 10 PCP OMAR LEAVITT     Subjective:  Sleeping, daughter and son-in-law at bedside, wants to go home    Objective:  BP (!) 165/71 (BP Location: Left arm)   Pulse 62   Temp 97.7 °F (36.5 °C) (Oral)   Resp 18   Ht 5' 8\" (1.727 m)   Wt 158 lb (71.7 kg)   SpO2 93%   BMI 24.02 kg/m²     Telemetry: SB, 50-60s, PACs    Intake/Output:    Intake/Output Summary (Last 24 hours) at 2025 09  Last data filed at 2025 204  Gross per 24 hour   Intake 2609.23 ml   Output 1250 ml   Net 1359.23 ml     Last 3 Weights   25 0500 158 lb (71.7 kg)   25 0519 158 lb 1.6 oz (71.7 kg)   25 0500 158 lb 9.6 oz (71.9 kg)   25 0500 159 lb 4.8 oz (72.3 kg)   25 0449 160 lb 9.6 oz (72.8 kg)   25 0500 154 lb 8.7 oz (70.1 kg)   25 1811 155 lb (70.3 kg)   25 1357 155 lb (70.3 kg)   24 0441 147 lb 6.4 oz (66.9 kg)   24 1757 141 lb 1.6 oz (64 kg)   24 1353 150 lb (68 kg)   17 0700 174 lb 4.8 oz (79.1 kg)   17 0700 174 lb 1.6 oz (79 kg)   17 0600 173 lb 12.8 oz (78.8 kg)   17 0600 175 lb 6.4 oz (79.6 kg)   17 0500 177 lb 3.2 oz (80.4 kg)   17 0600 177 lb 3.2 oz (80.4 kg)   17 0547 177 lb 5 oz (80.4 kg)   17 0916 170 lb (77.1 kg)     Labs:  Recent Labs   Lab 25  05025  0525   GLU 90 94 102*   BUN 78* 69* 54*   CREATSERUM 2.05* 1.71* 1.52*   EGFRCR 30* 37* 42*   CA 8.4* 8.3* 8.1*   * 153* 151*   K 4.2  4.2 4.4 4.0   * 124* 121*   CO2 18.0* 22.0 23.0     Recent Labs   Lab 25  0508 25  0625  0525   RBC 4.18  --  4.19 3.85   HGB 13.0 13.6  13.6 13.4 12.1*   HCT 38.8* 41.0 40.3 36.3*   MCV 92.8  --  96.2 94.3   MCH 31.1  --  32.0 31.4   MCHC 33.5  --  33.3 33.3   RDW 14.6  --  14.7 14.9    NEPRELIM 18.98*  --  20.33* 19.60*   WBC 22.7*  --  23.7* 22.5*   .0  --  329.0 358.0     Recent Labs   Lab 06/18/25  2218   CK 27*     Lab Results   Component Value Date/Time    HDL 42 07/17/2018 07:27 AM    LDL 68 07/17/2018 07:27 AM    TRIG 73 07/17/2018 07:27 AM     No results found for: \"DDIMER\"  Lab Results   Component Value Date    TSH 3.139 06/17/2025     Review of Systems:   Constitutional: No fevers, chills, fatigue or night sweats.  Respiratory: Denies cough, wheezing or shortness of breath.  CV: Denies chest pain, palpitations, orthopnea, PND or dizziness.  GI: No nausea, vomiting or diarrhea. No blood in stools.    Physical Exam:   General: Alert, cooperative, no distress, appears stated age.  Neck: no JVD.  Lungs: rhonchi bilaterally.  Chest wall: No tenderness or deformity.  Heart: Regular rate and rhythm, S1, S2 normal, no murmur, click, rub or gallop.  Abdomen: Soft, non-tender. Bowel sounds normal. No masses,  No organomegaly.  Extremities: Extremities normal, atraumatic, no cyanosis, 1+ BLE edema.  Pulses: 2+ and symmetric all extremities.  Neurologic: Grossly intact.    Medications:  Scheduled Medications[1]  Medication Infusions[2]    Assessment:    89 year old male with PMH of CAD s/p CABG, CKD, HTN, HLD who presented with legionella pneumonia. Cardiology was consulted for new onset AFRVR.     Paroxysmal atrial fibrillation with RVR  2/2 infection as above, inflammation  -IV amiodarone, now on PO daily  -LVEF preserved 50-55% with moderate aortic stenosis, mild-mod tricuspid regurgitation  -CHADS-VASc= 4 for age, CAD, HTN  -low dose toprol for beta blockade  -not on OAC due to bleeding episodes  -will need DOAC when deemed safe due to elevated CHADS-VASc  -daily EKG to monitor QT interval with QT prolonging abx, azithromycin completed 6/23, will recheck EKG   -appears overloaded on exam due to continuous IVF, would recommend low dose lasix    Pneumonia 2/2 legionella  Admitted with  hypoxic respiratory failure 2/2 pna  -pulm/ID following  -Abx completed  -sputum cx contaminated  -CT A/P with extensive right lung airspace disease  -WBC improving, afebrile    CAD s/p CABG x4, 2017  CABG x4  (LIMA-LAD, SVG-RCA, SVG-OM1,SVG-OM2) 1/25/2017  -Denies anginal symptoms  -asa, high intensity statin, BB  -EKG stable without acute ischemic changes    HLD-statin, LDL 70 in 2023    Moderate aortic stenosis   Mean gradient 17mmHg    Mild to moderate tricuspid regurgitation  OP echo in 2024 noted moderate TR, stable    SIERRA on CKD3  Cr up to 3.07 at peak, now improving  -Baseline cr 1  -admitting cr 1.5, today 1.5  -IVF D5  -nephrology following    Hyponatremia  Na 134 on 6/17 and 6/18, now hypernatremic after bicarb gtt 150s  -transitioned to D5 per nephrology    Hypothyroid-synthroid    Acute bleeding  Multiple melanotic stools noted per family over the past 24 hours, hemoptysis event this morning  -Hgb 15.8 on admission, now stable ~12-13  -asa 81mg daily  -GI following, no plans for endoscopy due to risk and improvement in Hgb, conservative management for now  -BID pantoprazole indefinitely  -okay to resume antiplatelet/OAC as needed, will start eliquis and monitor Hgb if family is comfortable with this     Plan:  -give one time dose IV lasix 20mg, appreciate nephrology recs  -EKG now to recheck QTc  -continue amiodarone   -continue asa, statin, toprol  -start eliquis 2.5mg BID if family agreeable, discussed risks and benefits of DOAC family will consider  -monitor hgb daily  -defer to nephrology for further diuretic dosing as needed  -stable from cardiac standpoint, we will sign off and arrange for OP f/u after discharge from rehab      Plan of care discussed with patient and famiily at bedside, RN.        Yesi Arboleda, MSN, APN, FNP-BC, CCK  06/24/25  9:17 AM           [1]    levothyroxine  100 mcg Oral Daily @ 0700    metoprolol succinate ER  12.5 mg Oral Daily Beta Blocker    hydrALAZINE  25 mg Oral  Q8H TIFFANIE    ipratropium-albuterol  3 mL Nebulization Q8H    amiodarone  200 mg Oral Daily    potassium chloride  20 mEq Intravenous Once    guaiFENesin ER  600 mg Oral BID    pantoprazole  40 mg Intravenous Q12H    aspirin  81 mg Oral Daily    atorvastatin  40 mg Oral Nightly   [2]    dextrose 83 mL/hr at 06/23/25 2024

## 2025-06-24 NOTE — SLP NOTE
SPEECH DAILY NOTE - INPATIENT    ASSESSMENT & PLAN   ASSESSMENT  PPE REQUIRED. THIS THERAPIST WORE GLOVES, DROPLET MASK, AND GOGGLES FOR DURATION OF EVALUATION. HANDS WASHED UPON ENTRANCE/EXIT.    SLP f/u for ongoing dysphagia tx/meal assessment per recommendations of BITE SIZED/MILDLY THICK per downgrade on 6/19. RN reports pt tolerates diet and medication well with no overt clinical s/s aspiration. Pt expressed he feels food getting stuck in throat because of phlegm.     Pt positioned upright in bedside chair with son in law at bedside. Pt afebrile, tolerating room air with oxygen status 95 prior to the start of oral trials. SLP reviewed aspiration precautions and safe swallowing compensatory strategies with the patient. Safe swallow guidelines remain written on the white board in purple. Diet recommendations remain on the whiteboard in the room. Patient and family v/u. Provided minimal assistance, pt tolerates bite sized and mildly thick liquids via open cup with no overt clinical signs/symptoms of aspiration. Pt trialed with thin liquids and overt signs/symptoms of aspiration observed as evidenced by immediate cough response. Pt trialed with advanced solids and mildly prolonged chewing observed, however, improved from previous sessions. Oxygen status remained >93 t/o the entire session. Oral/buccal cavities clear of residue following all trials. Recommend upgrade to easy to chew solids and continue mildly thick liquids.      MOST RECENT CXR 6/23/25:  CONCLUSION:   1. Extensive right perihilar and right upper lobe pulmonary consolidation.   2. Background of diffuse reticulonodular interstitial prominence and bibasilar parenchymal abnormality.     Dictated by (CST): Nathaniel Parrish MD on 6/23/2025 at 11:14 AM       Finalized by (CST): Nathaniel Parrish MD on 6/23/2025 at 11:16 AM     PLAN: VFSS to be completed to determine safest/least restrictive diet and/or further dysphagia goals.     Diet Recommendations  - Solids: Soft/ Easy to chew  Diet Recommendations - Liquids: Nectar thick liquids/ Mildly thick    Compensatory Strategies Recommended: Alternate consistencies  Aspiration Precautions: No straw, Small sips, Small bites, Slow rate, Upright position  Medication Administration Recommendations: Whole in puree    Patient Experiencing Pain: No      Treatment Plan  Treatment Plan/Recommendations: Videofluoroscopic swallow study    Interdisciplinary Communication: Discussed with RN  Recommendations posted at bedside            GOALS  Goal #1 The patient will tolerate ETC consistency and mildly thick liquids without overt signs or symptoms of aspiration with 100 % accuracy over 2 session(s).  Revised 6/24   Goal #2 The patient/family/caregiver will demonstrate understanding and implementation of aspiration precautions and swallow strategies independently over 2 session(s).    In Progress   Goal #3 The patient will utilize compensatory strategies as outlined by  BSSE (clinical evaluation) including Slow rate, Small bites, Small sips, Alternate liquids/solids, No straws, Upright 90 degrees, Eliminate distractions with minimal assistance 100 % of the time across 2 sessions.  In Progress   Goal #4 VFSS to be completed to determine safest/least restrictive diet and/or further dysphagia goals.  Goal initiated 6/24     FOLLOW UP  Follow Up Needed (Documentation Required): Yes  SLP Follow-up Date: 06/25/25  Duration: 1 week    Session: 2    If you have any questions, please contact RYAN Swain M.S., CCC-SLP  Speech Language Pathologist  Phone Number Ext. [phone]

## 2025-06-25 ENCOUNTER — APPOINTMENT (OUTPATIENT)
Dept: GENERAL RADIOLOGY | Facility: HOSPITAL | Age: OVER 89
End: 2025-06-25
Attending: INTERNAL MEDICINE
Payer: MEDICARE

## 2025-06-25 LAB
ALBUMIN SERPL-MCNC: 2.4 G/DL (ref 3.2–4.8)
ANION GAP SERPL CALC-SCNC: 8 MMOL/L (ref 0–18)
ATRIAL RATE: 53 BPM
BASOPHILS # BLD AUTO: 0.03 X10(3) UL (ref 0–0.2)
BASOPHILS NFR BLD AUTO: 0.2 %
BUN BLD-MCNC: 39 MG/DL (ref 9–23)
BUN/CREAT SERPL: 28.1 (ref 10–20)
CALCIUM BLD-MCNC: 8 MG/DL (ref 8.7–10.4)
CHLORIDE SERPL-SCNC: 116 MMOL/L (ref 98–112)
CO2 SERPL-SCNC: 22 MMOL/L (ref 21–32)
CREAT BLD-MCNC: 1.39 MG/DL (ref 0.7–1.3)
DEPRECATED RDW RBC AUTO: 51.7 FL (ref 35.1–46.3)
EGFRCR SERPLBLD CKD-EPI 2021: 47 ML/MIN/1.73M2 (ref 60–?)
EOSINOPHIL # BLD AUTO: 0.22 X10(3) UL (ref 0–0.7)
EOSINOPHIL NFR BLD AUTO: 1.2 %
ERYTHROCYTE [DISTWIDTH] IN BLOOD BY AUTOMATED COUNT: 14.6 % (ref 11–15)
GLUCOSE BLD-MCNC: 95 MG/DL (ref 70–99)
HCT VFR BLD AUTO: 35.8 % (ref 39–53)
HGB BLD-MCNC: 11.7 G/DL (ref 13–17.5)
IMM GRANULOCYTES # BLD AUTO: 0.29 X10(3) UL (ref 0–1)
IMM GRANULOCYTES NFR BLD: 1.6 %
LYMPHOCYTES # BLD AUTO: 0.95 X10(3) UL (ref 1–4)
LYMPHOCYTES NFR BLD AUTO: 5.2 %
MAGNESIUM SERPL-MCNC: 1.8 MG/DL (ref 1.6–2.6)
MCH RBC QN AUTO: 31.6 PG (ref 26–34)
MCHC RBC AUTO-ENTMCNC: 32.7 G/DL (ref 31–37)
MCV RBC AUTO: 96.8 FL (ref 80–100)
MONOCYTES # BLD AUTO: 0.95 X10(3) UL (ref 0.1–1)
MONOCYTES NFR BLD AUTO: 5.2 %
NEUTROPHILS # BLD AUTO: 15.89 X10 (3) UL (ref 1.5–7.7)
NEUTROPHILS # BLD AUTO: 15.89 X10(3) UL (ref 1.5–7.7)
NEUTROPHILS NFR BLD AUTO: 86.6 %
OSMOLALITY SERPL CALC.SUM OF ELEC: 311 MOSM/KG (ref 275–295)
P AXIS: 8 DEGREES
P-R INTERVAL: 144 MS
PHOSPHATE SERPL-MCNC: 2.8 MG/DL (ref 2.4–5.1)
PLATELET # BLD AUTO: 334 10(3)UL (ref 150–450)
POTASSIUM SERPL-SCNC: 3.5 MMOL/L (ref 3.5–5.1)
POTASSIUM SERPL-SCNC: 4.5 MMOL/L (ref 3.5–5.1)
Q-T INTERVAL: 544 MS
QRS DURATION: 88 MS
QTC CALCULATION (BEZET): 510 MS
R AXIS: 50 DEGREES
RBC # BLD AUTO: 3.7 X10(6)UL (ref 3.8–5.8)
SODIUM SERPL-SCNC: 146 MMOL/L (ref 136–145)
T AXIS: 95 DEGREES
VENTRICULAR RATE: 53 BPM
WBC # BLD AUTO: 18.3 X10(3) UL (ref 4–11)

## 2025-06-25 PROCEDURE — 74230 X-RAY XM SWLNG FUNCJ C+: CPT | Performed by: INTERNAL MEDICINE

## 2025-06-25 PROCEDURE — 99232 SBSQ HOSP IP/OBS MODERATE 35: CPT | Performed by: INTERNAL MEDICINE

## 2025-06-25 RX ORDER — METOPROLOL SUCCINATE 25 MG/1
12.5 TABLET, EXTENDED RELEASE ORAL
Qty: 30 TABLET | Refills: 1 | Status: SHIPPED | OUTPATIENT
Start: 2025-06-26

## 2025-06-25 RX ORDER — MAGNESIUM OXIDE 400 MG/1
400 TABLET ORAL ONCE
Status: COMPLETED | OUTPATIENT
Start: 2025-06-25 | End: 2025-06-25

## 2025-06-25 RX ORDER — POTASSIUM CHLORIDE 1500 MG/1
40 TABLET, EXTENDED RELEASE ORAL EVERY 4 HOURS
Status: COMPLETED | OUTPATIENT
Start: 2025-06-25 | End: 2025-06-25

## 2025-06-25 RX ORDER — HYDRALAZINE HYDROCHLORIDE 25 MG/1
25 TABLET, FILM COATED ORAL EVERY 8 HOURS SCHEDULED
Qty: 90 TABLET | Refills: 3 | Status: SHIPPED | OUTPATIENT
Start: 2025-06-25

## 2025-06-25 RX ORDER — ASPIRIN 81 MG/1
81 TABLET ORAL DAILY
Qty: 30 TABLET | Refills: 3 | Status: SHIPPED | OUTPATIENT
Start: 2025-06-26

## 2025-06-25 RX ORDER — AMIODARONE HYDROCHLORIDE 200 MG/1
200 TABLET ORAL DAILY
Qty: 90 TABLET | Refills: 1 | Status: SHIPPED | OUTPATIENT
Start: 2025-06-26

## 2025-06-25 NOTE — PROGRESS NOTES
INFECTIOUS DISEASE PROGRESS NOTE  [facility]  part of [facility] ID PROGRESS NOTE    Imer Mcguire Patient Status:  Inpatient    1931 MRN [id number]   Location [facility] 2W/SW Attending Severo Lacey MD   Hosp Day # 11 PCP OMAR LEAVITT     Subjective:  ROS reviewed. Had VFSS. Anxious for DC.    ASSESSMENT:    Antibiotics: IV azithromycin (IV zosyn, levaquin)     # Legionella pneumonia with hypoxia with +Legionella urine Ag   -Sputum cx with oral contamination    -CT C/A/P with extensive right lung airspace disease   -Air conditioner serviced earlier this month   -[place]requ[place]   # AFib on amiodarone drip  # SIERRA on CKD  # Leukocytosis with bandemia  # CAD s/p CABG, HTN, HLD        PLAN:  -  Continue to monitor off abx.  -  FU blood cx--NGTD.  -  Follow fever curve, wbc.  -  Reviewed labs, micro, imaging reports  -  Case d/w patient, family, RN, pulmonary.     History of Present Illness:  89-year-old male with a history of CAD status post CABG in 2017, HTN, HLD, CKD who now presents to hospital sick/14 with fatigue, weakness, malaise for 1 week, cough, dyspnea.  Found to be febrile to 103.1 on admission with hypoxia, tachycardia.  Initial WBC of 26 now up to 40.  Given IV Zosyn, vancomycin, azithromycin.  Initially with worsening O2 requirements up to 15 L now improved to 6 L.  Also with A-fib on amiodarone.  Initial chest x-ray with upper lung airspace opacification consistent with pneumonia.  Blood cultures no growth.  Urine Legionella positive.  MRSA nares negative.  Also SIERRA with worsening kidney function.     Has been having issues with his air conditioning. 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    Physical Exam:  /59 (BP Location: Right arm)   Pulse 67   Temp 97.6 °F (36.4 °C) (Oral)   Resp 16   Ht 5' 8\" (1.727 m)   Wt 158 lb (71.7 kg)   SpO2 91%   BMI 24.02 kg/m²     Gen:   Awake, in bed  HEENT:  EOMI, neck  supple  Abdom:  Soft, no TTP  Skin/extrem:  No rashes, no c/c/e  :   Primofit+  Lines:  PIV+    Laboratory Data: Reviewed    Microbiology: Reviewed    Radiology: Reviewed    [facility]  [phone]

## 2025-06-25 NOTE — SLP NOTE
ADULT VIDEOFLUOROSCOPIC SWALLOWING STUDY    Admission Date: 6/14/2025  Evaluation Date: 06/25/25  Radiologist: Dr. Aguilar    RECOMMENDATIONS   Diet Recommendations - Solids: Soft/ Easy to chew  Diet Recommendations - Liquids: Thin Liquids    Further Follow-up:  Follow Up Needed (Documentation Required): Yes  SLP Follow-up Date: 06/26/25  Compensatory Strategies Recommended: Alternate consistencies, Multiple swallows  Aspiration Precautions: Upright position, Slow rate, Small bites, Small sips, No straw  Medication Administration Recommendations:  (as tolerated)  Treatment Plan/Recommendations: Aspiration precautions    HISTORY   Background/Objective Information:    Problem List  Principal Problem:    Sepsis due to pneumonia (HCC)  Active Problems:    SIERRA (acute kidney injury)    Gastrointestinal hemorrhage    Hypernatremia      Past Medical History  Past Medical History[1]    Current Diet Consistency: Regular, Thin liquids  Prior Level of Function: Independent  Prior Living Situation: Home alone  History of Recent: Pneumonia, Difficulty breathing  Precautions: Aspiration  Imaging results:     CXR 6/23/25:  CONCLUSION:   1. Extensive right perihilar and right upper lobe pulmonary consolidation.   2. Background of diffuse reticulonodular interstitial prominence and bibasilar parenchymal abnormality.     Dictated by (CST): Nathaniel Parrish MD on 6/23/2025 at 11:14 AM       Finalized by (CST): Nathaniel Parrish MD on 6/23/2025 at 11:16 AM       VFSS 6/25/25:  FINDINGS/IMPRESSION:     Laryngeal penetration noted. Please refer to the speech pathologist's detailed note for further evaluation.     Electronically Verified and Signed by Attending Radiologist: Calixto Aguilar MD 6/25/2025 9:38 AM   Workstation: [facility] [employer] [facility]     Reason for Referral: R/O aspiration    Family/Patient Goals:      ASSESSMENT   DYSPHAGIA ASSESSMENT  Test completed in conjunction with Radiologist.  Patient Positioned: Upright, Midline.  Patient  Viewed: Lateral.  Patient Alertness: Fully alert.  Consistencies Presented: Thin liquids, Nectar thick liquids/ Mildly thick, Puree, Hard solid to assess oropharyngeal swallow function and assess for compensatory strategies to improve safe swallow function.    THIN LIQUIDS  Method of Presentation: Cup  Oral Phase of Swallow (VFSS - Thin Liquids): Within Functional Limits  Triggered at: Valleculae  Premature Spillage to: Valleculae  Residue Severity, Location: Mild, Valleculae  Cleared/Reduced with: Secondary swallow  Laryngeal Penetration: During the swallow, Trace  Tracheal Aspiration: None  Cough/Throat Clear Response: No  Strategy(ies) Implemented (Thin Liquids): No straws, Slow rate, Alternate consistencies, Small bites and sips  Effectiveness: Yes  NECTAR THICK LIQUIDS/ MILDLY THICK  Method of Presentation: Cup  Oral Phase of Swallow (VFSS - Nectar Thick Liquids): Within Functional Limits  Triggered at: Valleculae  Premature Spillage to: Valleculae  Residue Severity, Location: Mild, Valleculae  Cleared/Reduced with: Secondary swallow  Laryngeal Penetration: None  Tracheal Aspiration: None  Cough/Throat Clear Response: No  Cough/Throat Clear Effective: No  Strategy(ies) Implemented (Nectar Thick): No straws, Slow rate, Alternate consistencies, Small bites and sips, Multiple swallows  Effectiveness: Yes     PUREE  Oral Phase of Swallow (VFSS - Puree): Within Functional Limits  Triggered at: Valleculae  Premature Spillage to: Valleculae  Residue Severity, Location: Mild, Valleculae  Cleared/Reduced with: Secondary swallow  Laryngeal Penetration: None  Tracheal Aspiration: None  Cough/Throat Clear Response: No  Strategy(ies) Implemented (Puree): Slow rate, Alternate consistencies, Small bites and sips, Multple swallows  Effectiveness: Yes     HARD SOLID  Oral Phase of Swallow (VFSS - Hard Solid): Impaired  Bolus Retrieval (VFSS - Hard Solid): Intact  Bilabial Seal (VFSS - Hard Solid): Intact  Bolus Formation (VFSS  - Hard Solid): Intact  Bolus Propulsion (VFSS - Hard Solid): Impaired  Mastication (VFSS - Hard Solid): Impaired  Triggered at: Base of tongue  Premature Spillage to: Valleculae  Residue Severity, Location: Mild, Valleculae, Pyriform sinuses  Cleared/Reduced with: Secondary swallow, Liquid assist  Laryngeal Penetration: None  Tracheal Aspiration: None  Cough/Throat Clear Response: No  Strategy(ies) Implemented (Hard Solid): No straws, Slow rate, Alterante consistencies, Small bites and sips  Effectiveness: Yes  Penetration Aspiration Scale Score: Score 2: Material enters the airway, remains above the vocal folds, and is ejected from the airway     *It should be noted that second part of study was not recorded due to Fluoro machine having to be restarted and error with EDY unit.     Overall Impression: Pt received alert and oriented in radiology suite. Pt presents with mild oropharyngeal dysphagia. Oral phase impaired as evidenced by prolonged mastication with increased LELE and premature spillage to level of valleculae with all consistencies. Pharyngeal phase impaired as evidenced by reduced epiglottic inversion, penetration, and mild retention. Intermittent flash laryngeal penetration observed with thin liquid trials. No further episode of penetration on all other consistencies. No gross aspiration observed throughout duration of study. Intermittent mild retention observed in valleculae with thin liquids, mildly thick liquids, and puree solids - cleared with secondary swallow. Mild retention of hard solids observed in valleculae and pyriform sinuses, however, cleared with liquid rinse and double swallow. Functional clearance of bolus' with no observed slowing/stasis. Recommend easy to chew solids and thin liquids (no straws).    Results and recommendations reviewed in radiology suite with pt who v/u. [place] handout provided with diet recommendations and safe swallow precautions. RN v/u to all  results/recommendations.     GOALS  Goal #1 The patient will tolerate ETC consistency and thin liquids without overt signs or symptoms of aspiration with 100 % accuracy over 2 session(s).  Revised 6/25   Goal #2 The patient/family/caregiver will demonstrate understanding and implementation of aspiration precautions and swallow strategies independently over 2 session(s).    In Progress   Goal #3 The patient will utilize compensatory strategies as outlined by  BSSE (clinical evaluation) including Slow rate, Small bites, Small sips, Alternate liquids/solids, No straws, Upright 90 degrees, Eliminate distractions with minimal assistance 100 % of the time across 2 sessions.  In Progress   Goal #4 VFSS to be completed to determine safest/least restrictive diet and/or further dysphagia goals.  Goal met 6/25     PLAN: SLP to f/u x1-2 meal assessments, monitor CXR, and VFSS if clinically warranted.     EDUCATION/INSTRUCTION  Reviewed results and recommendations with patient/family/caregiver.  Agreement/Understanding verbalized and all questions answered to their apparent satisfaction.    INTERDISCIPLINARY COMMUNICATION  Reviewed results with Radiologist; agreement verbalized.    Patient Experiencing Pain: No      FOLLOW UP  Treatment Plan/Recommendations: Aspiration precautions  Duration: 1 week    Thank you for your referral.   If you have any questions, please contact RYAN Swain M.S. CCC-SLP  Speech Language Pathologist  Phone Number [phone]           [1]   Past Medical History:   Atherosclerosis of coronary artery    Cortical senile cataract    Disorder of thyroid    Dyslipidemia    Essential hypertension    High blood pressure    Hypothyroidism    Senile cataract

## 2025-06-25 NOTE — PROGRESS NOTES
[facility]  part of [facility]     Progress Note    Imer Mcguire Patient Status:  Inpatient    1931 MRN [id number]   Location [facility] 2W/SW Attending Severo Lacey MD   Hosp Day # 11 PCP OMAR LEAVITT       Subjective:   Patient seen and examined.  Weaned off oxygen.  Denies significant dyspnea at rest.  Occasional cough present  Objective:   Blood pressure 105/52, pulse 61, temperature 97.5 °F (36.4 °C), temperature source Oral, resp. rate 18, height 5' 8\" (1.727 m), weight 158 lb (71.7 kg), SpO2 94%.  Intake/Output:   Last 3 shifts: I/O last 3 completed shifts:  In: 4541.8 [P.O.:640; I.V.:3901.8]  Out: 3050 [Urine:3050]   This shift: I/O this shift:  In: 10 [I.V.:10]  Out: 250 [Urine:250]     Vent Settings:      Hemodynamic parameters (last 24 hours):      Scheduled Meds: Current Hospital Medications[1]    Continuous Infusions: Medication Infusions[2]    Physical Exam  Constitutional: no acute distress  Eyes: PERRL  ENT: nares pateint  Neck: supple, no JVD  Cardio: RRR, S1 S2  Respiratory: Right-sided crackles  GI: abdomen soft, non tender, active bowel sounds, no organomegaly  Extremities: no clubbing, cyanosis, edema  Neurologic: no gross motor deficits  Skin: warm, dry      Results:     Lab Results   Component Value Date    WBC 18.3 2025    HGB 11.7 2025    HCT 35.8 2025    .0 2025    CREATSERUM 1.39 2025    BUN 39 2025     2025    K 3.5 2025     2025    CO2 22.0 2025    GLU 95 2025    CA 8.0 2025    ALB 2.4 2025    MG 1.8 2025    PHOS 2.8 2025       XR VIDEO SWALLOW (CPT=74230)  Result Date: 2025  FINDINGS/IMPRESSION: Laryngeal penetration noted. Please refer to the speech pathologist's detailed note for further evaluation. Electronically Verified and Signed by Attending Radiologist: Calixto Aguilar MD 2025 9:38 AM Workstation: [id number]        EKG 12  Lead  Result Date: 6/25/2025  Sinus bradycardia T wave abnormality, consider anterior ischemia Prolonged QT interval or tu fusion, consider myocardial disease, electrolyte imbalance, or drug effects Abnormal ECG When compared with ECG of 23-JUN-2025 11:45, T wave inversion more evident in Anterior leads      Assessment   1.  Severe Legionella pneumonia  2.  Acute hypoxemic respiratory failure  3.  Fevers  4.  Leukocytosis  5.  Coronary artery disease  6.  Acute kidney injury  7.  Hypertension  8.  Atrial fibrillation with rapid ventricular response     Plan   -Patient presents with evidence of fatigue malaise some associated cough and mild dyspnea.  Hypoxic on presentation to emergency department.  - Legionella antigen positive.    - CT chest on 6/17/2025 with extensive right-sided opacities seen consistent with pneumonia.  Trace pleural effusion present.  - Monitor leukocytosis at this time  - Antibiotic therapy per ID recommendations.  Completed course of azithromycin.  - Weaned off of oxygen  - Atrial fibrillation management per cardiology has been started on amiodarone gtt initially now on p.o. amiodarone.  - Patient with evidence of some melena noted.  Further recommendations per GI.  Started on PPI therapy.  Closely monitor.  EGD on hold for now.  - PT/OT  - Okay for discharge from pulmonary perspective.  Follow-up in pulmonary clinic in 2 to 3 weeks time    Bernabe Davis DO  Pulmonary Critical Care Medicine  [facility]          [1]   Current Facility-Administered Medications   Medication Dose Route Frequency    levothyroxine (Synthroid) tab 100 mcg  100 mcg Oral Daily @ 0700    metoprolol succinate (Toprol XL) partial tablet 12.5 mg  12.5 mg Oral Daily Beta Blocker    hydrALAZINE (Apresoline) tab 25 mg  25 mg Oral Q8H [facility]    ipratropium-albuterol (Duoneb) 0.5-2.5 (3) MG/3ML inhalation solution 3 mL  3 mL Nebulization Q8H    amiodarone (Pacerone) tab 200 mg  200 mg Oral Daily    potassium chloride 20  mEq/100mL IVPB premix 20 mEq  20 mEq Intravenous Once    guaiFENesin ER (Mucinex) 12 hr tab 600 mg  600 mg Oral BID    pantoprazole (Protonix) 40 mg in sodium chloride 0.9% PF 10 mL IV push  40 mg Intravenous Q12H    aspirin DR tab 81 mg  81 mg Oral Daily    albuterol (Ventolin) (2.5 MG/3ML) 0.083% nebulizer solution 2.5 mg  2.5 mg Nebulization Q4H PRN    atorvastatin (Lipitor) tab 40 mg  40 mg Oral Nightly    acetaminophen (Tylenol) tab 650 mg  650 mg Oral Q6H PRN    calcium carbonate (Tums) chewable tab 500 mg  500 mg Oral BID PRN   [2]

## 2025-06-25 NOTE — CM/SW NOTE
Patient is medically ready from acute side for discharge.  [facility] would like to see him stable after IV fluids.  CM to follow-up with [facility] 6/26 am to see if they can accept.    / to remain available for support and/or discharge planning.      Mone Graham MBA BSN RN CRRSAURAV SIERRA  RN Case Manager PMU

## 2025-06-25 NOTE — PROGRESS NOTES
[facility]  part of [facility]    Progress Note    Imer Mcguire Patient Status:  Inpatient    1931 MRN [id number]   Location [facility] 5SW/SE Attending Severo Lacey MD   Hosp Day # 11 PCP OMAR LEAVITT       SUBJECTIVE:  Doing fine.  No complaints no fever no shortness of breath.  Daughter is at the bedside.    OBJECTIVE:  Vital signs in last 24 hours:  /59 (BP Location: Right arm)   Pulse 67   Temp 97.6 °F (36.4 °C) (Oral)   Resp 16   Ht 5' 8\" (1.727 m)   Wt 158 lb (71.7 kg)   SpO2 91%   BMI 24.02 kg/m²     Intake/Output:    Intake/Output Summary (Last 24 hours) at 2025 1055  Last data filed at 2025 0852  Gross per 24 hour   Intake 2352.59 ml   Output 2650 ml   Net -297.41 ml       Wt Readings from Last 3 Encounters:   25 158 lb (71.7 kg)   24 147 lb 6.4 oz (66.9 kg)   17 174 lb 4.8 oz (79.1 kg)       Exam  Patient is sitting up in the chair  Gen: No acute distress,   HEENT: No pallor no icterus  Pulm: Lungs crackles are noted on the right side.  Nonmeat better than yesterday  CV: Heart with irregular rate and rhythm, no peripheral edema  Abd: Abdomen soft, nontender, nondistended, no organomegaly, bowel sounds present  Skin: no rashes or lesions  CNS: Patient is alert    Data Review:     Labs:   Lab Results   Component Value Date    WBC 18.3 2025    HGB 11.7 2025    HCT 35.8 2025    .0 2025    CREATSERUM 1.39 2025    BUN 39 2025     2025    K 3.5 2025     2025    CO2 22.0 2025    GLU 95 2025    CA 8.0 2025    ALB 2.4 2025    MG 1.8 2025    PHOS 2.8 2025       LABS  Recent Labs   Lab 25  1057 25  1846 25  2218 25  0622 25  0630 25  0606 25  0525 25  0538   RBC  --   --   --   --    < > 4.19 3.85 3.70*   HGB  --   --   --  12.7*   < > 13.4 12.1* 11.7*   HCT  --   --   --  36.8*    < > 40.3 36.3* 35.8*   MCV  --   --   --   --    < > 96.2 94.3 96.8   MCH  --   --   --   --    < > 32.0 31.4 31.6   MCHC  --   --   --   --    < > 33.3 33.3 32.7   RDW  --   --   --   --    < > 14.7 14.9 14.6   NEPRELIM  --   --   --   --    < > 20.33* 19.60* 15.89*   WBC  --   --   --   --    < > 23.7* 22.5* 18.3*   PLT  --   --   --  168.0   < > 329.0 358.0 334.0   AST  --   --   --   --   --   --  30  --    ALT  --   --   --   --   --   --  34  --    PTT 48.0* 52.9*  --  44.3*  --   --   --   --    CK  --   --  27*  --   --   --   --   --    GLU  --   --   --   --    < > 94 102* 95    < > = values in this interval not displayed.       Imaging:      Meds:   Current Hospital Medications[1]    Assessment  Problem List[2]  Sepsis due to pneumonia (HCC)    Pneumonia.,   Community-acquired bacterial bacterial   Secondary to Legionella  Patient on Zithromax, completed today       New onset atrial fibrillation with rapid ventricular response  on amiodarone  Heart rate is better      Acute hypoxic respiratory failure  Much improved  Currently on room air     Acute kidney injury.  on IV fluids.,  Improving     Hypernatremia  Patient is on D5, better     Coronary disease stable.     Hypothyroidism continue the patient on levothyroxine.     Patient stable.    Thrombocytopenia, resolved    Melena  GI consult    Discussed with nursing staff.  Further management will depend on the clinical course of the patient     Plan:   IV fluids has been discontinued.  Repeat labs tomorrow morning.  Possible discharge tomorrow to acute rehab if accepted    Discussed with family at the bedside      Active Orders   LAB    CBC With Differential With Platelet     Frequency: Tomorrow AM draw     Number of Occurrences: 1 Occurrences    Magnesium     Frequency: Tomorrow AM draw     Number of Occurrences: 1 Occurrences     Order Comments: DO NOT DISCONTINUEMUST REMAIN FOR ELECTROLYTE PROTOCOL        Potassium     Frequency: Timed draw     Number  of Occurrences: 1 Occurrences     Order Comments: DO NOT DISCONTINUE MUST REMAIN FOR ELECTROLYTE PROTOCOL        Renal Function Panel     Frequency: Tomorrow AM draw     Number of Occurrences: 1 Occurrences   Imaging    XR CHEST AP PORTABLE  (CPT=71045)     Frequency: Once     Number of Occurrences: 1 Occurrences   Nourishments    Dietary Nutrition Supplements Daily     Frequency: Daily     Number of Occurrences: Until Specified     Order Comments: Magic Cup @ 2pm - Chocolate     Respiratory Care    Acapella Until Discontinued     Frequency: Until Discontinued     Number of Occurrences: Until Specified   Diet    Sodium restricted diet Sodium Restriction: 3-4 GM NA; Fluid Consistency: Thin Liquids ; Texture Consistency: Soft / Easy to Chew ; Is Patient on Accuchecks? No; Misc Restriction: No Straw     Frequency: Effective Now     Number of Occurrences: Until Specified   Nursing    Apply warming blanket     Frequency: PRN     Number of Occurrences: Until Specified    Cardiac monitoring     Frequency: Until Discontinued     Number of Occurrences: Until Specified    Cardiac monitoring     Frequency: Continuous     Number of Occurrences: Until Specified    Daily weights     Frequency: Until Discontinued     Number of Occurrences: Until Specified    Daily weights     Frequency: Until Discontinued     Number of Occurrences: Until Specified    Follow up for COPD/Pneumonia     Frequency: Once     Number of Occurrences: 1 Occurrences     Order Comments: Follow up  about one week after DC      Increase infusion dose 100 units/hr     Frequency: Once     Number of Occurrences: 1 Occurrences    Increase infusion dose 100 units/hr     Frequency: Once     Number of Occurrences: 1 Occurrences    Increase infusion dose 100 units/hr     Frequency: Once     Number of Occurrences: 1 Occurrences    Increase infusion dose 100 units/hr     Frequency: Once     Number of Occurrences: 1 Occurrences    Increase infusion dose 100 units/hr      Frequency: Once     Number of Occurrences: 1 Occurrences    Increase infusion dose 100 units/hr     Frequency: Once     Number of Occurrences: 1 Occurrences    Initiate electrolyte protocol     Frequency: Until Discontinued     Number of Occurrences: Until Specified    Intake and Output     Frequency: Q Shift     Number of Occurrences: Until Specified    Notify physician (cardiology or ordering physician):     Frequency: Until Discontinued     Number of Occurrences: Until Specified     Order Comments: If you are discontinuing a drip without any oral diltiazem medication orders      Notify physician for BP (cardiology or ordering physician):     Frequency: Until Discontinued     Number of Occurrences: Until Specified     Order Comments: For SBP < 80 mmHg or symptomatic hypotension, discontinue drip and notify physician      Notify physician for HR (cardiology or ordering physician):     Frequency: Until Discontinued     Number of Occurrences: Until Specified     Order Comments: 1. If you are on max intravenous dose and the HR is >120  2. If HR is >140 and it has been one hour since you transitioned to oral from IV diltiazem  3. If rate < 75 for > 5 min or symptomatic bradycardia, discontinue the drip and notify physician  4. For pause greater than 3.0 seconds, hold drip and notify physician      Place sequential compression device     Frequency: Continuous     Number of Occurrences: Until Specified    Stop amiodarone and call the attending cardiologist     Frequency: PRN     Number of Occurrences: Until Specified     Order Comments: If systolic blood pressure falls to less than 90 mmHg, heart rate drops to less than 60 beats per minute, if  EKG demonstrates greater than Mobitz 1 Heart Block      Strict intake and output     Frequency: Until Discontinued     Number of Occurrences: Until Specified    Strict intake and output     Frequency: Until Discontinued     Number of Occurrences: Until Specified    Tele Box      Frequency: Once     Number of Occurrences: 1 Occurrences    Tele Box     Frequency: Once     Number of Occurrences: 1 Occurrences   Code Status    Full code Continuous     Frequency: Continuous     Number of Occurrences: Until Specified   Consult    Consult to Gastroenterology     Frequency: Once     Number of Occurrences: 1 Occurrences   Medications    acetaminophen (Tylenol) tab 650 mg     Frequency: Q6H PRN     Dose: 650 mg     Route: Oral    albuterol (Ventolin) (2.5 MG/3ML) 0.083% nebulizer solution 2.5 mg     Frequency: Q4H PRN     Dose: 2.5 mg     Route: Nebulization    amiodarone (Pacerone) tab 200 mg     Frequency: Daily     Dose: 200 mg     Route: Oral    aspirin DR tab 81 mg     Frequency: Daily     Dose: 81 mg     Route: Oral    atorvastatin (Lipitor) tab 40 mg     Frequency: Nightly     Dose: 40 mg     Route: Oral    calcium carbonate (Tums) chewable tab 500 mg     Frequency: BID PRN     Dose: 500 mg     Route: Oral    dextrose 5% infusion     Frequency: Continuous     Route: Intravenous    guaiFENesin ER (Mucinex) 12 hr tab 600 mg     Frequency: BID     Dose: 600 mg     Route: Oral    hydrALAZINE (Apresoline) tab 25 mg     Frequency: Q8H TIFFANIE     Dose: 25 mg     Route: Oral    ipratropium-albuterol (Duoneb) 0.5-2.5 (3) MG/3ML inhalation solution 3 mL     Frequency: Q8H     Dose: 3 mL     Route: Nebulization    levothyroxine (Synthroid) tab 100 mcg     Frequency: Daily @ 0700     Dose: 100 mcg     Route: Oral    metoprolol succinate (Toprol XL) partial tablet 12.5 mg     Frequency: Daily Beta Blocker     Dose: 12.5 mg     Route: Oral    pantoprazole (Protonix) 40 mg in sodium chloride 0.9% PF 10 mL IV push     Frequency: Q12H     Dose: 40 mg     Route: Intravenous    potassium chloride (Klor-Con M20) tab 40 mEq     Frequency: Q4H     Dose: 40 mEq     Route: Oral    potassium chloride 20 mEq/100mL IVPB premix 20 mEq     Frequency: Once     Dose: 20 mEq     Route: Intravenous       Sevreo Lacey  MD           [1]   Current Facility-Administered Medications   Medication Dose Route Frequency    potassium chloride (Klor-Con M20) tab 40 mEq  40 mEq Oral Q4H    levothyroxine (Synthroid) tab 100 mcg  100 mcg Oral Daily @ 0700    metoprolol succinate (Toprol XL) partial tablet 12.5 mg  12.5 mg Oral Daily Beta Blocker    dextrose 5% infusion   Intravenous Continuous    hydrALAZINE (Apresoline) tab 25 mg  25 mg Oral Q8H TIFFANIE    ipratropium-albuterol (Duoneb) 0.5-2.5 (3) MG/3ML inhalation solution 3 mL  3 mL Nebulization Q8H    amiodarone (Pacerone) tab 200 mg  200 mg Oral Daily    potassium chloride 20 mEq/100mL IVPB premix 20 mEq  20 mEq Intravenous Once    guaiFENesin ER (Mucinex) 12 hr tab 600 mg  600 mg Oral BID    pantoprazole (Protonix) 40 mg in sodium chloride 0.9% PF 10 mL IV push  40 mg Intravenous Q12H    aspirin DR tab 81 mg  81 mg Oral Daily    albuterol (Ventolin) (2.5 MG/3ML) 0.083% nebulizer solution 2.5 mg  2.5 mg Nebulization Q4H PRN    atorvastatin (Lipitor) tab 40 mg  40 mg Oral Nightly    acetaminophen (Tylenol) tab 650 mg  650 mg Oral Q6H PRN    calcium carbonate (Tums) chewable tab 500 mg  500 mg Oral BID PRN   [2]   Patient Active Problem List  Diagnosis    Coronary artery disease involving native heart    Metabolic encephalopathy    Myopia with astigmatism and presbyopia, bilateral    Age-related nuclear cataract of both eyes    After cataract not obscuring vision, bilateral    Acute chest pain    Sepsis due to pneumonia (HCC)    SIERRA (acute kidney injury)    Gastrointestinal hemorrhage    Hypernatremia

## 2025-06-25 NOTE — PLAN OF CARE
Patient is A&O 3 but can be confused at times. Patient ambulates self with walker and stand pivot x 2. Monitoring vital signs, stable at this time. No acute changes at this moment. Pain medication provided as needed. Fall precautions in place- bed alarm on, bed locked in lowest position, call light within reach. Frequent rounding by nursing staff.       Problem: Patient Centered Care  Goal: Patient preferences are identified and integrated in the patient's plan of care  Description: Interventions:  - What would you like us to know as we care for you? From home alone  - Provide timely, complete, and accurate information to patient/family  - Incorporate patient and family knowledge, values, beliefs, and cultural backgrounds into the planning and delivery of care  - Encourage patient/family to participate in care and decision-making at the level they choose  - Honor patient and family perspectives and choices  Outcome: Progressing     Problem: Patient/Family Goals  Goal: Patient/Family Long Term Goal  Description: Patient's Long Term Goal: discharge    Interventions:  - - Monitor vitals  - Monitor appropriate labs  - Pain management  - Follow MD order  - Diagnostics per order  - Update/Informing patient and family on plan of care  - Discharge planning  - See additional Care Plan goals for specific interventions  Outcome: Progressing  Goal: Patient/Family Short Term Goal  Description: Patient's Short Term Goal: feel better    Interventions:   - - Monitor vitals  - Monitor appropriate labs  - Pain management  - Follow MD order  - Diagnostics per order  - Update/Informing patient and family on plan of care  - Discharge planning  - See additional Care Plan goals for specific interventions  Outcome: Progressing     Problem: PAIN - ADULT  Goal: Verbalizes/displays adequate comfort level or patient's stated pain goal  Description: INTERVENTIONS:  - Encourage pt to monitor pain and request assistance  - Assess pain using  appropriate pain scale  - Administer analgesics based on type and severity of pain and evaluate response  - Implement non-pharmacological measures as appropriate and evaluate response  - Consider cultural and social influences on pain and pain management  - Manage/alleviate anxiety  - Utilize distraction and/or relaxation techniques  - Monitor for opioid side effects  - Notify MD/LIP if interventions unsuccessful or patient reports new pain  - Anticipate increased pain with activity and pre-medicate as appropriate  Outcome: Progressing     Problem: SAFETY ADULT - FALL  Goal: Free from fall injury  Description: INTERVENTIONS:  - Assess pt frequently for physical needs  - Identify cognitive and physical deficits and behaviors that affect risk of falls.  - [place] fall precautions as indicated by assessment.  - Educate pt/family on patient safety including physical limitations  - Instruct pt to call for assistance with activity based on assessment  - Modify environment to reduce risk of injury  - Provide assistive devices as appropriate  - Consider OT/PT consult to assist with strengthening/mobility  - Encourage toileting schedule  Outcome: Progressing     Problem: DISCHARGE PLANNING  Goal: Discharge to home or other facility with appropriate resources  Description: INTERVENTIONS:  - Identify barriers to discharge w/pt and caregiver  - Include patient/family/discharge partner in discharge planning  - Arrange for needed discharge resources and transportation as appropriate  - Identify discharge learning needs (meds, wound care, etc)  - Arrange for interpreters to assist at discharge as needed  - Consider post-discharge preferences of patient/family/discharge partner  - Complete POLST form as appropriate  - Assess patient's ability to be responsible for managing their own health  - Refer to Case Management Department for coordinating discharge planning if the patient needs post-hospital services based on physician/LIP  order or complex needs related to functional status, cognitive ability or social support system  Outcome: Progressing     Problem: RESPIRATORY - ADULT  Goal: Achieves optimal ventilation and oxygenation  Description: INTERVENTIONS:  - Assess for changes in respiratory status  - Assess for changes in mentation and behavior  - Position to facilitate oxygenation and minimize respiratory effort  - Oxygen supplementation based on oxygen saturation or ABGs  - Provide Smoking Cessation handout, if applicable  - Encourage broncho-pulmonary hygiene including cough, deep breathe, Incentive Spirometry  - Assess the need for suctioning and perform as needed  - Assess and instruct to report SOB or any respiratory difficulty  - Respiratory Therapy support as indicated  - Manage/alleviate anxiety  - Monitor for signs/symptoms of CO2 retention  Outcome: Progressing     Problem: Impaired Functional Mobility  Goal: Achieve highest/safest level of mobility/gait  Description: Interventions:  - Assess patient's functional ability and stability  - Promote increasing activity/tolerance for mobility and gait  - Educate and engage patient/family in tolerated activity level and precautions  - Recommend use of  RW for transfers and ambulation  - Recommend patient transfer to bedside chair toward strongest side  Outcome: Progressing     Problem: CARDIOVASCULAR - ADULT  Goal: Maintains optimal cardiac output and hemodynamic stability  Description: INTERVENTIONS:  - Monitor vital signs, rhythm, and trends  - Monitor for bleeding, hypotension and signs of decreased cardiac output  - Evaluate effectiveness of vasoactive medications to optimize hemodynamic stability  - Monitor arterial and/or venous puncture sites for bleeding and/or hematoma  - Assess quality of pulses, skin color and temperature  - Assess for signs of decreased coronary artery perfusion - ex. Angina  - Evaluate fluid balance, assess for edema, trend weights  Outcome:  Progressing  Goal: Absence of cardiac arrhythmias or at baseline  Description: INTERVENTIONS:  - Continuous cardiac monitoring, monitor vital signs, obtain 12 lead EKG if indicated  - Evaluate effectiveness of antiarrhythmic and heart rate control medications as ordered  - Initiate emergency measures for life threatening arrhythmias  - Monitor electrolytes and administer replacement therapy as ordered  Outcome: Progressing     Problem: NEUROLOGICAL - ADULT  Goal: Achieves stable or improved neurological status  Description: INTERVENTIONS  - Assess for and report changes in neurological status  - Initiate measures to prevent increased intracranial pressure  - Maintain blood pressure and fluid volume within ordered parameters to optimize cerebral perfusion and minimize risk of hemorrhage  - Monitor temperature, glucose, and sodium. Initiate appropriate interventions as ordered  Outcome: Progressing  Goal: Absence of seizures  Description: INTERVENTIONS  - Monitor for seizure activity  - Administer anti-seizure medications as ordered  - Monitor neurological status  Outcome: Progressing  Goal: Remains free of injury related to seizure activity  Description: INTERVENTIONS:  - Maintain airway, patient safety  and administer oxygen as ordered  - Monitor patient for seizure activity, document and report duration and description of seizure to MD/LIP  - If seizure occurs, turn patient to side and suction secretions as needed  - Reorient patient post seizure  - Seizure pads on all 4 side rails  - Instruct patient/family to notify RN of any seizure activity  - Instruct patient/family to call for assistance with activity based on assessment  Outcome: Progressing  Goal: Achieves maximal functionality and self care  Description: INTERVENTIONS  - Monitor swallowing and airway patency with patient fatigue and changes in neurological status  - Encourage and assist patient to increase activity and self care with guidance from PT/OT  -  Encourage visually impaired, hearing impaired and aphasic patients to use assistive/communication devices  Outcome: Progressing     Problem: METABOLIC/FLUID AND ELECTROLYTES - ADULT  Goal: Electrolytes maintained within normal limits  Description: INTERVENTIONS:  - Monitor labs and rhythm and assess patient for signs and symptoms of electrolyte imbalances  - Administer electrolyte replacement as ordered  - Monitor response to electrolyte replacements, including rhythm and repeat lab results as appropriate  - Fluid restriction as ordered  - Instruct patient on fluid and nutrition restrictions as appropriate  Outcome: Progressing  Goal: Hemodynamic stability and optimal renal function maintained  Description: INTERVENTIONS:  - Monitor labs and assess for signs and symptoms of volume excess or deficit  - Monitor intake, output and patient weight  - Monitor urine specific gravity, serum osmolarity and serum sodium as indicated or ordered  - Monitor response to interventions for patient's volume status, including labs, urine output, blood pressure (other measures as available)  - Encourage oral intake as appropriate  - Instruct patient on fluid and nutrition restrictions as appropriate  Outcome: Progressing     Problem: RISK FOR INFECTION - ADULT  Goal: Absence of fever/infection during anticipated neutropenic period  Description: INTERVENTIONS  - Monitor WBC  - Administer growth factors as ordered  - Implement neutropenic guidelines  Outcome: Progressing

## 2025-06-25 NOTE — PROGRESS NOTES
[facility]  Nephrology Daily Progress Note    Imer Mcguire  [id number]  89 year old      HPI:   Imer Mcguire is a 89 year old male.  Awake and alert.  Up in chair.  Now on thin liquids and drinking fluids better. No CP or SOB. On RA.       ROS:     Constitutional:  Negative for decreased activity, fever, irritability and lethargy  Endocrine:  Negative for abnormal sleep patterns, increased activity, polydipsia and polyphagia  Cardiovascular:  Negative for cool extremity and irregular heartbeat/palpitations  Gastrointestinal:  Negative for abdominal pain, constipation, decreased appetite, diarrhea and vomiting  Genitourinary:  Negative for dysuria and hematuria  Hema/Lymph:  Negative for easy bleeding and easy bruising  Integumentary:  Negative for pruritus and rash  Musculoskeletal:  Negative for bone/joint symptoms  Neurological:  Negative for gait disturbance  Psychiatric:  Negative for inappropriate interaction and psychiatric symptoms  Respiratory:  Negative for cough, dyspnea and wheezing      PHYSICAL EXAM:   Temp:  [96.8 °F (36 °C)-98.3 °F (36.8 °C)] 97.6 °F (36.4 °C)  Pulse:  [53-67] 67  Resp:  [16-18] 16  BP: (113-157)/(54-70) 113/59  SpO2:  [90 %-94 %] 91 %  Patient Weight for the past 72 hrs:   Weight   06/23/25 0519 158 lb 1.6 oz (71.7 kg)   06/24/25 0500 158 lb (71.7 kg)       Constitutional: appears well hydrated alert and responsive no acute distress noted  Neck/Thyroid: neck is supple without adenopathy  Lymphatic: no abnormal cervical, supraclavicular or axillary adenopathy is noted  Respiratory: normal to inspection lungs are clear to auscultation bilaterally normal respiratory effort  Cardiovascular: regular rate and rhythm no murmurs, gallups, or rubs  Abdomen: soft non-tender non-distended no organomegaly noted no masses  Musculoskeletal: full ROM all extremities good strength  no deformities  Extremities: 1 + edema.   Neurological: exam appropriate for age reflexes and motor  skills appropriate for age    Labs:  Lab Results   Component Value Date    WBC 18.3 06/25/2025    HGB 11.7 06/25/2025    HCT 35.8 06/25/2025    .0 06/25/2025    CREATSERUM 1.39 06/25/2025    BUN 39 06/25/2025     06/25/2025    K 3.5 06/25/2025     06/25/2025    CO2 22.0 06/25/2025    GLU 95 06/25/2025    CA 8.0 06/25/2025    ALB 2.4 06/25/2025    MG 1.8 06/25/2025    PHOS 2.8 06/25/2025     Recent Labs   Lab 06/23/25  0606 06/24/25  0525 06/25/25  0538   WBC 23.7* 22.5* 18.3*   HGB 13.4 12.1* 11.7*   HCT 40.3 36.3* 35.8*   .0 358.0 334.0     Recent Labs   Lab 06/21/25  0858 06/21/25  1738 06/23/25  0606 06/24/25  0525 06/25/25  0538   GLU 96   < > 94 102* 95   *   < > 69* 54* 39*   CREATSERUM 2.52*   < > 1.71* 1.52* 1.39*   CA 8.3*   < > 8.3* 8.1* 8.0*   ALB 2.8*  --   --  2.7* 2.4*   *   < > 153* 151* 146*   K 3.8   < > 4.4 4.0 3.5   *   < > 124* 121* 116*   CO2 19.0*   < > 22.0 23.0 22.0   ALKPHO  --   --   --  137*  --    AST  --   --   --  30  --    ALT  --   --   --  34  --    BILT  --   --   --  1.0*  --    TP  --   --   --  5.0*  --    PHOS 4.3  --   --  2.9 2.8    < > = values in this interval not displayed.       Imaging  XR VIDEO SWALLOW (CPT=74230)  Result Date: 6/25/2025  FINDINGS/IMPRESSION: Laryngeal penetration noted. Please refer to the speech pathologist's detailed note for further evaluation. Electronically Verified and Signed by Attending Radiologist: Calixto Aguilar MD 6/25/2025 9:38 AM Workstation: [id number]    XR CHEST AP PORTABLE  (CPT=71045)  Result Date: 6/23/2025  CONCLUSION:  1. Extensive right perihilar and right upper lobe pulmonary consolidation. 2. Background of diffuse reticulonodular interstitial prominence and bibasilar parenchymal abnormality.    Dictated by (CST): Nathaniel Parrish MD on 6/23/2025 at 11:14 AM     Finalized by (CST): Nathaniel Parrish MD on 6/23/2025 at 11:16 AM              Medications:  Current Hospital  Medications[1]    Allergies:  Allergies[2]    Input/Output:    Intake/Output Summary (Last 24 hours) at 6/25/2025 1051  Last data filed at 6/25/2025 0852  Gross per 24 hour   Intake 2352.59 ml   Output 2650 ml   Net -297.41 ml          ASSESSMENT/PLAN:   Assessment   Problem List[3]  UO good 2400 ml and creatinine better 1.39.  Sodium improving to 146.  Replace K. Overall better.  Will wean off IVF.  OK for transfer today to . Discussed with RN and family at bedside.             6/25/2025  Hipolito Baugh MD               [1]   Current Facility-Administered Medications:     potassium chloride (Klor-Con M20) tab 40 mEq, 40 mEq, Oral, Q4H    levothyroxine (Synthroid) tab 100 mcg, 100 mcg, Oral, Daily @ 0700    metoprolol succinate (Toprol XL) partial tablet 12.5 mg, 12.5 mg, Oral, Daily Beta Blocker    dextrose 5% infusion, , Intravenous, Continuous    hydrALAZINE (Apresoline) tab 25 mg, 25 mg, Oral, Q8H TIFFANIE    ipratropium-albuterol (Duoneb) 0.5-2.5 (3) MG/3ML inhalation solution 3 mL, 3 mL, Nebulization, Q8H    amiodarone (Pacerone) tab 200 mg, 200 mg, Oral, Daily    [COMPLETED] potassium chloride 40 mEq in 250mL sodium chloride 0.9% IVPB premix, 40 mEq, Intravenous, Once **FOLLOWED BY** potassium chloride 20 mEq/100mL IVPB premix 20 mEq, 20 mEq, Intravenous, Once    guaiFENesin ER (Mucinex) 12 hr tab 600 mg, 600 mg, Oral, BID    pantoprazole (Protonix) 40 mg in sodium chloride 0.9% PF 10 mL IV push, 40 mg, Intravenous, Q12H    aspirin DR tab 81 mg, 81 mg, Oral, Daily    albuterol (Ventolin) (2.5 MG/3ML) 0.083% nebulizer solution 2.5 mg, 2.5 mg, Nebulization, Q4H PRN    atorvastatin (Lipitor) tab 40 mg, 40 mg, Oral, Nightly    acetaminophen (Tylenol) tab 650 mg, 650 mg, Oral, Q6H PRN    calcium carbonate (Tums) chewable tab 500 mg, 500 mg, Oral, BID PRN  [2] No Known Allergies  [3]   Patient Active Problem List  Diagnosis    Coronary artery disease involving native heart    Metabolic encephalopathy    Myopia with  astigmatism and presbyopia, bilateral    Age-related nuclear cataract of both eyes    After cataract not obscuring vision, bilateral    Acute chest pain    Sepsis due to pneumonia (HCC)    SIERRA (acute kidney injury)    Gastrointestinal hemorrhage    Hypernatremia

## 2025-06-26 ENCOUNTER — APPOINTMENT (OUTPATIENT)
Dept: GENERAL RADIOLOGY | Facility: HOSPITAL | Age: OVER 89
End: 2025-06-26
Attending: INTERNAL MEDICINE
Payer: MEDICARE

## 2025-06-26 VITALS
HEART RATE: 58 BPM | RESPIRATION RATE: 16 BRPM | WEIGHT: 160 LBS | DIASTOLIC BLOOD PRESSURE: 54 MMHG | SYSTOLIC BLOOD PRESSURE: 117 MMHG | OXYGEN SATURATION: 90 % | BODY MASS INDEX: 24.25 KG/M2 | HEIGHT: 68 IN | TEMPERATURE: 98 F

## 2025-06-26 PROBLEM — N17.9 AKI (ACUTE KIDNEY INJURY): Status: RESOLVED | Noted: 2025-06-21 | Resolved: 2025-06-26

## 2025-06-26 PROBLEM — J18.9 SEPSIS DUE TO PNEUMONIA (HCC): Status: RESOLVED | Noted: 2025-06-14 | Resolved: 2025-06-26

## 2025-06-26 PROBLEM — K92.2 GASTROINTESTINAL HEMORRHAGE: Status: RESOLVED | Noted: 2025-06-21 | Resolved: 2025-06-26

## 2025-06-26 PROBLEM — A41.9 SEPSIS DUE TO PNEUMONIA (HCC): Status: RESOLVED | Noted: 2025-06-14 | Resolved: 2025-06-26

## 2025-06-26 LAB
ALBUMIN SERPL-MCNC: 2.4 G/DL (ref 3.2–4.8)
ANION GAP SERPL CALC-SCNC: 7 MMOL/L (ref 0–18)
BASOPHILS # BLD AUTO: 0.05 X10(3) UL (ref 0–0.2)
BASOPHILS NFR BLD AUTO: 0.3 %
BUN BLD-MCNC: 39 MG/DL (ref 9–23)
BUN/CREAT SERPL: 29.3 (ref 10–20)
CALCIUM BLD-MCNC: 7.5 MG/DL (ref 8.7–10.4)
CHLORIDE SERPL-SCNC: 113 MMOL/L (ref 98–112)
CO2 SERPL-SCNC: 22 MMOL/L (ref 21–32)
CREAT BLD-MCNC: 1.33 MG/DL (ref 0.7–1.3)
DEPRECATED RDW RBC AUTO: 50 FL (ref 35.1–46.3)
EGFRCR SERPLBLD CKD-EPI 2021: 50 ML/MIN/1.73M2 (ref 60–?)
EOSINOPHIL # BLD AUTO: 0.28 X10(3) UL (ref 0–0.7)
EOSINOPHIL NFR BLD AUTO: 1.6 %
ERYTHROCYTE [DISTWIDTH] IN BLOOD BY AUTOMATED COUNT: 14.7 % (ref 11–15)
GLUCOSE BLD-MCNC: 81 MG/DL (ref 70–99)
HCT VFR BLD AUTO: 34.5 % (ref 39–53)
HGB BLD-MCNC: 11.5 G/DL (ref 13–17.5)
IMM GRANULOCYTES # BLD AUTO: 0.19 X10(3) UL (ref 0–1)
IMM GRANULOCYTES NFR BLD: 1.1 %
LYMPHOCYTES # BLD AUTO: 1.15 X10(3) UL (ref 1–4)
LYMPHOCYTES NFR BLD AUTO: 6.8 %
MAGNESIUM SERPL-MCNC: 1.6 MG/DL (ref 1.6–2.6)
MCH RBC QN AUTO: 31.1 PG (ref 26–34)
MCHC RBC AUTO-ENTMCNC: 33.3 G/DL (ref 31–37)
MCV RBC AUTO: 93.2 FL (ref 80–100)
MONOCYTES # BLD AUTO: 0.86 X10(3) UL (ref 0.1–1)
MONOCYTES NFR BLD AUTO: 5.1 %
NEUTROPHILS # BLD AUTO: 14.44 X10 (3) UL (ref 1.5–7.7)
NEUTROPHILS # BLD AUTO: 14.44 X10(3) UL (ref 1.5–7.7)
NEUTROPHILS NFR BLD AUTO: 85.1 %
OSMOLALITY SERPL CALC.SUM OF ELEC: 302 MOSM/KG (ref 275–295)
PHOSPHATE SERPL-MCNC: 2.7 MG/DL (ref 2.4–5.1)
PLATELET # BLD AUTO: 320 10(3)UL (ref 150–450)
POTASSIUM SERPL-SCNC: 4.3 MMOL/L (ref 3.5–5.1)
RBC # BLD AUTO: 3.7 X10(6)UL (ref 3.8–5.8)
SODIUM SERPL-SCNC: 142 MMOL/L (ref 136–145)
WBC # BLD AUTO: 17 X10(3) UL (ref 4–11)

## 2025-06-26 PROCEDURE — 71045 X-RAY EXAM CHEST 1 VIEW: CPT | Performed by: INTERNAL MEDICINE

## 2025-06-26 PROCEDURE — 99232 SBSQ HOSP IP/OBS MODERATE 35: CPT | Performed by: INTERNAL MEDICINE

## 2025-06-26 RX ORDER — GUAIFENESIN 600 MG/1
600 TABLET, EXTENDED RELEASE ORAL 2 TIMES DAILY
Qty: 30 TABLET | Refills: 0 | Status: SHIPPED | COMMUNITY
Start: 2025-06-26

## 2025-06-26 RX ORDER — IPRATROPIUM BROMIDE AND ALBUTEROL SULFATE 2.5; .5 MG/3ML; MG/3ML
3 SOLUTION RESPIRATORY (INHALATION) EVERY 4 HOURS PRN
Qty: 3 ML | Refills: 0 | Status: SHIPPED | OUTPATIENT
Start: 2025-06-26

## 2025-06-26 RX ORDER — ACETAMINOPHEN 325 MG/1
650 TABLET ORAL EVERY 6 HOURS PRN
Qty: 30 TABLET | Refills: 0 | Status: SHIPPED | COMMUNITY
Start: 2025-06-26

## 2025-06-26 RX ORDER — CALCIUM CARBONATE 500 MG/1
500 TABLET, CHEWABLE ORAL 2 TIMES DAILY PRN
Qty: 3 TABLET | Refills: 0 | Status: SHIPPED | COMMUNITY
Start: 2025-06-26

## 2025-06-26 RX ORDER — MAGNESIUM OXIDE 400 MG/1
400 TABLET ORAL ONCE
Status: COMPLETED | OUTPATIENT
Start: 2025-06-26 | End: 2025-06-26

## 2025-06-26 NOTE — SLP NOTE
SPEECH DAILY NOTE - INPATIENT    ASSESSMENT & PLAN   ASSESSMENT  PPE REQUIRED. THIS THERAPIST WORE GLOVES, DROPLET MASK, AND GOGGLES FOR DURATION OF EVALUATION. HANDS WASHED UPON ENTRANCE/EXIT.    SLP f/u for ongoing dysphagia tx/meal assessment per recommendations of easy to chew/thin per VFSS. RN reports pt tolerates diet and medication well with no overt clinical s/s aspiration. Pt denies any swallowing challenges.     Pt positioned upright in bedside chair. Pt afebrile, tolerating room air with oxygen status 93 prior to the start of oral trials. SLP reviewed aspiration precautions and safe swallowing compensatory strategies with the patient. Safe swallow guidelines remain written on the white board in purple. Diet recommendations remain on the whiteboard in the room. Patient and/or family v/u. Provided no assistance, pt tolerates easy to chew and thin liquids via open cup with no overt clinical signs/symptoms of aspiration. Oxygen status remained >92 t/o the entire session.     PLAN: SLP to sign off at this time as pt tolerating least restrictive diet with no CSA.     Diet Recommendations - Solids: Soft/ Easy to chew  Diet Recommendations - Liquids: Thin Liquids    Compensatory Strategies Recommended: Alternate consistencies, Multiple swallows  Aspiration Precautions: Upright position, Slow rate, Small bites, Small sips, No straw  Medication Administration Recommendations:  (as tolerated)    Patient Experiencing Pain: No      Treatment Plan  Treatment Plan/Recommendations: No further inpatient SLP service warranted    Interdisciplinary Communication: Discussed with RN  Recommendations posted at bedside            GOALS  Goal #1 The patient will tolerate ETC consistency and thin liquids without overt signs or symptoms of aspiration with 100 % accuracy over 2 session(s).  Goal met 6/26   Goal #2 The patient/family/caregiver will demonstrate understanding and implementation of aspiration precautions and swallow  strategies independently over 2 session(s).    Goal met 6/26   Goal #3 The patient will utilize compensatory strategies as outlined by  BSSE (clinical evaluation) including Slow rate, Small bites, Small sips, Alternate liquids/solids, No straws, Upright 90 degrees, Eliminate distractions with minimal assistance 100 % of the time across 2 sessions.  Goal met 6/26   Goal #4 VFSS to be completed to determine safest/least restrictive diet and/or further dysphagia goals.  Goal met 6/25     FOLLOW UP  Follow Up Needed (Documentation Required): No  SLP Follow-up Date:  (n/a)  Duration: 0 weeks    Session: 1 following VFSS    If you have any questions, please contact RYAN Swain M.S. CCC-SLP  Speech Language Pathologist  Phone Number Ext. [phone]

## 2025-06-26 NOTE — PLAN OF CARE
A&Ox3-4. Pt ambulates SBA with walker, voiding via purewick and up to bathroom, tolerating diet, on room air. Frequent rounding by nursing staff. Safety precautions maintained/call light within reach.    Problem: Patient Centered Care  Goal: Patient preferences are identified and integrated in the patient's plan of care  Description: Interventions:  - What would you like us to know as we care for you? From home alone  - Provide timely, complete, and accurate information to patient/family  - Incorporate patient and family knowledge, values, beliefs, and cultural backgrounds into the planning and delivery of care  - Encourage patient/family to participate in care and decision-making at the level they choose  - Honor patient and family perspectives and choices  Outcome: Progressing     Problem: Patient/Family Goals  Goal: Patient/Family Long Term Goal  Description: Patient's Long Term Goal: discharge    Interventions:  - - Monitor vitals  - Monitor appropriate labs  - Pain management  - Follow MD order  - Diagnostics per order  - Update/Informing patient and family on plan of care  - Discharge planning  - See additional Care Plan goals for specific interventions  Outcome: Progressing  Goal: Patient/Family Short Term Goal  Description: Patient's Short Term Goal: feel better    Interventions:   - - Monitor vitals  - Monitor appropriate labs  - Pain management  - Follow MD order  - Diagnostics per order  - Update/Informing patient and family on plan of care  - Discharge planning  - See additional Care Plan goals for specific interventions  Outcome: Progressing     Problem: PAIN - ADULT  Goal: Verbalizes/displays adequate comfort level or patient's stated pain goal  Description: INTERVENTIONS:  - Encourage pt to monitor pain and request assistance  - Assess pain using appropriate pain scale  - Administer analgesics based on type and severity of pain and evaluate response  - Implement non-pharmacological measures as  appropriate and evaluate response  - Consider cultural and social influences on pain and pain management  - Manage/alleviate anxiety  - Utilize distraction and/or relaxation techniques  - Monitor for opioid side effects  - Notify MD/LIP if interventions unsuccessful or patient reports new pain  - Anticipate increased pain with activity and pre-medicate as appropriate  Outcome: Progressing     Problem: SAFETY ADULT - FALL  Goal: Free from fall injury  Description: INTERVENTIONS:  - Assess pt frequently for physical needs  - Identify cognitive and physical deficits and behaviors that affect risk of falls.  - [place] fall precautions as indicated by assessment.  - Educate pt/family on patient safety including physical limitations  - Instruct pt to call for assistance with activity based on assessment  - Modify environment to reduce risk of injury  - Provide assistive devices as appropriate  - Consider OT/PT consult to assist with strengthening/mobility  - Encourage toileting schedule  Outcome: Progressing     Problem: DISCHARGE PLANNING  Goal: Discharge to home or other facility with appropriate resources  Description: INTERVENTIONS:  - Identify barriers to discharge w/pt and caregiver  - Include patient/family/discharge partner in discharge planning  - Arrange for needed discharge resources and transportation as appropriate  - Identify discharge learning needs (meds, wound care, etc)  - Arrange for interpreters to assist at discharge as needed  - Consider post-discharge preferences of patient/family/discharge partner  - Complete POLST form as appropriate  - Assess patient's ability to be responsible for managing their own health  - Refer to Case Management Department for coordinating discharge planning if the patient needs post-hospital services based on physician/LIP order or complex needs related to functional status, cognitive ability or social support system  Outcome: Progressing     Problem: RESPIRATORY -  ADULT  Goal: Achieves optimal ventilation and oxygenation  Description: INTERVENTIONS:  - Assess for changes in respiratory status  - Assess for changes in mentation and behavior  - Position to facilitate oxygenation and minimize respiratory effort  - Oxygen supplementation based on oxygen saturation or ABGs  - Provide Smoking Cessation handout, if applicable  - Encourage broncho-pulmonary hygiene including cough, deep breathe, Incentive Spirometry  - Assess the need for suctioning and perform as needed  - Assess and instruct to report SOB or any respiratory difficulty  - Respiratory Therapy support as indicated  - Manage/alleviate anxiety  - Monitor for signs/symptoms of CO2 retention  Outcome: Progressing     Problem: Impaired Functional Mobility  Goal: Achieve highest/safest level of mobility/gait  Description: Interventions:  - Assess patient's functional ability and stability  - Promote increasing activity/tolerance for mobility and gait  - Educate and engage patient/family in tolerated activity level and precautions  Outcome: Progressing     Problem: CARDIOVASCULAR - ADULT  Goal: Maintains optimal cardiac output and hemodynamic stability  Description: INTERVENTIONS:  - Monitor vital signs, rhythm, and trends  - Monitor for bleeding, hypotension and signs of decreased cardiac output  - Evaluate effectiveness of vasoactive medications to optimize hemodynamic stability  - Monitor arterial and/or venous puncture sites for bleeding and/or hematoma  - Assess quality of pulses, skin color and temperature  - Assess for signs of decreased coronary artery perfusion - ex. Angina  - Evaluate fluid balance, assess for edema, trend weights  Outcome: Progressing  Goal: Absence of cardiac arrhythmias or at baseline  Description: INTERVENTIONS:  - Continuous cardiac monitoring, monitor vital signs, obtain 12 lead EKG if indicated  - Evaluate effectiveness of antiarrhythmic and heart rate control medications as ordered  -  Initiate emergency measures for life threatening arrhythmias  - Monitor electrolytes and administer replacement therapy as ordered  Outcome: Progressing     Problem: NEUROLOGICAL - ADULT  Goal: Achieves stable or improved neurological status  Description: INTERVENTIONS  - Assess for and report changes in neurological status  - Initiate measures to prevent increased intracranial pressure  - Maintain blood pressure and fluid volume within ordered parameters to optimize cerebral perfusion and minimize risk of hemorrhage  - Monitor temperature, glucose, and sodium. Initiate appropriate interventions as ordered  Outcome: Progressing  Goal: Absence of seizures  Description: INTERVENTIONS  - Monitor for seizure activity  - Administer anti-seizure medications as ordered  - Monitor neurological status  Outcome: Progressing  Goal: Remains free of injury related to seizure activity  Description: INTERVENTIONS:  - Maintain airway, patient safety  and administer oxygen as ordered  - Monitor patient for seizure activity, document and report duration and description of seizure to MD/LIP  - If seizure occurs, turn patient to side and suction secretions as needed  - Reorient patient post seizure  - Seizure pads on all 4 side rails  - Instruct patient/family to notify RN of any seizure activity  - Instruct patient/family to call for assistance with activity based on assessment  Outcome: Progressing  Goal: Achieves maximal functionality and self care  Description: INTERVENTIONS  - Monitor swallowing and airway patency with patient fatigue and changes in neurological status  - Encourage and assist patient to increase activity and self care with guidance from PT/OT  - Encourage visually impaired, hearing impaired and aphasic patients to use assistive/communication devices  Outcome: Progressing     Problem: METABOLIC/FLUID AND ELECTROLYTES - ADULT  Goal: Electrolytes maintained within normal limits  Description: INTERVENTIONS:  - Monitor  labs and rhythm and assess patient for signs and symptoms of electrolyte imbalances  - Administer electrolyte replacement as ordered  - Monitor response to electrolyte replacements, including rhythm and repeat lab results as appropriate  - Fluid restriction as ordered  - Instruct patient on fluid and nutrition restrictions as appropriate  Outcome: Progressing  Goal: Hemodynamic stability and optimal renal function maintained  Description: INTERVENTIONS:  - Monitor labs and assess for signs and symptoms of volume excess or deficit  - Monitor intake, output and patient weight  - Monitor urine specific gravity, serum osmolarity and serum sodium as indicated or ordered  - Monitor response to interventions for patient's volume status, including labs, urine output, blood pressure (other measures as available)  - Encourage oral intake as appropriate  - Instruct patient on fluid and nutrition restrictions as appropriate  Outcome: Progressing     Problem: RISK FOR INFECTION - ADULT  Goal: Absence of fever/infection during anticipated neutropenic period  Description: INTERVENTIONS  - Monitor WBC  - Administer growth factors as ordered  - Implement neutropenic guidelines  Outcome: Progressing

## 2025-06-26 NOTE — PROGRESS NOTES
[facility]  Nephrology Daily Progress Note    Imer Mcguire  [id number]  89 year old      HPI:   Imer Mcguire is a 89 year old male.  Up in chair.  Feeling well. On RA.  Drinking fluids well.       ROS:     Constitutional:  Negative for decreased activity, fever, irritability and lethargy  Endocrine:  Negative for abnormal sleep patterns, increased activity, polydipsia and polyphagia  Cardiovascular:  Negative for cool extremity and irregular heartbeat/palpitations  Gastrointestinal:  Negative for abdominal pain, constipation, decreased appetite, diarrhea and vomiting  Genitourinary:  Negative for dysuria and hematuria  Hema/Lymph:  Negative for easy bleeding and easy bruising  Integumentary:  Negative for pruritus and rash  Musculoskeletal:  Negative for bone/joint symptoms  Neurological:  Negative for gait disturbance  Psychiatric:  Negative for inappropriate interaction and psychiatric symptoms  Respiratory:  Negative for cough, dyspnea and wheezing      PHYSICAL EXAM:   Temp:  [97.5 °F (36.4 °C)-98.1 °F (36.7 °C)] 98 °F (36.7 °C)  Pulse:  [43-72] 58  Resp:  [16-18] 16  BP: (100-146)/(51-81) 117/54  SpO2:  [90 %-94 %] 90 %  Patient Weight for the past 72 hrs:   Weight   06/24/25 0500 158 lb (71.7 kg)   06/26/25 0550 160 lb (72.6 kg)       Constitutional: appears well hydrated alert and responsive no acute distress noted  Neck/Thyroid: neck is supple without adenopathy  Lymphatic: no abnormal cervical, supraclavicular or axillary adenopathy is noted  Respiratory: normal to inspection lungs are clear to auscultation bilaterally normal respiratory effort  Cardiovascular: regular rate and rhythm no murmurs, gallups, or rubs  Abdomen: soft non-tender non-distended no organomegaly noted no masses  Musculoskeletal: full ROM all extremities good strength  no deformities  Extremities: no edema, cyanosis, or clubbing  Neurological: exam appropriate for age reflexes and motor skills appropriate for  age    Labs:  Lab Results   Component Value Date    WBC 17.0 06/26/2025    HGB 11.5 06/26/2025    HCT 34.5 06/26/2025    .0 06/26/2025    CREATSERUM 1.33 06/26/2025    BUN 39 06/26/2025     06/26/2025    K 4.3 06/26/2025     06/26/2025    CO2 22.0 06/26/2025    GLU 81 06/26/2025    CA 7.5 06/26/2025    ALB 2.4 06/26/2025    MG 1.6 06/26/2025    PHOS 2.7 06/26/2025     Recent Labs   Lab 06/24/25  0525 06/25/25  0538 06/26/25  0535   WBC 22.5* 18.3* 17.0*   HGB 12.1* 11.7* 11.5*   HCT 36.3* 35.8* 34.5*   .0 334.0 320.0     Recent Labs   Lab 06/24/25  0525 06/25/25  0538 06/25/25  1950 06/26/25  0535   * 95  --  81   BUN 54* 39*  --  39*   CREATSERUM 1.52* 1.39*  --  1.33*   CA 8.1* 8.0*  --  7.5*   ALB 2.7* 2.4*  --  2.4*   * 146*  --  142   K 4.0 3.5 4.5 4.3   * 116*  --  113*   CO2 23.0 22.0  --  22.0   ALKPHO 137*  --   --   --    AST 30  --   --   --    ALT 34  --   --   --    BILT 1.0*  --   --   --    TP 5.0*  --   --   --    PHOS 2.9 2.8  --  2.7       Imaging  XR CHEST AP PORTABLE  (CPT=71045)  Result Date: 6/26/2025  CONCLUSION: Some interval worsening of bilateral patchy consolidation since prior examination. Electronically Verified and Signed by Attending Radiologist: Nat Morin MD 6/26/2025 12:01 PM Workstation: [id number]    XR VIDEO SWALLOW (CPT=74230)  Result Date: 6/25/2025  FINDINGS/IMPRESSION: Laryngeal penetration noted. Please refer to the speech pathologist's detailed note for further evaluation. Electronically Verified and Signed by Attending Radiologist: Calixto Aguilar MD 6/25/2025 9:38 AM Workstation: [id number]        Medications:  Current Hospital Medications[1]    Allergies:  Allergies[2]    Input/Output:    Intake/Output Summary (Last 24 hours) at 6/26/2025 1230  Last data filed at 6/26/2025 1100  Gross per 24 hour   Intake 1020 ml   Output 650 ml   Net 370 ml          ASSESSMENT/PLAN:   Assessment   Problem List[3]    Hydration good..  Off  IVF and creatinine better 1.33 and sodium .  OK for transfer to  today.             6/26/2025  Hipolito Baugh MD               [1]   Current Facility-Administered Medications:     levothyroxine (Synthroid) tab 100 mcg, 100 mcg, Oral, Daily @ 0700    metoprolol succinate (Toprol XL) partial tablet 12.5 mg, 12.5 mg, Oral, Daily Beta Blocker    hydrALAZINE (Apresoline) tab 25 mg, 25 mg, Oral, Q8H TIFFANIE    ipratropium-albuterol (Duoneb) 0.5-2.5 (3) MG/3ML inhalation solution 3 mL, 3 mL, Nebulization, Q8H    amiodarone (Pacerone) tab 200 mg, 200 mg, Oral, Daily    [COMPLETED] potassium chloride 40 mEq in 250mL sodium chloride 0.9% IVPB premix, 40 mEq, Intravenous, Once **FOLLOWED BY** potassium chloride 20 mEq/100mL IVPB premix 20 mEq, 20 mEq, Intravenous, Once    guaiFENesin ER (Mucinex) 12 hr tab 600 mg, 600 mg, Oral, BID    pantoprazole (Protonix) 40 mg in sodium chloride 0.9% PF 10 mL IV push, 40 mg, Intravenous, Q12H    aspirin DR tab 81 mg, 81 mg, Oral, Daily    albuterol (Ventolin) (2.5 MG/3ML) 0.083% nebulizer solution 2.5 mg, 2.5 mg, Nebulization, Q4H PRN    atorvastatin (Lipitor) tab 40 mg, 40 mg, Oral, Nightly    acetaminophen (Tylenol) tab 650 mg, 650 mg, Oral, Q6H PRN    calcium carbonate (Tums) chewable tab 500 mg, 500 mg, Oral, BID PRN  [2] No Known Allergies  [3]   Patient Active Problem List  Diagnosis    Coronary artery disease involving native heart    Metabolic encephalopathy    Myopia with astigmatism and presbyopia, bilateral    Age-related nuclear cataract of both eyes    After cataract not obscuring vision, bilateral    Acute chest pain    Hypernatremia

## 2025-06-26 NOTE — PLAN OF CARE
Problem: Patient Centered Care  Goal: Patient preferences are identified and integrated in the patient's plan of care  Description: Interventions:  - What would you like us to know as we care for you? From home alone  - Provide timely, complete, and accurate information to patient/family  - Incorporate patient and family knowledge, values, beliefs, and cultural backgrounds into the planning and delivery of care  - Encourage patient/family to participate in care and decision-making at the level they choose  - Honor patient and family perspectives and choices  Outcome: Progressing     Problem: Patient/Family Goals  Goal: Patient/Family Long Term Goal  Description: Patient's Long Term Goal: To discharge from hospital     Interventions:  -  Monitor vital signs   - Monitor appropriate labs   - Pain management   - Administer medications per order   - Follow MD orders   - Diagnostics per order   - Update/inform patient and family on plan of care   - Discharge planning    - See additional Care Plan goals for specific interventions  Outcome: Progressing  Goal: Patient/Family Short Term Goal  Description: Patient's Short Term Goal: To feel better     Interventions:   - Monitor vital signs   - Monitor appropriate labs   - Pain management   - Administer medications per order   - Follow MD orders   - Diagnostics per order   - Update/inform patient and family on plan of care   - See additional Care Plan goals for specific interventions  Outcome: Progressing     Problem: PAIN - ADULT  Goal: Verbalizes/displays adequate comfort level or patient's stated pain goal  Description: INTERVENTIONS:  - Encourage pt to monitor pain and request assistance  - Assess pain using appropriate pain scale  - Administer analgesics based on type and severity of pain and evaluate response  - Implement non-pharmacological measures as appropriate and evaluate response  - Consider cultural and social influences on pain and pain management  -  Manage/alleviate anxiety  - Utilize distraction and/or relaxation techniques  - Monitor for opioid side effects  - Notify MD/LIP if interventions unsuccessful or patient reports new pain  - Anticipate increased pain with activity and pre-medicate as appropriate  Outcome: Progressing     Problem: SAFETY ADULT - FALL  Goal: Free from fall injury  Description: INTERVENTIONS:  - Assess pt frequently for physical needs  - Identify cognitive and physical deficits and behaviors that affect risk of falls.  - [place] fall precautions as indicated by assessment.  - Educate pt/family on patient safety including physical limitations  - Instruct pt to call for assistance with activity based on assessment  - Modify environment to reduce risk of injury  - Provide assistive devices as appropriate  - Consider OT/PT consult to assist with strengthening/mobility  - Encourage toileting schedule  Outcome: Progressing     Problem: DISCHARGE PLANNING  Goal: Discharge to home or other facility with appropriate resources  Description: INTERVENTIONS:  - Identify barriers to discharge w/pt and caregiver  - Include patient/family/discharge partner in discharge planning  - Arrange for needed discharge resources and transportation as appropriate  - Identify discharge learning needs (meds, wound care, etc)  - Arrange for interpreters to assist at discharge as needed  - Consider post-discharge preferences of patient/family/discharge partner  - Complete POLST form as appropriate  - Assess patient's ability to be responsible for managing their own health  - Refer to Case Management Department for coordinating discharge planning if the patient needs post-hospital services based on physician/LIP order or complex needs related to functional status, cognitive ability or social support system  Outcome: Progressing     Problem: RISK FOR INFECTION - ADULT  Goal: Absence of fever/infection during anticipated neutropenic period  Description:  INTERVENTIONS  - Monitor WBC  - Administer growth factors as ordered  - Implement neutropenic guidelines  Outcome: Progressing     Problem: RESPIRATORY - ADULT  Goal: Achieves optimal ventilation and oxygenation  Description: INTERVENTIONS:  - Assess for changes in respiratory status  - Assess for changes in mentation and behavior  - Position to facilitate oxygenation and minimize respiratory effort  - Oxygen supplementation based on oxygen saturation or ABGs  - Provide Smoking Cessation handout, if applicable  - Encourage broncho-pulmonary hygiene including cough, deep breathe, Incentive Spirometry  - Assess the need for suctioning and perform as needed  - Assess and instruct to report SOB or any respiratory difficulty  - Respiratory Therapy support as indicated  - Manage/alleviate anxiety  - Monitor for signs/symptoms of CO2 retention  Outcome: Progressing     Problem: Impaired Functional Mobility  Goal: Achieve highest/safest level of mobility/gait  Description: Interventions:  - Assess patient's functional ability and stability  - Promote increasing activity/tolerance for mobility and gait  - Educate and engage patient/family in tolerated activity level and precautions  Outcome: Progressing     Problem: METABOLIC/FLUID AND ELECTROLYTES - ADULT  Goal: Electrolytes maintained within normal limits  Description: INTERVENTIONS:  - Monitor labs and rhythm and assess patient for signs and symptoms of electrolyte imbalances  - Administer electrolyte replacement as ordered  - Monitor response to electrolyte replacements, including rhythm and repeat lab results as appropriate  - Fluid restriction as ordered  - Instruct patient on fluid and nutrition restrictions as appropriate  Outcome: Progressing  Goal: Hemodynamic stability and optimal renal function maintained  Description: INTERVENTIONS:  - Monitor labs and assess for signs and symptoms of volume excess or deficit  - Monitor intake, output and patient weight  -  Monitor urine specific gravity, serum osmolarity and serum sodium as indicated or ordered  - Monitor response to interventions for patient's volume status, including labs, urine output, blood pressure (other measures as available)  - Encourage oral intake as appropriate  - Instruct patient on fluid and nutrition restrictions as appropriate  Outcome: Progressing     Problem: CARDIOVASCULAR - ADULT  Goal: Maintains optimal cardiac output and hemodynamic stability  Description: INTERVENTIONS:  - Monitor vital signs, rhythm, and trends  - Monitor for bleeding, hypotension and signs of decreased cardiac output  - Evaluate effectiveness of vasoactive medications to optimize hemodynamic stability  - Monitor arterial and/or venous puncture sites for bleeding and/or hematoma  - Assess quality of pulses, skin color and temperature  - Assess for signs of decreased coronary artery perfusion - ex. Angina  - Evaluate fluid balance, assess for edema, trend weights  Outcome: Progressing  Goal: Absence of cardiac arrhythmias or at baseline  Description: INTERVENTIONS:  - Continuous cardiac monitoring, monitor vital signs, obtain 12 lead EKG if indicated  - Evaluate effectiveness of antiarrhythmic and heart rate control medications as ordered  - Initiate emergency measures for life threatening arrhythmias  - Monitor electrolytes and administer replacement therapy as ordered  Outcome: Progressing     Problem: NEUROLOGICAL - ADULT  Goal: Achieves stable or improved neurological status  Description: INTERVENTIONS  - Assess for and report changes in neurological status  - Initiate measures to prevent increased intracranial pressure  - Maintain blood pressure and fluid volume within ordered parameters to optimize cerebral perfusion and minimize risk of hemorrhage  - Monitor temperature, glucose, and sodium. Initiate appropriate interventions as ordered  Outcome: Progressing  Goal: Absence of seizures  Description: INTERVENTIONS  - Monitor  for seizure activity  - Administer anti-seizure medications as ordered  - Monitor neurological status  Outcome: Progressing  Goal: Remains free of injury related to seizure activity  Description: INTERVENTIONS:  - Maintain airway, patient safety  and administer oxygen as ordered  - Monitor patient for seizure activity, document and report duration and description of seizure to MD/LIP  - If seizure occurs, turn patient to side and suction secretions as needed  - Reorient patient post seizure  - Seizure pads on all 4 side rails  - Instruct patient/family to notify RN of any seizure activity  - Instruct patient/family to call for assistance with activity based on assessment  Outcome: Progressing  Goal: Achieves maximal functionality and self care  Description: INTERVENTIONS  - Monitor swallowing and airway patency with patient fatigue and changes in neurological status  - Encourage and assist patient to increase activity and self care with guidance from PT/OT  - Encourage visually impaired, hearing impaired and aphasic patients to use assistive/communication devices  Outcome: Progressing     Monitoring vital signs, stable at this time. No acute changes at this moment. Fall precautions in place- bed alarm on, bed locked in lowest position, call light within reach. Frequent rounding by nursing staff.

## 2025-06-26 NOTE — DISCHARGE SUMMARY
[facility]  part of [facility]    Discharge Summary    Imer Mcguire Patient Status:  Inpatient    1931 MRN [id number]   Location [facility]5W Attending Severo Lacey MD   Hosp Day # 12 PCP OMAR LEAVITT                Date of Admission: 2025   Date of Discharge: 2025    Admitting Diagnosis: Sepsis due to pneumonia (HCC) [J18.9, A41.9]    Disposition: Transfer to Rehab Facility:      Discharge Diagnosis: .Active Problems:    Hypernatremia  Resolved.  Acute hypoxic respiratory failure  Imaging  Pneumonia  Coronary artery disease    Hospital Course:   Reason for Admission: Pneumonia acute hypoxic respiratory failure    Discharge Physical Exam:  Vital Signs:  Blood pressure 117/54, pulse 58, temperature 98 °F (36.7 °C), temperature source Oral, resp. rate 16, height 5' 8\" (1.727 m), weight 160 lb (72.6 kg), SpO2 90%.     General: No acute distress. Alert and oriented x 3.  HEENT: Moist mucous membranes. EOM-I. PERRL  Neck: No lymphadenopathy.  No JVD. No carotid bruits.  Respiratory: Clear to auscultation bilaterally.  No wheezes. No rhonchi.  Cardiovascular: S1, S2.  Regular rate and rhythm.  No murmurs. Equal pulses   Abdomen: Soft, nontender, nondistended.  Positive bowel sounds. No rebound tenderness  Neurologic: No focal neurological deficits.  Musculoskeletal: Full range of motion of all extremities.  No swelling noted.  Integument: No lesions. No erythema.  Psychiatric: Appropriate mood and affect.    Hospital Course: Imer Mcguire is a(n) 89 year old male.  With coronary artery disease, status post CABG, hypothyroidism, hypertension, dyslipidemia, who was in his usual state of health.  The patient started and weakness.  Which has been getting worse over the last 1 week.  Patient also developed a fever and cough.  Patient came to the emergency room.  Patient also complaining of having shortness of breath earlier.  He is feeling better now.    Patient was admitted  to the hospital.  Patient.  Patient was having worsening shortness of breath.  Patient also went into atrial fibrillation with a rapid ventricular response.  Patient was transferred to intensive care unit patient was seen by pulmonary.  Recommendations were followed.  Patient was also seen by cardiology.  Patient was started on amiodarone.  Patient overall improved today.  Today the patient is feeling generally fine.  He is on room air.  His kidney function has improved.  Patient has been accepted at [facility]  And will be transferred there.    Discussed with the daughter at the bedside    Complications:     Consultants         Provider   Role Specialty     Rishi Hyatt MD      Consulting Physician Cardiovascular Diseases     Klaus Waggoner MD      Consulting Physician Cardiac Electrophysiology     Nilson Iverson DO      Consulting Physician Cardiovascular Diseases     Alisha Inman MD      Consulting Physician GASTROENTEROLOGY     Taco Dickens MD      Consulting Physician Physical Medicine     Diane Oswald MD      Consulting Physician NEPHROLOGY     Pranav Torrez MD      Consulting Physician INFECTIOUS DISEASES     Bernabe Davis DO      Consulting Physician PULMONARY DISEASES     Jimy Escobar MD      Consulting Physician PULMONARY DISEASES            Pending Labs       Order Current Status    Blood Culture Preliminary result    Blood Culture Preliminary result            Discharge Plan:   Discharge Condition: Good    Current Discharge Medication List        New Orders    Details   acetaminophen 325 MG Oral Tab Take 2 tablets (650 mg total) by mouth every 6 (six) hours as needed for Pain.      calcium carbonate 500 MG Oral Chew Tab Chew 1 tablet (500 mg total) by mouth 2 (two) times daily as needed.      ipratropium-albuterol 0.5-2.5 (3) MG/3ML Inhalation Solution Take 3 mL by nebulization every 4 (four) hours as needed.      guaiFENesin  MG Oral Tablet 12 Hr Take 1 tablet (600  mg total) by mouth 2 (two) times daily.      aspirin 81 MG Oral Tab EC Take 1 tablet (81 mg total) by mouth daily.      amiodarone 200 MG Oral Tab Take 1 tablet (200 mg total) by mouth daily.      hydrALAZINE 25 MG Oral Tab Take 1 tablet (25 mg total) by mouth every 8 (eight) hours.      metoprolol succinate ER 25 MG Oral Tablet 24 Hr Take 0.5 tablets (12.5 mg total) by mouth Daily Beta Blocker.           Home Meds - Unchanged    Details   atorvastatin 40 MG Oral Tab Take 1 tablet (40 mg total) by mouth nightly.      levothyroxine 75 MCG Oral Tab Take 100 mcg by mouth in the morning.                 Discharge Diet: Cardiac diet    Discharge Activity: As tolerated    Follow up:      Follow-up Information       Manny Pickett. Schedule an appointment as soon as possible for a visit in 1 week(s).    Specialty: Family Medicine  Contact information:  [address]  [facility] [address] [phone]               Rishi Hyatt MD Follow up in 2 week(s).    Specialties: Cardiovascular Diseases, CARDIOLOGY  Why: Office will call to schedule follow up  Contact information:  [address] [facility] [address] [facility] [address]  [facility] [address]  [phone]               Bernabe Davis DO. Schedule an appointment as soon as possible for a visit in 2 week(s).    Specialty: PULMONARY DISEASES  Contact information:  [address] [address]  [facility] [address]  [phone]                             Follow up Labs:        Severo Lacey MD   6/26/2025    I spent 35 minutes in discharge planning for this patient, including extensive counseling regarding the patient's condition, recommendations regarding follow-up care, discussing with any applicable consultants, and arranging follow-up as indicated.

## 2025-06-26 NOTE — CM/SW NOTE
06/26/25 0942   Discharge disposition   Expected discharge disposition Rehab Facili   Post Acute Care Provider Dorene   Discharge transportation [facility]     Plan is for [facility] to  at 1200.  Daughter notified of time and transport cost of $90.00.    Mone Graham MBA BSN RN CRRSAURAV SIERRA  RN Case Manager [facility]

## 2025-06-26 NOTE — PROGRESS NOTES
[facility]  part of [facility]    Progress Note    Imer Mcguire Patient Status:  Inpatient    1931 MRN [id number]   Location [facility] 5SW/SE Attending Severo Lacey MD   Hosp Day # 12 PCP OMAR LEAVITT       SUBJECTIVE:    No complaints no fever no shortness of breath.  Daughter is at the bedside.      OBJECTIVE:  Vital signs in last 24 hours:  /54 (BP Location: Right arm)   Pulse 58   Temp 98 °F (36.7 °C) (Oral)   Resp 16   Ht 5' 8\" (1.727 m)   Wt 160 lb (72.6 kg)   SpO2 90%   BMI 24.33 kg/m²     Intake/Output:    Intake/Output Summary (Last 24 hours) at 2025 1215  Last data filed at 2025 1100  Gross per 24 hour   Intake 1020 ml   Output 650 ml   Net 370 ml       Wt Readings from Last 3 Encounters:   25 160 lb (72.6 kg)   24 147 lb 6.4 oz (66.9 kg)   17 174 lb 4.8 oz (79.1 kg)       Exam  Patient is sitting up in the chair  Gen: No acute distress,   HEENT: No pallor no icterus  Pulm: Lungs crackles are noted on the right side.  better than yesterday  CV: Heart with irregular rate and rhythm, no peripheral edema  Abd: Abdomen soft, nontender, nondistended, no organomegaly, bowel sounds present  Skin: no rashes or lesions  CNS: Patient is alert    Data Review:     Labs:   Lab Results   Component Value Date    WBC 17.0 2025    HGB 11.5 2025    HCT 34.5 2025    .0 2025    CREATSERUM 1.33 2025    BUN 39 2025     2025    K 4.3 2025     2025    CO2 22.0 2025    GLU 81 2025    CA 7.5 2025    ALB 2.4 2025    MG 1.6 2025    PHOS 2.7 2025       LABS  Recent Labs   Lab 25  0525 25  0538 25  0535   RBC 3.85 3.70* 3.70*   HGB 12.1* 11.7* 11.5*   HCT 36.3* 35.8* 34.5*   MCV 94.3 96.8 93.2   MCH 31.4 31.6 31.1   MCHC 33.3 32.7 33.3   RDW 14.9 14.6 14.7   NEPRELIM 19.60* 15.89* 14.44*   WBC 22.5* 18.3* 17.0*   .0  334.0 320.0   AST 30  --   --    ALT 34  --   --    * 95 81       Imaging:      Meds:   Current Hospital Medications[1]    Assessment  Problem List[2]  Sepsis due to pneumonia (HCC)    Pneumonia.,   Community-acquired bacterial bacterial   Secondary to Legionella  Completed antibiotic therapy       New onset atrial fibrillation with rapid ventricular response  on amiodarone  Heart rate is better      Acute hypoxic respiratory failure  Much improved  Currently on room air     Acute kidney injury.  Improved     Hypernatremia  Improved     Disease stable.     Hypothyroidism continue the patient on levothyroxine.     Patient stable.    Thrombocytopenia, resolved    Melena  GI consult    Discussed with nursing staff.  Further management will depend on the clinical course of the patient     Plan:   DC to [facility] today.  Discussed with the daughter at the bedside discussed with the nursing staff       Active Orders   LAB    Magnesium     Frequency: Tomorrow AM draw     Number of Occurrences: 1 Occurrences     Order Comments: DO NOT DISCONTINUEMUST REMAIN FOR ELECTROLYTE PROTOCOL       Nourishments    Dietary Nutrition Supplements Daily     Frequency: Daily     Number of Occurrences: Until Specified     Order Comments: Magic Cup @ 2pm - Chocolate     Respiratory Care    Acapella Until Discontinued     Frequency: Until Discontinued     Number of Occurrences: Until Specified   Discharge    Discharge patient     Frequency: Once     Number of Occurrences: 1 Occurrences   Diet    Sodium restricted diet Sodium Restriction: 3-4 GM NA; Fluid Consistency: Thin Liquids ; Texture Consistency: Soft / Easy to Chew ; Is Patient on Accuchecks? No; Misc Restriction: No Straw     Frequency: Effective Now     Number of Occurrences: Until Specified   Nursing    Apply warming blanket     Frequency: PRN     Number of Occurrences: Until Specified    Cardiac monitoring     Frequency: Until Discontinued     Number of Occurrences: Until  Specified    Cardiac monitoring     Frequency: Continuous     Number of Occurrences: Until Specified    Daily weights     Frequency: Until Discontinued     Number of Occurrences: Until Specified    Daily weights     Frequency: Until Discontinued     Number of Occurrences: Until Specified    Increase infusion dose 100 units/hr     Frequency: Once     Number of Occurrences: 1 Occurrences    Increase infusion dose 100 units/hr     Frequency: Once     Number of Occurrences: 1 Occurrences    Increase infusion dose 100 units/hr     Frequency: Once     Number of Occurrences: 1 Occurrences    Increase infusion dose 100 units/hr     Frequency: Once     Number of Occurrences: 1 Occurrences    Increase infusion dose 100 units/hr     Frequency: Once     Number of Occurrences: 1 Occurrences    Increase infusion dose 100 units/hr     Frequency: Once     Number of Occurrences: 1 Occurrences    Initiate electrolyte protocol     Frequency: Until Discontinued     Number of Occurrences: Until Specified    Intake and Output     Frequency: Q Shift     Number of Occurrences: Until Specified    Notify physician (cardiology or ordering physician):     Frequency: Until Discontinued     Number of Occurrences: Until Specified     Order Comments: If you are discontinuing a drip without any oral diltiazem medication orders      Notify physician for BP (cardiology or ordering physician):     Frequency: Until Discontinued     Number of Occurrences: Until Specified     Order Comments: For SBP < 80 mmHg or symptomatic hypotension, discontinue drip and notify physician      Notify physician for HR (cardiology or ordering physician):     Frequency: Until Discontinued     Number of Occurrences: Until Specified     Order Comments: 1. If you are on max intravenous dose and the HR is >120  2. If HR is >140 and it has been one hour since you transitioned to oral from IV diltiazem  3. If rate < 75 for > 5 min or symptomatic bradycardia, discontinue the  drip and notify physician  4. For pause greater than 3.0 seconds, hold drip and notify physician      Place sequential compression device     Frequency: Continuous     Number of Occurrences: Until Specified    Stop amiodarone and call the attending cardiologist     Frequency: PRN     Number of Occurrences: Until Specified     Order Comments: If systolic blood pressure falls to less than 90 mmHg, heart rate drops to less than 60 beats per minute, if  EKG demonstrates greater than Mobitz 1 Heart Block      Strict intake and output     Frequency: Until Discontinued     Number of Occurrences: Until Specified    Strict intake and output     Frequency: Until Discontinued     Number of Occurrences: Until Specified    Tele Box     Frequency: Once     Number of Occurrences: 1 Occurrences    Tele Box     Frequency: Once     Number of Occurrences: 1 Occurrences   Code Status    Full code Continuous     Frequency: Continuous     Number of Occurrences: Until Specified   Consult    Consult to Gastroenterology     Frequency: Once     Number of Occurrences: 1 Occurrences   Medications    acetaminophen (Tylenol) tab 650 mg     Frequency: Q6H PRN     Dose: 650 mg     Route: Oral    albuterol (Ventolin) (2.5 MG/3ML) 0.083% nebulizer solution 2.5 mg     Frequency: Q4H PRN     Dose: 2.5 mg     Route: Nebulization    amiodarone (Pacerone) tab 200 mg     Frequency: Daily     Dose: 200 mg     Route: Oral    aspirin DR tab 81 mg     Frequency: Daily     Dose: 81 mg     Route: Oral    atorvastatin (Lipitor) tab 40 mg     Frequency: Nightly     Dose: 40 mg     Route: Oral    calcium carbonate (Tums) chewable tab 500 mg     Frequency: BID PRN     Dose: 500 mg     Route: Oral    guaiFENesin ER (Mucinex) 12 hr tab 600 mg     Frequency: BID     Dose: 600 mg     Route: Oral    hydrALAZINE (Apresoline) tab 25 mg     Frequency: Q8H TIFFANIE     Dose: 25 mg     Route: Oral    ipratropium-albuterol (Duoneb) 0.5-2.5 (3) MG/3ML inhalation solution 3 mL      Frequency: Q8H     Dose: 3 mL     Route: Nebulization    levothyroxine (Synthroid) tab 100 mcg     Frequency: Daily @ 0700     Dose: 100 mcg     Route: Oral    metoprolol succinate (Toprol XL) partial tablet 12.5 mg     Frequency: Daily Beta Blocker     Dose: 12.5 mg     Route: Oral    pantoprazole (Protonix) 40 mg in sodium chloride 0.9% PF 10 mL IV push     Frequency: Q12H     Dose: 40 mg     Route: Intravenous    potassium chloride 20 mEq/100mL IVPB premix 20 mEq     Frequency: Once     Dose: 20 mEq     Route: Intravenous       Severo Lacey MD           [1]   Current Facility-Administered Medications   Medication Dose Route Frequency    levothyroxine (Synthroid) tab 100 mcg  100 mcg Oral Daily @ 0700    metoprolol succinate (Toprol XL) partial tablet 12.5 mg  12.5 mg Oral Daily Beta Blocker    hydrALAZINE (Apresoline) tab 25 mg  25 mg Oral Q8H TIFFANIE    ipratropium-albuterol (Duoneb) 0.5-2.5 (3) MG/3ML inhalation solution 3 mL  3 mL Nebulization Q8H    amiodarone (Pacerone) tab 200 mg  200 mg Oral Daily    potassium chloride 20 mEq/100mL IVPB premix 20 mEq  20 mEq Intravenous Once    guaiFENesin ER (Mucinex) 12 hr tab 600 mg  600 mg Oral BID    pantoprazole (Protonix) 40 mg in sodium chloride 0.9% PF 10 mL IV push  40 mg Intravenous Q12H    aspirin DR tab 81 mg  81 mg Oral Daily    albuterol (Ventolin) (2.5 MG/3ML) 0.083% nebulizer solution 2.5 mg  2.5 mg Nebulization Q4H PRN    atorvastatin (Lipitor) tab 40 mg  40 mg Oral Nightly    acetaminophen (Tylenol) tab 650 mg  650 mg Oral Q6H PRN    calcium carbonate (Tums) chewable tab 500 mg  500 mg Oral BID PRN   [2]   Patient Active Problem List  Diagnosis    Coronary artery disease involving native heart    Metabolic encephalopathy    Myopia with astigmatism and presbyopia, bilateral    Age-related nuclear cataract of both eyes    After cataract not obscuring vision, bilateral    Acute chest pain    Hypernatremia

## 2025-06-26 NOTE — PLAN OF CARE
A&Ox3-4. Pt ambulates SBA with walker, voiding up to bathroom, on room air.  Frequent rounding by nursing staff. Safety precautions maintained/call light within reach.  Patient discharged to [facility] via [facility]. IV removed, discharge education provided, patient sent with all personal belongings, and discharge instructions. Addressed additional questions. Report given to Alison DILL.      Problem: Patient Centered Care  Goal: Patient preferences are identified and integrated in the patient's plan of care  Description: Interventions:  - What would you like us to know as we care for you? From home alone  - Provide timely, complete, and accurate information to patient/family  - Incorporate patient and family knowledge, values, beliefs, and cultural backgrounds into the planning and delivery of care  - Encourage patient/family to participate in care and decision-making at the level they choose  - Honor patient and family perspectives and choices  Outcome: Adequate for Discharge     Problem: Patient/Family Goals  Goal: Patient/Family Long Term Goal  Description: Patient's Long Term Goal: discharge    Interventions:  - - Monitor vitals  - Monitor appropriate labs  - Pain management  - Follow MD order  - Diagnostics per order  - Update/Informing patient and family on plan of care  - Discharge planning  - See additional Care Plan goals for specific interventions  Outcome: Adequate for Discharge  Goal: Patient/Family Short Term Goal  Description: Patient's Short Term Goal: feel better    Interventions:   - - Monitor vitals  - Monitor appropriate labs  - Pain management  - Follow MD order  - Diagnostics per order  - Update/Informing patient and family on plan of care  - Discharge planning  - See additional Care Plan goals for specific interventions  Outcome: Adequate for Discharge     Problem: PAIN - ADULT  Goal: Verbalizes/displays adequate comfort level or patient's stated pain goal  Description: INTERVENTIONS:  - Encourage  pt to monitor pain and request assistance  - Assess pain using appropriate pain scale  - Administer analgesics based on type and severity of pain and evaluate response  - Implement non-pharmacological measures as appropriate and evaluate response  - Consider cultural and social influences on pain and pain management  - Manage/alleviate anxiety  - Utilize distraction and/or relaxation techniques  - Monitor for opioid side effects  - Notify MD/LIP if interventions unsuccessful or patient reports new pain  - Anticipate increased pain with activity and pre-medicate as appropriate  Outcome: Adequate for Discharge     Problem: SAFETY ADULT - FALL  Goal: Free from fall injury  Description: INTERVENTIONS:  - Assess pt frequently for physical needs  - Identify cognitive and physical deficits and behaviors that affect risk of falls.  - [place] fall precautions as indicated by assessment.  - Educate pt/family on patient safety including physical limitations  - Instruct pt to call for assistance with activity based on assessment  - Modify environment to reduce risk of injury  - Provide assistive devices as appropriate  - Consider OT/PT consult to assist with strengthening/mobility  - Encourage toileting schedule  Outcome: Adequate for Discharge     Problem: DISCHARGE PLANNING  Goal: Discharge to home or other facility with appropriate resources  Description: INTERVENTIONS:  - Identify barriers to discharge w/pt and caregiver  - Include patient/family/discharge partner in discharge planning  - Arrange for needed discharge resources and transportation as appropriate  - Identify discharge learning needs (meds, wound care, etc)  - Arrange for interpreters to assist at discharge as needed  - Consider post-discharge preferences of patient/family/discharge partner  - Complete POLST form as appropriate  - Assess patient's ability to be responsible for managing their own health  - Refer to Case Management Department for coordinating  discharge planning if the patient needs post-hospital services based on physician/LIP order or complex needs related to functional status, cognitive ability or social support system  Outcome: Adequate for Discharge     Problem: RESPIRATORY - ADULT  Goal: Achieves optimal ventilation and oxygenation  Description: INTERVENTIONS:  - Assess for changes in respiratory status  - Assess for changes in mentation and behavior  - Position to facilitate oxygenation and minimize respiratory effort  - Oxygen supplementation based on oxygen saturation or ABGs  - Provide Smoking Cessation handout, if applicable  - Encourage broncho-pulmonary hygiene including cough, deep breathe, Incentive Spirometry  - Assess the need for suctioning and perform as needed  - Assess and instruct to report SOB or any respiratory difficulty  - Respiratory Therapy support as indicated  - Manage/alleviate anxiety  - Monitor for signs/symptoms of CO2 retention  Outcome: Adequate for Discharge     Problem: Impaired Functional Mobility  Goal: Achieve highest/safest level of mobility/gait  Description: Interventions:  - Assess patient's functional ability and stability  - Promote increasing activity/tolerance for mobility and gait  - Educate and engage patient/family in tolerated activity level and precautions  Outcome: Adequate for Discharge     Problem: CARDIOVASCULAR - ADULT  Goal: Maintains optimal cardiac output and hemodynamic stability  Description: INTERVENTIONS:  - Monitor vital signs, rhythm, and trends  - Monitor for bleeding, hypotension and signs of decreased cardiac output  - Evaluate effectiveness of vasoactive medications to optimize hemodynamic stability  - Monitor arterial and/or venous puncture sites for bleeding and/or hematoma  - Assess quality of pulses, skin color and temperature  - Assess for signs of decreased coronary artery perfusion - ex. Angina  - Evaluate fluid balance, assess for edema, trend weights  Outcome: Adequate for  Discharge  Goal: Absence of cardiac arrhythmias or at baseline  Description: INTERVENTIONS:  - Continuous cardiac monitoring, monitor vital signs, obtain 12 lead EKG if indicated  - Evaluate effectiveness of antiarrhythmic and heart rate control medications as ordered  - Initiate emergency measures for life threatening arrhythmias  - Monitor electrolytes and administer replacement therapy as ordered  Outcome: Adequate for Discharge     Problem: NEUROLOGICAL - ADULT  Goal: Achieves stable or improved neurological status  Description: INTERVENTIONS  - Assess for and report changes in neurological status  - Initiate measures to prevent increased intracranial pressure  - Maintain blood pressure and fluid volume within ordered parameters to optimize cerebral perfusion and minimize risk of hemorrhage  - Monitor temperature, glucose, and sodium. Initiate appropriate interventions as ordered  Outcome: Adequate for Discharge  Goal: Absence of seizures  Description: INTERVENTIONS  - Monitor for seizure activity  - Administer anti-seizure medications as ordered  - Monitor neurological status  Outcome: Adequate for Discharge  Goal: Remains free of injury related to seizure activity  Description: INTERVENTIONS:  - Maintain airway, patient safety  and administer oxygen as ordered  - Monitor patient for seizure activity, document and report duration and description of seizure to MD/LIP  - If seizure occurs, turn patient to side and suction secretions as needed  - Reorient patient post seizure  - Seizure pads on all 4 side rails  - Instruct patient/family to notify RN of any seizure activity  - Instruct patient/family to call for assistance with activity based on assessment  Outcome: Adequate for Discharge  Goal: Achieves maximal functionality and self care  Description: INTERVENTIONS  - Monitor swallowing and airway patency with patient fatigue and changes in neurological status  - Encourage and assist patient to increase activity  and self care with guidance from PT/OT  - Encourage visually impaired, hearing impaired and aphasic patients to use assistive/communication devices  Outcome: Adequate for Discharge     Problem: METABOLIC/FLUID AND ELECTROLYTES - ADULT  Goal: Electrolytes maintained within normal limits  Description: INTERVENTIONS:  - Monitor labs and rhythm and assess patient for signs and symptoms of electrolyte imbalances  - Administer electrolyte replacement as ordered  - Monitor response to electrolyte replacements, including rhythm and repeat lab results as appropriate  - Fluid restriction as ordered  - Instruct patient on fluid and nutrition restrictions as appropriate  Outcome: Adequate for Discharge  Goal: Hemodynamic stability and optimal renal function maintained  Description: INTERVENTIONS:  - Monitor labs and assess for signs and symptoms of volume excess or deficit  - Monitor intake, output and patient weight  - Monitor urine specific gravity, serum osmolarity and serum sodium as indicated or ordered  - Monitor response to interventions for patient's volume status, including labs, urine output, blood pressure (other measures as available)  - Encourage oral intake as appropriate  - Instruct patient on fluid and nutrition restrictions as appropriate  Outcome: Adequate for Discharge     Problem: RISK FOR INFECTION - ADULT  Goal: Absence of fever/infection during anticipated neutropenic period  Description: INTERVENTIONS  - Monitor WBC  - Administer growth factors as ordered  - Implement neutropenic guidelines  Outcome: Adequate for Discharge

## 2025-06-26 NOTE — PROGRESS NOTES
[facility]  part of [facility]     Progress Note    Imer Mcguire Patient Status:  Inpatient    1931 MRN [id number]   Location [facility] 2W/SW Attending Severo Lacey MD   Hosp Day # 12 PCP OMAR LEAVITT       Subjective:   Patient seen and examined.  Weaned off oxygen.  Denies significant dyspnea at rest.  Occasional cough present  Objective:   Blood pressure 117/54, pulse 58, temperature 98 °F (36.7 °C), temperature source Oral, resp. rate 16, height 5' 8\" (1.727 m), weight 160 lb (72.6 kg), SpO2 90%.  Intake/Output:   Last 3 shifts: I/O last 3 completed shifts:  In: 3022.6 [P.O.:1110; I.V.:1912.6]  Out: 1950 [Urine:1950]   This shift: I/O this shift:  In: 250 [P.O.:240; I.V.:10]  Out: -      Vent Settings:      Hemodynamic parameters (last 24 hours):      Scheduled Meds: Current Hospital Medications[1]    Continuous Infusions: Medication Infusions[2]    Physical Exam  Constitutional: no acute distress  Eyes: PERRL  ENT: nares pateint  Neck: supple, no JVD  Cardio: RRR, S1 S2  Respiratory: Right-sided crackles  GI: abdomen soft, non tender, active bowel sounds, no organomegaly  Extremities: no clubbing, cyanosis, edema  Neurologic: no gross motor deficits  Skin: warm, dry      Results:     Lab Results   Component Value Date    WBC 17.0 2025    HGB 11.5 2025    HCT 34.5 2025    .0 2025    CREATSERUM 1.33 2025    BUN 39 2025     2025    K 4.3 2025     2025    CO2 22.0 2025    GLU 81 2025    CA 7.5 2025    ALB 2.4 2025    MG 1.6 2025    PHOS 2.7 2025       XR VIDEO SWALLOW (CPT=74230)  Result Date: 2025  FINDINGS/IMPRESSION: Laryngeal penetration noted. Please refer to the speech pathologist's detailed note for further evaluation. Electronically Verified and Signed by Attending Radiologist: Calixto Aguilar MD 2025 9:38 AM Workstation: [id number]        EKG 12  Lead  Result Date: 6/25/2025  Sinus bradycardia T wave abnormality, consider anterior ischemia Prolonged QT interval or tu fusion, consider myocardial disease, electrolyte imbalance, or drug effects Abnormal ECG When compared with ECG of 23-JUN-2025 11:45, T wave inversion more evident in Anterior leads Confirmed by AMAIRANI SON ([id number]) on 6/25/2025 5:20:35 PM      Assessment   1.  Severe Legionella pneumonia  2.  Acute hypoxemic respiratory failure  3.  Fevers  4.  Leukocytosis  5.  Coronary artery disease  6.  Acute kidney injury  7.  Hypertension  8.  Atrial fibrillation with rapid ventricular response     Plan   -Patient presents with evidence of fatigue malaise some associated cough and mild dyspnea.  Hypoxic on presentation to emergency department.  - Legionella antigen positive.    - CT chest on 6/17/2025 with extensive right-sided opacities seen consistent with pneumonia.  Trace pleural effusion present.  - Monitor leukocytosis at this time  - Antibiotic therapy per ID recommendations.  Completed course of azithromycin.  - Weaned off of oxygen  - Atrial fibrillation management per cardiology has been started on amiodarone gtt initially now on p.o. amiodarone.  - Patient with evidence of some melena noted.  Further recommendations per GI.  Started on PPI therapy.  Closely monitor.  EGD on hold for now.  - PT/OT  - Okay for discharge from pulmonary perspective.  Follow-up in pulmonary clinic in 2 to 3 weeks time    Bernabe Davis DO  Pulmonary Critical Care Medicine  [facility]          [1]   Current Facility-Administered Medications   Medication Dose Route Frequency    levothyroxine (Synthroid) tab 100 mcg  100 mcg Oral Daily @ 0700    metoprolol succinate (Toprol XL) partial tablet 12.5 mg  12.5 mg Oral Daily Beta Blocker    hydrALAZINE (Apresoline) tab 25 mg  25 mg Oral Q8H Replaced by [facility]    ipratropium-albuterol (Duoneb) 0.5-2.5 (3) MG/3ML inhalation solution 3 mL  3 mL Nebulization Q8H    amiodarone (Pacerone)  tab 200 mg  200 mg Oral Daily    potassium chloride 20 mEq/100mL IVPB premix 20 mEq  20 mEq Intravenous Once    guaiFENesin ER (Mucinex) 12 hr tab 600 mg  600 mg Oral BID    pantoprazole (Protonix) 40 mg in sodium chloride 0.9% PF 10 mL IV push  40 mg Intravenous Q12H    aspirin DR tab 81 mg  81 mg Oral Daily    albuterol (Ventolin) (2.5 MG/3ML) 0.083% nebulizer solution 2.5 mg  2.5 mg Nebulization Q4H PRN    atorvastatin (Lipitor) tab 40 mg  40 mg Oral Nightly    acetaminophen (Tylenol) tab 650 mg  650 mg Oral Q6H PRN    calcium carbonate (Tums) chewable tab 500 mg  500 mg Oral BID PRN   [2]

## 2025-06-26 NOTE — DISCHARGE SUMMARY
[facility]  part of [facility]    Discharge Summary    Imer Mcguire Patient Status:  Inpatient    1931 MRN [id number]   Location [facility]5W Attending Severo Lacey MD   Hosp Day # 12 PCP OMAR LEAVITT       {Results  Index  PMH  PSH  SocHx  FHx  Allergies  ProbList  Imaging  Cardio  Lab  Current Meds Med Hx  ExpDC :8307}         Date of Admission: 2025   Date of Discharge:     Admitting Diagnosis: Sepsis due to pneumonia (HCC) [J18.9, A41.9]    Disposition: {Discharge/Transfer Disposition:5709}    Discharge Diagnosis: .Active Problems:    Hypernatremia      Hospital Course:   Reason for Admission: ***    Discharge Physical Exam:  Vital Signs:  Blood pressure 117/54, pulse 58, temperature 98 °F (36.7 °C), temperature source Oral, resp. rate 16, height 5' 8\" (1.727 m), weight 160 lb (72.6 kg), SpO2 90%.     General: No acute distress. Alert and oriented x 3.  HEENT: Moist mucous membranes. EOM-I. PERRL  Neck: No lymphadenopathy.  No JVD. No carotid bruits.  Respiratory: Clear to auscultation bilaterally.  No wheezes. No rhonchi.  Cardiovascular: S1, S2.  Regular rate and rhythm.  No murmurs. Equal pulses   Abdomen: Soft, nontender, nondistended.  Positive bowel sounds. No rebound tenderness  Neurologic: No focal neurological deficits.  Musculoskeletal: Full range of motion of all extremities.  No swelling noted.  Integument: No lesions. No erythema.  Psychiatric: Appropriate mood and affect.    Hospital Course: ***    Complications: ***    Consultants         Provider   Role Specialty     Rishi Hyatt MD      Consulting Physician Cardiovascular Diseases     Klaus Waggoner MD      Consulting Physician Cardiac Electrophysiology     Nilson Iverson DO      Consulting Physician Cardiovascular Diseases     Alisha Inman MD      Consulting Physician GASTROENTEROLOGY     Taco Dickens MD      Consulting Physician Physical Medicine     Diane Oswald MD       Consulting Physician NEPHROLOGY     Pranav Torrez MD      Consulting Physician INFECTIOUS DISEASES     Bernabe Davis DO      Consulting Physician PULMONARY DISEASES     Jimy Escobar MD      Consulting Physician PULMONARY DISEASES            Pending Labs       Order Current Status    Blood Culture Preliminary result    Blood Culture Preliminary result            Discharge Plan:   Discharge Condition: {DISCHARGE CONDITION:19696}    Current Discharge Medication List        New Orders    Details   acetaminophen 325 MG Oral Tab Take 2 tablets (650 mg total) by mouth every 6 (six) hours as needed for Pain.      calcium carbonate 500 MG Oral Chew Tab Chew 1 tablet (500 mg total) by mouth 2 (two) times daily as needed.      ipratropium-albuterol 0.5-2.5 (3) MG/3ML Inhalation Solution Take 3 mL by nebulization every 4 (four) hours as needed.      guaiFENesin  MG Oral Tablet 12 Hr Take 1 tablet (600 mg total) by mouth 2 (two) times daily.      aspirin 81 MG Oral Tab EC Take 1 tablet (81 mg total) by mouth daily.      amiodarone 200 MG Oral Tab Take 1 tablet (200 mg total) by mouth daily.      hydrALAZINE 25 MG Oral Tab Take 1 tablet (25 mg total) by mouth every 8 (eight) hours.      metoprolol succinate ER 25 MG Oral Tablet 24 Hr Take 0.5 tablets (12.5 mg total) by mouth Daily Beta Blocker.           Home Meds - Unchanged    Details   atorvastatin 40 MG Oral Tab Take 1 tablet (40 mg total) by mouth nightly.      levothyroxine 75 MCG Oral Tab Take 100 mcg by mouth in the morning.                 Discharge Diet: {Discharge Diet:5710::\"As tolerated\"}    Discharge Activity: {Discharge Activity:5711::\"Pelvic rest until cleared\"}    Follow up:      Follow-up Information       Manny Pickett. Schedule an appointment as soon as possible for a visit in 1 week(s).    Specialty: Family Medicine  Contact information:  [facility] [address] [address] [phone]               Rishi Hyatt MD  Follow up in 2 week(s).    Specialties: Cardiovascular Diseases, CARDIOLOGY  Why: Office will call to schedule follow up  Contact information:  [address]    [facility] [address]  [phone]                             Follow up Labs: ***       Severo Lacey MD   6/26/2025    I spent 35 minutes in discharge planning for this patient, including extensive counseling regarding the patient's condition, recommendations regarding follow-up care, discussing with any applicable consultants, and arranging follow-up as indicated.

## 2025-07-11 ENCOUNTER — TELEPHONE (OUTPATIENT)
Dept: PULMONOLOGY | Facility: CLINIC | Age: OVER 89
End: 2025-07-11

## 2025-07-11 NOTE — TELEPHONE ENCOUNTER
Consult 6/15/25 with Dr. Davis  Discharged 6/26    Dr. Davis- when do you want to add patient to your schedule?  
Daughter accepted appointment for patient with Dr. Davis on 7/22 10:15 am ([facility]). Appointment info given.   
Daughter states patient needs to be seen as soon as possible for hospital follow up within 2 weeks. The schedule is booked with Dr. Davis please call.   
Okay to double book sometime within the next 2 weeks  
Abscess of abdominal wall

## 2025-07-22 ENCOUNTER — OFFICE VISIT (OUTPATIENT)
Dept: PULMONOLOGY | Facility: CLINIC | Age: OVER 89
End: 2025-07-22

## 2025-07-22 VITALS
WEIGHT: 166 LBS | OXYGEN SATURATION: 95 % | DIASTOLIC BLOOD PRESSURE: 58 MMHG | RESPIRATION RATE: 20 BRPM | SYSTOLIC BLOOD PRESSURE: 121 MMHG | BODY MASS INDEX: 25.16 KG/M2 | HEIGHT: 68 IN | HEART RATE: 52 BPM

## 2025-07-22 DIAGNOSIS — A48.1 LEGIONELLA PNEUMONIA (HCC): Primary | ICD-10-CM

## 2025-07-22 PROCEDURE — 99213 OFFICE O/P EST LOW 20 MIN: CPT | Performed by: INTERNAL MEDICINE

## 2025-07-22 RX ORDER — ASCORBIC ACID 100 MG
1 TABLET,CHEWABLE ORAL
COMMUNITY
Start: 2025-07-07

## 2025-07-22 RX ORDER — ASCORBIC ACID 500 MG
500 TABLET ORAL DAILY
COMMUNITY
Start: 2025-07-07

## 2025-07-22 RX ORDER — CHOLECALCIFEROL (VITAMIN D3) 50 MCG
50 TABLET ORAL DAILY
COMMUNITY
Start: 2025-07-08

## 2025-07-22 RX ORDER — FUROSEMIDE 20 MG/1
20 TABLET ORAL DAILY
COMMUNITY
Start: 2025-07-16

## 2025-07-22 RX ORDER — DIPHENHYDRAMINE HYDROCHLORIDE, ZINC ACETATE 2; .1 G/100G; G/100G
1 CREAM TOPICAL 2 TIMES DAILY PRN
COMMUNITY
Start: 2025-07-07

## 2025-07-22 NOTE — PROGRESS NOTES
Referring Physician  OMAR LEAVITT    History of Present Illness  Patient presents today for follow-up visit after recent hospitalization with evidence of Legionella pneumonia.  Completed course of antibiotic therapy.  Denies significant dyspnea symptoms or cough.  Tolerating some activity at home.  Using incentive spirometry.    Medications  Medications Ordered Prior to Encounter[1]    Allergies  Allergies[2]    Physical Exam  Constitutional: no acute distress  HEENT: PERRL  Neck: supple, no JVD  Cardio: RRR, S1 S2  Respiratory: clear to auscultation bilaterally, no wheezing, rales, rhonchi, crackles  GI: abdomen soft, non tender, active bowel sounds, no organomegaly  Extremities: no clubbing, cyanosis, edema  Neurologic: no gross motor deficits  Skin: warm, dry  Lymphatic: no supraclavicular lymphadenopathy     Assessment  1.  Recent history of Legionella pneumonia    Plan  -Patient presents today for follow-up visit after recent hospitalization June 2025 with severe Legionella pneumonia.  CT chest on 6/17/2025 with extensive right-sided opacities seen consistent with pneumonia.  Completed course of antibiotic therapy.  Appears to be clinically improved.  Repeat chest x-ray ordered to monitor resolution of findings.    Bernabe Davis DO  Pulmonary Critical Care Medicine  [facility]  7/22/2025  10:11 AM          [1]   Current Outpatient Medications on File Prior to Visit   Medication Sig Dispense Refill    acetaminophen 325 MG Oral Tab Take 2 tablets (650 mg total) by mouth every 6 (six) hours as needed for Pain. 30 tablet 0    calcium carbonate 500 MG Oral Chew Tab Chew 1 tablet (500 mg total) by mouth 2 (two) times daily as needed. 3 tablet 0    ipratropium-albuterol 0.5-2.5 (3) MG/3ML Inhalation Solution Take 3 mL by nebulization every 4 (four) hours as needed. 3 mL 0    guaiFENesin  MG Oral Tablet 12 Hr Take 1 tablet (600 mg total) by mouth 2 (two) times daily. 30 tablet 0    aspirin 81 MG Oral Tab EC  Take 1 tablet (81 mg total) by mouth daily. 30 tablet 3    amiodarone 200 MG Oral Tab Take 1 tablet (200 mg total) by mouth daily. 90 tablet 1    hydrALAZINE 25 MG Oral Tab Take 1 tablet (25 mg total) by mouth every 8 (eight) hours. 90 tablet 3    metoprolol succinate ER 25 MG Oral Tablet 24 Hr Take 0.5 tablets (12.5 mg total) by mouth Daily Beta Blocker. 30 tablet 1    atorvastatin 40 MG Oral Tab Take 1 tablet (40 mg total) by mouth nightly.      levothyroxine 75 MCG Oral Tab Take 100 mcg by mouth in the morning.       No current facility-administered medications on file prior to visit.   [2] No Known Allergies

## 2025-08-02 ENCOUNTER — HOSPITAL ENCOUNTER (OUTPATIENT)
Dept: CV DIAGNOSTICS | Facility: HOSPITAL | Age: OVER 89
Discharge: HOME | End: 2025-08-02
Attending: STUDENT IN AN ORGANIZED HEALTH CARE EDUCATION/TRAINING PROGRAM

## 2025-08-02 DIAGNOSIS — I48.0 AF (PAROXYSMAL ATRIAL FIBRILLATION) (HCC): ICD-10-CM

## 2025-08-02 PROCEDURE — 93229 REMOTE 30 DAY ECG TECH SUPP: CPT | Performed by: STUDENT IN AN ORGANIZED HEALTH CARE EDUCATION/TRAINING PROGRAM

## (undated) DEVICE — 3M™ TEGADERM™ TRANSPARENT FILM DRESSING, 1626W, 4 IN X 4-3/4 IN (10 CM X 12 CM), 50 EACH/CARTON, 4 CARTON/CASE: Brand: 3M™ TEGADERM™

## (undated) DEVICE — SUTURE PDS II 1 CTX

## (undated) DEVICE — SUTURE SILK 2-0 SH

## (undated) DEVICE — SUTURE PDS II 2-0 CT-1

## (undated) DEVICE — CONNECTOR PRFSN QCK PRM .25IN

## (undated) DEVICE — PUNCH AORTIC 4.0 LONG HANDLE

## (undated) DEVICE — RETROGRADE CARDIOPLEGIA CATHETER: Brand: EDWARDS LIFESCIENCES RETROGRADE CARDIOPLEGIA CATHETER

## (undated) DEVICE — TRAY ENDOVEIN KTV16 HARVESTING

## (undated) DEVICE — SUTURE SILK 1-0 SA87G

## (undated) DEVICE — DEVICE BLWR/MSTR ACCUMIST ATCH

## (undated) DEVICE — PACK CUSTOM TUBING

## (undated) DEVICE — SUTURE SILK 4-0 SA63H

## (undated) DEVICE — COVER SGL STRL LGHT HNDL BLU

## (undated) DEVICE — POUCH: SSEAL TYVEK 2000/CS: Brand: MEDICAL ACTION INDUSTRIES

## (undated) DEVICE — BLOOD TRANSFUSION FILTER: Brand: HAEMONETICS

## (undated) DEVICE — CANNULA PRFSN 5.5IN 9FR AOR

## (undated) DEVICE — FORESIGHT LARGE SENSOR: Brand: FORESIGHT

## (undated) DEVICE — SUTURE PROLENE 7-0 BV-1

## (undated) DEVICE — SUTURE WIRE DOUBLE STERNOTOMY

## (undated) DEVICE — CANNULA PRFSN 15IN 36-46FR VNS

## (undated) DEVICE — SUTURE VICRYL 1-0 J977H

## (undated) DEVICE — SUTURE ETHIBOND 0 CT-1

## (undated) DEVICE — SUTURE SILK 2-0 SA85H

## (undated) DEVICE — STERILE TETRA-FLEX CF, ELASTIC BANDAGE, 4" X 5.5YD: Brand: TETRA-FLEX™CF

## (undated) DEVICE — SUTURE PROLENE 5-0 C-1

## (undated) DEVICE — COVER CAMERA LIGHT HANDLE

## (undated) DEVICE — SUTURE PROLENE 3-0 SH

## (undated) DEVICE — ABSORBABLE HEMOSTAT (OXIDIZED REGENERATED CELLULOSE, U.S.P.): Brand: SURGICEL

## (undated) DEVICE — INSERT SUTURE OPEN HEART

## (undated) DEVICE — EZ GLIDE AORTIC CANNULA: Brand: EDWARDS LIFESCIENCES EZ GLIDE AORTIC CANNULA

## (undated) DEVICE — SUTURE SILK 0 FSL

## (undated) DEVICE — LEAD BIPOLAR TEMP 6495XF53

## (undated) DEVICE — ALARM PT PTCH LVL

## (undated) DEVICE — CANNULA PRFSN 2.63IN 3MM 2MM

## (undated) DEVICE — ADAPTER CRDPLG ANTGRD RTRGD 3W

## (undated) DEVICE — PACK ASSRY CUSTOM TUBING

## (undated) DEVICE — [HIGH FLOW INSUFFLATOR,  DO NOT USE IF PACKAGE IS DAMAGED,  KEEP DRY,  KEEP AWAY FROM SUNLIGHT,  PROTECT FROM HEAT AND RADIOACTIVE SOURCES.]: Brand: PNEUMOSURE

## (undated) DEVICE — STERILE POLYISOPRENE POWDER-FREE SURGICAL GLOVES: Brand: PROTEXIS

## (undated) DEVICE — DRAPE SLUSH/WARMER W/DISC

## (undated) DEVICE — SUTURE SURGICAL STEEL #7

## (undated) DEVICE — STABILIZER SRG UNV AST CABG

## (undated) DEVICE — HEART DRAPE & SUPPLY PACK: Brand: MEDLINE INDUSTRIES, INC.

## (undated) DEVICE — CS5/5+ FASTPACK, 225ML 150U RES: Brand: HAEMONETICS CELL SAVER 5/5+ SYSTEMS

## (undated) DEVICE — STERILE TETRA-FLEX CF, ELASTIC BANDAGE LATEX FREE 6IN X5.5 YD: Brand: TETRA-FLEX™CF

## (undated) DEVICE — 12 FOOT DISPOSABLE EXTENSION CABLE WITH SAFE CONNECT / SCREW-DOWN

## (undated) DEVICE — CARTRIDGE HC HMS+ CRTDG SYR

## (undated) DEVICE — SUTURE PROLENE 6-0 C-1

## (undated) DEVICE — SUTURE MONOCRYL 3-0 Y936H

## (undated) DEVICE — PAD PLMM SLVR 12.5X4IN SLVR

## (undated) DEVICE — CARD SMRT MEDISTEM VERIQ TRNST

## (undated) DEVICE — INTENDED TO BE USED TO OCCLUDE, RETRACT AND IDENTIFY ARTERIES, VEINS, TENDONS AND NERVES IN SURGICAL PROCEDURES: Brand: STERION®  VESSEL LOOP

## (undated) DEVICE — BATTERY

## (undated) DEVICE — SUCTION CANISTER, 3000CC,SAFELINER: Brand: DEROYAL

## (undated) DEVICE — SUTURE PROLENE 7-0 8701H

## (undated) DEVICE — SOL  .9 1000ML BAG

## (undated) DEVICE — GOWN SURG AERO BLUE PERF LG

## (undated) DEVICE — SOL  .9 1000ML BTL

## (undated) DEVICE — THORACIC CATHETER, RIGHT ANGLE, SILICONE, WITH CLOTSTOP®: Brand: AXIOM® ATRAUM™ WITH CLOTSTOP®

## (undated) DEVICE — SUTURE PROLENE 4-0 BB

## (undated) DEVICE — 12 ML SYRINGE LUER-LOCK TIP: Brand: MONOJECT

## (undated) DEVICE — 3M™ STERI-DRAPE™ INSTRUMENT POUCH 1018: Brand: STERI-DRAPE™

## (undated) DEVICE — GAUZE SPONGES,12 PLY: Brand: CURITY

## (undated) DEVICE — CANNULA AORTIC 21 FR SOFT FLOW

## (undated) DEVICE — SPONGE LAP 18X18 XRAY STRL

## (undated) DEVICE — SUTURE PROLENE 7-0 BV175-6

## (undated) DEVICE — CATH SECURING DEVICE STATLOCK

## (undated) DEVICE — HEART A: Brand: MEDLINE INDUSTRIES, INC.

## (undated) DEVICE — STERILE (10.2 X 147CM) TELESCOPICALLY-FOLDED COVER: Brand: CIV-FLEX™ TRANSDUCER COVER

## (undated) DEVICE — THORACIC CATHETER, STRAIGHT, SILICONE, WITH CLOTSTOP®: Brand: AXIOM® ATRAUM™ WITH CLOTSTOP®

## (undated) NOTE — LETTER
[place] [address]    Consent for Diagnostic Services    Your physician has ordered one of the following tests to determine the function of your heart: Cardiac nuclear diagnostic stress test Lexiscan. The information obtained will aid your physician in detecting the possible presence of heart disease, to determine why you may have such symptoms such as chest pain or shortness of breath and to determine an appropriate plan of medical management.    The risks associated with any exercise test or pharmacological stress test are similar to the risks associated with exercise, including an abnormal blood pressure, dizziness, fainting, abnormal heart rate and in very rear instances heart attach, stroke, or death. During the test you must report any unusual feeling or symptoms you experience during the test. Every effort will be made to minimize such risks by careful observation during the test. Emergency equipment and trained personnel are available to deal with unusual situations, which may arise and these personnel have permission to treat you.     During the treadmill or nuclear stress test, the exercise intensity begins at a low level and advances in stages depending on your fitness level. We may stop the test at any time because of signs of fatigue or changes in your heart rate, electrocardiogram, or blood pressure, or other symptoms you may experience.     If your doctor has ordered a pharmacological stress test, you may experience a headache, shortness of breath, flushing, warmth, rapid heart rate, or a bitter taste in your mouth. Sometimes you may not experience any symptoms. Your prompt reporting of any symptoms is very important.     During a Regadenoson stress test, you are given an injection of Regadenoson. Immediately after the Regadenoson is given, you will be injected with a radioactive isotope. During a Dobutamine stress test, Dobutamine infuses over approximately fifteen  minutes. A radioactive isotope is injected during the infusion. After either pharmacological stress test, you will have either a nuclear scan or an echocardiogram.    The information that is obtained during your testing will be treated as privileged and confidential. The information obtained will be given to your doctor and may be used for insurance purposes or to track and trend data as part of  with your privacy retained.    I have read and understand the above information. I voluntarily agree and consent to the above ordered test. Furthermore, no guarantees or assurances have been given by anyone as to the results that may be obtained from this procedure. Any questions that may have occurred to me have been answered to my satisfaction.     Signature of Patient:   ____________________________________________________________    Signature of Witness: _______________________________Date: ___________Time: ________

## (undated) NOTE — LETTER
[place] [address]    Consent for Diagnostic Services    Your physician has ordered one of the following tests to determine the function of your heart: Nuclear Treadmill (Sestamibi). The information obtained will aid your physician in detecting the possible presence of heart disease, to determine why you may have such symptoms such as chest pain or shortness of breath and to determine an appropriate plan of medical management.    The risks associated with any exercise test or pharmacological stress test are similar to the risks associated with exercise, including an abnormal blood pressure, dizziness, fainting, abnormal heart rate and in very rear instances heart attach, stroke, or death. During the test you must report any unusual feeling or symptoms you experience during the test. Every effort will be made to minimize such risks by careful observation during the test. Emergency equipment and trained personnel are available to deal with unusual situations, which may arise and these personnel have permission to treat you.     During the treadmill or nuclear stress test, the exercise intensity begins at a low level and advances in stages depending on your fitness level. We may stop the test at any time because of signs of fatigue or changes in your heart rate, electrocardiogram, or blood pressure, or other symptoms you may experience.     If your doctor has ordered a pharmacological stress test, you may experience a headache, shortness of breath, flushing, warmth, rapid heart rate, or a bitter taste in your mouth. Sometimes you may not experience any symptoms. Your prompt reporting of any symptoms is very important.     During a Regadenoson stress test, you are given an injection of Regadenoson. Immediately after the Regadenoson is given, you will be injected with a radioactive isotope. During a Dobutamine stress test, Dobutamine infuses over approximately fifteen minutes. A radioactive  isotope is injected during the infusion. After either pharmacological stress test, you will have either a nuclear scan or an echocardiogram.    The information that is obtained during your testing will be treated as privileged and confidential. The information obtained will be given to your doctor and may be used for insurance purposes or to track and trend data as part of  with your privacy retained.    I have read and understand the above information. I voluntarily agree and consent to the above ordered test. Furthermore, no guarantees or assurances have been given by anyone as to the results that may be obtained from this procedure. Any questions that may have occurred to me have been answered to my satisfaction.     Signature of Patient:   ____________________________________________________________    Signature of Witness: _______________________________Date: ___________Time: ________

## (undated) NOTE — IP AVS SNAPSHOT
[address]  [address] [facility] ~ [phone]                Discharge Summary   1/25/2017    [address] [place] [address] Miguelito Galdamez           Admission Information        Provider Department    1/25/2017 Nehemiah Finnegan MD E Take 1 tablet (500 mg total) by mouth 3 (three) times daily.     Lily Moon        breakfast       lunch       dinner           Atorvastatin Calcium 20 MG Tabs   Last time this was given:  20 mg on 1/30/2017  8:19 PM   Commonly known as:  LIPI Take 2 tablets by mouth every 4 (four) hours as needed for Pain. Taylor Peña                        Metoprolol Succinate ER 25 MG Tb24   Last time this was given:  25 mg on 1/31/2017  7:07 AM   Commonly known as:   Toprol XL   Next dose due:  2/1/1 simvastatin 5 MG Tabs   Commonly known as:  ZOCOR                Where to Get Your Medications      Please  your prescriptions at the location directed by your doctor or nurse     Bring a paper prescription for each of these medications    - acetam Why:  10:45am    Contact information:    [address] [address]  [phone]          Follow up with Prasanna Trejo MD. Go on 2/13/2017.     Specialty:  CARDIOLOGY    Why:  appt is with DUSTY Whitten at 10:30AM    Contact information: Neutrophil % Lymphocyte % Monocyte % Eosinophil % Basophil % Prelim Neut Abs Final Neut Abs Lymphocyte Abso Monocyte Absolu Eosinophil Abso Basophil Absolu    (01/30/17)  72 (01/30/17)  14 (01/30/17)  12 (01/30/17)  2 (01/30/17)  1 -- (01/30/17)  7.2 (01/ coverage. Patient [address] is a Federal Navigator program that can help with your Affordable Care Act coverage, as well as all types of Medicaid plans.   To get signed up and covered, please call [phone] and ask to get set up for an insuran Use: Treat abnormal blood pressure (high or low), cardiac conditions; and/or abnormal heart rates/rhythms   Most common side effects: Dizziness or feeling lightheaded (especially with standing), heart rate changes, headaches, nausea/vomiting   What to repo Most common side effects: Constipation, drowsiness, dizziness, urinary retention (inability to urinate)   What to report to your healthcare team: Difficulty urinating, dizziness, no bowel movement in 2+ days, unresolved pain           Non-Narcotic Pain Med

## (undated) NOTE — LETTER
[address]  INFORMED CONSENT FOR TRANSFUSION OF BLOOD OR BLOOD PRODUCTS   My physician has informed me of the nature, purpose, benefits and risks of transfusion for blood and blood components that he/she may deem nece (Signature of Patient)                                       (Responsible party in case of Minor,                                                                            Incompetent, or unconscious Patient)  __________________________________    _______

## (undated) NOTE — MR AVS SNAPSHOT
[place]  [address]  [phone]               Thank you for choosing us for your health care visit with [facility], OD.   We are glad to serve you and happy to provide you with this summary of Take 2 tablets (650 mg total) by mouth every 6 (six) hours as needed. Commonly known as:  TYLENOL           ascorbic acid 500 MG Tabs   Take 1 tablet (500 mg total) by mouth 3 (three) times daily.    Commonly known as:  VITAMIN C           aspirin 81 MG C visit,  view other health information, and more. To sign up or find more information, go to [email or web address] and click on the Sign Up Now link in the [employer] box.      Enter your [employer] Activation Code exactly as it appears below along with yo Don’t forget strength training with weights and resistance Set goals and track your progress   You don’t need to join a gym. Home exercises work great.  Put more priority on exercise in your life                    Visit [facility] online at

## (undated) NOTE — IP AVS SNAPSHOT
Patient Demographics     Address Phone E-mail Address    [address] [address] [address] [facility])  [phone] [email or web address]      Emergency Contact(s)     Name Relation Home Work Leonard Castillo [phone] Contact information:    [address] [place], [place] [address] [address] [address]          Follow up with Maxine Mccain.     Specialty:  SURGERY, ORTHOPEDIC    Contact information:    [address] [facility] [address] [address] [phone] Take 1 tablet (75 mg total) by mouth daily.     Lily Moon        morning                   docusate sodium 100 MG Caps   Last time this was given:  100 mg on 1/31/2017  9:00 AM   Commonly known as:  COLACE   Next dose due:  Anytime this eveni Metoprolol Succinate ER 25 MG Tb24   Last time this was given:  25 mg on 1/31/2017  7:07 AM   Commonly known as: Toprol XL   Next dose due:  2/1/17 morning          Take 1 tablet (25 mg total) by mouth Daily Beta Blocker.     Miriam Dent [id number] Metoprolol Succinate ER (Toprol XL) 24 hr tab 25 mg 01/31/17 0707 Given      [id number] Normal Saline Flush 0.9 % injection 10 mL 01/30/17 2020 Given      [id number] Normal Saline Flush 0.9 % injection 10 mL 01/31/17 0707 Given      [id number] Eliz Procedure Component Value Units Date/Time    MRSA/SA Scrn by PCR:Emergent Ortho only Once [[id number]]  (Normal) Collected:  01/20/17 0840    Order Status:  Completed Lab Status:  Final result Updated:  01/20/17 134    Specimen Information:  Other from Na Assessment and Plan:     1. Mild metabolic encephalopathy    The patient is significantly improved. His mental status is more appropriate. He no longer has some myoclonic jerking  There are no focal findings to suggest an ischemic event.   His renal func him, because she reminded him of his . He also at times is describing seen some lines in his vision. He tells me that the lines are present both in the right eye in the left eye.   They are about 18 rolls of lines but they are also there when Normal Saline Flush 0.9 % injection 10 mL 10 mL Intravenous PRN   0.9%  NaCl infusion  Intravenous Continuous   temazepam (RESTORIL) cap 15 mg 15 mg Oral Nightly PRN   DOBUTamine in D5W (DOBUTREX) 250 mg/250 ml infusion 2.5-10 mcg/kg/min Intravenous Contin morphINE sulfate (PF) 2 MG/ML injection 2 mg 2 mg Intravenous Q2H PRN   Or      morphINE sulfate (PF) 4 MG/ML injection 4 mg 4 mg Intravenous Q2H PRN   Or      morphINE sulfate (PF) 4 MG/ML injection 8 mg 8 mg Intravenous Q2H PRN   aspirin chewable tab 81 Mental Status: Oriented to person place   Speech: Speech is normal  Neck: Supple, normal ROM  Head: normocephalic, temporal arteries nontender, occipital notches nontender    Cranial Nerves:   CN II  Pupils are equal, round, and reactive to light.   Visual a mild underlying infection with his elevated WBC. Both his WBC and renal function should be followed. If he is not better in 24-48 hours I recommend a plain CT of the head and EEG.             60 minutes spent on this admission - examining patient, obtain for balance and was reminded not to push down with arms. Pt with 1 slight feeling of dizziness with first turn in the room but quickly passed. Pt was left in the bathroom in the joy and handed off to nursing with call light within reach.  Pt is on track to PT Approx Degree of Impairment Score: 54.16%   Standardized Score (AM-PAC Scale): 40.78   CMS Modifier (G-Code): CK    FUNCTIONAL ABILITY STATUS  Gait Assessment   Gait Assistance:  (CGA)  Distance (ft): 175'  Assistive Device: Rolling walker  Pattern:  (d Coronary artery disease involving native heart    Metabolic encephalopathy      ASSESSMENT   RN Lianet Gayle in agreeable for PT to see this pt for PT treatment.   The pt still presents with the same impairments and functional limitations that limit his ability /67 mmHg  SpO2 96% room air  HR 89 bpm  RR 16  After activity  BP 94/71 mmHg   bpm (RN aware)  RR 17    AM-PAC '6-Clicks' INPATIENT SHORT FORM - BASIC MOBILITY  How much difficulty does the patient currently have. ..  -   Turning over in bed (in Goal #3   Patient is able to ambulate 300 feet with assist device: walker - rolling at assistance level: modified independent  Current: 300' RW, CGA   Goal #4   The pt will be able to recite and maintain his sternal precautions during activity.   CURRENT: t OT Device Recommendations: Reacher;Raised toilet seat; Shower chair    PLAN  OT Treatment Plan: Balance activities; Energy conservation/work simplification techniques;ADL training;Functional transfer training;UE strengthening/ROM; Endurance training    [address] [place] [address]  sternal precautions   Goals  on: 2/3/17  Frequency: 3-5x/week    [address]  [address]  [id number]       Occupational Therapy Note by Nirmala Purdy OT at 2017 11:51 AM  Version 1 of 1    Author:  Mahin Johnson, Weight Bearing Restriction: None                PAIN ASSESSMENT              ACTIVITY TOLERANCE  O2 Saturation at rest 97%  Room air  No shortness of breath  Heart Rate: at rest 88  Respiratory Rate: 16    ACTIVITIES OF DAILY LIVING ASSESSMENT  AM-PAC ‘6-C t/f and toileting today. Pt was able to I'ly i.d. 2 sternal precautions. Video Swallow Study Notes     No notes of this type exist for this encounter.          SLP Note - SLP Notes      SLP Note by RYAN Reeder at 1/27/2 GOAL PROGRESSING. SLP to follow x1 during meal intake of current diet with mech soft solids at meal as possible.    Goal #2 The patient/family/caregiver will demonstrate understanding and implementation of aspiration precautions and swallow strategies indep 2/28/2017 10:45 AM D'Amico, Lavinia Masker, DO [facility], [address] [place] [address]

## (undated) NOTE — LETTER
[address], [place], [place]     AUTHORIZATION FOR SURGICAL OPERATION OR PROCEDURE    1.    I hereby authorize Dr. Ivania Vivas MD, my Physician(s) and whomever may be designated as the doctor's Assistant, to perform to have an autologous transfusion of my own blood, or a directed donor transfusion, I will discuss this with my         Physician.   5.   I consent to the photographing of procedure(s) to be performed for the purposes of advancing medicine, science (Responsible person in case of minor or unconscious patient)   (Relationship to Patient)      _______________________________________________________________ ____________________________  (Witness signature)

## (undated) NOTE — LETTER
[address], [place]  Authorization for Invasive Procedures  1.  I hereby authorize Dr. Kimberlee Pak , my physician and whomever may be designated as the doctor's assistant, to perform the following operation and/or procedure:  Peripher performed for the purposes of advancing medicine, science, and/or education, provided my identity is not revealed. If the procedure has been videotaped, the physician/surgeon will obtain the original videotape.  The hospital will not be responsible for stor My signature below affirms that prior to the time of the procedure, I have explained to the patient and/or his legal representative, the risks and benefits involved in the proposed treatment and any reasonable alternative to the proposed treatment.  I have

## (undated) NOTE — LETTER
[place] [address]    Consent for Diagnostic Services    Your physician has ordered one of the following tests to determine the function of your heart: Nuclear Treadmill (Sestamibi). The information obtained will aid your physician in detecting the possible presence of heart disease, to determine why you may have such symptoms such as chest pain or shortness of breath and to determine an appropriate plan of medical management.    The risks associated with any exercise test or pharmacological stress test are similar to the risks associated with exercise, including an abnormal blood pressure, dizziness, fainting, abnormal heart rate and in very rear instances heart attach, stroke, or death. During the test you must report any unusual feeling or symptoms you experience during the test. Every effort will be made to minimize such risks by careful observation during the test. Emergency equipment and trained personnel are available to deal with unusual situations, which may arise and these personnel have permission to treat you.     During the treadmill or nuclear stress test, the exercise intensity begins at a low level and advances in stages depending on your fitness level. We may stop the test at any time because of signs of fatigue or changes in your heart rate, electrocardiogram, or blood pressure, or other symptoms you may experience.     If your doctor has ordered a pharmacological stress test, you may experience a headache, shortness of breath, flushing, warmth, rapid heart rate, or a bitter taste in your mouth. Sometimes you may not experience any symptoms. Your prompt reporting of any symptoms is very important.     During a Regadenoson stress test, you are given an injection of Regadenoson. Immediately after the Regadenoson is given, you will be injected with a radioactive isotope. During a Dobutamine stress test, Dobutamine infuses over approximately fifteen minutes. A radioactive  isotope is injected during the infusion. After either pharmacological stress test, you will have either a nuclear scan or an echocardiogram.    The information that is obtained during your testing will be treated as privileged and confidential. The information obtained will be given to your doctor and may be used for insurance purposes or to track and trend data as part of  with your privacy retained.    I have read and understand the above information. I voluntarily agree and consent to the above ordered test. Furthermore, no guarantees or assurances have been given by anyone as to the results that may be obtained from this procedure. Any questions that may have occurred to me have been answered to my satisfaction.     Signature of Patient:   ____________________________________________________________    Signature of Witness: _______________________________Date: ___________Time: ________

## (undated) NOTE — LETTER
[facility]  Administration of Anesthesia  1.    I Lou Geronimo, or _________________________________ acting on his/her behalf, (Patient) (Dependent/Representative) request to receive anesthesia for my pending procedure/operation/treatment pressure, difficulty urinating, slowing of the baby's heart rate and headache. Rare risks include infections, high spinal         block, spinal bleeding, seizure, cardiac arrest and death.   7.   AWARENESS: I understand that it is possible (but unlike ________________________________________________  (Date) (Time)                                                                                                  (Responsible person in case of minor/ unconscious pt) /Relationship    My signature below affir